# Patient Record
Sex: FEMALE | Race: WHITE | NOT HISPANIC OR LATINO | Employment: FULL TIME | URBAN - METROPOLITAN AREA
[De-identification: names, ages, dates, MRNs, and addresses within clinical notes are randomized per-mention and may not be internally consistent; named-entity substitution may affect disease eponyms.]

---

## 2017-05-25 DIAGNOSIS — J45.20 MILD INTERMITTENT ASTHMA WITHOUT COMPLICATION: ICD-10-CM

## 2017-05-26 RX ORDER — ALBUTEROL SULFATE 90 UG/1
AEROSOL, METERED RESPIRATORY (INHALATION)
Qty: 8.5 G | Refills: 0 | Status: SHIPPED | OUTPATIENT
Start: 2017-05-26 | End: 2017-07-28 | Stop reason: SDUPTHER

## 2017-06-05 ENCOUNTER — TELEPHONE (OUTPATIENT)
Dept: FAMILY MEDICINE | Facility: CLINIC | Age: 57
End: 2017-06-05

## 2017-06-05 DIAGNOSIS — Z00.00 ANNUAL PHYSICAL EXAM: Primary | ICD-10-CM

## 2017-06-05 DIAGNOSIS — E55.9 VITAMIN D DEFICIENCY: ICD-10-CM

## 2017-06-05 NOTE — TELEPHONE ENCOUNTER
----- Message from Soledad Summers sent at 6/5/2017  2:45 PM CDT -----  Contact: Pt [phone]  Doctor appointment and lab have been scheduled.  Please link lab orders to the lab appointment.  Date of doctor appointment:  07-28-17  Physical or EP:annual   Date of lab appointment:  07-28-17  Comments:

## 2017-07-28 ENCOUNTER — OFFICE VISIT (OUTPATIENT)
Dept: FAMILY MEDICINE | Facility: CLINIC | Age: 57
End: 2017-07-28
Payer: COMMERCIAL

## 2017-07-28 ENCOUNTER — LAB VISIT (OUTPATIENT)
Dept: LAB | Facility: HOSPITAL | Age: 57
End: 2017-07-28
Attending: INTERNAL MEDICINE
Payer: COMMERCIAL

## 2017-07-28 VITALS
BODY MASS INDEX: 30.93 KG/M2 | HEIGHT: 67 IN | HEART RATE: 64 BPM | SYSTOLIC BLOOD PRESSURE: 109 MMHG | WEIGHT: 197.06 LBS | DIASTOLIC BLOOD PRESSURE: 67 MMHG | TEMPERATURE: 98 F

## 2017-07-28 DIAGNOSIS — Z23 NEED FOR PNEUMOCOCCAL VACCINATION: ICD-10-CM

## 2017-07-28 DIAGNOSIS — N95.1 MENOPAUSAL SYNDROME (HOT FLASHES): ICD-10-CM

## 2017-07-28 DIAGNOSIS — Z00.00 ANNUAL PHYSICAL EXAM: ICD-10-CM

## 2017-07-28 DIAGNOSIS — Z00.00 ANNUAL PHYSICAL EXAM: Primary | ICD-10-CM

## 2017-07-28 DIAGNOSIS — E78.5 HYPERLIPIDEMIA, UNSPECIFIED HYPERLIPIDEMIA TYPE: ICD-10-CM

## 2017-07-28 DIAGNOSIS — J30.89 ENVIRONMENTAL AND SEASONAL ALLERGIES: ICD-10-CM

## 2017-07-28 DIAGNOSIS — E66.9 OBESITY (BMI 30-39.9): ICD-10-CM

## 2017-07-28 DIAGNOSIS — M54.30 SCIATICA, UNSPECIFIED LATERALITY: ICD-10-CM

## 2017-07-28 DIAGNOSIS — E55.9 VITAMIN D DEFICIENCY: ICD-10-CM

## 2017-07-28 DIAGNOSIS — F32.A DEPRESSION, UNSPECIFIED DEPRESSION TYPE: ICD-10-CM

## 2017-07-28 DIAGNOSIS — J45.20 MILD INTERMITTENT ASTHMA WITHOUT COMPLICATION: ICD-10-CM

## 2017-07-28 LAB
25(OH)D3+25(OH)D2 SERPL-MCNC: 32 NG/ML
ALBUMIN SERPL BCP-MCNC: 3.6 G/DL
ALP SERPL-CCNC: 80 U/L
ALT SERPL W/O P-5'-P-CCNC: 20 U/L
ANION GAP SERPL CALC-SCNC: 13 MMOL/L
AST SERPL-CCNC: 20 U/L
BASOPHILS # BLD AUTO: 0.04 K/UL
BASOPHILS NFR BLD: 0.6 %
BILIRUB SERPL-MCNC: 0.5 MG/DL
BUN SERPL-MCNC: 21 MG/DL
CALCIUM SERPL-MCNC: 9.5 MG/DL
CHLORIDE SERPL-SCNC: 103 MMOL/L
CHOLEST/HDLC SERPL: 5.3 {RATIO}
CO2 SERPL-SCNC: 24 MMOL/L
CREAT SERPL-MCNC: 0.8 MG/DL
DIFFERENTIAL METHOD: NORMAL
EOSINOPHIL # BLD AUTO: 0.3 K/UL
EOSINOPHIL NFR BLD: 4.1 %
ERYTHROCYTE [DISTWIDTH] IN BLOOD BY AUTOMATED COUNT: 13.2 %
EST. GFR  (AFRICAN AMERICAN): >60 ML/MIN/1.73 M^2
EST. GFR  (NON AFRICAN AMERICAN): >60 ML/MIN/1.73 M^2
GLUCOSE SERPL-MCNC: 92 MG/DL
HCT VFR BLD AUTO: 38.7 %
HDL/CHOLESTEROL RATIO: 18.8 %
HDLC SERPL-MCNC: 255 MG/DL
HDLC SERPL-MCNC: 48 MG/DL
HGB BLD-MCNC: 12.9 G/DL
LDLC SERPL CALC-MCNC: 173 MG/DL
LYMPHOCYTES # BLD AUTO: 2.2 K/UL
LYMPHOCYTES NFR BLD: 30.3 %
MCH RBC QN AUTO: 29.2 PG
MCHC RBC AUTO-ENTMCNC: 33.3 G/DL
MCV RBC AUTO: 88 FL
MONOCYTES # BLD AUTO: 0.4 K/UL
MONOCYTES NFR BLD: 5.5 %
NEUTROPHILS # BLD AUTO: 4.2 K/UL
NEUTROPHILS NFR BLD: 59.2 %
NONHDLC SERPL-MCNC: 207 MG/DL
PLATELET # BLD AUTO: 338 K/UL
PMV BLD AUTO: 11.3 FL
POTASSIUM SERPL-SCNC: 4.1 MMOL/L
PROT SERPL-MCNC: 7.3 G/DL
RBC # BLD AUTO: 4.42 M/UL
SODIUM SERPL-SCNC: 140 MMOL/L
TRIGL SERPL-MCNC: 170 MG/DL
WBC # BLD AUTO: 7.13 K/UL

## 2017-07-28 PROCEDURE — 85025 COMPLETE CBC W/AUTO DIFF WBC: CPT

## 2017-07-28 PROCEDURE — 36415 COLL VENOUS BLD VENIPUNCTURE: CPT | Mod: PO

## 2017-07-28 PROCEDURE — 99999 PR PBB SHADOW E&M-EST. PATIENT-LVL III: CPT | Mod: PBBFAC,,, | Performed by: INTERNAL MEDICINE

## 2017-07-28 PROCEDURE — 90732 PPSV23 VACC 2 YRS+ SUBQ/IM: CPT | Mod: S$GLB,,, | Performed by: INTERNAL MEDICINE

## 2017-07-28 PROCEDURE — 80061 LIPID PANEL: CPT

## 2017-07-28 PROCEDURE — 80053 COMPREHEN METABOLIC PANEL: CPT

## 2017-07-28 PROCEDURE — 82306 VITAMIN D 25 HYDROXY: CPT

## 2017-07-28 PROCEDURE — 90471 IMMUNIZATION ADMIN: CPT | Mod: S$GLB,,, | Performed by: INTERNAL MEDICINE

## 2017-07-28 PROCEDURE — 99396 PREV VISIT EST AGE 40-64: CPT | Mod: 25,S$GLB,, | Performed by: INTERNAL MEDICINE

## 2017-07-28 RX ORDER — BUPROPION HYDROCHLORIDE 300 MG/1
300 TABLET ORAL EVERY MORNING
Qty: 90 TABLET | Refills: 3 | Status: SHIPPED | OUTPATIENT
Start: 2017-07-28 | End: 2018-08-19 | Stop reason: SDUPTHER

## 2017-07-28 RX ORDER — ALBUTEROL SULFATE 90 UG/1
1-2 AEROSOL, METERED RESPIRATORY (INHALATION) EVERY 6 HOURS PRN
Qty: 18 G | Refills: 3 | Status: SHIPPED | OUTPATIENT
Start: 2017-07-28 | End: 2018-11-20 | Stop reason: SDUPTHER

## 2017-07-28 NOTE — PROGRESS NOTES
Subjective:        Patient ID: Amita Lewis is a 56 y.o. female.    Chief Complaint: Annual Exam    HPI   Amita Lewis presents for annual exam.  Feels well, no new complaints today.    1. Allergies: pt reports her allergies really flared up due to poor air quality and things she breathes in while at work.  She has found a good regimen that works for her to control these sx: OTC Allegra, nasal saline and OTC Flonase daily.  She is also using an ointment for her eyes prescribed by her eye doctor that helps with the irritation, redness.    2. Weight: Pt was able to lose weight with regular walking, myfitness pal when she was using it regularly.  She has gained some weight back due to not being adherent but plans to resume.  The weight loss helped with heartburn, mood and she felt a lot better overall.    Review of Systems   Constitutional: Negative for unexpected weight change.   HENT: Negative for hearing loss, sore throat and trouble swallowing.    Eyes: Negative for visual disturbance (glasses).   Respiratory: Negative for chest tightness, shortness of breath and wheezing.    Cardiovascular: Negative for chest pain and leg swelling.   Gastrointestinal: Negative for abdominal pain, blood in stool, constipation and diarrhea.   Genitourinary: Negative for difficulty urinating, hematuria, vaginal bleeding and vaginal discharge.   Musculoskeletal: Positive for back pain (chronic, managed with Ibuprofen qhs).   Skin: Negative for rash.   Allergic/Immunologic: Positive for environmental allergies.   Psychiatric/Behavioral: Negative for dysphoric mood. The patient is not nervous/anxious.            Objective:        Vitals:    07/28/17 0828   BP: 109/67   Pulse: 64   Temp: 98.1 °F (36.7 °C)     Physical Exam   Constitutional: She is oriented to person, place, and time. She appears well-developed and well-nourished. No distress.   HENT:   Head: Normocephalic and atraumatic.   Right Ear: External ear  normal.   Left Ear: External ear normal.   Nose: Nose normal.   Mouth/Throat: Oropharynx is clear and moist. No oropharyngeal exudate.   - bilateral ear canals clear, tympanic membranes visualized - normal color and light reflex   Eyes: Conjunctivae and EOM are normal.   Neck: Normal range of motion. Neck supple. No thyromegaly present.   Cardiovascular: Normal rate, regular rhythm and normal heart sounds.    No murmur heard.  Pulmonary/Chest: Effort normal and breath sounds normal. No respiratory distress. She has no wheezes. She has no rales.   Abdominal: Soft. Bowel sounds are normal. She exhibits no distension. There is no tenderness. There is no guarding.   Musculoskeletal: She exhibits no edema.   Lymphadenopathy:     She has no cervical adenopathy.   Neurological: She is alert and oriented to person, place, and time.   Skin: Skin is warm and dry. No rash noted. No erythema.   Psychiatric: She has a normal mood and affect. Her behavior is normal. Judgment and thought content normal.   Vitals reviewed.          Assessment:         1. Annual physical exam    2. Need for pneumococcal vaccination    3. Mild intermittent asthma without complication    4. Depression, unspecified depression type    5. Sciatica, unspecified laterality    6. Menopausal syndrome (hot flashes)    7. Vitamin D deficiency    8. Hyperlipidemia, unspecified hyperlipidemia type    9. Environmental and seasonal allergies    10. Obesity (BMI 30-39.9)              Plan:         Amita was seen today for annual exam.    Diagnoses and all orders for this visit:    Annual physical exam  - fasting labs drawn this AM, results still pending  - Pneumovax today given asthma  - pt received zoster 2014  - gets flu shot every October    Need for pneumococcal vaccination  -     (In Office Administered) Pneumococcal Polysaccharide Vaccine (23 Valent) (SQ/IM)    Mild intermittent asthma without complication: No acute issues; Albuterol PRN; Pneumovax today.  -      (In Office Administered) Pneumococcal Polysaccharide Vaccine (23 Valent) (SQ/IM)  -     PROAIR HFA 90 mcg/actuation inhaler; Inhale 1-2 puffs into the lungs every 6 (six) hours as needed for Wheezing or Shortness of Breath (chest tightness). Rescue    Depression, unspecified depression type: Stable with regular exercise, Wellbutrin 300mg qAM  -     buPROPion (WELLBUTRIN XL) 300 MG 24 hr tablet; Take 1 tablet (300 mg total) by mouth every morning.    Sciatica, unspecified laterality: Stable; Ibuprofen qhs    Menopausal syndrome (hot flashes): No acute issues; Estradiol vaginal cream; follow up with gyn    Vitamin D deficiency: Vit D level pending    Hyperlipidemia, unspecified hyperlipidemia type: Could not tolerate statin.  Pt incorporated more fruits/vegetables into diet, started taking flax seed instead of fish oil.  Fasting lipid pending.    Environmental and seasonal allergies: Currently well controlled w Allegra, Flonase and nasal saline.  Advised pt to let me know if these does not continue to control her sx, could add Montelukast.    Obesity (BMI 30-39.9): Pt planning to resume [employer] pal to lose weight, continue with regular exercise.        Follow up pending lab results.

## 2017-07-28 NOTE — PROGRESS NOTES
Two patient identifiers verified.  Allergies reviewed.   Pneumovax 23 IM administered to Left deltoid per MD order.  Patient tolerated injection well; no redness, bleeding, or bruising noted to injection site.  Patient instructed to remain in clinic setting for 15 minutes.  Verbalizes understanding.

## 2017-07-31 DIAGNOSIS — M54.30 SCIATICA, UNSPECIFIED LATERALITY: ICD-10-CM

## 2017-07-31 RX ORDER — IBUPROFEN 800 MG/1
800 TABLET ORAL EVERY 8 HOURS PRN
Qty: 270 TABLET | Refills: 3 | Status: SHIPPED | OUTPATIENT
Start: 2017-07-31 | End: 2018-11-20 | Stop reason: SDUPTHER

## 2017-07-31 NOTE — TELEPHONE ENCOUNTER
----- Message from Yoon Guillory sent at 7/31/2017  2:55 PM CDT -----  Contact: Self/ [phone]   Type: Rx    Name of medication(s):  ibuprofen (ADVIL,MOTRIN) 800 MG tablet    Is this a refill? New rx? Refill     Who prescribed medication? Dr. Zepeda     Pharmacy Name, Phone, & Location: [facility] on file     Comments: pt is calling to have a refill on the medication above. Please call and advise     Thank you

## 2017-08-25 ENCOUNTER — TELEPHONE (OUTPATIENT)
Dept: FAMILY MEDICINE | Facility: CLINIC | Age: 57
End: 2017-08-25

## 2017-08-25 NOTE — TELEPHONE ENCOUNTER
Patient returning my call in regards to the Fit stool sample kit. She will call for nurse appointment when she returns from vacation.

## 2017-08-25 NOTE — TELEPHONE ENCOUNTER
----- Message from Patricia Zepeda MD sent at 8/25/2017 10:45 AM CDT -----  Regarding: FW: needs FIT      ----- Message -----  From: Patricia Zepeda MD  Sent: 7/28/2017   8:58 AM  To: Patricia Zepeda MD  Subject: needs FIT

## 2017-09-11 ENCOUNTER — PATIENT MESSAGE (OUTPATIENT)
Dept: FAMILY MEDICINE | Facility: CLINIC | Age: 57
End: 2017-09-11

## 2017-09-11 DIAGNOSIS — M54.30 SCIATICA, UNSPECIFIED LATERALITY: Primary | ICD-10-CM

## 2017-09-12 ENCOUNTER — PATIENT MESSAGE (OUTPATIENT)
Dept: FAMILY MEDICINE | Facility: CLINIC | Age: 57
End: 2017-09-12

## 2017-09-12 DIAGNOSIS — M54.30 SCIATICA, UNSPECIFIED LATERALITY: Primary | ICD-10-CM

## 2017-09-13 RX ORDER — GABAPENTIN 300 MG/1
300 CAPSULE ORAL 3 TIMES DAILY
Qty: 90 CAPSULE | Refills: 1 | Status: SHIPPED | OUTPATIENT
Start: 2017-09-13 | End: 2017-09-19

## 2017-09-15 NOTE — PROGRESS NOTES
Subjective:      Patient ID: Amita Lewis is a 56 y.o. female.    Chief Complaint: Low-back Pain (left)    Ms Lewis is a 55 yo female here for evaluation of left low back pain.  She has had back and left leg pain for the past 2 weeks.  She had back and leg pain in 2007 and she did PT and JILLIAN, and then had a surgery L4-5 microdiscectomy 2007, post surgery she was back in the hospital with lumbar drains, and she had another surgery.  Post second surgery the pain was better.  She has been able to manage her pain and do lot of stretches and ibuprofen at night.  She was able to walk.  The pain is now in the left buttock and the left calf.  She has some numbness in the foot.  She has more pain in the back of the left calf.  The pain is also in the buttock.  The leg pain is not constant.  The pain is gone with sitting and worse with standing and walking.  She was told she has DDD.  She is concerned disc has collapsed.  MRI from 2015 shows DDD at L4-5 and L3-4.  She has been going to chiropractor to manage.  She has not done PT recently.  She does work on tightening the core.  She was given gabapentin but has not started it.  The ibuprofen 800mg po BID.  Pain in the back is constant.  Pain is 1/10 now, worst 8/10 with walking, best 0/10 resting    Past Medical History:  No date: Abnormal Pap smear  No date: Asthma  3/17/2016: Depression  3/17/2016: Environmental and seasonal allergies  3/17/2016: Heartburn  03/22/2016: HLD (hyperlipidemia)      Comment: 2017 ASCVD 5.2%; can't tolerate statins  3/17/2016: Menopausal syndrome (hot flashes)  3/17/2016: Mild intermittent asthma without complication  3/17/2016: Obesity (BMI 30-39.9)  3/17/2016: Sciatica    Past Surgical History:  No date: CERVICAL BIOPSY  W/ LOOP ELECTRODE EXCISION  No date: microdiscetomy      Comment: L4-L5  2001: OVARIAN CYST REMOVAL    Review of patient's family history indicates:    Cancer                         Father                       Comment: lung; non smoker, worked in railroad and               gas station    Breast cancer                  Mother                    Heart failure                  Mother                    Diabetes Mellitus              Mother                      Comment: ?secondary to chronic steroids    Ovarian cancer                 Neg Hx                    Hypertension                   Neg Hx                      Social History    Marital status: Single              Spouse name:                       Years of education:                 Number of children:               Social History Main Topics    Smoking status: Current Some Day Smoker                                                      Packs/day: 0.00      Years: 0.00           Types: Cigarettes    Smokeless tobacco: Never Used                        Alcohol use: Yes           0.0 oz/week    Drug use: No              Sexual activity: Yes               Partners with: Male       Birth control/protection: None    Social History Narrative    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    Works for [employer]    Walks a lot at work and for exercise    Going to [place] Aug 2017    Zoster 11/4/14        Current Outpatient Prescriptions:  ASPIRIN (ASPIR-81 ORAL), Take 1 tablet by mouth once daily., Disp: , Rfl:   buPROPion (WELLBUTRIN XL) 300 MG 24 hr tablet, Take 1 tablet (300 mg total) by mouth every morning., Disp: 90 tablet, Rfl: 3  estradiol (ESTRACE) 0.01 % (0.1 mg/gram) vaginal cream, Use 1/2 gram twice weekly, Disp: 42.5 g, Rfl: 10  fexofenadine (ALLEGRA) 180 MG tablet, Take 1 tablet (180 mg total) by mouth once daily., Disp: 90 tablet, Rfl: 3  FLAXSEED ORAL, Take by mouth., Disp: , Rfl:   fluticasone (FLONASE) 50 mcg/actuation nasal spray, 1 spray by Each Nare route 2 (two) times daily as needed for Rhinitis or Allergies., Disp: 1 Bottle, Rfl: 6  gabapentin (NEURONTIN) 300 MG capsule, Take 1 capsule (300 mg total) by mouth 3 (three) times daily., Disp: 90 capsule,  Rfl: 1  ibuprofen (ADVIL,MOTRIN) 800 MG tablet, Take 1 tablet (800 mg total) by mouth every 8 (eight) hours as needed for Pain., Disp: 270 tablet, Rfl: 3  LACTOBAC NO.41/BIFIDOBACT NO.7 (PROBIOTIC-10 ORAL), Take by mouth., Disp: , Rfl:   multivitamin (ONE DAILY MULTIVITAMIN) per tablet, Take 1 tablet by mouth every evening. , Disp: , Rfl:   PROAIR HFA 90 mcg/actuation inhaler, Inhale 1-2 puffs into the lungs every 6 (six) hours as needed for Wheezing or Shortness of Breath (chest tightness). Rescue, Disp: 18 g, Rfl: 3    No current facility-administered medications for this visit.       Review of patient's allergies indicates:  No Known Allergies          Review of Systems   Constitution: Negative for weight gain and weight loss.   Cardiovascular: Negative for chest pain.   Respiratory: Negative for shortness of breath.    Musculoskeletal: Positive for back pain (left leg). Negative for joint pain and joint swelling.   Gastrointestinal: Negative for abdominal pain and bowel incontinence.   Genitourinary: Negative for bladder incontinence.   Neurological: Negative for numbness.         Objective:        General: Amita is well-developed, well-nourished, appears stated age, in no acute distress, alert and oriented to time, place and person.     General    Vitals reviewed.  Constitutional: She is oriented to person, place, and time. She appears well-developed and well-nourished.   HENT:   Head: Normocephalic and atraumatic.   Pulmonary/Chest: Effort normal.   Neurological: She is alert and oriented to person, place, and time.   Psychiatric: She has a normal mood and affect. Her behavior is normal. Judgment and thought content normal.     General Musculoskeletal Exam   Gait: normal     Right Ankle/Foot Exam     Tests   Heel Walk: able to perform  Tiptoe Walk: able to perform    Left Ankle/Foot Exam     Tests   Heel Walk: able to perform  Tiptoe Walk: able to perform  Back (L-Spine & T-Spine) / Neck (C-Spine) Exam      Tenderness Left paramedian tenderness of the Sacrum.     Back (L-Spine & T-Spine) Range of Motion   Extension: 20   Flexion: 90   Lateral Bend Right: 20   Lateral Bend Left: 20   Rotation Right: 40   Rotation Left: 40     Spinal Sensation   Right Side Sensation  C-Spine Level: normal   L-Spine Level: normal  S-Spine Level: normal  Left Side Sensation  C-Spine Level: normal  L-Spine Level: normal  S-Spine Level: normal    Back (L-Spine & T-Spine) Tests   Right Side Tests  Straight leg raise:      Sitting SLR: > 70 degrees      Left Side Tests  Straight leg raise:     Sitting SLR: > 70 degrees          Other She has no scoliosis .  Spinal Kyphosis:  Absent      Muscle Strength   Right Upper Extremity   Biceps: 5/5/5   Deltoid:  5/5  Triceps:  5/5  Wrist Extension: 5/5/5   Finger Flexors:  5/5  Left Upper Extremity  Biceps: 5/5/5   Deltoid:  5/5  Triceps:  5/5  Wrist Extension: 5/5/5   Finger Flexors:  5/5  Right Lower Extremity   Hip Flexion: 5/5   Quadriceps:  5/5   Anterior tibial:  5/5/5  EHL:  5/5  Left Lower Extremity   Hip Flexion: 5/5   Quadriceps:  5/5   Anterior tibial:  5/5/5   EHL:  5/5    Reflexes     Left Side  Biceps:  2+  Triceps:  2+  Brachioradialis:  2+  Quadriceps:  2+  Achilles:  2+  Left Moctezuma's Sign:  Absent  Babinski Sign:  absent    Right Side   Biceps:  2+  Triceps:  2+  Brachioradialis:  2+  Quadriceps:  2+  Achilles:  2+  Right Moctezuma's Sign:  absent  Babinski Sign:  absent    Vascular Exam     Right Pulses        Carotid:                  2+    Left Pulses        Carotid:                  2+              Assessment:       1. Chronic left-sided low back pain with left-sided sciatica    2. DDD (degenerative disc disease), lumbar           Plan:       Orders Placed This Encounter    MRI Lumbar Spine W WO Contrast    X-Ray Lumbar Complete With Flex And Ext    Ambulatory Referral to Physical/Occupational Therapy     More than 50% of the total time of 45 minutes was spent in counseling  on diagnosis and treatment options.  We discussed back pain and the nature of back pain.  We discussed that it can improve and that it is not one thing that causes the pain but an accumulation of multiple things that we do.  We discussed that We discussed posture sitting and the importance of trying to sit better.  We discussed the benefits of therapy and exercise and continuing to move.  We discussed healthy back program, her work schedule is hard.  We discussed the possibility of JILLIAN as an option, she is reluctant to try another JILLIAN because they did not work and they hit her nerve.  I reviewed her old MRI CD from 2015, and does appear to be space from previous surgery.   all of her Cds were given back.  1. PT for back strengthening, core strengthening, stretching and myofascial release at [facility]  2.  Ibuprofen 800 TID  3.  We discussed a steroid taper but not interested  4.  She will try the gabapentin, we discussed trying at night first  5.  X-ray lumbar  6.  MRI with and without contrast  7.  RTC after MRI    Follow-up: No Follow-up on file. If there are any questions prior to this, the patient was instructed to contact the office.

## 2017-09-18 ENCOUNTER — HOSPITAL ENCOUNTER (OUTPATIENT)
Dept: RADIOLOGY | Facility: OTHER | Age: 57
Discharge: HOME | End: 2017-09-18
Attending: PHYSICAL MEDICINE & REHABILITATION
Payer: COMMERCIAL

## 2017-09-18 ENCOUNTER — OFFICE VISIT (OUTPATIENT)
Dept: SPINE | Facility: CLINIC | Age: 57
End: 2017-09-18
Attending: PHYSICAL MEDICINE & REHABILITATION
Payer: COMMERCIAL

## 2017-09-18 VITALS
WEIGHT: 199 LBS | HEIGHT: 67 IN | DIASTOLIC BLOOD PRESSURE: 96 MMHG | HEART RATE: 76 BPM | SYSTOLIC BLOOD PRESSURE: 165 MMHG | BODY MASS INDEX: 31.23 KG/M2

## 2017-09-18 DIAGNOSIS — G89.29 CHRONIC LEFT-SIDED LOW BACK PAIN WITH LEFT-SIDED SCIATICA: ICD-10-CM

## 2017-09-18 DIAGNOSIS — M54.42 CHRONIC LEFT-SIDED LOW BACK PAIN WITH LEFT-SIDED SCIATICA: ICD-10-CM

## 2017-09-18 DIAGNOSIS — M54.42 CHRONIC LEFT-SIDED LOW BACK PAIN WITH LEFT-SIDED SCIATICA: Primary | ICD-10-CM

## 2017-09-18 DIAGNOSIS — M51.36 DDD (DEGENERATIVE DISC DISEASE), LUMBAR: ICD-10-CM

## 2017-09-18 DIAGNOSIS — G89.29 CHRONIC LEFT-SIDED LOW BACK PAIN WITH LEFT-SIDED SCIATICA: Primary | ICD-10-CM

## 2017-09-18 PROCEDURE — 3008F BODY MASS INDEX DOCD: CPT | Mod: S$GLB,,, | Performed by: PHYSICAL MEDICINE & REHABILITATION

## 2017-09-18 PROCEDURE — 72114 X-RAY EXAM L-S SPINE BENDING: CPT | Mod: TC

## 2017-09-18 PROCEDURE — 72114 X-RAY EXAM L-S SPINE BENDING: CPT | Mod: 26,,, | Performed by: RADIOLOGY

## 2017-09-18 PROCEDURE — 99204 OFFICE O/P NEW MOD 45 MIN: CPT | Mod: S$GLB,,, | Performed by: PHYSICAL MEDICINE & REHABILITATION

## 2017-09-18 PROCEDURE — 99999 PR PBB SHADOW E&M-EST. PATIENT-LVL III: CPT | Mod: PBBFAC,,, | Performed by: PHYSICAL MEDICINE & REHABILITATION

## 2017-09-18 NOTE — LETTER
September 18, 2017      Patricia Zepeda MD  [address] [facility] [address]           [facility]  [address], [address]  [facility] [address]  Phone: [phone]  Fax: [phone]          Patient: Amita Lewis   MR Number: [id number]   YOB: 1960   Date of Visit: 9/18/2017       Dear Dr. Patricia Zepeda:    Thank you for referring Amita Lewis to me for evaluation. Attached you will find relevant portions of my assessment and plan of care.    If you have questions, please do not hesitate to call me. I look forward to following Amita Lewis along with you.    Sincerely,    Imelda Orozco MD    Enclosure  CC:  No Recipients    If you would like to receive this communication electronically, please contact [email or web address] or [phone] to request more information on [email or web address] Link access.    For providers and/or their staff who would like to refer a patient to [facility], please contact us through our one-stop-shop provider referral line, [facility], at [phone].    If you feel you have received this communication in error or would no longer like to receive these types of communications, please e-mail [email or web address]

## 2017-09-19 ENCOUNTER — PATIENT MESSAGE (OUTPATIENT)
Dept: FAMILY MEDICINE | Facility: CLINIC | Age: 57
End: 2017-09-19

## 2017-09-21 ENCOUNTER — PATIENT MESSAGE (OUTPATIENT)
Dept: FAMILY MEDICINE | Facility: CLINIC | Age: 57
End: 2017-09-21

## 2017-09-21 DIAGNOSIS — B37.2 CANDIDAL SKIN INFECTION: Primary | ICD-10-CM

## 2017-09-22 PROBLEM — B37.2 CANDIDAL SKIN INFECTION: Status: ACTIVE | Noted: 2017-09-22

## 2017-09-22 RX ORDER — NYSTATIN 100000 [USP'U]/G
POWDER TOPICAL
Qty: 30 G | Refills: 3 | Status: SHIPPED | OUTPATIENT
Start: 2017-09-22 | End: 2018-11-20 | Stop reason: SDUPTHER

## 2017-09-25 ENCOUNTER — HOSPITAL ENCOUNTER (OUTPATIENT)
Dept: RADIOLOGY | Facility: OTHER | Age: 57
Discharge: HOME | End: 2017-09-25
Attending: PHYSICAL MEDICINE & REHABILITATION
Payer: COMMERCIAL

## 2017-09-25 DIAGNOSIS — M51.36 DDD (DEGENERATIVE DISC DISEASE), LUMBAR: ICD-10-CM

## 2017-09-25 DIAGNOSIS — G89.29 CHRONIC LEFT-SIDED LOW BACK PAIN WITH LEFT-SIDED SCIATICA: ICD-10-CM

## 2017-09-25 DIAGNOSIS — M54.42 CHRONIC LEFT-SIDED LOW BACK PAIN WITH LEFT-SIDED SCIATICA: ICD-10-CM

## 2017-09-25 PROCEDURE — A9585 GADOBUTROL INJECTION: HCPCS | Performed by: PHYSICAL MEDICINE & REHABILITATION

## 2017-09-25 PROCEDURE — 25500020 PHARM REV CODE 255: Performed by: PHYSICAL MEDICINE & REHABILITATION

## 2017-09-25 PROCEDURE — 72158 MRI LUMBAR SPINE W/O & W/DYE: CPT | Mod: 26,,, | Performed by: RADIOLOGY

## 2017-09-25 PROCEDURE — 72158 MRI LUMBAR SPINE W/O & W/DYE: CPT | Mod: TC

## 2017-09-25 RX ORDER — GADOBUTROL 604.72 MG/ML
9 INJECTION INTRAVENOUS
Status: COMPLETED | OUTPATIENT
Start: 2017-09-25 | End: 2017-09-25

## 2017-09-25 RX ADMIN — GADOBUTROL 9 ML: 604.72 INJECTION INTRAVENOUS at 09:09

## 2017-10-06 DIAGNOSIS — Z12.11 COLON CANCER SCREENING: ICD-10-CM

## 2017-10-09 ENCOUNTER — OFFICE VISIT (OUTPATIENT)
Dept: SPINE | Facility: CLINIC | Age: 57
End: 2017-10-09
Attending: PHYSICAL MEDICINE & REHABILITATION
Payer: COMMERCIAL

## 2017-10-09 VITALS
BODY MASS INDEX: 31.98 KG/M2 | HEART RATE: 70 BPM | DIASTOLIC BLOOD PRESSURE: 57 MMHG | HEIGHT: 66 IN | SYSTOLIC BLOOD PRESSURE: 120 MMHG | WEIGHT: 199 LBS

## 2017-10-09 DIAGNOSIS — M51.36 DDD (DEGENERATIVE DISC DISEASE), LUMBAR: ICD-10-CM

## 2017-10-09 DIAGNOSIS — M47.26 OSTEOARTHRITIS OF SPINE WITH RADICULOPATHY, LUMBAR REGION: ICD-10-CM

## 2017-10-09 DIAGNOSIS — M54.42 CHRONIC LEFT-SIDED LOW BACK PAIN WITH LEFT-SIDED SCIATICA: Primary | ICD-10-CM

## 2017-10-09 DIAGNOSIS — G89.29 CHRONIC LEFT-SIDED LOW BACK PAIN WITH LEFT-SIDED SCIATICA: Primary | ICD-10-CM

## 2017-10-09 PROCEDURE — 99999 PR PBB SHADOW E&M-EST. PATIENT-LVL III: CPT | Mod: PBBFAC,,, | Performed by: PHYSICAL MEDICINE & REHABILITATION

## 2017-10-09 PROCEDURE — 99214 OFFICE O/P EST MOD 30 MIN: CPT | Mod: S$GLB,,, | Performed by: PHYSICAL MEDICINE & REHABILITATION

## 2017-10-09 NOTE — PROGRESS NOTES
Subjective:      Patient ID: Amita Lewis is a 56 y.o. female.    Chief Complaint: Follow-up    Ms Lewis is a 57 yo female here for follow up of low back pain and left leg pain.  She has had back pain since 2007,but flare 2 weeks before her last visit on 9/18/2017 and did an MRI and sent her to PT.  She had back and leg pain in 2007 and she did PT and JILLIAN, and then had a surgery L4-5 microdiscectomy 2007, post surgery she was back in the hospital with lumbar drains, and she had another surgery.  Post second surgery the pain was better.  She has been going to PT twice a week, she has had 6 visits, she does not feel like it is helping.  She is still having back and left leg pain.  She does not feel like it is better.  The leg pain is with lifting, stairs, and bending, and walking.  She is having some pain on the side of the but.  The pain goes down to the calf and the numbness of the whole front of the foot.  She feels like she has to  her foot higher.  She feels like she is limping so the knee is hurting.  Pain is 1/10 now, worst 8/10 walking, best 0/10 sitting and standing still.  She has had more lower back pain the past 2 weeks    MRI lumbar 9/25/2017  FINDINGS: The lumbar spine vertebral body heights and alignment are well-maintained.  There is no evidence of osseous fracture.    There is degenerative disc space narrowing most prominent at L3-L4 and L4-L5.  The disc spaces are otherwise well maintained.    The spinal cord terminates at the L1 level and its visualized portions are unremarkable.    The pre-and post vertebral soft tissues are unremarkable.    T12-L1: No evidence of disc bulge, facet arthropathy, neuroforaminal stenosis, or central canal stenosis.    L1-L2: There is degenerative facet arthropathy with ligamentous thickening.  No evidence of a disc bulge, central canal stenosis, or neuroforaminal stenosis.    L2-L3: There is degenerative facet hypertrophy with ligamentous thickening.   No evidence of a disc bulge, central canal stenosis or neuroforaminal stenosis.    L3-L4: There is a diffuse posterior disc bulge and bilateral facet hypertrophy with ligamentous thickening.  There is mild right-sided neuroforaminal stenosis.  The left neural foramen appears patent.  There is minimal central canal stenosis.    L4-L5: There is a diffuse posterior disc bulge and bilateral facet hypertrophy with ligamentous thickening.  There is mild bilateral neuroforaminal stenosis.  No central canal stenosis.    L5-S1: There is bilateral facet hypertrophy and ligamentous thickening.  No evidence of a disc bulge, central canal stenosis, or neuroforaminal stenosis.  Impression          #1. Degenerative change of the lumbar spine most significant at L3-L4 and L4-L5 as detailed above.  On the prior MRI there appears to have been a left-sided paracentral disc herniation at L4-L5 that is not seen on today's exam.        Past Medical History:  No date: Abnormal Pap smear  No date: Asthma  3/17/2016: Depression  3/17/2016: Environmental and seasonal allergies  3/17/2016: Heartburn  03/22/2016: HLD (hyperlipidemia)      Comment: 2017 ASCVD 5.2%; can't tolerate statins  3/17/2016: Menopausal syndrome (hot flashes)  3/17/2016: Mild intermittent asthma without complication  3/17/2016: Obesity (BMI 30-39.9)  3/17/2016: Sciatica    Past Surgical History:  No date: CERVICAL BIOPSY  W/ LOOP ELECTRODE EXCISION  No date: microdiscetomy      Comment: L4-L5  2001: OVARIAN CYST REMOVAL    Review of patient's family history indicates:    Cancer                         Father                      Comment: lung; non smoker, worked in railroad and               gas station    Breast cancer                  Mother                    Heart failure                  Mother                    Diabetes Mellitus              Mother                      Comment: ?secondary to chronic steroids    Ovarian cancer                 Neg Hx                     Hypertension                   Neg Hx                      Social History    Marital status: Single              Spouse name:                       Years of education:                 Number of children:               Social History Main Topics    Smoking status: Current Some Day Smoker                                                      Packs/day: 0.00      Years: 0.00           Types: Cigarettes    Smokeless tobacco: Never Used                        Alcohol use: Yes           0.0 oz/week    Drug use: No              Sexual activity: Yes               Partners with: Male       Birth control/protection: None    Social History Narrative    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    Works for [employer]    Walks a lot at work and for exercise    Going to [place] Aug 2017    [place] 11/4/14        Current Outpatient Prescriptions:  ASPIRIN (ASPIR-81 ORAL), Take 1 tablet by mouth once daily., Disp: , Rfl:   buPROPion (WELLBUTRIN XL) 300 MG 24 hr tablet, Take 1 tablet (300 mg total) by mouth every morning., Disp: 90 tablet, Rfl: 3  estradiol (ESTRACE) 0.01 % (0.1 mg/gram) vaginal cream, Use 1/2 gram twice weekly, Disp: 42.5 g, Rfl: 10  fexofenadine (ALLEGRA) 180 MG tablet, Take 1 tablet (180 mg total) by mouth once daily., Disp: 90 tablet, Rfl: 3  FLAXSEED ORAL, Take by mouth., Disp: , Rfl:   fluticasone (FLONASE) 50 mcg/actuation nasal spray, 1 spray by Each Nare route 2 (two) times daily as needed for Rhinitis or Allergies., Disp: 1 Bottle, Rfl: 6  ibuprofen (ADVIL,MOTRIN) 800 MG tablet, Take 1 tablet (800 mg total) by mouth every 8 (eight) hours as needed for Pain., Disp: 270 tablet, Rfl: 3  LACTOBAC NO.41/BIFIDOBACT NO.7 (PROBIOTIC-10 ORAL), Take by mouth., Disp: , Rfl:   multivitamin (ONE DAILY MULTIVITAMIN) per tablet, Take 1 tablet by mouth every evening. , Disp: , Rfl:   nystatin (MYCOSTATIN) powder, Apply to affected areas of skin twice daily as needed for skin infection.  Re treat as needed., Disp: 30  g, Rfl: 3  PROAIR HFA 90 mcg/actuation inhaler, Inhale 1-2 puffs into the lungs every 6 (six) hours as needed for Wheezing or Shortness of Breath (chest tightness). Rescue, Disp: 18 g, Rfl: 3    No current facility-administered medications for this visit.       Review of patient's allergies indicates:  No Known Allergies          Review of Systems   Constitution: Negative for weight gain and weight loss.   Cardiovascular: Negative for chest pain.   Respiratory: Negative for shortness of breath.    Musculoskeletal: Positive for back pain (left leg). Negative for joint pain and joint swelling.   Gastrointestinal: Negative for abdominal pain and bowel incontinence.   Genitourinary: Negative for bladder incontinence.   Neurological: Negative for numbness.         Objective:        General: Amita is well-developed, well-nourished, appears stated age, in no acute distress, alert and oriented to time, place and person.     General    Vitals reviewed.  Constitutional: She is oriented to person, place, and time. She appears well-developed and well-nourished.   HENT:   Head: Normocephalic and atraumatic.   Pulmonary/Chest: Effort normal.   Neurological: She is alert and oriented to person, place, and time.   Psychiatric: She has a normal mood and affect. Her behavior is normal. Judgment and thought content normal.     General Musculoskeletal Exam   Gait: normal     Right Ankle/Foot Exam     Tests   Heel Walk: able to perform  Tiptoe Walk: able to perform    Left Ankle/Foot Exam     Tests   Heel Walk: able to perform  Tiptoe Walk: able to perform  Back (L-Spine & T-Spine) / Neck (C-Spine) Exam     Tenderness Left paramedian tenderness of the Sacrum.     Back (L-Spine & T-Spine) Range of Motion   Extension: 20   Flexion: 90   Lateral Bend Right: 20   Lateral Bend Left: 20   Rotation Right: 40   Rotation Left: 40     Spinal Sensation   Right Side Sensation  C-Spine Level: normal   L-Spine Level: normal  S-Spine Level:  normal  Left Side Sensation  C-Spine Level: normal  L-Spine Level: normal  S-Spine Level: normal    Back (L-Spine & T-Spine) Tests   Right Side Tests  Straight leg raise:      Sitting SLR: > 70 degrees      Left Side Tests  Straight leg raise:     Sitting SLR: > 70 degrees          Other She has no scoliosis .  Spinal Kyphosis:  Absent      Muscle Strength   Right Upper Extremity   Biceps: 5/5/5   Deltoid:  5/5  Triceps:  5/5  Wrist Extension: 5/5/5   Finger Flexors:  5/5  Left Upper Extremity  Biceps: 5/5/5   Deltoid:  5/5  Triceps:  5/5  Wrist Extension: 5/5/5   Finger Flexors:  5/5  Right Lower Extremity   Hip Flexion: 5/5   Quadriceps:  5/5   Anterior tibial:  5/5/5  EHL:  5/5  Left Lower Extremity   Hip Flexion: 5/5   Quadriceps:  5/5   Anterior tibial:  5/5/5   EHL:  5/5    Reflexes     Left Side  Biceps:  2+  Triceps:  2+  Brachioradialis:  2+  Quadriceps:  2+  Achilles:  2+  Left Moctezuma's Sign:  Absent  Babinski Sign:  absent    Right Side   Biceps:  2+  Triceps:  2+  Brachioradialis:  2+  Quadriceps:  2+  Achilles:  2+  Right Moctezuma's Sign:  absent  Babinski Sign:  absent    Vascular Exam     Right Pulses        Carotid:                  2+    Left Pulses        Carotid:                  2+              Assessment:       1. Chronic left-sided low back pain with left-sided sciatica    2. DDD (degenerative disc disease), lumbar    3. Osteoarthritis of spine with radiculopathy, lumbar region           Plan:          1. Continue PT for back strengthening, core strengthening, stretching and myofascial release at [place].  She wants to give therapy more time prior to considering an injection  2.  Continue Ibuprofen 800 TID  3.  MRI of the lumbar spine was reviewed with her we compared it to her old MRI and the DDD at L3-4 and L4-5.  We discussed arthritis in the back.  We discussed narrowing where the nerves go, and we could try an JILLIAN Left L4 and L5.  She wants to wait for now  4. Gabapentin is an option, but  she has not been intersted5.  X-ray lumbar  6  RTC 6 weeks    Follow-up: Return in about 6 weeks (around 11/20/2017). If there are any questions prior to this, the patient was instructed to contact the office.

## 2017-10-27 ENCOUNTER — TELEPHONE (OUTPATIENT)
Dept: OBSTETRICS AND GYNECOLOGY | Facility: CLINIC | Age: 57
End: 2017-10-27

## 2017-10-27 DIAGNOSIS — Z12.31 ENCOUNTER FOR SCREENING MAMMOGRAM FOR BREAST CANCER: Primary | ICD-10-CM

## 2017-10-27 NOTE — TELEPHONE ENCOUNTER
----- Message from Trang Pedro sent at 10/27/2017  4:12 PM CDT -----  Contact: pt  _x  1st Request  _  2nd Request  _  3rd Request      Who:pt    Why: requesting [facility] orders     What Number to Call Back: [phone]    When to Expect a call back: (Before the end of the day)   -- if call after 3:00 call back will be tomorrow.

## 2017-11-20 NOTE — PROGRESS NOTES
Subjective:      Patient ID: Amita Lewis is a 57 y.o. female.    Chief Complaint: Follow-up (6 weeks)    Ms Lewis is a 56 yo female here for follow up of her back and leg pain.  She has had back pain since 2007,but flare in early september.  She had back and leg pain in 2007 and she did PT and JILLIAN, and then had a surgery L4-5 microdiscectomy 2007, post surgery she was back in the hospital with lumbar drains, and she had another surgery.  Post second surgery the pain was better.  She was last seen by me on 10/9/2017 was going to PT, but did not feel like it was helping.  We disucssed a left L4 and L5 TF but she was not interested.  Today, she is doing well.  She has good days and bad days.  She feels like PT is helping.  She feels like left leg getting stronger.  She is making improvements.  She still has the pain with walking, but not every time she walks.  She has been thinking about the injection.  She is willing to try the injection.  She has 1/10 now, worst 8/10 with walking.  Best 0/10 sitting.  The pain is not always gone with sitting, but she feels like her back will hurt.  She is going to get a new chair at work.  She is still taking the ibuprofen twice a day.  She has long days at work.  She works in the court.  The pain is not bad at night.  She is sleeping ok.      MRI lumbar 9/25/2017  FINDINGS: The lumbar spine vertebral body heights and alignment are well-maintained.  There is no evidence of osseous fracture.     There is degenerative disc space narrowing most prominent at L3-L4 and L4-L5.  The disc spaces are otherwise well maintained.     The spinal cord terminates at the L1 level and its visualized portions are unremarkable.     The pre-and post vertebral soft tissues are unremarkable.     T12-L1: No evidence of disc bulge, facet arthropathy, neuroforaminal stenosis, or central canal stenosis.     L1-L2: There is degenerative facet arthropathy with ligamentous thickening.  No evidence of  a disc bulge, central canal stenosis, or neuroforaminal stenosis.     L2-L3: There is degenerative facet hypertrophy with ligamentous thickening.  No evidence of a disc bulge, central canal stenosis or neuroforaminal stenosis.     L3-L4: There is a diffuse posterior disc bulge and bilateral facet hypertrophy with ligamentous thickening.  There is mild right-sided neuroforaminal stenosis.  The left neural foramen appears patent.  There is minimal central canal stenosis.     L4-L5: There is a diffuse posterior disc bulge and bilateral facet hypertrophy with ligamentous thickening.  There is mild bilateral neuroforaminal stenosis.  No central canal stenosis.     L5-S1: There is bilateral facet hypertrophy and ligamentous thickening.  No evidence of a disc bulge, central canal stenosis, or neuroforaminal stenosis.  Impression            #1. Degenerative change of the lumbar spine most significant at L3-L4 and L4-L5 as detailed above.  On the prior MRI there appears to have been a left-sided paracentral disc herniation at L4-L5 that is not seen on today's exam.    Past Medical History:  No date: Abnormal Pap smear  No date: Asthma  3/17/2016: Depression  3/17/2016: Environmental and seasonal allergies  3/17/2016: Heartburn  03/22/2016: HLD (hyperlipidemia)      Comment: 2017 ASCVD 5.2%; can't tolerate statins  3/17/2016: Menopausal syndrome (hot flashes)  3/17/2016: Mild intermittent asthma without complication  3/17/2016: Obesity (BMI 30-39.9)  3/17/2016: Sciatica    Past Surgical History:  No date: CERVICAL BIOPSY  W/ LOOP ELECTRODE EXCISION  No date: microdiscetomy      Comment: L4-L5  2001: OVARIAN CYST REMOVAL    Review of patient's family history indicates:    Cancer                         Father                      Comment: lung; non smoker, worked in [place] and               gas station    Breast cancer                  Mother                    Heart failure                  Mother                     Diabetes Mellitus              Mother                      Comment: ?secondary to chronic steroids    Ovarian cancer                 Neg Hx                    Hypertension                   Neg Hx                      Social History    Marital status: Single              Spouse name:                       Years of education:                 Number of children:               Social History Main Topics    Smoking status: Current Some Day Smoker                                                      Packs/day: 0.00      Years: 0.00           Types: Cigarettes    Smokeless tobacco: Never Used                        Alcohol use: Yes           0.0 oz/week    Drug use: No              Sexual activity: Yes               Partners with: Male       Birth control/protection: None    Social History Narrative    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    Works for [employer]    Walks a lot at work and for exercise    Going to [place] Aug 2017    [place] 11/4/14        Current Outpatient Prescriptions:  ASPIRIN (ASPIR-81 ORAL), Take 1 tablet by mouth once daily., Disp: , Rfl:   buPROPion (WELLBUTRIN XL) 300 MG 24 hr tablet, Take 1 tablet (300 mg total) by mouth every morning., Disp: 90 tablet, Rfl: 3  estradiol (ESTRACE) 0.01 % (0.1 mg/gram) vaginal cream, Use 1/2 gram twice weekly, Disp: 42.5 g, Rfl: 10  fexofenadine (ALLEGRA) 180 MG tablet, Take 1 tablet (180 mg total) by mouth once daily., Disp: 90 tablet, Rfl: 3  FLAXSEED ORAL, Take by mouth., Disp: , Rfl:   fluticasone (FLONASE) 50 mcg/actuation nasal spray, 1 spray by Each Nare route 2 (two) times daily as needed for Rhinitis or Allergies., Disp: 1 Bottle, Rfl: 6  ibuprofen (ADVIL,MOTRIN) 800 MG tablet, Take 1 tablet (800 mg total) by mouth every 8 (eight) hours as needed for Pain., Disp: 270 tablet, Rfl: 3  LACTOBAC NO.41/BIFIDOBACT NO.7 (PROBIOTIC-10 ORAL), Take by mouth., Disp: , Rfl:   multivitamin (ONE DAILY MULTIVITAMIN) per tablet, Take 1 tablet by mouth every  evening. , Disp: , Rfl:   nystatin (MYCOSTATIN) powder, Apply to affected areas of skin twice daily as needed for skin infection.  Re treat as needed., Disp: 30 g, Rfl: 3  PROAIR HFA 90 mcg/actuation inhaler, Inhale 1-2 puffs into the lungs every 6 (six) hours as needed for Wheezing or Shortness of Breath (chest tightness). Rescue, Disp: 18 g, Rfl: 3    No current facility-administered medications for this visit.       Review of patient's allergies indicates:  No Known Allergies          Review of Systems   Constitution: Negative for weight gain and weight loss.   Cardiovascular: Negative for chest pain.   Respiratory: Negative for shortness of breath.    Musculoskeletal: Positive for back pain (left leg). Negative for joint pain and joint swelling.   Gastrointestinal: Negative for abdominal pain and bowel incontinence.   Genitourinary: Negative for bladder incontinence.   Neurological: Negative for numbness.         Objective:        General: Amita is well-developed, well-nourished, appears stated age, in no acute distress, alert and oriented to time, place and person.     General    Vitals reviewed.  Constitutional: She is oriented to person, place, and time. She appears well-developed and well-nourished.   HENT:   Head: Normocephalic and atraumatic.   Pulmonary/Chest: Effort normal.   Neurological: She is alert and oriented to person, place, and time.   Psychiatric: She has a normal mood and affect. Her behavior is normal. Judgment and thought content normal.     General Musculoskeletal Exam   Gait: normal     Right Ankle/Foot Exam     Tests   Heel Walk: able to perform  Tiptoe Walk: able to perform    Left Ankle/Foot Exam     Tests   Heel Walk: able to perform  Tiptoe Walk: able to perform  Back (L-Spine & T-Spine) / Neck (C-Spine) Exam     Tenderness Right paramedian tenderness of the Sacrum. Left paramedian tenderness of the Sacrum.     Back (L-Spine & T-Spine) Range of Motion   Extension: 20   Flexion: 90    Lateral Bend Right: 20   Lateral Bend Left: 20   Rotation Right: 40   Rotation Left: 40     Spinal Sensation   Right Side Sensation  C-Spine Level: normal   L-Spine Level: normal  S-Spine Level: normal  Left Side Sensation  C-Spine Level: normal  L-Spine Level: normal  S-Spine Level: normal    Back (L-Spine & T-Spine) Tests   Right Side Tests  Straight leg raise:      Sitting SLR: > 70 degrees      Left Side Tests  Straight leg raise:     Sitting SLR: > 70 degrees          Other She has no scoliosis .  Spinal Kyphosis:  Absent      Muscle Strength   Right Lower Extremity   Hip Flexion: 5/5   Quadriceps:  5/5   Anterior tibial:  5/5/5  EHL:  5/5  Left Lower Extremity   Hip Flexion: 5/5   Quadriceps:  5/5   Anterior tibial:  5/5/5   EHL:  5/5    Reflexes     Left Side  Biceps:  2+  Triceps:  2+  Brachioradialis:  2+  Quadriceps:  2+  Achilles:  2+  Babinski Sign:  absent    Right Side   Biceps:  2+  Triceps:  2+  Brachioradialis:  2+  Quadriceps:  2+  Achilles:  2+  Babinski Sign:  absent    Vascular Exam     Right Pulses        Carotid:                  2+    Left Pulses        Carotid:                  2+              Assessment:       1. Chronic left-sided low back pain with left-sided sciatica    2. DDD (degenerative disc disease), lumbar    3. Osteoarthritis of spine with radiculopathy, lumbar region           Plan:       Orders Placed This Encounter    Procedure Order to [facility]     1. Continue PT for back strengthening, core strengthening, stretching and myofascial release at [place]. She feels like it is helping  2.  Continue Ibuprofen 800 TID  3.  JILLIAN Left L4 and L5 to be done with pain management  4.   She is getting a new chair at work that is more ergonomic.  I said I would write a letter after seeing what chair she is sitting in  5.  RTC 6 weeks    Follow-up: Return in about 6 weeks (around 1/2/2018). If there are any questions prior to this, the patient was instructed to contact the  office.

## 2017-11-21 ENCOUNTER — OFFICE VISIT (OUTPATIENT)
Dept: SPINE | Facility: CLINIC | Age: 57
End: 2017-11-21
Attending: PHYSICAL MEDICINE & REHABILITATION
Payer: COMMERCIAL

## 2017-11-21 VITALS
HEIGHT: 66 IN | WEIGHT: 199 LBS | SYSTOLIC BLOOD PRESSURE: 115 MMHG | HEART RATE: 68 BPM | DIASTOLIC BLOOD PRESSURE: 56 MMHG | BODY MASS INDEX: 31.98 KG/M2

## 2017-11-21 DIAGNOSIS — M51.36 DDD (DEGENERATIVE DISC DISEASE), LUMBAR: ICD-10-CM

## 2017-11-21 DIAGNOSIS — M54.42 CHRONIC LEFT-SIDED LOW BACK PAIN WITH LEFT-SIDED SCIATICA: Primary | ICD-10-CM

## 2017-11-21 DIAGNOSIS — G89.29 CHRONIC LEFT-SIDED LOW BACK PAIN WITH LEFT-SIDED SCIATICA: Primary | ICD-10-CM

## 2017-11-21 DIAGNOSIS — M47.26 OSTEOARTHRITIS OF SPINE WITH RADICULOPATHY, LUMBAR REGION: ICD-10-CM

## 2017-11-21 PROCEDURE — 99214 OFFICE O/P EST MOD 30 MIN: CPT | Mod: S$GLB,,, | Performed by: PHYSICAL MEDICINE & REHABILITATION

## 2017-11-21 PROCEDURE — 99999 PR PBB SHADOW E&M-EST. PATIENT-LVL III: CPT | Mod: PBBFAC,,, | Performed by: PHYSICAL MEDICINE & REHABILITATION

## 2017-12-07 ENCOUNTER — PATIENT MESSAGE (OUTPATIENT)
Dept: PAIN MEDICINE | Facility: CLINIC | Age: 57
End: 2017-12-07

## 2017-12-07 ENCOUNTER — TELEPHONE (OUTPATIENT)
Dept: PAIN MEDICINE | Facility: CLINIC | Age: 57
End: 2017-12-07

## 2017-12-07 NOTE — TELEPHONE ENCOUNTER
----- Message from Roas Humphreys sent at 12/7/2017 11:22 AM CST -----  Contact: pt  x_  1st Request  _  2nd Request  _  3rd Request    Who: DANIELLE OJEDA [[id number]]    Why: Patient would like to speak with staff in regards to rescheduling her injection that is scheduled for 12/15.    What Number to Call Back:[phone] or [phone]     When to Expect a call back: (Within 24 hours)    Please return the call at earliest convenience. Thanks!

## 2017-12-07 NOTE — TELEPHONE ENCOUNTER
Contacted and spoke to pt regarding rescheduling procedure. Pt stated she can reschedule with any provider she just needs the injection scheduled for 12/27/17. Pt was scheduled to have injection with Dr. Rowley.    Pt verbalized understanding

## 2017-12-13 ENCOUNTER — OFFICE VISIT (OUTPATIENT)
Dept: OBSTETRICS AND GYNECOLOGY | Facility: CLINIC | Age: 57
End: 2017-12-13
Attending: OBSTETRICS & GYNECOLOGY
Payer: COMMERCIAL

## 2017-12-13 ENCOUNTER — HOSPITAL ENCOUNTER (OUTPATIENT)
Dept: RADIOLOGY | Facility: OTHER | Age: 57
Discharge: HOME | End: 2017-12-13
Attending: OBSTETRICS & GYNECOLOGY
Payer: COMMERCIAL

## 2017-12-13 VITALS
BODY MASS INDEX: 31.35 KG/M2 | HEIGHT: 67 IN | WEIGHT: 199.75 LBS | DIASTOLIC BLOOD PRESSURE: 80 MMHG | SYSTOLIC BLOOD PRESSURE: 114 MMHG

## 2017-12-13 DIAGNOSIS — N76.0 VAGINITIS AND VULVOVAGINITIS: ICD-10-CM

## 2017-12-13 DIAGNOSIS — Z12.31 ENCOUNTER FOR SCREENING MAMMOGRAM FOR BREAST CANCER: ICD-10-CM

## 2017-12-13 DIAGNOSIS — Z01.419 WELL WOMAN EXAM: Primary | ICD-10-CM

## 2017-12-13 PROCEDURE — 77067 SCR MAMMO BI INCL CAD: CPT | Mod: TC

## 2017-12-13 PROCEDURE — 88175 CYTOPATH C/V AUTO FLUID REDO: CPT

## 2017-12-13 PROCEDURE — 77063 BREAST TOMOSYNTHESIS BI: CPT | Mod: 26,,, | Performed by: RADIOLOGY

## 2017-12-13 PROCEDURE — 99999 PR PBB SHADOW E&M-EST. PATIENT-LVL III: CPT | Mod: PBBFAC,,, | Performed by: OBSTETRICS & GYNECOLOGY

## 2017-12-13 PROCEDURE — 99396 PREV VISIT EST AGE 40-64: CPT | Mod: S$GLB,,, | Performed by: OBSTETRICS & GYNECOLOGY

## 2017-12-13 PROCEDURE — 77067 SCR MAMMO BI INCL CAD: CPT | Mod: 26,,, | Performed by: RADIOLOGY

## 2017-12-13 RX ORDER — METRONIDAZOLE 500 MG/1
500 TABLET ORAL 2 TIMES DAILY
Qty: 14 TABLET | Refills: 0 | Status: SHIPPED | OUTPATIENT
Start: 2017-12-13 | End: 2017-12-18

## 2017-12-13 RX ORDER — BORIC ACID
600 GRANULES (GRAM) MISCELLANEOUS NIGHTLY
Qty: 100 G | Refills: 0 | Status: SHIPPED | OUTPATIENT
Start: 2017-12-13 | End: 2017-12-27

## 2017-12-13 NOTE — PROGRESS NOTES
CC: Well woman exam    Amita Lewis is a 57 y.o. female  presents for a well woman exam.  LMP: No LMP recorded. Patient is not currently having periods (Reason: Premenopausal)..  Has had recurrent bv for about 1 year.  Daily probiotic has helped.  Reviewed hygiene, etc with patient.    Hx LEEP  & .  No abnormal pap after that.  Pap 10/15 negative;  Pap & HPV 12/16 negative.  Patient prefers yearly testing.    Past Medical History:   Diagnosis Date    Abnormal Pap smear     Asthma     Depression 3/17/2016    Environmental and seasonal allergies 3/17/2016    Heartburn 3/17/2016    HLD (hyperlipidemia) 2016 ASCVD 5.2%; can't tolerate statins    Menopausal syndrome (hot flashes) 3/17/2016    Mild intermittent asthma without complication 3/17/2016    Obesity (BMI 30-39.9) 3/17/2016    Sciatica 3/17/2016     Past Surgical History:   Procedure Laterality Date    CERVICAL BIOPSY  W/ LOOP ELECTRODE EXCISION      microdiscetomy      L4-L5    OVARIAN CYST REMOVAL       Social History     Social History    Marital status: Single     Spouse name: N/A    Number of children: N/A    Years of education: N/A     Social History Main Topics    Smoking status: Current Some Day Smoker     Types: Cigarettes    Smokeless tobacco: Never Used    Alcohol use 0.6 oz/week     1 Glasses of wine per week    Drug use: No    Sexual activity: Yes     Partners: Male     Birth control/ protection: None     Other Topics Concern    None     Social History Narrative    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    Works for [employer] at [employer]    Walks a lot at work and for exercise    Going to [place] Aug 2017    Zoster 15     Family History   Problem Relation Age of Onset    Cancer Father      lung; non smoker, worked in [employer] and gas station    Breast cancer Mother     Heart failure Mother     Diabetes Mellitus Mother      ?secondary to chronic steroids    Ovarian cancer Neg Hx      "Hypertension Neg Hx      OB History      Para Term  AB Living    0              SAB TAB Ectopic Multiple Live Births                       /80   Ht 5' 7" (1.702 m)   Wt 90.6 kg (199 lb 11.8 oz)   BMI 31.28 kg/m²       ROS:  GENERAL: Denies weight gain or weight loss. Feeling well overall.   SKIN: Denies rash or lesions.   HEAD: Denies head injury or headache.   NODES: Denies enlarged lymph nodes.   CHEST: Denies chest pain or shortness of breath.   CARDIOVASCULAR: Denies palpitations or left sided chest pain.   ABDOMEN: No abdominal pain, constipation, diarrhea, nausea, vomiting or rectal bleeding.   URINARY: No frequency, dysuria, hematuria, or burning on urination.  REPRODUCTIVE: See HPI.   BREASTS: The patient performs breast self-examination and denies pain, lumps, or nipple discharge.   HEMATOLOGIC: No easy bruisability or excessive bleeding.   MUSCULOSKELETAL: Denies joint pain or swelling.   NEUROLOGIC: Denies syncope or weakness.   PSYCHIATRIC: Denies depression, anxiety or mood swings.    PHYSICAL EXAM:    APPEARANCE: Well nourished, well developed, in no acute distress.  AFFECT: WNL, alert and oriented x 3  SKIN: No acne or hirsutism  NECK: Neck symmetric without masses or thyromegaly  NODES: No inguinal, cervical, axillary, or femoral lymph node enlargement  CHEST: Good respiratory effect  ABDOMEN: Soft.  No tenderness or masses.  No hepatosplenomegaly.  No hernias.  BREASTS: Symmetrical, no skin changes or visible lesions.  No palpable masses, nipple discharge bilaterally.  PELVIC: Normal external genitalia without lesions.  Normal hair distribution.  Adequate perineal body, normal urethral meatus.  Vagina moist and well rugated without lesions or discharge.  Cervix pink, without lesions, discharge or tenderness.  No significant cystocele or rectocele.  Bimanual exam shows uterus to be normal size, regular, mobile and nontender.  Adnexa without masses or tenderness.    EXTREMITIES: " No edema.    ASSESSMENT & PLAN      ICD-10-CM ICD-9-CM    1. Well woman exam Z01.419 V72.31 Liquid-based pap smear, screening   2. Vaginitis and vulvovaginitis N76.0 616.10 boric acid, bulk, Gran         Patient was counseled today on A.C.S. Pap guidelines and recommendations for yearly pelvic exams, mammograms and monthly self breast exams; to see her PCP for other health maintenance.

## 2017-12-27 ENCOUNTER — HOSPITAL ENCOUNTER (OUTPATIENT)
Facility: OTHER | Age: 57
Discharge: HOME | End: 2017-12-27
Attending: ANESTHESIOLOGY | Admitting: ANESTHESIOLOGY
Payer: COMMERCIAL

## 2017-12-27 VITALS
SYSTOLIC BLOOD PRESSURE: 140 MMHG | BODY MASS INDEX: 30.61 KG/M2 | OXYGEN SATURATION: 99 % | HEIGHT: 67 IN | TEMPERATURE: 99 F | WEIGHT: 195 LBS | RESPIRATION RATE: 16 BRPM | HEART RATE: 63 BPM | DIASTOLIC BLOOD PRESSURE: 67 MMHG

## 2017-12-27 DIAGNOSIS — M54.16 LUMBAR RADICULOPATHY: Primary | ICD-10-CM

## 2017-12-27 PROCEDURE — 64484 NJX AA&/STRD TFRM EPI L/S EA: CPT | Performed by: ANESTHESIOLOGY

## 2017-12-27 PROCEDURE — 63600175 PHARM REV CODE 636 W HCPCS: Performed by: ANESTHESIOLOGY

## 2017-12-27 PROCEDURE — 99152 MOD SED SAME PHYS/QHP 5/>YRS: CPT | Mod: ,,, | Performed by: ANESTHESIOLOGY

## 2017-12-27 PROCEDURE — 64483 NJX AA&/STRD TFRM EPI L/S 1: CPT | Mod: LT,,, | Performed by: ANESTHESIOLOGY

## 2017-12-27 PROCEDURE — 64484 NJX AA&/STRD TFRM EPI L/S EA: CPT | Mod: LT,,, | Performed by: ANESTHESIOLOGY

## 2017-12-27 PROCEDURE — 64483 NJX AA&/STRD TFRM EPI L/S 1: CPT | Performed by: ANESTHESIOLOGY

## 2017-12-27 PROCEDURE — 25500020 PHARM REV CODE 255: Performed by: ANESTHESIOLOGY

## 2017-12-27 PROCEDURE — 25000003 PHARM REV CODE 250: Performed by: ANESTHESIOLOGY

## 2017-12-27 RX ORDER — SODIUM CHLORIDE 9 MG/ML
INJECTION, SOLUTION INTRAVENOUS CONTINUOUS
Status: DISCONTINUED | OUTPATIENT
Start: 2017-12-27 | End: 2017-12-27 | Stop reason: HOSPADM

## 2017-12-27 RX ORDER — MIDAZOLAM HYDROCHLORIDE 1 MG/ML
INJECTION INTRAMUSCULAR; INTRAVENOUS
Status: DISCONTINUED | OUTPATIENT
Start: 2017-12-27 | End: 2017-12-27 | Stop reason: HOSPADM

## 2017-12-27 RX ORDER — BUPIVACAINE HYDROCHLORIDE 2.5 MG/ML
INJECTION, SOLUTION EPIDURAL; INFILTRATION; INTRACAUDAL
Status: DISCONTINUED | OUTPATIENT
Start: 2017-12-27 | End: 2017-12-27 | Stop reason: HOSPADM

## 2017-12-27 RX ORDER — METHYLPREDNISOLONE ACETATE 40 MG/ML
INJECTION, SUSPENSION INTRA-ARTICULAR; INTRALESIONAL; INTRAMUSCULAR; SOFT TISSUE
Status: DISCONTINUED | OUTPATIENT
Start: 2017-12-27 | End: 2017-12-27 | Stop reason: HOSPADM

## 2017-12-27 RX ORDER — LIDOCAINE HYDROCHLORIDE 10 MG/ML
INJECTION INFILTRATION; PERINEURAL
Status: DISCONTINUED | OUTPATIENT
Start: 2017-12-27 | End: 2017-12-27 | Stop reason: HOSPADM

## 2017-12-27 RX ADMIN — SODIUM CHLORIDE: 900 INJECTION, SOLUTION INTRAVENOUS at 08:12

## 2017-12-27 NOTE — H&P
"HPI  Here for L4/5 TFESI to address radicular symptoms.  Lengthy discussion was had with the patient regarding choice of level and reasoning behind it vs L3/4 level.  Patient was in agreement.  No recent Abx use or anticoagulation.    PMHx, PSHx, Allergies, Medications reviewed in epic    ROS negative except pain complaints in HPI    OBJECTIVE:    BP (!) 183/77 (BP Location: Right arm, Patient Position: Lying)   Pulse 71   Temp 98.7 °F (37.1 °C) (Oral)   Resp 18   Ht 5' 7" (1.702 m)   Wt 88.5 kg (195 lb)   LMP 10/20/2013   SpO2 99%   BMI 30.54 kg/m²     PHYSICAL EXAMINATION:    GENERAL: Well appearing, in no acute distress, alert and oriented x3.  PSYCH:  Mood and affect appropriate.  SKIN: Skin color, texture, turgor normal, no rashes or lesions.  CV: RRR with palpation of the radial artery.  PULM: No evidence of respiratory difficulty, symmetric chest rise. Clear to auscultation.  NEURO: Cranial nerves grossly intact.    Plan:    Proceed with procedure as planned    Nba Shelton  12/27/2017    "

## 2017-12-27 NOTE — OP NOTE
INFORMED CONSENT: The procedure, risks, benefits and options were discussed with patient. There are no contraindications to the procedure. The patient expressed understanding and agreed to proceed. The personnel performing the procedure was discussed.    12/27/2017    Surgeon: Lynette Rowley MD    Assistants: None    PROCEDURE:    1)  Left  L4 TRANSFORAMINAL EPIDURAL STEROID INJECTION    2)  Left  L5 TRANSFORAMINAL EPIDURAL STEROID INJECTION    Pre Procedure diagnosis:    Left  L4 and L5  DDD (degenerative disc disease), lumbar [M51.36]  Lumbar radiculopathy    Post-Procedure diagnosis:   same    Complications: None    Specimens: None      DESCRIPTION OF PROCEDURE: The patient was brought to the procedure room. IV access was obtained prior to the procedure. The patient was positioned prone on the fluoroscopy table. Continuous hemodynamic monitoring was initiated including blood pressure, EKG, and pulse oximetry. . The skin was prepped with chlorhexidine and draped in a sterile fashion. Skin anesthesia was achieved using a total of 10mL of lidocaine, 5mL over each respective injection site.     The  L4 and L5  transforaminal spaces were identified with fluoroscopy in the  AP, oblique, and lateral views.  A 22 gauge spinal quinke needle was then advanced into the area of the trans foraminal spaces bilaterally with confirmation of proper needle position using AP, oblique, and lateral fluoroscopic views. Once the needle tip was in the area of the transforaminal space, and there was no blood, CSF or paraesthesias,  1.5 mL of Omnipaque 300mg/ml was injected on each side for a total of 3mL.  Fluoroscopic imaging in the AP and lateral views revealed a clear outline of the spinal nerve with proximal spread of agent through the neural foramen into the epidural space. A total combination of 1 mL of Bupivicaine 0.25% and 40 mg depo medrol was injected on each side for a total of 4mL of injected medications with displacement of  the contrast dye confirming that the medication went into the area of the transforaminal spaces bilaterally. A sterile dressing was applied.   Patient tolerated the procedure well.    Conscious sedation provided by M.D    The patient was monitored with continuous pulse oximetry, EKG, and intermittent blood pressure monitors.  The patient was hemodynamically stable throughout the entire process was responsive to voice, and breathing spontaneously.  Supplemental O2 was provided at 2L/min via nasal cannula.  Patient was comfortable for the duration of the procedure. (See nurse documentation and case log for sedation time)    There was a total of 3 mg IV Midazolam was titrated for the procedure    Patient was taken back to the recovery room for further observation.     The patient was discharged to home in stable condition

## 2017-12-27 NOTE — DISCHARGE INSTRUCTIONS
Home Care Instructions Pain Management:    1. DIET:   You may resume your normal diet today.   2. BATHING:   You may shower with luke warm water.  3. DRESSING:   You may remove your bandage today.   4. ACTIVITY LEVEL:   You may resume your normal activities 24 hrs after your procedure.  5. MEDICATIONS:   You may resume your normal medications today.   6. SPECIAL INSTRUCTIONS:   No heat to the injection site for 24 hrs including, bath or shower, heating pad, moist heat, or hot tubs.    Use ice pack to injection site for any pain or discomfort.  Apply ice packs for 20 minute intervals as needed.   If you have received any sedatives by mouth today you may not drive for 12 hours.    If you have received any sedation through your IV, you may not drive for 24 hrs.     PLEASE CALL YOUR DOCTOR IF:  1. Redness or swelling around the injection site.  2. Fever of 101 degrees  3. Drainage (pus) from the injection site.  4. For any continuous bleeding (some dried blood over the incision is normal.)    FOR EMERGENCIES:   If any unusual problems or difficulties occur during clinic hours, call [phone] or [phone].     Thank you for allowing us to care for you today. You may receive a survey about the care we provided. Your feedback is valuable and helps us provide excellent care throughout the community.

## 2017-12-27 NOTE — DISCHARGE SUMMARY
Discharge Note  Short Stay      SUMMARY     Admit Date: 12/27/2017    Attending Physician: Lynette Rowley    Discharge Diagnosis: DDD (degenerative disc disease), lumbar [M51.36]    Discharge Physician: Lynette Rowley      Discharge Date: 12/27/2017 9:59 AM     PROCEDURE:    1)  Left  L4 TRANSFORAMINAL EPIDURAL STEROID INJECTION    2)  Left  L5 TRANSFORAMINAL EPIDURAL STEROID INJECTION    Pre Procedure diagnosis:    Left  L4 and L5  DDD (degenerative disc disease), lumbar [M51.36]  Lumbar radiculopathy    Disposition: Home or self care    Patient Instructions:   Current Discharge Medication List      CONTINUE these medications which have NOT CHANGED    Details   ASPIRIN (ASPIR-81 ORAL) Take 1 tablet by mouth once daily.      boric acid, bulk, Gran Place 600 mg vaginally every evening.  Qty: 100 g, Refills: 0    Associated Diagnoses: Vaginitis and vulvovaginitis      buPROPion (WELLBUTRIN XL) 300 MG 24 hr tablet Take 1 tablet (300 mg total) by mouth every morning.  Qty: 90 tablet, Refills: 3    Associated Diagnoses: Depression, unspecified depression type      estradiol (ESTRACE) 0.01 % (0.1 mg/gram) vaginal cream Use 1/2 gram twice weekly  Qty: 42.5 g, Refills: 10    Associated Diagnoses: Vaginal atrophy      fexofenadine (ALLEGRA) 180 MG tablet Take 1 tablet (180 mg total) by mouth once daily.  Qty: 90 tablet, Refills: 3    Associated Diagnoses: Environmental and seasonal allergies      FLAXSEED ORAL Take by mouth.      fluticasone (FLONASE) 50 mcg/actuation nasal spray 1 spray by Each Nare route 2 (two) times daily as needed for Rhinitis or Allergies.  Qty: 1 Bottle, Refills: 6    Associated Diagnoses: Environmental and seasonal allergies      ibuprofen (ADVIL,MOTRIN) 800 MG tablet Take 1 tablet (800 mg total) by mouth every 8 (eight) hours as needed for Pain.  Qty: 270 tablet, Refills: 3    Associated Diagnoses: Sciatica, unspecified laterality      LACTOBAC NO.41/BIFIDOBACT NO.7 (PROBIOTIC-10 ORAL) Take by  mouth.      multivitamin (ONE DAILY MULTIVITAMIN) per tablet Take 1 tablet by mouth every evening.       nystatin (MYCOSTATIN) powder Apply to affected areas of skin twice daily as needed for skin infection.  Re treat as needed.  Qty: 30 g, Refills: 3    Associated Diagnoses: Candidal skin infection      PROAIR HFA 90 mcg/actuation inhaler Inhale 1-2 puffs into the lungs every 6 (six) hours as needed for Wheezing or Shortness of Breath (chest tightness). Rescue  Qty: 18 g, Refills: 3    Associated Diagnoses: Mild intermittent asthma without complication             Resume home diet and activity

## 2018-01-06 ENCOUNTER — PATIENT MESSAGE (OUTPATIENT)
Dept: FAMILY MEDICINE | Facility: CLINIC | Age: 58
End: 2018-01-06

## 2018-01-09 ENCOUNTER — HOSPITAL ENCOUNTER (OUTPATIENT)
Dept: RADIOLOGY | Facility: HOSPITAL | Age: 58
Discharge: HOME | End: 2018-01-09
Attending: INTERNAL MEDICINE
Payer: COMMERCIAL

## 2018-01-09 ENCOUNTER — OFFICE VISIT (OUTPATIENT)
Dept: FAMILY MEDICINE | Facility: CLINIC | Age: 58
End: 2018-01-09
Payer: COMMERCIAL

## 2018-01-09 ENCOUNTER — PATIENT MESSAGE (OUTPATIENT)
Dept: FAMILY MEDICINE | Facility: CLINIC | Age: 58
End: 2018-01-09

## 2018-01-09 VITALS
HEIGHT: 67 IN | WEIGHT: 196 LBS | TEMPERATURE: 98 F | SYSTOLIC BLOOD PRESSURE: 114 MMHG | DIASTOLIC BLOOD PRESSURE: 68 MMHG | BODY MASS INDEX: 30.76 KG/M2

## 2018-01-09 DIAGNOSIS — F32.A DEPRESSION, UNSPECIFIED DEPRESSION TYPE: ICD-10-CM

## 2018-01-09 DIAGNOSIS — J20.9 SUBACUTE BRONCHITIS: ICD-10-CM

## 2018-01-09 DIAGNOSIS — J20.9 SUBACUTE BRONCHITIS: Primary | ICD-10-CM

## 2018-01-09 PROCEDURE — 71046 X-RAY EXAM CHEST 2 VIEWS: CPT | Mod: TC,FY,PO

## 2018-01-09 PROCEDURE — 99214 OFFICE O/P EST MOD 30 MIN: CPT | Mod: S$GLB,,, | Performed by: INTERNAL MEDICINE

## 2018-01-09 PROCEDURE — 71046 X-RAY EXAM CHEST 2 VIEWS: CPT | Mod: 26,,, | Performed by: RADIOLOGY

## 2018-01-09 PROCEDURE — 99999 PR PBB SHADOW E&M-EST. PATIENT-LVL III: CPT | Mod: PBBFAC,,, | Performed by: INTERNAL MEDICINE

## 2018-01-09 RX ORDER — AMOXICILLIN AND CLAVULANATE POTASSIUM 875; 125 MG/1; MG/1
1 TABLET, FILM COATED ORAL 2 TIMES DAILY
Qty: 10 TABLET | Refills: 0 | Status: SHIPPED | OUTPATIENT
Start: 2018-01-09 | End: 2018-01-14

## 2018-01-09 RX ORDER — BUPROPION HYDROCHLORIDE 150 MG/1
150 TABLET ORAL DAILY
Qty: 90 TABLET | Refills: 3 | Status: SHIPPED | OUTPATIENT
Start: 2018-01-09 | End: 2018-11-20 | Stop reason: SDUPTHER

## 2018-01-09 NOTE — PROGRESS NOTES
Subjective:        Patient ID: Amita Lewis is a 57 y.o. female.    Chief Complaint: Cough (green mucus) and Sore Throat    HPI   Amita Lewis presents with the followin. URI: Pt c/o rhinorrhea, chest congestion, cough productive of brown and green mucous, scratchy throat first starting 1.5 weeks ago.  Pt was treating her sx at home with Sudafed, nasal spray, green tea with lemon, lots of water.  She has not required her Albuterol inhaler more than usual.  Her sx seemed to be improving but before pt returned to baseline, her sx returned and got much worse 3 days ago.  Pt has missed work since 1/3/18 due to this.  She did not get a flu shot this season.    2. Depression: Pt reports her depression and anger has been gradually getting worse.  She reports decreased enjoyment of activities at home.  Pt attributes a lot of this to her job.  She works very long, unpredictable hours.  She would like to go to therapy but due to her work schedule, cannot reliably schedule/keep appointments.  She is taking Wellbutrin 300mg qd and tolerating well.  She has started smoking again to deal with the increased stress.  No EtOH or recreational drugs.  Denies SI/HI.    Review of Systems   Constitutional: Positive for fever. Negative for chills.   HENT: Positive for postnasal drip, rhinorrhea and sore throat. Negative for ear pain.    Respiratory: Positive for cough. Negative for shortness of breath and wheezing.    Cardiovascular: Negative for chest pain.   Musculoskeletal: Negative for myalgias.   Skin: Negative for rash.   Allergic/Immunologic: Negative for environmental allergies.   Neurological: Positive for headaches.           Objective:        Vitals:    18 1406   BP: 114/68   Temp: 98.1 °F (36.7 °C)     Physical Exam   Constitutional: She is oriented to person, place, and time. She appears well-developed and well-nourished. No distress.   HENT:   Head: Normocephalic and atraumatic.   Right Ear:  External ear normal.   Left Ear: External ear normal.   Nose: Nose normal.   Mouth/Throat: Oropharynx is clear and moist. No oropharyngeal exudate.   - bilateral ear canals clear, tympanic membranes visualized - normal color and light reflex   Eyes: Conjunctivae and EOM are normal.   Neck: Normal range of motion. Neck supple.   Cardiovascular: Normal rate, regular rhythm and normal heart sounds.    Pulmonary/Chest: Effort normal. No respiratory distress. She has wheezes (right middle lobe). She has no rales.   - decreased breath sounds in right middle lobe   Lymphadenopathy:     She has no cervical adenopathy.   Neurological: She is alert and oriented to person, place, and time.   Skin: Skin is warm and dry.   Psychiatric: She has a normal mood and affect. Her behavior is normal. Judgment and thought content normal.   Vitals reviewed.          Assessment:         1. Subacute bronchitis    2. Depression, unspecified depression type              Plan:         Amita was seen today for cough and sore throat.    Diagnoses and all orders for this visit:    Subacute bronchitis: CXR to evaluate for PNA.  abx pending CXR results.  - Continue with current symptomatic treatment  - out of window for treatment with Tamiflu so no testing today  -     X-Ray Chest PA And Lateral; Future    Depression, unspecified depression type: Discussed trying to find a therapist that works evenings or weekends so pt can keep appt.  Pt is considering finding a different job.  Increase Wellbutrin from 300 to 450mg XL total per day.  -     buPROPion (WELLBUTRIN XL) 150 MG TB24 tablet; Take 1 tablet (150 mg total) by mouth once daily.      Follow up if worse or not improving.

## 2018-01-31 NOTE — PROGRESS NOTES
Subjective:      Patient ID: Amita Lewis is a 57 y.o. female.    Chief Complaint: Back Pain    Ms Lewis is a 58 yo female here for follow up of her abck and leg pain that has been present since 2007 but worsened in September 2017.  She had back and leg pain in 2007 and she did PT and JILLIAN, and then had a surgery L4-5 microdiscectomy 2007, post surgery she was back in the hospital with lumbar drains, and she had another surgery.  Post second surgery the pain was better.  She was last seen by me on 11/21/2017 and I encouraged her to continue PT.  We also discussed a left L4 and L5 Tf JILLIAN which was done on 12//27/2017.  She does feel like the injection helped some.  She still has good days and bad days.  She feels like the PT is helping.  She is stronger.  Yesterday, she walked a half a block and the pain in the leg started.  However today she did not have a problem.  She sees how many blocks she can go before she stops.  She does not have a problem standing, it is the walking and the sitting.  The pain is the worst in the left calf.  She has had some right glute pain.  That pain comes and goes.  She has had needling and some pain from that. Pain is 0/10 now, worst 5/10 with walking in the left calf, best 0/10 standing, sitting, and lying down    MRI lumbar 9/25/2017  FINDINGS: The lumbar spine vertebral body heights and alignment are well-maintained.  There is no evidence of osseous fracture.     There is degenerative disc space narrowing most prominent at L3-L4 and L4-L5.  The disc spaces are otherwise well maintained.     The spinal cord terminates at the L1 level and its visualized portions are unremarkable.     The pre-and post vertebral soft tissues are unremarkable.     T12-L1: No evidence of disc bulge, facet arthropathy, neuroforaminal stenosis, or central canal stenosis.     L1-L2: There is degenerative facet arthropathy with ligamentous thickening.  No evidence of a disc bulge, central canal  stenosis, or neuroforaminal stenosis.     L2-L3: There is degenerative facet hypertrophy with ligamentous thickening.  No evidence of a disc bulge, central canal stenosis or neuroforaminal stenosis.     L3-L4: There is a diffuse posterior disc bulge and bilateral facet hypertrophy with ligamentous thickening.  There is mild right-sided neuroforaminal stenosis.  The left neural foramen appears patent.  There is minimal central canal stenosis.     L4-L5: There is a diffuse posterior disc bulge and bilateral facet hypertrophy with ligamentous thickening.  There is mild bilateral neuroforaminal stenosis.  No central canal stenosis.     L5-S1: There is bilateral facet hypertrophy and ligamentous thickening.  No evidence of a disc bulge, central canal stenosis, or neuroforaminal stenosis.  Impression            #1. Degenerative change of the lumbar spine most significant at L3-L4 and L4-L5 as detailed above.  On the prior MRI there appears to have been a left-sided paracentral disc herniation at L4-L5 that is not seen on today's exam.    Past Medical History:  No date: Abnormal Pap smear  No date: Asthma  3/17/2016: Depression  3/17/2016: Environmental and seasonal allergies  3/17/2016: Heartburn  03/22/2016: HLD (hyperlipidemia)      Comment: 2017 ASCVD 5.2%; can't tolerate statins  3/17/2016: Menopausal syndrome (hot flashes)  3/17/2016: Mild intermittent asthma without complication  3/17/2016: Obesity (BMI 30-39.9)  3/17/2016: Sciatica    Past Surgical History:  No date: CERVICAL BIOPSY  W/ LOOP ELECTRODE EXCISION  No date: microdiscetomy      Comment: L4-L5  2001: OVARIAN CYST REMOVAL    Review of patient's family history indicates:    Cancer                         Father                      Comment: lung; non smoker, worked in [place] and               gas station    Breast cancer                  Mother                    Heart failure                  Mother                    Diabetes Mellitus               Mother                      Comment: ?secondary to chronic steroids    Ovarian cancer                 Neg Hx                    Hypertension                   Neg Hx                      Social History    Marital status:              Spouse name:                       Years of education:                 Number of children:               Social History Main Topics    Smoking status: Current Some Day Smoker                                                      Packs/day: 0.00      Years: 0.00           Types: Cigarettes    Smokeless tobacco: Never Used                        Alcohol use: Yes           0.6 oz/week       Glasses of wine: 1 per week    Drug use: No              Sexual activity: Yes               Partners with: Male       Birth control/protection: None    Social History Narrative    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    Works for [employer]    Walks a lot at work and for exercise    Going to [place] Aug 2017    Zoster 11/4/14        Current Outpatient Prescriptions:  ASPIRIN (ASPIR-81 ORAL), Take 1 tablet by mouth once daily., Disp: , Rfl:   boric acid (BORIC ACID) vaginal suppository, Place 650 mg vaginally., Disp: , Rfl:   buPROPion (WELLBUTRIN XL) 150 MG TB24 tablet, Take 1 tablet (150 mg total) by mouth once daily., Disp: 90 tablet, Rfl: 3  buPROPion (WELLBUTRIN XL) 300 MG 24 hr tablet, Take 1 tablet (300 mg total) by mouth every morning., Disp: 90 tablet, Rfl: 3  estradiol (ESTRACE) 0.01 % (0.1 mg/gram) vaginal cream, Use 1/2 gram twice weekly, Disp: 42.5 g, Rfl: 10  fexofenadine (ALLEGRA) 180 MG tablet, Take 1 tablet (180 mg total) by mouth once daily., Disp: 90 tablet, Rfl: 3  FLAXSEED ORAL, Take by mouth., Disp: , Rfl:   fluticasone (FLONASE) 50 mcg/actuation nasal spray, 1 spray by Each Nare route 2 (two) times daily as needed for Rhinitis or Allergies., Disp: 1 Bottle, Rfl: 6  ibuprofen (ADVIL,MOTRIN) 800 MG tablet, Take 1 tablet (800 mg total) by mouth every 8 (eight) hours as  needed for Pain., Disp: 270 tablet, Rfl: 3  LACTOBAC NO.41/BIFIDOBACT NO.7 (PROBIOTIC-10 ORAL), Take by mouth., Disp: , Rfl:   multivitamin (ONE DAILY MULTIVITAMIN) per tablet, Take 1 tablet by mouth every evening. , Disp: , Rfl:   nystatin (MYCOSTATIN) powder, Apply to affected areas of skin twice daily as needed for skin infection.  Re treat as needed., Disp: 30 g, Rfl: 3  PROAIR HFA 90 mcg/actuation inhaler, Inhale 1-2 puffs into the lungs every 6 (six) hours as needed for Wheezing or Shortness of Breath (chest tightness). Rescue, Disp: 18 g, Rfl: 3    No current facility-administered medications for this visit.       Review of patient's allergies indicates:  No Known Allergies             Review of Systems   Constitution: Negative for weight gain and weight loss.   Cardiovascular: Negative for chest pain.   Respiratory: Negative for shortness of breath.    Musculoskeletal: Positive for back pain (left leg). Negative for joint pain and joint swelling.   Gastrointestinal: Negative for abdominal pain and bowel incontinence.   Genitourinary: Negative for bladder incontinence.   Neurological: Negative for numbness.         Objective:        General: Amita is well-developed, well-nourished, appears stated age, in no acute distress, alert and oriented to time, place and person.     General    Vitals reviewed.  Constitutional: She is oriented to person, place, and time. She appears well-developed and well-nourished.   HENT:   Head: Normocephalic and atraumatic.   Pulmonary/Chest: Effort normal.   Neurological: She is alert and oriented to person, place, and time.   Psychiatric: She has a normal mood and affect. Her behavior is normal. Judgment and thought content normal.     General Musculoskeletal Exam   Gait: normal     Back (L-Spine & T-Spine) / Neck (C-Spine) Exam     Tenderness Right paramedian tenderness of the Sacrum. Left paramedian tenderness of the Sacrum.     Back (L-Spine & T-Spine) Range of Motion    Extension: 20   Flexion: 90   Lateral Bend Right: 20   Lateral Bend Left: 20   Rotation Right: 40   Rotation Left: 40     Spinal Sensation   Right Side Sensation  C-Spine Level: normal   L-Spine Level: normal  S-Spine Level: normal  Left Side Sensation  C-Spine Level: normal  L-Spine Level: normal  S-Spine Level: normal    Back (L-Spine & T-Spine) Tests   Right Side Tests  Straight leg raise:      Sitting SLR: > 70 degrees      Left Side Tests  Straight leg raise:     Sitting SLR: > 70 degrees          Other She has no scoliosis .  Spinal Kyphosis:  Absent      Muscle Strength   Right Lower Extremity   Hip Flexion: 5/5   Quadriceps:  5/5   Anterior tibial:  5/5/5  EHL:  5/5  Left Lower Extremity   Hip Flexion: 5/5   Quadriceps:  5/5   Anterior tibial:  5/5/5   EHL:  5/5    Reflexes     Left Side  Quadriceps:  2+    Right Side   Quadriceps:  2+    Vascular Exam     Right Pulses        Carotid:                  2+    Left Pulses        Carotid:                  2+              Assessment:       1. Chronic left-sided low back pain with left-sided sciatica    2. DDD (degenerative disc disease), lumbar    3. Osteoarthritis of spine with radiculopathy, lumbar region    4. Claudication           Plan:       Orders Placed This Encounter    Ambulatory consult to Pain Clinic     1. Continue PT for back strengthening, core strengthening, stretching and myofascial release at [facility]. She feels like it is helping  2.  Continue Ibuprofen as needed  3.  JILLIAN Left L4 and L5 was done on 12/27 she is not sure it helped much  4.   We discussed the calf pain with walking that gets better when stop and stand still.  We discussed investigating vascular causes.  She wants to discuss with her PCP.    5.  Pain consult to see Dr. Rowley.  She does not want to follow up with me at this time.  She realizes that she needs to manage the pain at this point.  She is not ready for DCS, but we did discuss there are other injection  options    Follow-up: No Follow-up on file. If there are any questions prior to this, the patient was instructed to contact the office.

## 2018-02-01 ENCOUNTER — OFFICE VISIT (OUTPATIENT)
Dept: SPINE | Facility: CLINIC | Age: 58
End: 2018-02-01
Attending: PHYSICAL MEDICINE & REHABILITATION
Payer: COMMERCIAL

## 2018-02-01 VITALS
SYSTOLIC BLOOD PRESSURE: 151 MMHG | BODY MASS INDEX: 31.39 KG/M2 | HEIGHT: 67 IN | HEART RATE: 79 BPM | WEIGHT: 200 LBS | DIASTOLIC BLOOD PRESSURE: 69 MMHG

## 2018-02-01 DIAGNOSIS — M51.36 DDD (DEGENERATIVE DISC DISEASE), LUMBAR: ICD-10-CM

## 2018-02-01 DIAGNOSIS — M47.26 OSTEOARTHRITIS OF SPINE WITH RADICULOPATHY, LUMBAR REGION: ICD-10-CM

## 2018-02-01 DIAGNOSIS — I73.9 CLAUDICATION: ICD-10-CM

## 2018-02-01 DIAGNOSIS — M54.42 CHRONIC LEFT-SIDED LOW BACK PAIN WITH LEFT-SIDED SCIATICA: Primary | ICD-10-CM

## 2018-02-01 DIAGNOSIS — G89.29 CHRONIC LEFT-SIDED LOW BACK PAIN WITH LEFT-SIDED SCIATICA: Primary | ICD-10-CM

## 2018-02-01 PROCEDURE — 3008F BODY MASS INDEX DOCD: CPT | Mod: S$GLB,,, | Performed by: PHYSICAL MEDICINE & REHABILITATION

## 2018-02-01 PROCEDURE — 99213 OFFICE O/P EST LOW 20 MIN: CPT | Mod: S$GLB,,, | Performed by: PHYSICAL MEDICINE & REHABILITATION

## 2018-02-01 PROCEDURE — 99999 PR PBB SHADOW E&M-EST. PATIENT-LVL III: CPT | Mod: PBBFAC,,, | Performed by: PHYSICAL MEDICINE & REHABILITATION

## 2018-02-08 ENCOUNTER — OFFICE VISIT (OUTPATIENT)
Dept: PAIN MEDICINE | Facility: CLINIC | Age: 58
End: 2018-02-08
Attending: PHYSICAL MEDICINE & REHABILITATION
Payer: COMMERCIAL

## 2018-02-08 VITALS
BODY MASS INDEX: 30.79 KG/M2 | WEIGHT: 196.19 LBS | DIASTOLIC BLOOD PRESSURE: 64 MMHG | TEMPERATURE: 99 F | HEART RATE: 78 BPM | RESPIRATION RATE: 18 BRPM | SYSTOLIC BLOOD PRESSURE: 137 MMHG | HEIGHT: 67 IN

## 2018-02-08 DIAGNOSIS — M54.16 LUMBAR RADICULOPATHY: ICD-10-CM

## 2018-02-08 DIAGNOSIS — R29.818 NEUROGENIC CLAUDICATION: Primary | ICD-10-CM

## 2018-02-08 PROCEDURE — 99999 PR PBB SHADOW E&M-EST. PATIENT-LVL III: CPT | Mod: PBBFAC,,, | Performed by: ANESTHESIOLOGY

## 2018-02-08 PROCEDURE — 99244 OFF/OP CNSLTJ NEW/EST MOD 40: CPT | Mod: S$GLB,,, | Performed by: ANESTHESIOLOGY

## 2018-02-08 NOTE — PROGRESS NOTES
Chronic Pain - New Consult    Referring Physician: Imelda Orozco, *    Chief Complaint:   Chief Complaint   Patient presents with    Back Pain        SUBJECTIVE: Disclaimer: This note has been generated using voice-recognition software. There may be typographical errors that have been missed during proof-reading    Initial encounter:    Amita Lewis presents to the clinic for the evaluation of back pain. The pain started one year ago  and symptoms have been worsening.    Brief history:    Pain Description:    The pain is located in the back area and radiates to the leg in the L3-4 distribution.  She describes symptoms of neurogenic versus vascular claudication.  Her pain significant worsens after walking for several blocks and gradually improves when sitting down.  She describes her pain symptoms as a radiating pain into her calf and a cramping sensation in her calf.    At BEST  0/10     At WORST  7/10 on the WORST day.      On average pain is rated as 7/10.     Today the pain is rated as 1/10    The pain is described as burning, dull and tight band      Symptoms interfere with daily activity and sleeping.     Exacerbating factors: Walking.      Mitigating factors medications.     Patient denies .  Patient denies any suicidal or homicidal ideations    Pain Medications:  Current:  Ibuprofen - with relief    Tried in Past:  NSAIDs -Never  TCA -Never  SNRI -Never  Anti-convulsants -lyrica and gabapentin in the past with significant sedating side effects limiting their use  Muscle Relaxants -Never  Opioids-Never    Physical Therapy/Home Exercise: yes       report:  Reviewed and consistent with medication use as prescribed.    Pain Procedures:   12/27/2017 LUMBAR LEFT L4 AND L5 TRANSFORAMINAL JILLIAN     Chiropractor -never  Acupuncture - never  TENS unit -never  Spinal decompression -Lumbar microdiskectomy in 2008 with complication of dural tear requiring repair.  Joint replacement  -never    Imagin2017 MRI L SPINE   Impression          #1. Degenerative change of the lumbar spine most significant at L3-L4 and L4-L5 as detailed above.  On the prior MRI there appears to have been a left-sided paracentral disc herniation at L4-L5 that is not seen on today's exam           2017 XRAY L SPINE  Impression        Mild degenerative disc disease of the inferior lumbar spine without instability.       Past Medical History:   Diagnosis Date    Abnormal Pap smear     Asthma     Depression 3/17/2016    Environmental and seasonal allergies 3/17/2016    Heartburn 3/17/2016    HLD (hyperlipidemia) 2016 ASCVD 5.2%; can't tolerate statins    Menopausal syndrome (hot flashes) 3/17/2016    Mild intermittent asthma without complication 3/17/2016    Obesity (BMI 30-39.9) 3/17/2016    Sciatica 3/17/2016     Past Surgical History:   Procedure Laterality Date    CERVICAL BIOPSY  W/ LOOP ELECTRODE EXCISION      microdiscetomy      L4-L5    OVARIAN CYST REMOVAL       Social History     Social History    Marital status:      Spouse name: N/A    Number of children: N/A    Years of education: N/A     Occupational History    Not on file.     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    Works for [employer] at [employer]    Walks a lot at work and for exercise    Going to [place] Aug 2017    Zoster 15     Family History   Problem Relation Age of Onset    Cancer Father      lung; non smoker, worked in [employer] and gas station    Breast cancer Mother     Heart failure Mother     Diabetes Mellitus Mother      ?secondary to  chronic steroids    Ovarian cancer Neg Hx     Hypertension Neg Hx        Review of patient's allergies indicates:  No Known Allergies    Current Outpatient Prescriptions   Medication Sig    ASPIRIN (ASPIR-81 ORAL) Take 1 tablet by mouth once daily.    boric acid (BORIC ACID) vaginal suppository Place 650 mg vaginally.    buPROPion (WELLBUTRIN XL) 150 MG TB24 tablet Take 1 tablet (150 mg total) by mouth once daily.    buPROPion (WELLBUTRIN XL) 300 MG 24 hr tablet Take 1 tablet (300 mg total) by mouth every morning.    estradiol (ESTRACE) 0.01 % (0.1 mg/gram) vaginal cream Use 1/2 gram twice weekly    fexofenadine (ALLEGRA) 180 MG tablet Take 1 tablet (180 mg total) by mouth once daily.    FLAXSEED ORAL Take by mouth.    fluticasone (FLONASE) 50 mcg/actuation nasal spray 1 spray by Each Nare route 2 (two) times daily as needed for Rhinitis or Allergies.    ibuprofen (ADVIL,MOTRIN) 800 MG tablet Take 1 tablet (800 mg total) by mouth every 8 (eight) hours as needed for Pain.    LACTOBAC NO.41/BIFIDOBACT NO.7 (PROBIOTIC-10 ORAL) Take by mouth.    multivitamin (ONE DAILY MULTIVITAMIN) per tablet Take 1 tablet by mouth every evening.     nystatin (MYCOSTATIN) powder Apply to affected areas of skin twice daily as needed for skin infection.  Re treat as needed.    PROAIR HFA 90 mcg/actuation inhaler Inhale 1-2 puffs into the lungs every 6 (six) hours as needed for Wheezing or Shortness of Breath (chest tightness). Rescue     No current facility-administered medications for this visit.        REVIEW OF SYSTEMS:    GENERAL:  No weight loss, malaise or fevers.  HEENT:   No recent changes in vision or hearing  NECK:  Negative for lumps, no difficulty with swallowing.  RESPIRATORY:  Negative for cough, wheezing or shortness of breath, patient denies any recent URI.  CARDIOVASCULAR:  Negative for chest pain, leg swelling or palpitations.  GI:  Negative for abdominal discomfort, blood in stools or black stools or  "change in bowel habits.  MUSCULOSKELETAL:  See HPI.  SKIN:  Negative for lesions, rash, and itching.  PSYCH:  No mood disorder or recent psychosocial stressors.  Patients sleep is not disturbed secondary to pain.  HEMATOLOGY/LYMPHOLOGY:  Negative for prolonged bleeding, bruising easily or swollen nodes.  Patient is not currently taking any anti-coagulants  ENDO: No history of diabetes or thyroid dysfunction  NEURO:   No history of headaches, syncope, paralysis, seizures or tremors.  All other reviewed and negative other than HPI.    OBJECTIVE:    /64   Pulse 78   Temp 98.7 °F (37.1 °C)   Resp 18   Ht 5' 7" (1.702 m)   Wt 89 kg (196 lb 3.4 oz)   LMP 10/20/2013   BMI 30.73 kg/m²     PHYSICAL EXAMINATION:    GENERAL: Well appearing, in no acute distress, alert and oriented x3.  PSYCH:  Mood and affect appropriate.  SKIN: Skin color, texture, turgor normal, no rashes or lesions.  HEAD/FACE:  Normocephalic, atraumatic. Cranial nerves grossly intact.  CV: RRR with palpation of the radial artery.  PULM: No evidence of respiratory difficulty, symmetric chest rise.  BACK: Straight leg raising in the sitting and supine positions is negative to radicular pain. There is pain with palpation over the facet joints of the lumbar spine bilaterally. There is decreased range of motion with extension to 15 degrees, and facet loading maneuvers cause reproducible pain.    EXTREMITIES: Peripheral joint ROM is full and pain free without obvious instability or laxity in all four extremities. No deformities, edema, or skin discoloration. Good capillary refill.  MUSCULOSKELETAL: Hip, and knee provocative maneuvers are negative.  There is  pain with palpation over the sacroiliac joints bilaterally.  There is no pain to palpation over the greater trochanteric bursa bilaterally.  FABERs test is positive.  FADIRs test is negative.   Bilateral lower extremity strength is normal and symmetric.  No atrophy or tone abnormalities are " noted.  NEURO: Bilateral lower extremity coordination and muscle stretch reflexes are physiologic and symmetric.  Plantar response are downgoing. No clonus.  No loss of sensation is noted.  GAIT: Antalgic, ambulates without assistance     Lab Results   Component Value Date    WBC 7.13 07/28/2017    HGB 12.9 07/28/2017    HCT 38.7 07/28/2017    MCV 88 07/28/2017     07/28/2017     BMP  Lab Results   Component Value Date     07/28/2017    K 4.1 07/28/2017     07/28/2017    CO2 24 07/28/2017    BUN 21 (H) 07/28/2017    CREATININE 0.8 07/28/2017    CALCIUM 9.5 07/28/2017    ANIONGAP 13 07/28/2017    ESTGFRAFRICA >60.0 07/28/2017    EGFRNONAA >60.0 07/28/2017         ASSESSMENT: 57 y.o. year old female with pain, consistent with     Encounter Diagnoses   Name Primary?    Neurogenic claudication Yes    Lumbar radiculopathy        PLAN:   I will schedule the patient for left-sided L3 and L4 transforaminal epidural steroid injection by one time to see if this helps her more significantly than the L4 and L5 transforaminal epidural steroid injection.  She is continuing to describe symptoms of neurogenic versus vascular claudication although on physical exam today she does not have any evidence of decreased blood flow in her lower extremity.    Follow-up 2 weeks after injection with APC, depending on response to injection the patient may be scheduled for an ankle brachial index to evaluate lower extremity blood flow versus trial of anticonvulsant with slow escalation to rule out sedating side effects.    Placed emphasis on the importance of continued physical therapy and exercises for her lumbar spine.    The above plan and management options were discussed at length with patient. Patient is in agreement with the above and verbalized understanding. It will be communicated with the referring physician via electronic record, fax, or mail.    Lynette Rowley  02/08/2018

## 2018-03-14 ENCOUNTER — HOSPITAL ENCOUNTER (OUTPATIENT)
Facility: OTHER | Age: 58
Discharge: HOME | End: 2018-03-14
Attending: ANESTHESIOLOGY | Admitting: ANESTHESIOLOGY
Payer: COMMERCIAL

## 2018-03-14 ENCOUNTER — SURGERY (OUTPATIENT)
Age: 58
End: 2018-03-14

## 2018-03-14 VITALS
TEMPERATURE: 98 F | HEART RATE: 85 BPM | DIASTOLIC BLOOD PRESSURE: 73 MMHG | OXYGEN SATURATION: 99 % | RESPIRATION RATE: 18 BRPM | SYSTOLIC BLOOD PRESSURE: 160 MMHG

## 2018-03-14 DIAGNOSIS — M54.16 LUMBAR RADICULOPATHY: Primary | ICD-10-CM

## 2018-03-14 PROCEDURE — 99152 MOD SED SAME PHYS/QHP 5/>YRS: CPT | Mod: ,,, | Performed by: ANESTHESIOLOGY

## 2018-03-14 PROCEDURE — 25500020 PHARM REV CODE 255: Performed by: ANESTHESIOLOGY

## 2018-03-14 PROCEDURE — 64484 NJX AA&/STRD TFRM EPI L/S EA: CPT | Mod: LT,,, | Performed by: ANESTHESIOLOGY

## 2018-03-14 PROCEDURE — 64483 NJX AA&/STRD TFRM EPI L/S 1: CPT | Mod: LT,,, | Performed by: ANESTHESIOLOGY

## 2018-03-14 PROCEDURE — 25000003 PHARM REV CODE 250: Performed by: ANESTHESIOLOGY

## 2018-03-14 PROCEDURE — 63600175 PHARM REV CODE 636 W HCPCS: Performed by: ANESTHESIOLOGY

## 2018-03-14 PROCEDURE — 64484 NJX AA&/STRD TFRM EPI L/S EA: CPT | Performed by: ANESTHESIOLOGY

## 2018-03-14 PROCEDURE — 64483 NJX AA&/STRD TFRM EPI L/S 1: CPT | Performed by: ANESTHESIOLOGY

## 2018-03-14 RX ORDER — SODIUM CHLORIDE 9 MG/ML
INJECTION, SOLUTION INTRAVENOUS CONTINUOUS
Status: DISCONTINUED | OUTPATIENT
Start: 2018-03-14 | End: 2018-03-14 | Stop reason: HOSPADM

## 2018-03-14 RX ORDER — MIDAZOLAM HYDROCHLORIDE 1 MG/ML
INJECTION INTRAMUSCULAR; INTRAVENOUS
Status: DISCONTINUED | OUTPATIENT
Start: 2018-03-14 | End: 2018-03-14 | Stop reason: HOSPADM

## 2018-03-14 RX ORDER — BUPIVACAINE HYDROCHLORIDE 2.5 MG/ML
INJECTION, SOLUTION EPIDURAL; INFILTRATION; INTRACAUDAL
Status: DISCONTINUED | OUTPATIENT
Start: 2018-03-14 | End: 2018-03-14 | Stop reason: HOSPADM

## 2018-03-14 RX ORDER — LIDOCAINE HYDROCHLORIDE 10 MG/ML
INJECTION INFILTRATION; PERINEURAL
Status: DISCONTINUED | OUTPATIENT
Start: 2018-03-14 | End: 2018-03-14 | Stop reason: HOSPADM

## 2018-03-14 RX ORDER — METHYLPREDNISOLONE ACETATE 80 MG/ML
INJECTION, SUSPENSION INTRA-ARTICULAR; INTRALESIONAL; INTRAMUSCULAR; SOFT TISSUE
Status: DISCONTINUED | OUTPATIENT
Start: 2018-03-14 | End: 2018-03-14 | Stop reason: HOSPADM

## 2018-03-14 RX ADMIN — MIDAZOLAM HYDROCHLORIDE 2 MG: 1 INJECTION, SOLUTION INTRAMUSCULAR; INTRAVENOUS at 03:03

## 2018-03-14 RX ADMIN — BUPIVACAINE HYDROCHLORIDE 10 ML: 2.5 INJECTION, SOLUTION EPIDURAL; INFILTRATION; INTRACAUDAL; PERINEURAL at 03:03

## 2018-03-14 RX ADMIN — IOHEXOL 5 ML: 300 INJECTION, SOLUTION INTRAVENOUS at 03:03

## 2018-03-14 RX ADMIN — METHYLPREDNISOLONE ACETATE 80 MG: 80 INJECTION, SUSPENSION INTRA-ARTICULAR; INTRALESIONAL; INTRAMUSCULAR; SOFT TISSUE at 03:03

## 2018-03-14 RX ADMIN — LIDOCAINE HYDROCHLORIDE 10 ML: 10 INJECTION, SOLUTION INFILTRATION; PERINEURAL at 03:03

## 2018-03-14 RX ADMIN — SODIUM CHLORIDE: 900 INJECTION, SOLUTION INTRAVENOUS at 03:03

## 2018-03-14 NOTE — DISCHARGE SUMMARY
Discharge Note  Short Stay      SUMMARY     Admit Date: 3/14/2018    Attending Physician: Lynette Rowley    Discharge Diagnosis: Lumbar radiculopathy [M54.16]    Discharge Physician: Lynette Rowley      Discharge Date: 3/14/2018 3:47 PM     PROCEDURE:    1)  Left  L4 TRANSFORAMINAL EPIDURAL STEROID INJECTION    2)  Left  L3 TRANSFORAMINAL EPIDURAL STEROID INJECTION    Pre Procedure diagnosis:    Left  L3 and L4  Lumbar radiculopathy [M54.16]    Disposition: Home or self care    Patient Instructions:   Current Discharge Medication List      CONTINUE these medications which have NOT CHANGED    Details   ASPIRIN (ASPIR-81 ORAL) Take 1 tablet by mouth once daily.      boric acid (BORIC ACID) vaginal suppository Place 650 mg vaginally.      !! buPROPion (WELLBUTRIN XL) 150 MG TB24 tablet Take 1 tablet (150 mg total) by mouth once daily.  Qty: 90 tablet, Refills: 3    Associated Diagnoses: Depression, unspecified depression type      !! buPROPion (WELLBUTRIN XL) 300 MG 24 hr tablet Take 1 tablet (300 mg total) by mouth every morning.  Qty: 90 tablet, Refills: 3    Associated Diagnoses: Depression, unspecified depression type      estradiol (ESTRACE) 0.01 % (0.1 mg/gram) vaginal cream Use 1/2 gram twice weekly  Qty: 42.5 g, Refills: 10    Associated Diagnoses: Vaginal atrophy      fexofenadine (ALLEGRA) 180 MG tablet Take 1 tablet (180 mg total) by mouth once daily.  Qty: 90 tablet, Refills: 3    Associated Diagnoses: Environmental and seasonal allergies      FLAXSEED ORAL Take by mouth.      fluticasone (FLONASE) 50 mcg/actuation nasal spray 1 spray by Each Nare route 2 (two) times daily as needed for Rhinitis or Allergies.  Qty: 1 Bottle, Refills: 6    Associated Diagnoses: Environmental and seasonal allergies      ibuprofen (ADVIL,MOTRIN) 800 MG tablet Take 1 tablet (800 mg total) by mouth every 8 (eight) hours as needed for Pain.  Qty: 270 tablet, Refills: 3    Associated Diagnoses: Sciatica, unspecified laterality       LACTOBAC NO.41/BIFIDOBACT NO.7 (PROBIOTIC-10 ORAL) Take by mouth.      multivitamin (ONE DAILY MULTIVITAMIN) per tablet Take 1 tablet by mouth every evening.       nystatin (MYCOSTATIN) powder Apply to affected areas of skin twice daily as needed for skin infection.  Re treat as needed.  Qty: 30 g, Refills: 3    Associated Diagnoses: Candidal skin infection      PROAIR HFA 90 mcg/actuation inhaler Inhale 1-2 puffs into the lungs every 6 (six) hours as needed for Wheezing or Shortness of Breath (chest tightness). Rescue  Qty: 18 g, Refills: 3    Associated Diagnoses: Mild intermittent asthma without complication       !! - Potential duplicate medications found. Please discuss with provider.          Resume home diet and activity

## 2018-03-14 NOTE — OP NOTE
INFORMED CONSENT: The procedure, risks, benefits and options were discussed with patient. There are no contraindications to the procedure. The patient expressed understanding and agreed to proceed. The personnel performing the procedure was discussed.    03/14/2018    Surgeon: Lynette Rowley MD    Assistants:  William Montiel MD PGY-4  I was present and supervising all critical portions of the procedure      PROCEDURE:    1)  Left  L4 TRANSFORAMINAL EPIDURAL STEROID INJECTION    2)  Left  L3 TRANSFORAMINAL EPIDURAL STEROID INJECTION    Pre Procedure diagnosis:    Left  L3 and L4  Lumbar radiculopathy [M54.16]    Post-Procedure diagnosis:   same    Complications: None    Specimens: None      DESCRIPTION OF PROCEDURE: The patient was brought to the procedure room. IV access was obtained prior to the procedure. The patient was positioned prone on the fluoroscopy table. Continuous hemodynamic monitoring was initiated including blood pressure, EKG, and pulse oximetry. . The skin was prepped with chlorhexidine and draped in a sterile fashion. Skin anesthesia was achieved using a total of 10mL of lidocaine, 5mL over each respective injection site.     The  L3 and L4  transforaminal spaces were identified with fluoroscopy in the  AP, oblique, and lateral views.  A 22 gauge spinal quinke needle was then advanced into the area of the trans foraminal spaces bilaterally with confirmation of proper needle position using AP, oblique, and lateral fluoroscopic views. Once the needle tip was in the area of the transforaminal space, and there was no blood, CSF or paraesthesias,  1.5 mL of Omnipaque 300mg/ml was injected on each side for a total of 3mL.  Fluoroscopic imaging in the AP and lateral views revealed a clear outline of the spinal nerve with proximal spread of agent through the neural foramen into the epidural space. A total combination of 1 mL of Bupivicaine 0.25% and 40 mg depo medrol was injected on each side for a  total of 4mL of injected medications with displacement of the contrast dye confirming that the medication went into the area of the transforaminal spaces bilaterally. A sterile dressing was applied.   Patient tolerated the procedure well.    Conscious sedation provided by M.D    The patient was monitored with continuous pulse oximetry, EKG, and intermittent blood pressure monitors.  The patient was hemodynamically stable throughout the entire process was responsive to voice, and breathing spontaneously.  Supplemental O2 was provided at 2L/min via nasal cannula.  Patient was comfortable for the duration of the procedure. (See nurse documentation and case log for sedation time)    There was a total of 4 mg IV Midazolam was titrated for the procedure    Patient was taken back to the recovery room for further observation.     The patient was discharged to home in stable condition

## 2018-03-14 NOTE — H&P
HPI  The pt is a 56 yo F who presents for left TF JILLIAN at L3 and L4.     PMHx, PSHx, Allergies, Medications reviewed in epic    ROS negative except pain complaints in HPI    OBJECTIVE:    BP (!) 148/69 (BP Location: Right arm, Patient Position: Lying)   Pulse 77   Temp 97.9 °F (36.6 °C) (Oral)   Resp 18   LMP 10/20/2013   SpO2 100%     PHYSICAL EXAMINATION:    GENERAL: Well appearing, in no acute distress, alert and oriented x3.  PSYCH:  Mood and affect appropriate.  SKIN: Skin color, texture, turgor normal, no rashes or lesions.  CV: RRR with palpation of the radial artery.  PULM: No evidence of respiratory difficulty, symmetric chest rise. Clear to auscultation.  NEURO: Cranial nerves grossly intact.    Plan:    Proceed with procedure as planned    William Montiel  03/14/2018

## 2018-03-14 NOTE — DISCHARGE INSTRUCTIONS
Home Care Instructions Pain Management:    1. DIET:   You may resume your normal diet today.   2. BATHING:   You may shower with luke warm water.  3. DRESSING:   You may remove your bandage today.   4. ACTIVITY LEVEL:   You may resume your normal activities 24 hrs after your procedure.  5. MEDICATIONS:   You may resume your normal medications today.   6. SPECIAL INSTRUCTIONS:   No heat to the injection site for 24 hrs including, bath or shower, heating pad, moist heat, or hot tubs.    Use ice pack to injection site for any pain or discomfort.  Apply ice packs for 20 minute intervals as needed.   If you have received any sedatives by mouth today you may not drive for 12 hours.    If you have received any sedation through your IV, you may not drive for 24 hrs.     PLEASE CALL YOUR DOCTOR IF:  1. Redness or swelling around the injection site.  2. Fever of 101 degrees  3. Drainage (pus) from the injection site.  4. For any continuous bleeding (some dried blood over the incision is normal.)    FOR EMERGENCIES:   If any unusual problems or difficulties occur during clinic hours, call [phone] or [phone].   Adult Procedural Sedation Instructions    Recovery After Procedural Sedation (Adult)  You have been given medicine by vein to make you sleep during your surgery. This may have included both a pain medicine and sleeping medicine. Most of the effects have worn off. But you may still have some drowsiness for the next 6 to 8 hours.  Home care  Follow these guidelines when you get home:  · For the next 8 hours, you should be watched by a responsible adult. This person should make sure your condition is not getting worse.  · Don't drink any alcohol for the next 24 hours.  · Don't drive, operate dangerous machinery, or make important business or personal decisions during the next 24 hours.  Note: Your healthcare provider may tell you not to take any medicine by mouth for pain or sleep in the next 4 hours. These medicines may  react with the medicines you were given in the hospital. This could cause a much stronger response than usual.  Follow-up care  Follow up with your healthcare provider if you are not alert and back to your usual level of activity within 12 hours.  When to seek medical advice  Call your healthcare provider right away if any of these occur:  · Drowsiness gets worse  · Weakness or dizziness gets worse  · Repeated vomiting  · You can't be awakened   Date Last Reviewed: 10/18/2016  © 6967-1854 [employer]. [address] [place] [address]. All rights reserved. This information is not intended as a substitute for professional medical care. Always follow your healthcare professional's instructions.      Thank you for allowing us to care for you today. You may receive a survey about the care we provided. Your feedback is valuable and helps us provide excellent care throughout the community.

## 2018-04-20 ENCOUNTER — TELEPHONE (OUTPATIENT)
Dept: PAIN MEDICINE | Facility: CLINIC | Age: 58
End: 2018-04-20

## 2018-04-20 ENCOUNTER — TELEPHONE (OUTPATIENT)
Dept: SPINE | Facility: CLINIC | Age: 58
End: 2018-04-20

## 2018-04-20 NOTE — TELEPHONE ENCOUNTER
"WILLARD Antoine LPN informed me that patient states she has been in pain since Wednesday of this week and needs to be seen. It was explained that We have no available appointments today and recommended to go to the ER if pain was unbearable. Pt. Asked to speak to supervisor. Called and spoke with patient. Informed her that we do not have a provider in the office at the moment and that our NP is fully booked. I do see that Dr. Orozco is her treating physician and that she should call his off for an appointment or go to the ER if needed. Patient states she is not going to "tie up someone's ER and will call her other provider".   "

## 2018-04-20 NOTE — TELEPHONE ENCOUNTER
She can go to urgent care, and continue moving and stretching.  Can schedule follow up with Dr. Rowley next week to consider a repeat injection

## 2018-04-20 NOTE — TELEPHONE ENCOUNTER
Spoke with patient regarding a same day appointment request, patient stated that on this past Wednesday she bent over and she has been in excruciating pain ever since and she is having difficulty standing, walking and moving, advised patient that Dr Rowley is out of the office until 05/23/2018 and that the nurse practitioner Patience is 100% booked as of now, advised patient to follow up with the ER or Urgent Care Dept, patient began yelling stating hat she was not going to take up space in an ER and that she needed to be seen and have a mri done now,  Advised patient that her request would be escalated to the Pain

## 2018-04-20 NOTE — TELEPHONE ENCOUNTER
Called spoke with patient she stated she bend to  water on Wed.and was unable to stand straight up afterwards it hurts to walk,sit and stand moving hurts, she have been doing ice and heat with 800 ibuprofen but it is not helping her legs are weak, pain in right hip  8/10 slow movement.please advise  ----- Message from Jostin Russell sent at 4/20/2018 12:29 PM CDT -----  Contact: Amita Lewis            Name of Who is Calling: Amita Lewis      What is the request in detail: Patient is having extreme back pain and patient needs advise on what to do      Can the clinic reply by [facility]: No      What Number to Call Back if not [facility]: [phone]

## 2018-04-23 ENCOUNTER — OFFICE VISIT (OUTPATIENT)
Dept: PAIN MEDICINE | Facility: CLINIC | Age: 58
End: 2018-04-23
Attending: ANESTHESIOLOGY
Payer: COMMERCIAL

## 2018-04-23 VITALS
BODY MASS INDEX: 31.59 KG/M2 | WEIGHT: 201.25 LBS | HEART RATE: 88 BPM | DIASTOLIC BLOOD PRESSURE: 68 MMHG | SYSTOLIC BLOOD PRESSURE: 138 MMHG | TEMPERATURE: 98 F | HEIGHT: 67 IN

## 2018-04-23 DIAGNOSIS — M53.3 SACROILIAC JOINT PAIN: ICD-10-CM

## 2018-04-23 DIAGNOSIS — M79.10 MYALGIA: ICD-10-CM

## 2018-04-23 PROCEDURE — 99214 OFFICE O/P EST MOD 30 MIN: CPT | Mod: S$GLB,,, | Performed by: ANESTHESIOLOGY

## 2018-04-23 PROCEDURE — 99999 PR PBB SHADOW E&M-EST. PATIENT-LVL IV: CPT | Mod: PBBFAC,,, | Performed by: ANESTHESIOLOGY

## 2018-04-23 RX ORDER — TRAMADOL HYDROCHLORIDE 50 MG/1
50 TABLET ORAL EVERY 6 HOURS PRN
Qty: 10 TABLET | Refills: 0 | Status: SHIPPED | OUTPATIENT
Start: 2018-04-23 | End: 2018-05-03

## 2018-04-23 RX ORDER — METHYLPREDNISOLONE 4 MG/1
TABLET ORAL
Qty: 1 PACKAGE | Refills: 0 | Status: SHIPPED | OUTPATIENT
Start: 2018-04-23 | End: 2018-05-14

## 2018-04-23 NOTE — PROGRESS NOTES
Chronic Pain -Follow Up    Referring Physician: No ref. provider found    Chief Complaint:   Chief Complaint   Patient presents with    Low-back Pain        SUBJECTIVE: Disclaimer: This note has been generated using voice-recognition software. There may be typographical errors that have been missed during proof-reading  Interval History 4/23/2018  One week ago, sudden worsened back pain when bending forwards with radiation into bilateral thighs and weakness.  Gradually improving, taking ibuprofen, but has been restricting activities secondary to persisting pain in the lower back.  Initial encounter:    Amita Lewis presents to the clinic for the evaluation of back pain. The pain started one year ago  and symptoms have been worsening.    Brief history:    Pain Description:    The pain is located in the back area and radiates to the leg in the L3-4 distribution.  She describes symptoms of neurogenic versus vascular claudication.  Her pain significant worsens after walking for several blocks and gradually improves when sitting down.  She describes her pain symptoms as a radiating pain into her calf and a cramping sensation in her calf.    At BEST  0/10     At WORST  7/10 on the WORST day.      On average pain is rated as 7/10.     Today the pain is rated as 1/10    The pain is described as burning, dull and tight band      Symptoms interfere with daily activity and sleeping.     Exacerbating factors: Walking.      Mitigating factors medications.     Patient denies .  Patient denies any suicidal or homicidal ideations    Pain Medications:  Current:  Ibuprofen - with relief    Tried in Past:  NSAIDs -Never  TCA -Never  SNRI -Never  Anti-convulsants -lyrica and gabapentin in the past with significant sedating side effects limiting their use  Muscle Relaxants -Never  Opioids-Never    Physical Therapy/Home Exercise: yes       report:  Reviewed and consistent with medication use as prescribed.    Pain  Procedures:   2017 LUMBAR LEFT L4 AND L5 TRANSFORAMINAL JILLIAN   3/14/2018 - left L3 and L4 TFESI    Chiropractor -never  Acupuncture - never  TENS unit -never  Spinal decompression -Lumbar microdiskectomy in  with complication of dural tear requiring repair.  Joint replacement -never    Imagin2017 MRI L SPINE   Impression          #1. Degenerative change of the lumbar spine most significant at L3-L4 and L4-L5 as detailed above.  On the prior MRI there appears to have been a left-sided paracentral disc herniation at L4-L5 that is not seen on today's exam           2017 XRAY L SPINE  Impression        Mild degenerative disc disease of the inferior lumbar spine without instability.       Past Medical History:   Diagnosis Date    Abnormal Pap smear     Asthma     Depression 3/17/2016    Environmental and seasonal allergies 3/17/2016    Heartburn 3/17/2016    HLD (hyperlipidemia) 2016 ASCVD 5.2%; can't tolerate statins    Menopausal syndrome (hot flashes) 3/17/2016    Mild intermittent asthma without complication 3/17/2016    Obesity (BMI 30-39.9) 3/17/2016    Sciatica 3/17/2016     Past Surgical History:   Procedure Laterality Date    CERVICAL BIOPSY  W/ LOOP ELECTRODE EXCISION      microdiscetomy      L4-L5    OVARIAN CYST REMOVAL       Social History     Social History    Marital status:      Spouse name: N/A    Number of children: N/A    Years of education: N/A     Occupational History    Not on file.     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    Works for  at [employer]    Walks a lot at work and  for exercise    Going to [place] Aug 2017    Zoster 11/4/14     Family History   Problem Relation Age of Onset    Cancer Father      lung; non smoker, worked in [employer] and [employer] station    Breast cancer Mother     Heart failure Mother     Diabetes Mellitus Mother      ?secondary to chronic steroids    Ovarian cancer Neg Hx     Hypertension Neg Hx        Review of patient's allergies indicates:  No Known Allergies    Current Outpatient Prescriptions   Medication Sig    ASPIRIN (ASPIR-81 ORAL) Take 1 tablet by mouth once daily.    boric acid (BORIC ACID) vaginal suppository Place 650 mg vaginally.    buPROPion (WELLBUTRIN XL) 150 MG TB24 tablet Take 1 tablet (150 mg total) by mouth once daily.    buPROPion (WELLBUTRIN XL) 300 MG 24 hr tablet Take 1 tablet (300 mg total) by mouth every morning.    estradiol (ESTRACE) 0.01 % (0.1 mg/gram) vaginal cream Use 1/2 gram twice weekly    fexofenadine (ALLEGRA) 180 MG tablet Take 1 tablet (180 mg total) by mouth once daily.    FLAXSEED ORAL Take by mouth.    fluticasone (FLONASE) 50 mcg/actuation nasal spray 1 spray by Each Nare route 2 (two) times daily as needed for Rhinitis or Allergies.    ibuprofen (ADVIL,MOTRIN) 800 MG tablet Take 1 tablet (800 mg total) by mouth every 8 (eight) hours as needed for Pain.    LACTOBAC NO.41/BIFIDOBACT NO.7 (PROBIOTIC-10 ORAL) Take by mouth.    multivitamin (ONE DAILY MULTIVITAMIN) per tablet Take 1 tablet by mouth every evening.     nystatin (MYCOSTATIN) powder Apply to affected areas of skin twice daily as needed for skin infection.  Re treat as needed.    PROAIR HFA 90 mcg/actuation inhaler Inhale 1-2 puffs into the lungs every 6 (six) hours as needed for Wheezing or Shortness of Breath (chest tightness). Rescue     No current facility-administered medications for this visit.        REVIEW OF SYSTEMS:    GENERAL:  No weight loss, malaise or fevers.  HEENT:   No recent changes in vision or hearing  NECK:  Negative for  "lumps, no difficulty with swallowing.  RESPIRATORY:  Negative for cough, wheezing or shortness of breath, patient denies any recent URI.  CARDIOVASCULAR:  Negative for chest pain, leg swelling or palpitations.  GI:  Negative for abdominal discomfort, blood in stools or black stools or change in bowel habits.  MUSCULOSKELETAL:  See HPI.  SKIN:  Negative for lesions, rash, and itching.  PSYCH:  No mood disorder or recent psychosocial stressors.  Patients sleep is disturbed secondary to pain.  HEMATOLOGY/LYMPHOLOGY:  Negative for prolonged bleeding, bruising easily or swollen nodes.  Patient is not currently taking any anti-coagulants  ENDO: No history of diabetes or thyroid dysfunction  NEURO:   No history of headaches, syncope, paralysis, seizures or tremors.  All other reviewed and negative other than HPI.    OBJECTIVE:    /68   Pulse 88   Temp 98.1 °F (36.7 °C)   Ht 5' 7" (1.702 m)   Wt 91.3 kg (201 lb 4.5 oz)   LMP 10/20/2013   BMI 31.52 kg/m²     PHYSICAL EXAMINATION:    GENERAL: Well appearing, in no acute distress, alert and oriented x3.  PSYCH:  Mood and affect appropriate.  SKIN: Resolving ecchymosis over the skin at the level of L5-S1  HEAD/FACE:  Normocephalic, atraumatic. Cranial nerves grossly intact.  CV: RRR with palpation of the radial artery.  PULM: No evidence of respiratory difficulty, symmetric chest rise.  BACK: Straight leg raising in the sitting and supine positions is negative to radicular pain. There is pain with palpation over the facet joints of the lumbar spine bilaterally. There is decreased range of motion with extension to 15 degrees, and facet loading maneuvers cause reproducible pain.    EXTREMITIES: Peripheral joint ROM is full and pain free without obvious instability or laxity in all four extremities. No deformities, edema, or skin discoloration. Good capillary refill.  MUSCULOSKELETAL: Hip, and knee provocative maneuvers are negative.  There is  pain with palpation over " the sacroiliac joints bilaterally.  There is no pain to palpation over the greater trochanteric bursa bilaterally.  FABERs test is positive.  FADIRs test is negative.   Bilateral lower extremity strength is normal and symmetric.  No atrophy or tone abnormalities are noted.  NEURO: Bilateral lower extremity coordination and muscle stretch reflexes are physiologic and symmetric.  Plantar response are downgoing. No clonus.  No loss of sensation is noted.  GAIT: Antalgic, ambulates without assistance     Lab Results   Component Value Date    WBC 7.13 07/28/2017    HGB 12.9 07/28/2017    HCT 38.7 07/28/2017    MCV 88 07/28/2017     07/28/2017     BMP  Lab Results   Component Value Date     07/28/2017    K 4.1 07/28/2017     07/28/2017    CO2 24 07/28/2017    BUN 21 (H) 07/28/2017    CREATININE 0.8 07/28/2017    CALCIUM 9.5 07/28/2017    ANIONGAP 13 07/28/2017    ESTGFRAFRICA >60.0 07/28/2017    EGFRNONAA >60.0 07/28/2017         ASSESSMENT: 57 y.o. year old female with pain, consistent with     Encounter Diagnoses   Name Primary?    Myalgia     Sacroiliac joint pain        PLAN:   Medrol Dosepak    Tramadol 50 mg every 6 hours by mouth when necessary quantity #10, refill 0    Continue physical therapy after an additional week of rest and light activity.    Ice over lower back to greater than 20 minutes at a time and not directly to the skin    Light stretches at home to patient's tolerance    Follow-up with [facility]    The  above plan and management options were discussed at length with patient. Patient is in agreement with the above and verbalized understanding. It will be communicated with the referring physician via electronic record, fax, or mail.    Lynette Rowley  04/23/2018

## 2018-05-11 ENCOUNTER — OFFICE VISIT (OUTPATIENT)
Dept: FAMILY MEDICINE | Facility: CLINIC | Age: 58
End: 2018-05-11
Payer: COMMERCIAL

## 2018-05-11 VITALS
SYSTOLIC BLOOD PRESSURE: 120 MMHG | RESPIRATION RATE: 20 BRPM | BODY MASS INDEX: 32.03 KG/M2 | WEIGHT: 199.31 LBS | DIASTOLIC BLOOD PRESSURE: 56 MMHG | HEIGHT: 66 IN | TEMPERATURE: 98 F

## 2018-05-11 DIAGNOSIS — G43.001 MIGRAINE WITHOUT AURA AND WITH STATUS MIGRAINOSUS, NOT INTRACTABLE: Primary | ICD-10-CM

## 2018-05-11 PROCEDURE — 99999 PR PBB SHADOW E&M-EST. PATIENT-LVL III: CPT | Mod: PBBFAC,,, | Performed by: INTERNAL MEDICINE

## 2018-05-11 PROCEDURE — 99214 OFFICE O/P EST MOD 30 MIN: CPT | Mod: S$GLB,,, | Performed by: INTERNAL MEDICINE

## 2018-05-11 PROCEDURE — 3008F BODY MASS INDEX DOCD: CPT | Mod: CPTII,S$GLB,, | Performed by: INTERNAL MEDICINE

## 2018-05-11 RX ORDER — BUTALBITAL, ACETAMINOPHEN AND CAFFEINE 50; 325; 40 MG/1; MG/1; MG/1
1 TABLET ORAL EVERY 4 HOURS PRN
Qty: 30 TABLET | Refills: 1 | Status: SHIPPED | OUTPATIENT
Start: 2018-05-11 | End: 2018-05-24

## 2018-05-11 NOTE — PROGRESS NOTES
"Subjective:        Patient ID: Amita Lewis is a 57 y.o. female.    Chief Complaint: Headache (left side); Neck Pain (left side); and Jaw Pain (left side)    HPI   Amita Lewis presents with c/o left sided HA x 2 weeks.  CAGE feels like a constant pressure or throbbing.  She reports some left jaw pain and neck "tightness".  The HA waxes and wanes but never goes away completely.  There is no associated fever, vision or hearing changes, sinus congestion or pain, toothache or other dental issues, grinding of the jaw, facial weakness, numbness/tingling, dysphagia, dysphasia, changes in gait/balance/coordination.    Pt has allergies that have been well controlled lately with Allegra.  Mood has been better since increasing Wellbutrin dose.  She has had some increased stress due to work and renovating a new house.  She goes to PT 2x/week and walks for exercise.  Sleeping well.  No excessive sun exposure.  No significant changes in diet, caffeine (less than before), EtOH (occasional), smoking (decreasing gradually).  Menopausal vasomotor sx are a little worse but still tolerable and manageable with lifestyle modifications (fan, cooling sheets/pillow).  No recent changes in medications or OTC supplements (has been taking flaxseed, probiotic and MVI for a long time).    Pt has hx of migraine HAs in the 90s but that started in the back of the head and radiated up "like someone was clawing her head".    Review of Systems  as per HPI      Objective:        Vitals:    05/11/18 1329   BP: (!) 120/56   Resp: 20   Temp: 98.3 °F (36.8 °C)     Physical Exam   Constitutional: She is oriented to person, place, and time. She appears well-developed and well-nourished. No distress.   HENT:   Head: Normocephalic and atraumatic.   Right Ear: External ear normal.   Left Ear: External ear normal.   Nose: Nose normal.   Mouth/Throat: Oropharynx is clear and moist. No oropharyngeal exudate.   - bilateral ear canals clear, " tympanic membranes visualized - normal color and light reflex  - good dentition, normal gingiva   Eyes: Conjunctivae and EOM are normal. Pupils are equal, round, and reactive to light.   Neck: Normal range of motion. Neck supple.   - mild tenderness of L neck  - normal an symmetrical muscle tone   Neurological: She is alert and oriented to person, place, and time. No cranial nerve deficit or sensory deficit. She exhibits normal muscle tone. Coordination normal.   - cerebellar testing normal   Skin: Skin is warm and dry.   Psychiatric: She has a normal mood and affect. Her behavior is normal. Judgment and thought content normal.   Vitals reviewed.          Assessment:         1. Migraine without aura and with status migrainosus, not intractable              Plan:         Amita was seen today for headache, neck pain and jaw pain.    Diagnoses and all orders for this visit:    Migraine without aura and with status migrainosus, not intractable: Normal neuro exam today.  Given sudden onset of new, constant HA in pt >45, recommend MRI to r/o intracranial lesion.  This may be a new pattern to migraine HAs.  Potential triggers include stress, MSK pain, postmenopausal vasomotor sx.  - Pt will continue PT and dry needling for low back.  They are starting to work on upper back, upper extremities and neck.  - Discussed treatments for vasomotor sx.  No HRT due to mother having breast CA.  Currently sx are still tolerable so we will defer starting any other new medication for this now.  - Will try fioricet for HA pain.  Consider triptan if that doesn't help.  -     butalbital-acetaminophen-caffeine -40 mg (FIORICET, ESGIC) -40 mg per tablet; Take 1 tablet by mouth every 4 (four) hours as needed for Headaches.  -     MRI Brain Without Contrast; Future          Follow-up for pending MRI results.

## 2018-05-15 ENCOUNTER — OFFICE VISIT (OUTPATIENT)
Dept: SPINE | Facility: CLINIC | Age: 58
End: 2018-05-15
Attending: ANESTHESIOLOGY
Payer: COMMERCIAL

## 2018-05-15 VITALS
RESPIRATION RATE: 18 BRPM | HEART RATE: 92 BPM | DIASTOLIC BLOOD PRESSURE: 65 MMHG | BODY MASS INDEX: 31.89 KG/M2 | HEIGHT: 66 IN | TEMPERATURE: 98 F | WEIGHT: 198.44 LBS | SYSTOLIC BLOOD PRESSURE: 131 MMHG

## 2018-05-15 DIAGNOSIS — M53.3 SACROILIAC JOINT PAIN: Primary | ICD-10-CM

## 2018-05-15 DIAGNOSIS — M79.10 MYALGIA: ICD-10-CM

## 2018-05-15 DIAGNOSIS — M54.30 SCIATICA, UNSPECIFIED LATERALITY: ICD-10-CM

## 2018-05-15 PROCEDURE — 99213 OFFICE O/P EST LOW 20 MIN: CPT | Mod: S$GLB,,, | Performed by: ANESTHESIOLOGY

## 2018-05-15 PROCEDURE — 99999 PR PBB SHADOW E&M-EST. PATIENT-LVL III: CPT | Mod: PBBFAC,,, | Performed by: ANESTHESIOLOGY

## 2018-05-15 PROCEDURE — 3008F BODY MASS INDEX DOCD: CPT | Mod: CPTII,S$GLB,, | Performed by: ANESTHESIOLOGY

## 2018-05-15 NOTE — PROGRESS NOTES
Chronic Pain -Follow Up    Referring Physician: No ref. provider found    Chief Complaint:   Chief Complaint   Patient presents with    Follow-up        SUBJECTIVE: Disclaimer: This note has been generated using voice-recognition software. There may be typographical errors that have been missed during proof-reading  Interval history 5/15/2018:  Since previous encounter the patient continues to have significant lower back pain over the area of the sacral iliac joints.  She states it is worse when she first starts to get going and gradually improves as she walks.  She feels as if she is able to walk longer distances after previous epidural steroid injections but overall reports that 10% improvement in her pain.  The previous Medrol Dosepak did not help significant only tramadol was also not helping with her pain.  She has been doing physical therapy and feels as if she is making slow strides but there still certain activities that she cannot do without exacerbating her pain.  No other health changes since previous encounter.  Interval History 4/23/2018  One week ago, sudden worsened back pain when bending forwards with radiation into bilateral thighs and weakness.  Gradually improving, taking ibuprofen, but has been restricting activities secondary to persisting pain in the lower back.  Initial encounter:    Amita Lewis presents to the clinic for the evaluation of back pain. The pain started one year ago  and symptoms have been worsening.    Brief history:    Pain Description:    The pain is located in the back area and radiates to the leg in the L3-4 distribution.  She describes symptoms of neurogenic versus vascular claudication.  Her pain significant worsens after walking for several blocks and gradually improves when sitting down.  She describes her pain symptoms as a radiating pain into her calf and a cramping sensation in her calf.    At BEST  0/10     At WORST  7/10 on the WORST day.      On  average pain is rated as 7/10.     Today the pain is rated as 1/10    The pain is described as burning, dull and tight band      Symptoms interfere with daily activity and sleeping.     Exacerbating factors: Walking.      Mitigating factors medications.     Patient denies .  Patient denies any suicidal or homicidal ideations    Pain Medications:  Current:  Ibuprofen - with relief  Medrol dose el    Tried in Past:  NSAIDs -Never  TCA -Never  SNRI -Never  Anti-convulsants -lyrica and gabapentin in the past with significant sedating side effects limiting their use  Muscle Relaxants -Never  Opioids-tramadol without improvement    Physical Therapy/Home Exercise: yes       report:  Reviewed and consistent with medication use as prescribed.    Pain Procedures:   2017 LUMBAR LEFT L4 AND L5 TRANSFORAMINAL JILLIAN   3/14/2018 - left L3 and L4 TFESI    Chiropractor -never  Acupuncture - never  TENS unit -never  Spinal decompression -Lumbar microdiskectomy in  with complication of dural tear requiring repair.  Joint replacement -never    Imagin2017 MRI L SPINE   Impression          #1. Degenerative change of the lumbar spine most significant at L3-L4 and L4-L5 as detailed above.  On the prior MRI there appears to have been a left-sided paracentral disc herniation at L4-L5 that is not seen on today's exam           2017 XRAY L SPINE  Impression        Mild degenerative disc disease of the inferior lumbar spine without instability.       Past Medical History:   Diagnosis Date    Abnormal Pap smear     Asthma     Depression 3/17/2016    Environmental and seasonal allergies 3/17/2016    Heartburn 3/17/2016    HLD (hyperlipidemia) 2016    2017 ASCVD 5.2%; can't tolerate statins    Menopausal syndrome (hot flashes) 3/17/2016    Mild intermittent asthma without complication 3/17/2016    Obesity (BMI 30-39.9) 3/17/2016    Sciatica 3/17/2016     Past Surgical History:   Procedure Laterality Date     CERVICAL BIOPSY  W/ LOOP ELECTRODE EXCISION      microdiscetomy      L4-L5    OVARIAN CYST REMOVAL  2001     Social History     Social History    Marital status:      Spouse name: N/A    Number of children: N/A    Years of education: N/A     Occupational History    Not on file.     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    boric acid (BORIC ACID) vaginal suppository Place 650 mg vaginally.    buPROPion (WELLBUTRIN XL) 150 MG TB24 tablet Take 1 tablet (150 mg total) by mouth once daily.    buPROPion (WELLBUTRIN XL) 300 MG 24 hr tablet Take 1 tablet (300 mg total) by mouth every morning.    butalbital-acetaminophen-caffeine -40 mg (FIORICET, ESGIC) -40 mg per tablet Take 1 tablet by mouth every 4 (four) hours as needed for Headaches.    estradiol (ESTRACE) 0.01 % (0.1 mg/gram) vaginal cream Use 1/2 gram twice weekly    fexofenadine (ALLEGRA) 180 MG tablet Take 1 tablet (180 mg total) by mouth once daily.    FLAXSEED ORAL Take by mouth.     "fluticasone (FLONASE) 50 mcg/actuation nasal spray 1 spray by Each Nare route 2 (two) times daily as needed for Rhinitis or Allergies.    ibuprofen (ADVIL,MOTRIN) 800 MG tablet Take 1 tablet (800 mg total) by mouth every 8 (eight) hours as needed for Pain.    LACTOBAC NO.41/BIFIDOBACT NO.7 (PROBIOTIC-10 ORAL) Take by mouth.    multivitamin (ONE DAILY MULTIVITAMIN) per tablet Take 1 tablet by mouth every evening.     nystatin (MYCOSTATIN) powder Apply to affected areas of skin twice daily as needed for skin infection.  Re treat as needed.    PROAIR HFA 90 mcg/actuation inhaler Inhale 1-2 puffs into the lungs every 6 (six) hours as needed for Wheezing or Shortness of Breath (chest tightness). Rescue     No current facility-administered medications for this visit.        REVIEW OF SYSTEMS:    GENERAL:  No weight loss, malaise or fevers.  RESPIRATORY:  Negative for cough, wheezing or shortness of breath, patient denies any recent URI.  CARDIOVASCULAR:  Negative for chest pain, leg swelling or palpitations.  GI:  Negative for abdominal discomfort, blood in stools or black stools or change in bowel habits.  MUSCULOSKELETAL:  See HPI.  SKIN:  Negative for lesions, rash, and itching.  PSYCH:  No mood disorder or recent psychosocial stressors.  Patients sleep is disturbed secondary to pain.  HEMATOLOGY/LYMPHOLOGY:  Negative for prolonged bleeding, bruising easily or swollen nodes.  Patient is not currently taking any anti-coagulants  ENDO: No history of diabetes or thyroid dysfunction  NEURO:   No history of headaches, syncope, paralysis, seizures or tremors.  All other reviewed and negative other than HPI.    OBJECTIVE:    /65   Pulse 92   Temp 97.8 °F (36.6 °C) (Oral)   Resp 18   Ht 5' 6" (1.676 m)   Wt 90 kg (198 lb 6.6 oz)   LMP 10/20/2013   BMI 32.02 kg/m²     PHYSICAL EXAMINATION:    GENERAL: Well appearing, in no acute distress, alert and oriented x3.  PSYCH:  Mood and affect appropriate.  SKIN: " Resolving ecchymosis over the skin at the level of L5-S1  HEAD/FACE:  Normocephalic, atraumatic. Cranial nerves grossly intact.  CV: RRR with palpation of the radial artery.  PULM: No evidence of respiratory difficulty, symmetric chest rise.  BACK: Straight leg raising in the sitting and supine positions is negative to radicular pain. There is pain with palpation over the facet joints of the lumbar spine bilaterally. There is decreased range of motion with extension to 15 degrees, and facet loading maneuvers cause reproducible pain.    EXTREMITIES: Peripheral joint ROM is full and pain free without obvious instability or laxity in all four extremities. No deformities, edema, or skin discoloration. Good capillary refill.  MUSCULOSKELETAL: Hip, and knee provocative maneuvers are negative.  There is  pain with palpation over the sacroiliac joints bilaterally.  There is no pain to palpation over the greater trochanteric bursa bilaterally.  FABERs test is positive.  FADIRs test is negative.   Bilateral lower extremity strength is normal and symmetric.  No atrophy or tone abnormalities are noted.  NEURO: Bilateral lower extremity coordination and muscle stretch reflexes are physiologic and symmetric.  Plantar response are downgoing. No clonus.  No loss of sensation is noted.  GAIT: Antalgic, ambulates without assistance     Lab Results   Component Value Date    WBC 7.13 07/28/2017    HGB 12.9 07/28/2017    HCT 38.7 07/28/2017    MCV 88 07/28/2017     07/28/2017     BMP  Lab Results   Component Value Date     07/28/2017    K 4.1 07/28/2017     07/28/2017    CO2 24 07/28/2017    BUN 21 (H) 07/28/2017    CREATININE 0.8 07/28/2017    CALCIUM 9.5 07/28/2017    ANIONGAP 13 07/28/2017    ESTGFRAFRICA >60.0 07/28/2017    EGFRNONAA >60.0 07/28/2017         ASSESSMENT: 57 y.o. year old female with pain, consistent with     Encounter Diagnoses   Name Primary?    Sacroiliac joint pain Yes    Sciatica, unspecified  laterality     Myalgia        PLAN:   I will schedule patient for bilateral sacral iliac joint injections    Continue home exercises    In the future we can consider long-term therapy with a Yo 2 inhibitor such as meloxicam.    The  above plan and management options were discussed at length with patient. Patient is in agreement with the above and verbalized understanding. It will be communicated with the referring physician via electronic record, fax, or mail.    Lynette Rowley  05/15/2018

## 2018-05-23 ENCOUNTER — HOSPITAL ENCOUNTER (OUTPATIENT)
Dept: RADIOLOGY | Facility: OTHER | Age: 58
Discharge: HOME | End: 2018-05-23
Attending: INTERNAL MEDICINE
Payer: COMMERCIAL

## 2018-05-23 DIAGNOSIS — G43.001 MIGRAINE WITHOUT AURA AND WITH STATUS MIGRAINOSUS, NOT INTRACTABLE: ICD-10-CM

## 2018-05-23 PROCEDURE — 70551 MRI BRAIN STEM W/O DYE: CPT | Mod: TC

## 2018-05-23 PROCEDURE — 70551 MRI BRAIN STEM W/O DYE: CPT | Mod: 26,,, | Performed by: RADIOLOGY

## 2018-05-24 ENCOUNTER — PATIENT MESSAGE (OUTPATIENT)
Dept: FAMILY MEDICINE | Facility: CLINIC | Age: 58
End: 2018-05-24

## 2018-05-24 DIAGNOSIS — G43.809 OTHER MIGRAINE WITHOUT STATUS MIGRAINOSUS, NOT INTRACTABLE: Primary | ICD-10-CM

## 2018-05-24 RX ORDER — SUMATRIPTAN 50 MG/1
TABLET, FILM COATED ORAL
Qty: 30 TABLET | Refills: 1 | Status: SHIPPED | OUTPATIENT
Start: 2018-05-24 | End: 2022-08-16

## 2018-05-31 ENCOUNTER — HOSPITAL ENCOUNTER (OUTPATIENT)
Facility: OTHER | Age: 58
Discharge: HOME | End: 2018-05-31
Attending: ANESTHESIOLOGY | Admitting: ANESTHESIOLOGY
Payer: COMMERCIAL

## 2018-05-31 ENCOUNTER — SURGERY (OUTPATIENT)
Age: 58
End: 2018-05-31

## 2018-05-31 VITALS
BODY MASS INDEX: 31.39 KG/M2 | WEIGHT: 200 LBS | RESPIRATION RATE: 18 BRPM | OXYGEN SATURATION: 100 % | HEART RATE: 68 BPM | TEMPERATURE: 98 F | SYSTOLIC BLOOD PRESSURE: 166 MMHG | HEIGHT: 67 IN | DIASTOLIC BLOOD PRESSURE: 79 MMHG

## 2018-05-31 DIAGNOSIS — M53.3 SACROILIAC JOINT PAIN: Primary | ICD-10-CM

## 2018-05-31 PROCEDURE — 25000003 PHARM REV CODE 250: Performed by: ANESTHESIOLOGY

## 2018-05-31 PROCEDURE — 63600175 PHARM REV CODE 636 W HCPCS: Performed by: ANESTHESIOLOGY

## 2018-05-31 PROCEDURE — 27096 INJECT SACROILIAC JOINT: CPT | Mod: 50 | Performed by: ANESTHESIOLOGY

## 2018-05-31 PROCEDURE — 25500020 PHARM REV CODE 255: Performed by: ANESTHESIOLOGY

## 2018-05-31 PROCEDURE — 27096 INJECT SACROILIAC JOINT: CPT | Mod: 50,,, | Performed by: ANESTHESIOLOGY

## 2018-05-31 RX ORDER — METHYLPREDNISOLONE ACETATE 40 MG/ML
INJECTION, SUSPENSION INTRA-ARTICULAR; INTRALESIONAL; INTRAMUSCULAR; SOFT TISSUE
Status: DISCONTINUED | OUTPATIENT
Start: 2018-05-31 | End: 2018-05-31 | Stop reason: HOSPADM

## 2018-05-31 RX ORDER — LIDOCAINE HYDROCHLORIDE 10 MG/ML
INJECTION INFILTRATION; PERINEURAL
Status: DISCONTINUED | OUTPATIENT
Start: 2018-05-31 | End: 2018-05-31 | Stop reason: HOSPADM

## 2018-05-31 RX ORDER — BUPIVACAINE HYDROCHLORIDE 2.5 MG/ML
INJECTION, SOLUTION EPIDURAL; INFILTRATION; INTRACAUDAL
Status: DISCONTINUED | OUTPATIENT
Start: 2018-05-31 | End: 2018-05-31 | Stop reason: HOSPADM

## 2018-05-31 RX ADMIN — LIDOCAINE HYDROCHLORIDE 10 ML: 10 INJECTION, SOLUTION INFILTRATION; PERINEURAL at 09:05

## 2018-05-31 RX ADMIN — METHYLPREDNISOLONE ACETATE 80 MG: 40 INJECTION, SUSPENSION INTRA-ARTICULAR; INTRALESIONAL; INTRAMUSCULAR; SOFT TISSUE at 09:05

## 2018-05-31 RX ADMIN — BUPIVACAINE HYDROCHLORIDE 10 ML: 2.5 INJECTION, SOLUTION EPIDURAL; INFILTRATION; INTRACAUDAL; PERINEURAL at 09:05

## 2018-05-31 RX ADMIN — IOHEXOL 5 ML: 300 INJECTION, SOLUTION INTRAVENOUS at 09:05

## 2018-05-31 NOTE — DISCHARGE SUMMARY
Discharge Note  Short Stay      SUMMARY     Admit Date: 5/31/2018    Attending Physician: Lynette Rowley      Discharge Physician: Lynette Rowley      Discharge Date: 5/31/2018 10:10 AM    Procedure(s) (LRB):  INJECTION-JOINT (Bilateral)    Final Diagnosis: Sacroiliitis [M46.1]    Disposition: Home or self care    Patient Instructions:   Current Discharge Medication List      CONTINUE these medications which have NOT CHANGED    Details   ASPIRIN (ASPIR-81 ORAL) Take 1 tablet by mouth once daily.      boric acid (BORIC ACID) vaginal suppository Place 650 mg vaginally.      !! buPROPion (WELLBUTRIN XL) 150 MG TB24 tablet Take 1 tablet (150 mg total) by mouth once daily.  Qty: 90 tablet, Refills: 3    Associated Diagnoses: Depression, unspecified depression type      !! buPROPion (WELLBUTRIN XL) 300 MG 24 hr tablet Take 1 tablet (300 mg total) by mouth every morning.  Qty: 90 tablet, Refills: 3    Associated Diagnoses: Depression, unspecified depression type      estradiol (ESTRACE) 0.01 % (0.1 mg/gram) vaginal cream Use 1/2 gram twice weekly  Qty: 42.5 g, Refills: 10    Associated Diagnoses: Vaginal atrophy      fexofenadine (ALLEGRA) 180 MG tablet Take 1 tablet (180 mg total) by mouth once daily.  Qty: 90 tablet, Refills: 3    Associated Diagnoses: Environmental and seasonal allergies      FLAXSEED ORAL Take by mouth.      fluticasone (FLONASE) 50 mcg/actuation nasal spray 1 spray by Each Nare route 2 (two) times daily as needed for Rhinitis or Allergies.  Qty: 1 Bottle, Refills: 6    Associated Diagnoses: Environmental and seasonal allergies      ibuprofen (ADVIL,MOTRIN) 800 MG tablet Take 1 tablet (800 mg total) by mouth every 8 (eight) hours as needed for Pain.  Qty: 270 tablet, Refills: 3    Associated Diagnoses: Sciatica, unspecified laterality      LACTOBAC NO.41/BIFIDOBACT NO.7 (PROBIOTIC-10 ORAL) Take by mouth.      multivitamin (ONE DAILY MULTIVITAMIN) per tablet Take 1 tablet by mouth every evening.        nystatin (MYCOSTATIN) powder Apply to affected areas of skin twice daily as needed for skin infection.  Re treat as needed.  Qty: 30 g, Refills: 3    Associated Diagnoses: Candidal skin infection      PROAIR HFA 90 mcg/actuation inhaler Inhale 1-2 puffs into the lungs every 6 (six) hours as needed for Wheezing or Shortness of Breath (chest tightness). Rescue  Qty: 18 g, Refills: 3    Associated Diagnoses: Mild intermittent asthma without complication      sumatriptan (IMITREX) 50 MG tablet Take 1 tab by mouth as needed for migraine headache.  Repeat dose every 2 hours as needed.  Max 200mg in 24 hours.  Qty: 30 tablet, Refills: 1    Associated Diagnoses: Other migraine without status migrainosus, not intractable       !! - Potential duplicate medications found. Please discuss with provider.              Discharge Diagnosis: Sacroiliitis [M46.1]  Condition on Discharge: Stable with no complications to procedure   Diet on Discharge: Same as before.  Activity: as per instruction sheet.  Discharge to: Home with a responsible adult.  Follow up: 2-4 weeks

## 2018-05-31 NOTE — DISCHARGE INSTRUCTIONS

## 2018-06-13 NOTE — PROGRESS NOTES
Chronic Pain -Follow Up    Referring Physician: No ref. provider found    Chief Complaint:   Chief Complaint   Patient presents with    Low-back Pain     Follow up after procedure with Dr. Rowley        SUBJECTIVE: Disclaimer: This note has been generated using voice-recognition software. There may be typographical errors that have been missed during proof-reading    Interval History 6/14/2018:  The patient returns for follow up.  She is s/p bilateral SI joint injections on 5/31/18 with 100% pain relief.  She has not had any back or buttock pain since the procedure.  She still has some cramping to her calf which she feels is unrelated.  She takes Ibuprofen 800 mg at night with benefit.  She has had benefit with PT exercises and continues to be active.  Her pain today is 0/10.    Interval history 5/15/2018:  Since previous encounter the patient continues to have significant lower back pain over the area of the sacral iliac joints.  She states it is worse when she first starts to get going and gradually improves as she walks.  She feels as if she is able to walk longer distances after previous epidural steroid injections but overall reports that 10% improvement in her pain.  The previous Medrol Dosepak did not help significant only tramadol was also not helping with her pain.  She has been doing physical therapy and feels as if she is making slow strides but there still certain activities that she cannot do without exacerbating her pain.  No other health changes since previous encounter.    Interval History 4/23/2018  One week ago, sudden worsened back pain when bending forwards with radiation into bilateral thighs and weakness.  Gradually improving, taking ibuprofen, but has been restricting activities secondary to persisting pain in the lower back.    Initial encounter:    Amita Lewis presents to the clinic for the evaluation of back pain. The pain started one year ago  and symptoms have been  worsening.    Brief history:    Pain Description:    The pain is located in the back area and radiates to the leg in the L3-4 distribution.  She describes symptoms of neurogenic versus vascular claudication.  Her pain significant worsens after walking for several blocks and gradually improves when sitting down.  She describes her pain symptoms as a radiating pain into her calf and a cramping sensation in her calf.    At BEST  0/10     At WORST  7/10 on the WORST day.      On average pain is rated as 7/10.     Today the pain is rated as 1/10    The pain is described as burning, dull and tight band      Symptoms interfere with daily activity and sleeping.     Exacerbating factors: Walking.      Mitigating factors medications.     Patient denies .  Patient denies any suicidal or homicidal ideations    Pain Medications:  Current:  Ibuprofen - with relief    Tried in Past:  NSAIDs -Never  TCA -Never  SNRI -Never  Anti-convulsants -lyrica and gabapentin in the past with significant sedating side effects limiting their use  Muscle Relaxants -Never  Opioids-Never    Physical Therapy/Home Exercise: yes       report:  Reviewed and consistent with medication use as prescribed.    Pain Procedures:   2017 LUMBAR LEFT L4 AND L5 TRANSFORAMINAL JILLIAN   3/14/2018 - left L3 and L4 TF JILLIAN  18 Bilateral SI joint injections- 100% relief    Chiropractor -never  Acupuncture - never  TENS unit -never  Spinal decompression -Lumbar microdiskectomy in  with complication of dural tear requiring repair.  Joint replacement -never    Imagin2017 MRI L SPINE   Impression          #1. Degenerative change of the lumbar spine most significant at L3-L4 and L4-L5 as detailed above.  On the prior MRI there appears to have been a left-sided paracentral disc herniation at L4-L5 that is not seen on today's exam           2017 XRAY L SPINE  Impression        Mild degenerative disc disease of the inferior lumbar spine without  instability.       Past Medical History:   Diagnosis Date    Abnormal Pap smear     Asthma     Depression 3/17/2016    Environmental and seasonal allergies 3/17/2016    Heartburn 3/17/2016    HLD (hyperlipidemia) 03/22/2016    2017 ASCVD 5.2%; can't tolerate statins    Menopausal syndrome (hot flashes) 3/17/2016    Mild intermittent asthma without complication 3/17/2016    Obesity (BMI 30-39.9) 3/17/2016    Sciatica 3/17/2016     Past Surgical History:   Procedure Laterality Date    CERVICAL BIOPSY  W/ LOOP ELECTRODE EXCISION      INJECTION OF JOINT Bilateral 5/31/2018    Procedure: INJECTION-JOINT;  Surgeon: Lynette Rowley MD;  [facility];  Service: Pain Management;  Laterality: Bilateral;  B/L SI Joint Injs  02450, [id number]    microdiscetomy      L4-L5    OVARIAN CYST REMOVAL  2001     Social History     Social History    Marital status:      Spouse name: N/A    Number of children: N/A    Years of education: N/A     Occupational History    Not on file.     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    Works for  at [employer]    Walks a lot at work and for exercise    Going to [place] Aug 2017    Zoster 11/4/14     Family History   Problem Relation Age of Onset    Cancer Father         lung; non smoker, worked in [employer] and gas station    Breast cancer Mother     Heart failure Mother     Diabetes Mellitus Mother         ?secondary to chronic steroids    Ovarian cancer Neg Hx     Hypertension Neg Hx        Review of patient's allergies indicates:  No Known Allergies    Current Outpatient Prescriptions   Medication Sig     ASPIRIN (ASPIR-81 ORAL) Take 1 tablet by mouth once daily.    boric acid (BORIC ACID) vaginal suppository Place 650 mg vaginally.    buPROPion (WELLBUTRIN XL) 150 MG TB24 tablet Take 1 tablet (150 mg total) by mouth once daily.    buPROPion (WELLBUTRIN XL) 300 MG 24 hr tablet Take 1 tablet (300 mg total) by mouth every morning.    estradiol (ESTRACE) 0.01 % (0.1 mg/gram) vaginal cream Use 1/2 gram twice weekly    fexofenadine (ALLEGRA) 180 MG tablet Take 1 tablet (180 mg total) by mouth once daily.    FLAXSEED ORAL Take by mouth.    fluticasone (FLONASE) 50 mcg/actuation nasal spray 1 spray by Each Nare route 2 (two) times daily as needed for Rhinitis or Allergies.    ibuprofen (ADVIL,MOTRIN) 800 MG tablet Take 1 tablet (800 mg total) by mouth every 8 (eight) hours as needed for Pain.    LACTOBAC NO.41/BIFIDOBACT NO.7 (PROBIOTIC-10 ORAL) Take by mouth.    multivitamin (ONE DAILY MULTIVITAMIN) per tablet Take 1 tablet by mouth every evening.     nystatin (MYCOSTATIN) powder Apply to affected areas of skin twice daily as needed for skin infection.  Re treat as needed.    PROAIR HFA 90 mcg/actuation inhaler Inhale 1-2 puffs into the lungs every 6 (six) hours as needed for Wheezing or Shortness of Breath (chest tightness). Rescue    sumatriptan (IMITREX) 50 MG tablet Take 1 tab by mouth as needed for migraine headache.  Repeat dose every 2 hours as needed.  Max 200mg in 24 hours.     No current facility-administered medications for this visit.        REVIEW OF SYSTEMS:    GENERAL:  No weight loss, malaise or fevers.  HEENT:   No recent changes in vision or hearing  NECK:  Negative for lumps, no difficulty with swallowing.  RESPIRATORY:  Negative for cough, wheezing or shortness of breath, patient denies any recent URI.  CARDIOVASCULAR:  Negative for chest pain, leg swelling or palpitations.  GI:  Negative for abdominal discomfort, blood in stools or black stools or change in bowel habits.  MUSCULOSKELETAL:   "See HPI.  SKIN:  Negative for lesions, rash, and itching.  PSYCH:  No mood disorder or recent psychosocial stressors.  Patient's sleep is disturbed secondary to pain.  HEMATOLOGY/LYMPHOLOGY:  Negative for prolonged bleeding, bruising easily or swollen nodes.  Patient is not currently taking any anti-coagulants  ENDO: No history of diabetes or thyroid dysfunction  NEURO: Occasional headaches.  All other reviewed and negative other than HPI.    OBJECTIVE:    /87   Pulse 75   Temp 97.9 °F (36.6 °C) (Oral)   Resp 18   Ht 5' 7" (1.702 m)   Wt 89 kg (196 lb 3.4 oz)   LMP 10/20/2013   SpO2 100%   BMI 30.73 kg/m²     PHYSICAL EXAMINATION:    GENERAL: Well appearing, in no acute distress, alert and oriented x3.  PSYCH:  Mood and affect appropriate.  SKIN: No rashes or lesions.  HEAD/FACE:  Normocephalic, atraumatic. Cranial nerves grossly intact.  CV: RRR with palpation of the radial artery.  PULM: No evidence of respiratory difficulty, symmetric chest rise.  BACK: Straight leg raising in the sitting and supine positions is negative to radicular pain.  There is pain with palpation over the facet joints of the lumbar spine bilaterally. Full ROM without pain.  Negative facet loading bilaterally.  EXTREMITIES: Peripheral joint ROM is full and pain free without obvious instability or laxity in all four extremities. No deformities, edema, or skin discoloration. Good capillary refill.  MUSCULOSKELETAL: Hip, and knee provocative maneuvers are negative.  There is no pain with palpation over the sacroiliac joints bilaterally.  There is no pain to palpation over the greater trochanteric bursa bilaterally.  FABERs test is negative.  FADIRs test is negative.   Bilateral lower extremity strength is normal and symmetric.  No atrophy or tone abnormalities are noted.  NEURO: Bilateral lower extremity coordination and muscle stretch reflexes are physiologic and symmetric.  Plantar response are downgoing. No clonus.  No loss of " sensation is noted.  GAIT: Normal.    Lab Results   Component Value Date    WBC 7.13 07/28/2017    HGB 12.9 07/28/2017    HCT 38.7 07/28/2017    MCV 88 07/28/2017     07/28/2017     BMP  Lab Results   Component Value Date     07/28/2017    K 4.1 07/28/2017     07/28/2017    CO2 24 07/28/2017    BUN 21 (H) 07/28/2017    CREATININE 0.8 07/28/2017    CALCIUM 9.5 07/28/2017    ANIONGAP 13 07/28/2017    ESTGFRAFRICA >60.0 07/28/2017    EGFRNONAA >60.0 07/28/2017         ASSESSMENT: 57 y.o. year old female with back and leg pain, consistent with the following diagnoses:    Encounter Diagnoses   Name Primary?    Sacroiliac joint pain Yes    Myalgia     Lumbar radiculopathy     Neurogenic claudication        PLAN:     - Previous imaging was reviewed and discussed with the patient today.    - She is s/p SI joint injections with significant benefit.  Will monitor progress.    - Continue with PT exercises and activity.    - RTC in 2-3 months.  She would like to have an appointment prior to her vacation for check up.      The  above plan and management options were discussed at length with patient. Patient is in agreement with the above and verbalized understanding.     Patience Vinson  06/13/2018

## 2018-06-14 ENCOUNTER — OFFICE VISIT (OUTPATIENT)
Dept: PAIN MEDICINE | Facility: CLINIC | Age: 58
End: 2018-06-14
Payer: COMMERCIAL

## 2018-06-14 VITALS
HEART RATE: 75 BPM | WEIGHT: 196.19 LBS | BODY MASS INDEX: 30.79 KG/M2 | SYSTOLIC BLOOD PRESSURE: 137 MMHG | RESPIRATION RATE: 18 BRPM | OXYGEN SATURATION: 100 % | TEMPERATURE: 98 F | HEIGHT: 67 IN | DIASTOLIC BLOOD PRESSURE: 87 MMHG

## 2018-06-14 DIAGNOSIS — R29.818 NEUROGENIC CLAUDICATION: ICD-10-CM

## 2018-06-14 DIAGNOSIS — M79.10 MYALGIA: ICD-10-CM

## 2018-06-14 DIAGNOSIS — M53.3 SACROILIAC JOINT PAIN: Primary | ICD-10-CM

## 2018-06-14 DIAGNOSIS — M54.16 LUMBAR RADICULOPATHY: ICD-10-CM

## 2018-06-14 PROCEDURE — 99213 OFFICE O/P EST LOW 20 MIN: CPT | Mod: S$GLB,,, | Performed by: NURSE PRACTITIONER

## 2018-06-14 PROCEDURE — 99999 PR PBB SHADOW E&M-EST. PATIENT-LVL III: CPT | Mod: PBBFAC,,, | Performed by: NURSE PRACTITIONER

## 2018-06-14 PROCEDURE — 3008F BODY MASS INDEX DOCD: CPT | Mod: CPTII,S$GLB,, | Performed by: NURSE PRACTITIONER

## 2018-08-13 ENCOUNTER — PATIENT MESSAGE (OUTPATIENT)
Dept: SPINE | Facility: CLINIC | Age: 58
End: 2018-08-13

## 2018-08-19 DIAGNOSIS — F32.A DEPRESSION, UNSPECIFIED DEPRESSION TYPE: ICD-10-CM

## 2018-08-20 RX ORDER — BUPROPION HYDROCHLORIDE 300 MG/1
TABLET ORAL
Qty: 90 TABLET | Refills: 0 | Status: SHIPPED | OUTPATIENT
Start: 2018-08-20 | End: 2018-11-20 | Stop reason: SDUPTHER

## 2018-08-28 ENCOUNTER — TELEPHONE (OUTPATIENT)
Dept: FAMILY MEDICINE | Facility: CLINIC | Age: 58
End: 2018-08-28

## 2018-08-28 DIAGNOSIS — Z00.00 ROUTINE GENERAL MEDICAL EXAMINATION AT A HEALTH CARE FACILITY: Primary | ICD-10-CM

## 2018-08-28 NOTE — TELEPHONE ENCOUNTER
----- Message from Rohini Mart sent at 8/28/2018 12:11 PM CDT -----  Doctor appointment and lab have been scheduled.  Please link lab orders to the lab appointment.  Date of doctor appointment:  11/20/2018  Physical or EP:  Physical   Date of lab appointment:  11/12/2018  Comments:

## 2018-09-05 ENCOUNTER — OFFICE VISIT (OUTPATIENT)
Dept: SPINE | Facility: CLINIC | Age: 58
End: 2018-09-05
Attending: ANESTHESIOLOGY
Payer: COMMERCIAL

## 2018-09-05 VITALS
DIASTOLIC BLOOD PRESSURE: 70 MMHG | TEMPERATURE: 99 F | BODY MASS INDEX: 31.39 KG/M2 | SYSTOLIC BLOOD PRESSURE: 156 MMHG | HEIGHT: 67 IN | WEIGHT: 200 LBS | HEART RATE: 65 BPM

## 2018-09-05 DIAGNOSIS — M53.3 SACROILIAC JOINT PAIN: Primary | ICD-10-CM

## 2018-09-05 DIAGNOSIS — M46.1 BILATERAL SACROILIITIS: Primary | ICD-10-CM

## 2018-09-05 PROCEDURE — 99213 OFFICE O/P EST LOW 20 MIN: CPT | Mod: S$GLB,,, | Performed by: ANESTHESIOLOGY

## 2018-09-05 PROCEDURE — 99999 PR PBB SHADOW E&M-EST. PATIENT-LVL IV: CPT | Mod: PBBFAC,,, | Performed by: ANESTHESIOLOGY

## 2018-09-05 PROCEDURE — 3008F BODY MASS INDEX DOCD: CPT | Mod: CPTII,S$GLB,, | Performed by: ANESTHESIOLOGY

## 2018-09-05 NOTE — PROGRESS NOTES
Chronic Pain -Follow Up    Referring Physician: No ref. provider found    Chief Complaint:   Chief Complaint   Patient presents with    Follow-up        SUBJECTIVE: Disclaimer: This note has been generated using voice-recognition software. There may be typographical errors that have been missed during proof-reading    Interval History: 9/5/18:  She states that 3 weeks ago her pain returned after hearing a pop and twisting while getting into her car. She states that pain has returned in the same character and distribution as prior to the bilateral SIJ on 5/31 but may be more severe now with pain going down both legs instead just one. She is hoping to repeat injections. She denies any other health changes in the interim. She takes a baby ASA. She takes ibuprofen nightly for pain.     Interval History 6/14/2018:  The patient returns for follow up.  She is s/p bilateral SI joint injections on 5/31/18 with 100% pain relief.  She has not had any back or buttock pain since the procedure.  She still has some cramping to her calf which she feels is unrelated.  She takes Ibuprofen 800 mg at night with benefit.  She has had benefit with PT exercises and continues to be active.  Her pain today is 0/10.    Interval history 5/15/2018:  Since previous encounter the patient continues to have significant lower back pain over the area of the sacral iliac joints.  She states it is worse when she first starts to get going and gradually improves as she walks.  She feels as if she is able to walk longer distances after previous epidural steroid injections but overall reports that 10% improvement in her pain.  The previous Medrol Dosepak did not help significant only tramadol was also not helping with her pain.  She has been doing physical therapy and feels as if she is making slow strides but there still certain activities that she cannot do without exacerbating her pain.  No other health changes since previous encounter.  Interval  History 4/23/2018  One week ago, sudden worsened back pain when bending forwards with radiation into bilateral thighs and weakness.  Gradually improving, taking ibuprofen, but has been restricting activities secondary to persisting pain in the lower back.  Initial encounter:    Amita Lewis presents to the clinic for the evaluation of back pain. The pain started one year ago  and symptoms have been worsening.    Brief history:    Pain Description:    The pain is located in the back area and radiates down the posterior legs to the calves. She describes symptoms of neurogenic versus vascular claudication.  Her pain significant worsens after walking for several blocks and gradually improves when sitting down.  She describes her pain symptoms as a radiating pain into her calf and a cramping sensation in her calf.    At BEST  0/10     At WORST  7/10 on the WORST day.      On average pain is rated as 7/10.     Today the pain is rated as 1/10    The pain is described as burning, dull and tight band      Symptoms interfere with daily activity and sleeping.     Exacerbating factors: Walking.      Mitigating factors medications.     Patient denies .  Patient denies any suicidal or homicidal ideations    Pain Medications:  Current:  Ibuprofen - with relief  Medrol dose el    Tried in Past:  NSAIDs - Ibuprofen  TCA -Never  SNRI -Never  Anti-convulsants -lyrica and gabapentin in the past with significant sedating side effects limiting their use  Muscle Relaxants -Never  Opioids-tramadol without improvement    Physical Therapy/Home Exercise: yes       report:  Reviewed and consistent with medication use as prescribed.    Pain Procedures:   5/31/18: Bilateral Sacroiliac Joint Injections: 95% relief for 3 months  3/14/2018 - left L3 and L4 TFESI  12/27/2017 LUMBAR LEFT L4 AND L5 TRANSFORAMINAL JILLIAN     Chiropractor -never  Acupuncture - never  TENS unit -never  Spinal decompression -Lumbar microdiskectomy in 2008 with  complication of dural tear requiring repair.  Joint replacement -never    Imagin2017 MRI L SPINE   Impression          #1. Degenerative change of the lumbar spine most significant at L3-L4 and L4-L5 as detailed above.  On the prior MRI there appears to have been a left-sided paracentral disc herniation at L4-L5 that is not seen on today's exam           2017 XRAY L SPINE  Impression        Mild degenerative disc disease of the inferior lumbar spine without instability.       Past Medical History:   Diagnosis Date    Abnormal Pap smear     Asthma     Depression 3/17/2016    Environmental and seasonal allergies 3/17/2016    Heartburn 3/17/2016    HLD (hyperlipidemia) 2016 ASCVD 5.2%; can't tolerate statins    Menopausal syndrome (hot flashes) 3/17/2016    Mild intermittent asthma without complication 3/17/2016    Obesity (BMI 30-39.9) 3/17/2016    Sciatica 3/17/2016     Past Surgical History:   Procedure Laterality Date    CERVICAL BIOPSY  W/ LOOP ELECTRODE EXCISION      microdiscetomy      L4-L5    OVARIAN CYST REMOVAL       Social History     Socioeconomic History    Marital status:      Spouse name: Not on file    Number of children: Not on file    Years of education: Not on file    Highest education level: Not on file   Social Needs    Financial resource strain: Not on file    Food insecurity - worry: Not on file    Food insecurity - inability: Not on file    Transportation needs - medical: Not on file    Transportation needs - non-medical: Not on file   Occupational History    Not on file   Tobacco Use    Smoking status: Current Some Day Smoker     Types: Cigarettes    Smokeless tobacco: Never Used   Substance and Sexual Activity    Alcohol use: Yes     Alcohol/week: 0.6 oz     Types: 1 Glasses of wine per week    Drug use: No    Sexual activity: Yes     Partners: Male     Birth control/protection: None   Other Topics Concern    Not on file    Social History Narrative    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    Works for [employer] at [employer]    Walks a lot at work and for exercise    Going to [place] Aug 2017    [place] 11/4/14     Family History   Problem Relation Age of Onset    Cancer Father         lung; non smoker, worked in [employer] and gas station    Breast cancer Mother     Heart failure Mother     Diabetes Mellitus Mother         ?secondary to chronic steroids    Ovarian cancer Neg Hx     Hypertension Neg Hx        Review of patient's allergies indicates:  No Known Allergies    Current Outpatient Medications   Medication Sig    ASPIRIN (ASPIR-81 ORAL) Take 1 tablet by mouth once daily.    boric acid (BORIC ACID) vaginal suppository Place 650 mg vaginally.    buPROPion (WELLBUTRIN XL) 150 MG TB24 tablet Take 1 tablet (150 mg total) by mouth once daily.    buPROPion (WELLBUTRIN XL) 300 MG 24 hr tablet TAKE 1 TABLET BY MOUTH EVERY MORNING    estradiol (ESTRACE) 0.01 % (0.1 mg/gram) vaginal cream Use 1/2 gram twice weekly    fexofenadine (ALLEGRA) 180 MG tablet Take 1 tablet (180 mg total) by mouth once daily.    FLAXSEED ORAL Take by mouth.    fluticasone (FLONASE) 50 mcg/actuation nasal spray 1 spray by Each Nare route 2 (two) times daily as needed for Rhinitis or Allergies.    ibuprofen (ADVIL,MOTRIN) 800 MG tablet Take 1 tablet (800 mg total) by mouth every 8 (eight) hours as needed for Pain.    LACTOBAC NO.41/BIFIDOBACT NO.7 (PROBIOTIC-10 ORAL) Take by mouth.    multivitamin (ONE DAILY MULTIVITAMIN) per tablet Take 1 tablet by mouth every evening.     nystatin (MYCOSTATIN) powder Apply to affected areas of skin twice daily as needed for skin infection.  Re treat as needed.    sumatriptan (IMITREX) 50 MG tablet Take 1 tab by mouth as needed for migraine headache.  Repeat dose every 2 hours as needed.  Max 200mg in 24 hours.    PROAIR HFA 90 mcg/actuation inhaler Inhale 1-2 puffs into the lungs every 6 (six) hours as needed for  "Wheezing or Shortness of Breath (chest tightness). Rescue     No current facility-administered medications for this visit.        REVIEW OF SYSTEMS:    GENERAL:  No weight loss, malaise or fevers.  RESPIRATORY:  Negative for cough, wheezing or shortness of breath, patient denies any recent URI.  CARDIOVASCULAR:  Takes daily baby ASA. Negative for chest pain, leg swelling or palpitations.  GI:  Negative for bowel changes  MUSCULOSKELETAL:  See HPI.  SKIN:  Negative for lesions, rash, and itching.  PSYCH:  (+) Depression.  Patients sleep is disturbed secondary to pain.  HEMATOLOGY/LYMPHOLOGY:  Negative for prolonged bleeding, bruising easily or swollen nodes.    ENDO: No history of diabetes or thyroid dysfunction  NEURO:   No history of headaches, syncope, paralysis, seizures or tremors.  All other reviewed and negative other than HPI.    OBJECTIVE:    BP (!) 156/70   Pulse 65   Temp 98.9 °F (37.2 °C)   Ht 5' 7" (1.702 m)   Wt 90.7 kg (200 lb)   LMP 10/20/2013   BMI 31.32 kg/m²     PHYSICAL EXAMINATION:    GENERAL: Well appearing, in no acute distress, alert.  PSYCH:  Mood and affect appropriate.  SKIN: Surgical scar in midline of spine well healed  HEAD/FACE:  Normocephalic, atraumatic. Cranial nerves grossly intact.  CV: Non-diaphoretic, pulses intact in BUE.   PULM: No evidence of respiratory difficulty, symmetric chest rise.  BACK: Straight leg raising in the sitting position is negative to radicular pain. There is pain with palpation over the facet joints of the lumbar spine bilaterally. There is decreased range of motion with extension to 15 degrees, and facet loading maneuvers cause reproducible pain.    EXTREMITIES: No deformities, edema, or skin discoloration.   MUSCULOSKELETAL: There is  pain with palpation over the sacroiliac joints bilaterally.  There is no pain to palpation over the greater trochanteric bursa bilaterally.  No atrophy or tone abnormalities are noted.  NEURO: Bilateral lower extremity " strength is normal and symmetric.  Bilateral patella reflexes are +3 and symmetric. No clonus.  No loss of sensation is noted.  GAIT: Antalgic, ambulates without assistance     Lab Results   Component Value Date    WBC 7.13 07/28/2017    HGB 12.9 07/28/2017    HCT 38.7 07/28/2017    MCV 88 07/28/2017     07/28/2017     BMP  Lab Results   Component Value Date     07/28/2017    K 4.1 07/28/2017     07/28/2017    CO2 24 07/28/2017    BUN 21 (H) 07/28/2017    CREATININE 0.8 07/28/2017    CALCIUM 9.5 07/28/2017    ANIONGAP 13 07/28/2017    ESTGFRAFRICA >60.0 07/28/2017    EGFRNONAA >60.0 07/28/2017         ASSESSMENT: 57 y.o. year old female with pain, consistent with     Encounter Diagnosis   Name Primary?    Sacroiliac joint pain Yes       PLAN:   I will schedule patient for bilateral sacral iliac joint injections. If not getting adequate relief of leg pain, consider L4/5 or L5/S1 ILESI to cover bilateral radicular symptoms in future.     Continue home exercises    Discussed risks and benefits of NSAIDs. Takes daily ibuprofen. Rec mixing in tylenol 500-1000mg nightly and discussed not exceeding recommended daily dose and recommended limiting to 3G daily if needed. Patient on daily low dose ASA, so recommended switching to naproxen. Will want to avoid akins-2 selective NSAIDs with the baby ASA.     RTC 2 weeks after procedure.     The  above plan and management options were discussed at length with patient. Patient is in agreement with the above and verbalized understanding. It will be communicated with the referring physician via electronic record, fax, or mail.    Janel Velazquez  09/05/2018     I reviewed and edited the  fellow's note, I conducted my own interview and physical examination and agree with the findings.      Lynette Rowley 09/05/2018

## 2018-09-18 ENCOUNTER — HOSPITAL ENCOUNTER (OUTPATIENT)
Facility: OTHER | Age: 58
Discharge: HOME | End: 2018-09-18
Attending: ANESTHESIOLOGY | Admitting: ANESTHESIOLOGY
Payer: COMMERCIAL

## 2018-09-18 VITALS
RESPIRATION RATE: 18 BRPM | HEART RATE: 77 BPM | SYSTOLIC BLOOD PRESSURE: 142 MMHG | DIASTOLIC BLOOD PRESSURE: 67 MMHG | OXYGEN SATURATION: 100 % | WEIGHT: 200 LBS | TEMPERATURE: 99 F | BODY MASS INDEX: 31.39 KG/M2 | HEIGHT: 67 IN

## 2018-09-18 DIAGNOSIS — M53.3 SACROILIAC JOINT PAIN: Primary | ICD-10-CM

## 2018-09-18 PROCEDURE — 25000003 PHARM REV CODE 250: Performed by: ANESTHESIOLOGY

## 2018-09-18 PROCEDURE — 25500020 PHARM REV CODE 255: Performed by: ANESTHESIOLOGY

## 2018-09-18 PROCEDURE — 63600175 PHARM REV CODE 636 W HCPCS: Performed by: ANESTHESIOLOGY

## 2018-09-18 PROCEDURE — 27096 INJECT SACROILIAC JOINT: CPT | Mod: 50 | Performed by: ANESTHESIOLOGY

## 2018-09-18 PROCEDURE — 27096 INJECT SACROILIAC JOINT: CPT | Mod: 50,,, | Performed by: ANESTHESIOLOGY

## 2018-09-18 RX ORDER — BUPIVACAINE HYDROCHLORIDE 2.5 MG/ML
INJECTION, SOLUTION EPIDURAL; INFILTRATION; INTRACAUDAL
Status: DISCONTINUED | OUTPATIENT
Start: 2018-09-18 | End: 2018-09-18 | Stop reason: HOSPADM

## 2018-09-18 RX ORDER — LIDOCAINE HYDROCHLORIDE 10 MG/ML
INJECTION, SOLUTION EPIDURAL; INFILTRATION; INTRACAUDAL; PERINEURAL
Status: DISCONTINUED | OUTPATIENT
Start: 2018-09-18 | End: 2018-09-18 | Stop reason: HOSPADM

## 2018-09-18 RX ORDER — METHYLPREDNISOLONE ACETATE 40 MG/ML
INJECTION, SUSPENSION INTRA-ARTICULAR; INTRALESIONAL; INTRAMUSCULAR; SOFT TISSUE
Status: DISCONTINUED | OUTPATIENT
Start: 2018-09-18 | End: 2018-09-18 | Stop reason: HOSPADM

## 2018-09-18 NOTE — OP NOTE
Sacroiliac Joint Injection under Fluoroscopy    Date of procedure 09/18/2018    Time-out taken to identify patient and procedure side prior to starting the procedure.                                                                 PROCEDURE:  Bilateral sacroiliac joint injection under fluoroscopy.    1) Right  sacroiliac joint injection under fluoroscopy.    2) Left  sacroiliac joint injection under fluoroscopy.    REASON FOR PROCEDURE: Bilateral sacroiliitis [M46.1]    PHYSICIAN: Lynette Rowley MD  ASSISTANTS: >None    MEDICATIONS INJECTED:  Depo-Medrol 40mg and 4 mL Bupivacaine 0.25% into each joint    LOCAL ANESTHETIC USED: Xylocaine 1% 5mL    SEDATION MEDICATIONS: None    ESTIMATED BLOOD LOSS:  None.    COMPLICATIONS:  None.    TECHNIQUE:   Laying in the prone position, the patient was prepped and draped in the usual sterile fashion using ChloraPrep and fenestrated drape.  The area was determined under fluoroscopy.  Local Xylocaine was injected by raising a wheel and going down to the periosteum using a 27-gauge hypodermic needle.  The 3.5 inch 22-gauge spinal needle was introduce into bilateral sacroiliac joints.  Negative pressure applied to confirm no intravascular placement.  Omnipaque was injected to confirm placement and to confirm that there was no vascular runoff.  The medication was then injected slowly.  The patient tolerated the procedure well.    The patient was monitored for approximately 30 minutes after the procedure.  Patient was given post procedure and discharge instructions to follow at home.  We will see the patient back in two weeks or the patient may call to inform of status. The patient was discharged in a stable condition

## 2018-09-18 NOTE — DISCHARGE SUMMARY
Discharge Note  Short Stay      SUMMARY     Admit Date: 9/18/2018    Attending Physician: Lynette Rowley      Discharge Physician: Lynette Rowley      Discharge Date: 9/18/2018 3:13 PM    Procedure(s) (LRB):  INJECTION, JOINT  Bilateral SI joint (Bilateral)    Final Diagnosis: Bilateral sacroiliitis [M46.1]    Disposition: Home or self care    Patient Instructions:   Current Discharge Medication List      CONTINUE these medications which have NOT CHANGED    Details   ASPIRIN (ASPIR-81 ORAL) Take 1 tablet by mouth once daily.      boric acid (BORIC ACID) vaginal suppository Place 650 mg vaginally.      !! buPROPion (WELLBUTRIN XL) 150 MG TB24 tablet Take 1 tablet (150 mg total) by mouth once daily.  Qty: 90 tablet, Refills: 3    Associated Diagnoses: Depression, unspecified depression type      !! buPROPion (WELLBUTRIN XL) 300 MG 24 hr tablet TAKE 1 TABLET BY MOUTH EVERY MORNING  Qty: 90 tablet, Refills: 0    Associated Diagnoses: Depression, unspecified depression type      estradiol (ESTRACE) 0.01 % (0.1 mg/gram) vaginal cream Use 1/2 gram twice weekly  Qty: 42.5 g, Refills: 10    Associated Diagnoses: Vaginal atrophy      fexofenadine (ALLEGRA) 180 MG tablet Take 1 tablet (180 mg total) by mouth once daily.  Qty: 90 tablet, Refills: 3    Associated Diagnoses: Environmental and seasonal allergies      FLAXSEED ORAL Take by mouth.      fluticasone (FLONASE) 50 mcg/actuation nasal spray 1 spray by Each Nare route 2 (two) times daily as needed for Rhinitis or Allergies.  Qty: 1 Bottle, Refills: 6    Associated Diagnoses: Environmental and seasonal allergies      ibuprofen (ADVIL,MOTRIN) 800 MG tablet Take 1 tablet (800 mg total) by mouth every 8 (eight) hours as needed for Pain.  Qty: 270 tablet, Refills: 3    Associated Diagnoses: Sciatica, unspecified laterality      LACTOBAC NO.41/BIFIDOBACT NO.7 (PROBIOTIC-10 ORAL) Take by mouth.      multivitamin (ONE DAILY MULTIVITAMIN) per tablet Take 1 tablet by mouth  every evening.       nystatin (MYCOSTATIN) powder Apply to affected areas of skin twice daily as needed for skin infection.  Re treat as needed.  Qty: 30 g, Refills: 3    Associated Diagnoses: Candidal skin infection      PROAIR HFA 90 mcg/actuation inhaler Inhale 1-2 puffs into the lungs every 6 (six) hours as needed for Wheezing or Shortness of Breath (chest tightness). Rescue  Qty: 18 g, Refills: 3    Associated Diagnoses: Mild intermittent asthma without complication      sumatriptan (IMITREX) 50 MG tablet Take 1 tab by mouth as needed for migraine headache.  Repeat dose every 2 hours as needed.  Max 200mg in 24 hours.  Qty: 30 tablet, Refills: 1    Associated Diagnoses: Other migraine without status migrainosus, not intractable       !! - Potential duplicate medications found. Please discuss with provider.              Discharge Diagnosis: Bilateral sacroiliitis [M46.1]  Condition on Discharge: Stable with no complications to procedure   Diet on Discharge: Same as before.  Activity: as per instruction sheet.  Discharge to: Home with a responsible adult.  Follow up: 2-4 weeks

## 2018-09-18 NOTE — DISCHARGE INSTRUCTIONS
Thank you for allowing us to care for you today. You may receive a survey about the care we provided. Your feedback is valuable and helps us provide excellent care throughout the community.     Home Care Instructions for Pain Management:    1. DIET:   You may resume your normal diet today.   2. BATHING:   You may shower with luke warm water. No tub baths or anything that will soak injection sites under water for the next 24 hours.  3. DRESSING:   You may remove your bandage today.   4. ACTIVITY LEVEL:   You may resume your normal activities 24 hrs after your procedure. Nothing strenuous today.  5. MEDICATIONS:   You may resume your normal medications today. To restart blood thinners, ask your doctor.  6. DRIVING    If you have received any sedatives by mouth today, you may not drive for 12 hours.    If you have received any sedation through your IV, you may not drive for 24 hrs.   7. SPECIAL INSTRUCTIONS:   No heat to the injection site for 24 hrs including, hot bath or shower, heating pad, moist heat, or hot tubs.    Use ice pack to injection site for any pain or discomfort.  Apply ice packs for 20 minute intervals as needed.    IF you have diabetes, be sure to monitor your blood sugar more closely. IF your injection contained steroids your blood sugar levels may become higher than normal.    If you are still having pain upon discharge:  Your pain may improve over the next 48 hours. The anesthetic (numbing medication) works immediately to 48 hours. IF your injection contained a steroid (anti-inflammatory medication), it takes approximately 3 days to start feeling relief and 7-10 days to see your greatest results from the medication. It is possible you may need subsequent injections. This would be discussed at your follow up appointment with pain management or your referring doctor.      PLEASE CALL YOUR DOCTOR IF:  1. Redness or swelling around the injection site.  2. Fever of 101 degrees or more  3. Drainage  (pus) from the injection site.  4. For any continuous bleeding (some dried blood over the incision is normal.)    FOR EMERGENCIES:   If any unusual problems or difficulties occur during clinic hours, call [phone] or [phone].

## 2018-10-05 ENCOUNTER — OFFICE VISIT (OUTPATIENT)
Dept: PAIN MEDICINE | Facility: CLINIC | Age: 58
End: 2018-10-05
Payer: COMMERCIAL

## 2018-10-05 VITALS
BODY MASS INDEX: 30.86 KG/M2 | HEART RATE: 68 BPM | HEIGHT: 67 IN | DIASTOLIC BLOOD PRESSURE: 87 MMHG | SYSTOLIC BLOOD PRESSURE: 158 MMHG | TEMPERATURE: 98 F | WEIGHT: 196.63 LBS

## 2018-10-05 DIAGNOSIS — M51.36 DDD (DEGENERATIVE DISC DISEASE), LUMBAR: ICD-10-CM

## 2018-10-05 DIAGNOSIS — M53.3 SACROILIAC JOINT PAIN: Primary | ICD-10-CM

## 2018-10-05 PROCEDURE — 3008F BODY MASS INDEX DOCD: CPT | Mod: CPTII,S$GLB,, | Performed by: NURSE PRACTITIONER

## 2018-10-05 PROCEDURE — 99999 PR PBB SHADOW E&M-EST. PATIENT-LVL III: CPT | Mod: PBBFAC,,, | Performed by: NURSE PRACTITIONER

## 2018-10-05 PROCEDURE — 99213 OFFICE O/P EST LOW 20 MIN: CPT | Mod: S$GLB,,, | Performed by: NURSE PRACTITIONER

## 2018-10-05 NOTE — PROGRESS NOTES
Chronic Pain -Follow Up    Referring Physician: No ref. provider found    Chief Complaint:   Chief Complaint   Patient presents with    Low-back Pain        SUBJECTIVE: Disclaimer: This note has been generated using voice-recognition software. There may be typographical errors that have been missed during proof-reading    Interval History 10/5/2018:  The patient returns to clinic today for follow up. She is s/p bilateral SI joint injection on 9/18/2018. She reports 95% relief of her low back and buttock pain. She denies any radiating leg pain today. She continues to participate in physical therapy with benefit. She denies any other health changes. Her pain today is 1/10.    Interval History: 9/5/18:  She states that 3 weeks ago her pain returned after hearing a pop and twisting while getting into her car. She states that pain has returned in the same character and distribution as prior to the bilateral SIJ on 5/31 but may be more severe now with pain going down both legs instead just one. She is hoping to repeat injections. She denies any other health changes in the interim. She takes a baby ASA. She takes ibuprofen nightly for pain.     Interval History 6/14/2018:  The patient returns for follow up.  She is s/p bilateral SI joint injections on 5/31/18 with 100% pain relief.  She has not had any back or buttock pain since the procedure.  She still has some cramping to her calf which she feels is unrelated.  She takes Ibuprofen 800 mg at night with benefit.  She has had benefit with PT exercises and continues to be active.  Her pain today is 0/10.    Interval history 5/15/2018:  Since previous encounter the patient continues to have significant lower back pain over the area of the sacral iliac joints.  She states it is worse when she first starts to get going and gradually improves as she walks.  She feels as if she is able to walk longer distances after previous epidural steroid injections but overall reports  that 10% improvement in her pain.  The previous Medrol Dosepak did not help significant only tramadol was also not helping with her pain.  She has been doing physical therapy and feels as if she is making slow strides but there still certain activities that she cannot do without exacerbating her pain.  No other health changes since previous encounter.    Interval History 4/23/2018  One week ago, sudden worsened back pain when bending forwards with radiation into bilateral thighs and weakness.  Gradually improving, taking ibuprofen, but has been restricting activities secondary to persisting pain in the lower back.    Initial encounter:    Amita Lewis presents to the clinic for the evaluation of back pain. The pain started one year ago  and symptoms have been worsening.    Brief history:    Pain Description:    The pain is located in the back area and radiates to the leg in the L3-4 distribution.  She describes symptoms of neurogenic versus vascular claudication.  Her pain significant worsens after walking for several blocks and gradually improves when sitting down.  She describes her pain symptoms as a radiating pain into her calf and a cramping sensation in her calf.    At BEST  0/10     At WORST  7/10 on the WORST day.      On average pain is rated as 7/10.     Today the pain is rated as 1/10    The pain is described as burning, dull and tight band      Symptoms interfere with daily activity and sleeping.     Exacerbating factors: Walking.      Mitigating factors medications.     Patient denies .  Patient denies any suicidal or homicidal ideations    Pain Medications:  Current:  Ibuprofen - with relief    Tried in Past:  NSAIDs -Never  TCA -Never  SNRI -Never  Anti-convulsants -lyrica and gabapentin in the past with significant sedating side effects limiting their use  Muscle Relaxants -Never  Opioids-Never    Physical Therapy/Home Exercise: yes       report:  Reviewed and consistent with medication  use as prescribed.    Pain Procedures:   2017 LUMBAR LEFT L4 AND L5 TRANSFORAMINAL JILLIAN   3/14/2018 - left L3 and L4 TF JILLIAN  18 Bilateral SI joint injections- 100% relief  2018- Bilateral SI joint injections    Chiropractor -never  Acupuncture - never  TENS unit -never  Spinal decompression -Lumbar microdiskectomy in 2008 with complication of dural tear requiring repair.  Joint replacement -never    Imagin2017 MRI L SPINE   Impression          #1. Degenerative change of the lumbar spine most significant at L3-L4 and L4-L5 as detailed above.  On the prior MRI there appears to have been a left-sided paracentral disc herniation at L4-L5 that is not seen on today's exam           2017 XRAY L SPINE  Impression        Mild degenerative disc disease of the inferior lumbar spine without instability.       Past Medical History:   Diagnosis Date    Abnormal Pap smear     Asthma     Depression 3/17/2016    Environmental and seasonal allergies 3/17/2016    Heartburn 3/17/2016    HLD (hyperlipidemia) 2016 ASCVD 5.2%; can't tolerate statins    Menopausal syndrome (hot flashes) 3/17/2016    Mild intermittent asthma without complication 3/17/2016    Obesity (BMI 30-39.9) 3/17/2016    Sciatica 3/17/2016     Past Surgical History:   Procedure Laterality Date    CERVICAL BIOPSY  W/ LOOP ELECTRODE EXCISION      INJECTION OF JOINT Bilateral 2018    Procedure: INJECTION-JOINT;  Surgeon: Lynette Rowley MD;  Location: [facility];  Service: Pain Management;  Laterality: Bilateral;  B/L SI Joint Injs  45996, [id number]    INJECTION OF JOINT Bilateral 2018    Procedure: INJECTION, JOINT  Bilateral SI joint;  Surgeon: Lynette Rowley MD;  Location: [facility];  Service: Pain Management;  Laterality: Bilateral;    INJECTION, JOINT  Bilateral SI joint Bilateral 2018    Performed by [facility] MD at [facility]    INJECTION-JOINT Bilateral 2018     [facility]    INJECTION-STEROID-EPIDURAL-TRANSFORAMINAL Left 3/14/2018    [facility]    INJECTION-STEROID-EPIDURAL-TRANSFORAMINAL Left 12/27/2017    [facility]    microdiscetomy      L4-L5    OVARIAN CYST REMOVAL  2001     Social History     Socioeconomic History    Marital status:      Spouse name: Not on file    Number of children: Not on file    Years of education: Not on file    Highest education level: Not on file   Social Needs    Financial resource strain: Not on file    Food insecurity - worry: Not on file    Food insecurity - inability: Not on file    Transportation needs - medical: Not on file    Transportation needs - non-medical: Not on file   Occupational History    Not on file   Tobacco Use    Smoking status: Current Some Day Smoker     Types: Cigarettes    Smokeless tobacco: Never Used   Substance and Sexual Activity    Alcohol use: Yes     Alcohol/week: 0.6 oz     Types: 1 Glasses of wine per week    Drug use: No    Sexual activity: Yes     Partners: Male     Birth control/protection: None   Other Topics Concern    Not on file   Social History Narrative    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    Works for [email or web address]    Walks a lot at work and for exercise    Going to [place] Aug 2017    [place] 11/4/14     Family History   Problem Relation Age of Onset    Cancer Father         lung; non smoker, worked in [employer] and gas station    Breast cancer Mother     Heart failure Mother     Diabetes Mellitus Mother         ?secondary to chronic steroids    Ovarian cancer Neg Hx     Hypertension Neg Hx        Review of patient's allergies indicates:  No Known Allergies    Current Outpatient Medications   Medication Sig    ASPIRIN (ASPIR-81 ORAL) Take 1 tablet by mouth once daily.    boric acid (BORIC ACID) vaginal suppository Place 650 mg vaginally.     buPROPion (WELLBUTRIN XL) 150 MG TB24 tablet Take 1 tablet (150 mg total) by mouth once daily.    buPROPion (WELLBUTRIN XL) 300 MG 24 hr tablet TAKE 1 TABLET BY MOUTH EVERY MORNING    estradiol (ESTRACE) 0.01 % (0.1 mg/gram) vaginal cream Use 1/2 gram twice weekly    fexofenadine (ALLEGRA) 180 MG tablet Take 1 tablet (180 mg total) by mouth once daily.    FLAXSEED ORAL Take by mouth.    fluticasone (FLONASE) 50 mcg/actuation nasal spray 1 spray by Each Nare route 2 (two) times daily as needed for Rhinitis or Allergies.    ibuprofen (ADVIL,MOTRIN) 800 MG tablet Take 1 tablet (800 mg total) by mouth every 8 (eight) hours as needed for Pain.    LACTOBAC NO.41/BIFIDOBACT NO.7 (PROBIOTIC-10 ORAL) Take by mouth.    multivitamin (ONE DAILY MULTIVITAMIN) per tablet Take 1 tablet by mouth every evening.     nystatin (MYCOSTATIN) powder Apply to affected areas of skin twice daily as needed for skin infection.  Re treat as needed.    PROAIR HFA 90 mcg/actuation inhaler Inhale 1-2 puffs into the lungs every 6 (six) hours as needed for Wheezing or Shortness of Breath (chest tightness). Rescue    sumatriptan (IMITREX) 50 MG tablet Take 1 tab by mouth as needed for migraine headache.  Repeat dose every 2 hours as needed.  Max 200mg in 24 hours.     No current facility-administered medications for this visit.        REVIEW OF SYSTEMS:    GENERAL:  No weight loss, malaise or fevers.  HEENT:   No recent changes in vision or hearing  NECK:  Negative for lumps, no difficulty with swallowing.  RESPIRATORY:  Negative for cough, wheezing or shortness of breath, patient denies any recent URI.  CARDIOVASCULAR:  Negative for chest pain, leg swelling or palpitations.  GI:  Negative for abdominal discomfort, blood in stools or black stools or change in bowel habits.  MUSCULOSKELETAL:  See HPI.  SKIN:  Negative for lesions, rash, and itching.  PSYCH:  No mood disorder or recent psychosocial stressors.  Patient's sleep is  "disturbed secondary to pain.  HEMATOLOGY/LYMPHOLOGY:  Negative for prolonged bleeding, bruising easily or swollen nodes.  Patient is not currently taking any anti-coagulants  ENDO: No history of diabetes or thyroid dysfunction  NEURO: Occasional headaches.  All other reviewed and negative other than HPI.    OBJECTIVE:    BP (!) 158/87   Pulse 68   Temp 97.8 °F (36.6 °C)   Ht 5' 7" (1.702 m)   Wt 89.2 kg (196 lb 10.4 oz)   LMP 10/20/2013   BMI 30.80 kg/m²     PHYSICAL EXAMINATION:    GENERAL: Well appearing, in no acute distress, alert and oriented x3.  PSYCH:  Mood and affect appropriate.  SKIN: No rashes or lesions.  HEAD/FACE:  Normocephalic, atraumatic. Cranial nerves grossly intact.  CV: RRR with palpation of the radial artery.  PULM: No evidence of respiratory difficulty, symmetric chest rise.  BACK: Straight leg raising in the sitting and supine positions is negative to radicular pain.  There is mild pain with palpation over the facet joints of the lumbar spine bilaterally. Full ROM without pain.  Negative facet loading bilaterally.  EXTREMITIES: Peripheral joint ROM is full and pain free without obvious instability or laxity in all four extremities. No deformities, edema, or skin discoloration. Good capillary refill.  MUSCULOSKELETAL: Hip, and knee provocative maneuvers are negative.  There is mild pain with palpation over the sacroiliac joints bilaterally.  There is no pain to palpation over the greater trochanteric bursa bilaterally.  FABERs test is negative.  FADIRs test is negative.   Bilateral lower extremity strength is normal and symmetric.  No atrophy or tone abnormalities are noted.  NEURO: Bilateral lower extremity coordination and muscle stretch reflexes are physiologic and symmetric.  Plantar response are downgoing. No clonus.  No loss of sensation is noted.  GAIT: Normal.    Lab Results   Component Value Date    WBC 7.13 07/28/2017    HGB 12.9 07/28/2017    HCT 38.7 07/28/2017    MCV 88 " 07/28/2017     07/28/2017     BMP  Lab Results   Component Value Date     07/28/2017    K 4.1 07/28/2017     07/28/2017    CO2 24 07/28/2017    BUN 21 (H) 07/28/2017    CREATININE 0.8 07/28/2017    CALCIUM 9.5 07/28/2017    ANIONGAP 13 07/28/2017    ESTGFRAFRICA >60.0 07/28/2017    EGFRNONAA >60.0 07/28/2017         ASSESSMENT: 57 y.o. year old female with back and leg pain, consistent with the following diagnoses:    Encounter Diagnoses   Name Primary?    Sacroiliac joint pain Yes    DDD (degenerative disc disease), lumbar        PLAN:     - Previous imaging was reviewed and discussed with the patient today.    - She is s/p SI joint injections with significant benefit. We can repeat this as needed.     - Continue with PT exercises.    - RTC PRN.     - Dr. Rowley was consulted on the patient and agrees with this plan.    The  above plan and management options were discussed at length with patient. Patient is in agreement with the above and verbalized understanding.     Carolann Hi, MAIN  10/05/2018

## 2018-10-29 ENCOUNTER — TELEPHONE (OUTPATIENT)
Dept: OBSTETRICS AND GYNECOLOGY | Facility: CLINIC | Age: 58
End: 2018-10-29

## 2018-10-29 DIAGNOSIS — Z12.31 ENCOUNTER FOR SCREENING MAMMOGRAM FOR BREAST CANCER: Primary | ICD-10-CM

## 2018-10-29 NOTE — TELEPHONE ENCOUNTER
----- Message from Twyla Conteh sent at 10/29/2018 10:12 AM CDT -----  Contact: self  Pt is requesting mmg orders. Pt can be reached at [phone].

## 2018-11-12 ENCOUNTER — LAB VISIT (OUTPATIENT)
Dept: LAB | Facility: HOSPITAL | Age: 58
End: 2018-11-12
Attending: INTERNAL MEDICINE
Payer: COMMERCIAL

## 2018-11-12 DIAGNOSIS — Z00.00 ROUTINE GENERAL MEDICAL EXAMINATION AT A HEALTH CARE FACILITY: ICD-10-CM

## 2018-11-12 LAB
ALBUMIN SERPL BCP-MCNC: 3.7 G/DL
ALP SERPL-CCNC: 97 U/L
ALT SERPL W/O P-5'-P-CCNC: 19 U/L
ANION GAP SERPL CALC-SCNC: 10 MMOL/L
AST SERPL-CCNC: 22 U/L
BASOPHILS # BLD AUTO: 0.07 K/UL
BASOPHILS NFR BLD: 0.9 %
BILIRUB SERPL-MCNC: 0.4 MG/DL
BUN SERPL-MCNC: 17 MG/DL
CALCIUM SERPL-MCNC: 9.8 MG/DL
CHLORIDE SERPL-SCNC: 105 MMOL/L
CHOLEST SERPL-MCNC: 264 MG/DL
CHOLEST/HDLC SERPL: 6.1 {RATIO}
CO2 SERPL-SCNC: 26 MMOL/L
CREAT SERPL-MCNC: 0.8 MG/DL
DIFFERENTIAL METHOD: ABNORMAL
EOSINOPHIL # BLD AUTO: 0.3 K/UL
EOSINOPHIL NFR BLD: 3.7 %
ERYTHROCYTE [DISTWIDTH] IN BLOOD BY AUTOMATED COUNT: 13.2 %
EST. GFR  (AFRICAN AMERICAN): >60 ML/MIN/1.73 M^2
EST. GFR  (NON AFRICAN AMERICAN): >60 ML/MIN/1.73 M^2
ESTIMATED AVG GLUCOSE: 105 MG/DL
GLUCOSE SERPL-MCNC: 91 MG/DL
HBA1C MFR BLD HPLC: 5.3 %
HCT VFR BLD AUTO: 40.8 %
HDLC SERPL-MCNC: 43 MG/DL
HDLC SERPL: 16.3 %
HGB BLD-MCNC: 13 G/DL
IMM GRANULOCYTES # BLD AUTO: 0.02 K/UL
IMM GRANULOCYTES NFR BLD AUTO: 0.3 %
LDLC SERPL CALC-MCNC: 190.4 MG/DL
LYMPHOCYTES # BLD AUTO: 2 K/UL
LYMPHOCYTES NFR BLD: 26.1 %
MCH RBC QN AUTO: 28.4 PG
MCHC RBC AUTO-ENTMCNC: 31.9 G/DL
MCV RBC AUTO: 89 FL
MONOCYTES # BLD AUTO: 0.5 K/UL
MONOCYTES NFR BLD: 6.8 %
NEUTROPHILS # BLD AUTO: 4.8 K/UL
NEUTROPHILS NFR BLD: 62.2 %
NONHDLC SERPL-MCNC: 221 MG/DL
NRBC BLD-RTO: 0 /100 WBC
PLATELET # BLD AUTO: 328 K/UL
PMV BLD AUTO: 11.8 FL
POTASSIUM SERPL-SCNC: 4.3 MMOL/L
PROT SERPL-MCNC: 7.3 G/DL
RBC # BLD AUTO: 4.57 M/UL
SODIUM SERPL-SCNC: 141 MMOL/L
TRIGL SERPL-MCNC: 153 MG/DL
WBC # BLD AUTO: 7.66 K/UL

## 2018-11-12 PROCEDURE — 80061 LIPID PANEL: CPT

## 2018-11-12 PROCEDURE — 83036 HEMOGLOBIN GLYCOSYLATED A1C: CPT

## 2018-11-12 PROCEDURE — 36415 COLL VENOUS BLD VENIPUNCTURE: CPT | Mod: PO

## 2018-11-12 PROCEDURE — 85025 COMPLETE CBC W/AUTO DIFF WBC: CPT

## 2018-11-12 PROCEDURE — 80053 COMPREHEN METABOLIC PANEL: CPT

## 2018-11-13 ENCOUNTER — OFFICE VISIT (OUTPATIENT)
Dept: URGENT CARE | Facility: CLINIC | Age: 58
End: 2018-11-13
Payer: COMMERCIAL

## 2018-11-13 VITALS
TEMPERATURE: 98 F | DIASTOLIC BLOOD PRESSURE: 65 MMHG | WEIGHT: 196 LBS | HEIGHT: 67 IN | OXYGEN SATURATION: 98 % | SYSTOLIC BLOOD PRESSURE: 135 MMHG | HEART RATE: 75 BPM | RESPIRATION RATE: 18 BRPM | BODY MASS INDEX: 30.76 KG/M2

## 2018-11-13 DIAGNOSIS — J02.9 SORE THROAT: Primary | ICD-10-CM

## 2018-11-13 DIAGNOSIS — J03.90 TONSILLITIS: ICD-10-CM

## 2018-11-13 LAB
CTP QC/QA: YES
S PYO RRNA THROAT QL PROBE: NEGATIVE

## 2018-11-13 PROCEDURE — 3008F BODY MASS INDEX DOCD: CPT | Mod: CPTII,S$GLB,, | Performed by: NURSE PRACTITIONER

## 2018-11-13 PROCEDURE — 99214 OFFICE O/P EST MOD 30 MIN: CPT | Mod: S$GLB,,, | Performed by: NURSE PRACTITIONER

## 2018-11-13 PROCEDURE — 87880 STREP A ASSAY W/OPTIC: CPT | Mod: QW,S$GLB,, | Performed by: NURSE PRACTITIONER

## 2018-11-13 RX ORDER — AMOXICILLIN 500 MG/1
500 CAPSULE ORAL EVERY 12 HOURS
Qty: 20 CAPSULE | Refills: 0 | Status: SHIPPED | OUTPATIENT
Start: 2018-11-13 | End: 2018-11-18

## 2018-11-13 NOTE — PROGRESS NOTES
"Subjective:       Patient ID: Amita Lewis is a 57 y.o. female.    Vitals:  height is 5' 7" (1.702 m) and weight is 88.9 kg (196 lb). Her temperature is 98.3 °F (36.8 °C). Her blood pressure is 135/65 and her pulse is 75. Her respiration is 18 and oxygen saturation is 98%.     Chief Complaint: Sore Throat    Sore throat and right ear pain for 4 days   Strep with co worker about 3 weeks ago       Sore Throat    This is a new problem. The current episode started in the past 7 days. The problem has been gradually worsening. There has been no fever. Associated symptoms include ear pain. Pertinent negatives include no abdominal pain, congestion, coughing, headaches, hoarse voice or shortness of breath. She has tried NSAIDs and oral narcotic analgesics for the symptoms. The treatment provided no relief.     Review of Systems   Constitution: Negative for chills, fever and malaise/fatigue.   HENT: Positive for ear pain and sore throat. Negative for congestion and hoarse voice.    Eyes: Negative for discharge and redness.   Cardiovascular: Negative for chest pain, dyspnea on exertion and leg swelling.   Respiratory: Negative for cough, shortness of breath, sputum production and wheezing.    Musculoskeletal: Negative for myalgias.   Gastrointestinal: Negative for abdominal pain and nausea.   Neurological: Negative for headaches.       Objective:      Physical Exam   Constitutional: She is oriented to person, place, and time. She appears well-developed.   HENT:   Head: Normocephalic.   Right Ear: Hearing and external ear normal. No swelling or tenderness. Tympanic membrane is not erythematous. A middle ear effusion is present.   Left Ear: Hearing and external ear normal. No swelling or tenderness. Tympanic membrane is not erythematous. A middle ear effusion is present.   Nose: Nose normal.   Mouth/Throat: Uvula is midline and mucous membranes are normal. Posterior oropharyngeal erythema present. Tonsils are 2+ on the " right. Tonsils are 2+ on the left. Tonsillar exudate.   Eyes: Pupils are equal, round, and reactive to light.   Pulmonary/Chest: Effort normal.   Neurological: She is alert and oriented to person, place, and time.   Skin: Skin is warm and dry.   Psychiatric: She has a normal mood and affect. Her behavior is normal. Judgment and thought content normal.   Vitals reviewed.      Assessment:       1. Sore throat    2. Tonsillitis        Plan:         Sore throat  -     POCT rapid strep A    Tonsillitis    Other orders  -     amoxicillin (AMOXIL) 500 MG capsule; Take 1 capsule (500 mg total) by mouth every 12 (twelve) hours.  Dispense: 20 capsule; Refill: 0  -     (Magic mouthwash) 1:1:1 Benadryl 12.5mg/5ml liq, aluminum & magnesium hydroxide-simehticone (Maalox), lidocaine viscous 2%; 10 cc swish and spit every 4 hours as needed for mouth sores or sore throat  Dispense: 90 mL; Refill: 0    recommended throat culture prior to abx but the patient wants to start abx now \  Results for orders placed or performed in visit on 11/13/18   POCT rapid strep A   Result Value Ref Range    Rapid Strep A Screen Negative Negative     Acceptable Yes        Patient Instructions

## 2018-11-18 ENCOUNTER — HOSPITAL ENCOUNTER (INPATIENT)
Facility: OTHER | Age: 58
LOS: 2 days | Discharge: HOME | DRG: 603 | End: 2018-11-20
Attending: EMERGENCY MEDICINE | Admitting: INTERNAL MEDICINE
Payer: COMMERCIAL
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
## 2018-11-18 DIAGNOSIS — L03.114 CELLULITIS OF LEFT HAND: Primary | ICD-10-CM

## 2018-11-18 LAB
ANION GAP SERPL CALC-SCNC: 11 MMOL/L
BASOPHILS # BLD AUTO: 0.03 K/UL
BASOPHILS NFR BLD: 0.4 %
BUN SERPL-MCNC: 14 MG/DL
CALCIUM SERPL-MCNC: 9.7 MG/DL
CHLORIDE SERPL-SCNC: 105 MMOL/L
CO2 SERPL-SCNC: 25 MMOL/L
CREAT SERPL-MCNC: 0.8 MG/DL
DIFFERENTIAL METHOD: NORMAL
EOSINOPHIL # BLD AUTO: 0.3 K/UL
EOSINOPHIL NFR BLD: 3.8 %
ERYTHROCYTE [DISTWIDTH] IN BLOOD BY AUTOMATED COUNT: 13.4 %
EST. GFR  (AFRICAN AMERICAN): >60 ML/MIN/1.73 M^2
EST. GFR  (NON AFRICAN AMERICAN): >60 ML/MIN/1.73 M^2
GLUCOSE SERPL-MCNC: 108 MG/DL
HCT VFR BLD AUTO: 39 %
HGB BLD-MCNC: 12.6 G/DL
LYMPHOCYTES # BLD AUTO: 1.9 K/UL
LYMPHOCYTES NFR BLD: 22.2 %
MCH RBC QN AUTO: 28.8 PG
MCHC RBC AUTO-ENTMCNC: 32.3 G/DL
MCV RBC AUTO: 89 FL
MONOCYTES # BLD AUTO: 0.6 K/UL
MONOCYTES NFR BLD: 6.6 %
NEUTROPHILS # BLD AUTO: 5.7 K/UL
NEUTROPHILS NFR BLD: 66.9 %
PLATELET # BLD AUTO: 348 K/UL
PMV BLD AUTO: 10.6 FL
POTASSIUM SERPL-SCNC: 3.7 MMOL/L
RBC # BLD AUTO: 4.37 M/UL
SODIUM SERPL-SCNC: 141 MMOL/L
WBC # BLD AUTO: 8.5 K/UL

## 2018-11-18 PROCEDURE — 99285 EMERGENCY DEPT VISIT HI MDM: CPT | Mod: 25,27

## 2018-11-18 PROCEDURE — S0077 INJECTION, CLINDAMYCIN PHOSP: HCPCS | Performed by: EMERGENCY MEDICINE

## 2018-11-18 PROCEDURE — 87040 BLOOD CULTURE FOR BACTERIA: CPT

## 2018-11-18 PROCEDURE — 25000003 PHARM REV CODE 250: Performed by: EMERGENCY MEDICINE

## 2018-11-18 PROCEDURE — 12000002 HC ACUTE/MED SURGE SEMI-PRIVATE ROOM

## 2018-11-18 PROCEDURE — 96365 THER/PROPH/DIAG IV INF INIT: CPT

## 2018-11-18 PROCEDURE — 63600175 PHARM REV CODE 636 W HCPCS: Performed by: PHYSICIAN ASSISTANT

## 2018-11-18 PROCEDURE — 85025 COMPLETE CBC W/AUTO DIFF WBC: CPT

## 2018-11-18 PROCEDURE — 96375 TX/PRO/DX INJ NEW DRUG ADDON: CPT

## 2018-11-18 PROCEDURE — 80048 BASIC METABOLIC PNL TOTAL CA: CPT

## 2018-11-18 RX ORDER — CLINDAMYCIN PHOSPHATE 900 MG/50ML
900 INJECTION, SOLUTION INTRAVENOUS
Status: COMPLETED | OUTPATIENT
Start: 2018-11-18 | End: 2018-11-19

## 2018-11-18 RX ORDER — CLINDAMYCIN PHOSPHATE 150 MG/ML
900 INJECTION, SOLUTION INTRAVENOUS
Status: DISCONTINUED | OUTPATIENT
Start: 2018-11-18 | End: 2018-11-18 | Stop reason: SDUPTHER

## 2018-11-18 RX ORDER — KETOROLAC TROMETHAMINE 30 MG/ML
10 INJECTION, SOLUTION INTRAMUSCULAR; INTRAVENOUS
Status: COMPLETED | OUTPATIENT
Start: 2018-11-18 | End: 2018-11-18

## 2018-11-18 RX ADMIN — KETOROLAC TROMETHAMINE 10 MG: 30 INJECTION, SOLUTION INTRAMUSCULAR at 10:11

## 2018-11-18 RX ADMIN — CLINDAMYCIN IN 5 PERCENT DEXTROSE 900 MG: 18 INJECTION, SOLUTION INTRAVENOUS at 11:11

## 2018-11-19 PROBLEM — L03.114 CELLULITIS OF LEFT HAND: Status: ACTIVE | Noted: 2018-11-19

## 2018-11-19 LAB
ANION GAP SERPL CALC-SCNC: 10 MMOL/L
BASOPHILS # BLD AUTO: 0.05 K/UL
BASOPHILS NFR BLD: 0.7 %
BUN SERPL-MCNC: 17 MG/DL
CALCIUM SERPL-MCNC: 9.4 MG/DL
CHLORIDE SERPL-SCNC: 105 MMOL/L
CO2 SERPL-SCNC: 27 MMOL/L
CREAT SERPL-MCNC: 0.8 MG/DL
DIFFERENTIAL METHOD: NORMAL
EOSINOPHIL # BLD AUTO: 0.4 K/UL
EOSINOPHIL NFR BLD: 4.9 %
ERYTHROCYTE [DISTWIDTH] IN BLOOD BY AUTOMATED COUNT: 13.6 %
EST. GFR  (AFRICAN AMERICAN): >60 ML/MIN/1.73 M^2
EST. GFR  (NON AFRICAN AMERICAN): >60 ML/MIN/1.73 M^2
GLUCOSE SERPL-MCNC: 100 MG/DL
HCT VFR BLD AUTO: 37.7 %
HGB BLD-MCNC: 12.1 G/DL
LYMPHOCYTES # BLD AUTO: 2.1 K/UL
LYMPHOCYTES NFR BLD: 29.4 %
MCH RBC QN AUTO: 28.9 PG
MCHC RBC AUTO-ENTMCNC: 32.1 G/DL
MCV RBC AUTO: 90 FL
MONOCYTES # BLD AUTO: 0.6 K/UL
MONOCYTES NFR BLD: 7.8 %
NEUTROPHILS # BLD AUTO: 4.1 K/UL
NEUTROPHILS NFR BLD: 57.1 %
PLATELET # BLD AUTO: 308 K/UL
PMV BLD AUTO: 10.7 FL
POTASSIUM SERPL-SCNC: 4.4 MMOL/L
RBC # BLD AUTO: 4.19 M/UL
SODIUM SERPL-SCNC: 142 MMOL/L
WBC # BLD AUTO: 7.14 K/UL

## 2018-11-19 PROCEDURE — 99253 IP/OBS CNSLTJ NEW/EST LOW 45: CPT | Mod: 57,,, | Performed by: PLASTIC SURGERY

## 2018-11-19 PROCEDURE — 25000003 PHARM REV CODE 250: Performed by: PHYSICIAN ASSISTANT

## 2018-11-19 PROCEDURE — 63600175 PHARM REV CODE 636 W HCPCS: Performed by: PHYSICIAN ASSISTANT

## 2018-11-19 PROCEDURE — 94761 N-INVAS EAR/PLS OXIMETRY MLT: CPT

## 2018-11-19 PROCEDURE — 99223 1ST HOSP IP/OBS HIGH 75: CPT | Mod: ,,, | Performed by: PHYSICIAN ASSISTANT

## 2018-11-19 PROCEDURE — 85025 COMPLETE CBC W/AUTO DIFF WBC: CPT

## 2018-11-19 PROCEDURE — 11000001 HC ACUTE MED/SURG PRIVATE ROOM

## 2018-11-19 PROCEDURE — 25000003 PHARM REV CODE 250: Performed by: INTERNAL MEDICINE

## 2018-11-19 PROCEDURE — 80048 BASIC METABOLIC PNL TOTAL CA: CPT

## 2018-11-19 PROCEDURE — 25000003 PHARM REV CODE 250: Performed by: PLASTIC SURGERY

## 2018-11-19 PROCEDURE — 36415 COLL VENOUS BLD VENIPUNCTURE: CPT

## 2018-11-19 RX ORDER — BACITRACIN 500 [USP'U]/G
OINTMENT TOPICAL 3 TIMES DAILY
Status: DISCONTINUED | OUTPATIENT
Start: 2018-11-19 | End: 2018-11-20 | Stop reason: HOSPADM

## 2018-11-19 RX ORDER — BUPROPION HYDROCHLORIDE 150 MG/1
450 TABLET ORAL NIGHTLY
Status: DISCONTINUED | OUTPATIENT
Start: 2018-11-19 | End: 2018-11-20 | Stop reason: HOSPADM

## 2018-11-19 RX ORDER — ASPIRIN 81 MG/1
81 TABLET ORAL DAILY
Status: DISCONTINUED | OUTPATIENT
Start: 2018-11-19 | End: 2018-11-20 | Stop reason: HOSPADM

## 2018-11-19 RX ORDER — BUPROPION HYDROCHLORIDE 150 MG/1
150 TABLET ORAL DAILY
Status: DISCONTINUED | OUTPATIENT
Start: 2018-11-19 | End: 2018-11-19

## 2018-11-19 RX ORDER — ACETAMINOPHEN 325 MG/1
650 TABLET ORAL EVERY 4 HOURS PRN
Status: DISCONTINUED | OUTPATIENT
Start: 2018-11-19 | End: 2018-11-20 | Stop reason: HOSPADM

## 2018-11-19 RX ORDER — LIDOCAINE HYDROCHLORIDE 10 MG/ML
10 INJECTION INFILTRATION; PERINEURAL
Status: COMPLETED | OUTPATIENT
Start: 2018-11-19 | End: 2018-11-19

## 2018-11-19 RX ORDER — HYDROCODONE BITARTRATE AND ACETAMINOPHEN 5; 325 MG/1; MG/1
1 TABLET ORAL EVERY 4 HOURS PRN
Status: DISCONTINUED | OUTPATIENT
Start: 2018-11-19 | End: 2018-11-20 | Stop reason: HOSPADM

## 2018-11-19 RX ORDER — ONDANSETRON 8 MG/1
8 TABLET, ORALLY DISINTEGRATING ORAL EVERY 8 HOURS PRN
Status: DISCONTINUED | OUTPATIENT
Start: 2018-11-19 | End: 2018-11-20 | Stop reason: HOSPADM

## 2018-11-19 RX ORDER — ENOXAPARIN SODIUM 100 MG/ML
40 INJECTION SUBCUTANEOUS EVERY 24 HOURS
Status: DISCONTINUED | OUTPATIENT
Start: 2018-11-19 | End: 2018-11-20 | Stop reason: HOSPADM

## 2018-11-19 RX ORDER — SODIUM CHLORIDE 0.9 % (FLUSH) 0.9 %
5 SYRINGE (ML) INJECTION
Status: DISCONTINUED | OUTPATIENT
Start: 2018-11-19 | End: 2018-11-20 | Stop reason: HOSPADM

## 2018-11-19 RX ORDER — ACETAMINOPHEN 650 MG/1
650 SUPPOSITORY RECTAL EVERY 8 HOURS PRN
Status: DISCONTINUED | OUTPATIENT
Start: 2018-11-19 | End: 2018-11-20 | Stop reason: HOSPADM

## 2018-11-19 RX ADMIN — AMPICILLIN SODIUM AND SULBACTAM SODIUM 3 G: 2; 1 INJECTION, POWDER, FOR SOLUTION INTRAMUSCULAR; INTRAVENOUS at 03:11

## 2018-11-19 RX ADMIN — ASPIRIN 81 MG: 81 TABLET, COATED ORAL at 08:11

## 2018-11-19 RX ADMIN — AMPICILLIN SODIUM AND SULBACTAM SODIUM 3 G: 2; 1 INJECTION, POWDER, FOR SOLUTION INTRAMUSCULAR; INTRAVENOUS at 10:11

## 2018-11-19 RX ADMIN — LIDOCAINE HYDROCHLORIDE 10 ML: 10 INJECTION, SOLUTION INFILTRATION; PERINEURAL at 09:11

## 2018-11-19 RX ADMIN — AMPICILLIN SODIUM AND SULBACTAM SODIUM 3 G: 2; 1 INJECTION, POWDER, FOR SOLUTION INTRAMUSCULAR; INTRAVENOUS at 08:11

## 2018-11-19 RX ADMIN — BACITRACIN ZINC: 500 OINTMENT TOPICAL at 03:11

## 2018-11-19 RX ADMIN — ACETAMINOPHEN 650 MG: 325 TABLET ORAL at 01:11

## 2018-11-19 RX ADMIN — BACITRACIN ZINC: 500 OINTMENT TOPICAL at 10:11

## 2018-11-19 RX ADMIN — BUPROPION HYDROCHLORIDE 450 MG: 150 TABLET, FILM COATED, EXTENDED RELEASE ORAL at 10:11

## 2018-11-19 RX ADMIN — AMPICILLIN SODIUM AND SULBACTAM SODIUM 3 G: 2; 1 INJECTION, POWDER, FOR SOLUTION INTRAMUSCULAR; INTRAVENOUS at 04:11

## 2018-11-19 NOTE — PLAN OF CARE
Problem: Patient Care Overview  Goal: Plan of Care Review  Outcome: Ongoing (interventions implemented as appropriate)  Plan of care reviewed with patient. VSS on RA. No pain reported during shift. IV ABX administered per orders. Pt ambulates to RR independently. Purposeful rounding performed. Bed lowered and locked, call light and personal items with reach. No needs at this time. Will continue to monitor.l

## 2018-11-19 NOTE — ED TRIAGE NOTES
"Pt arrives to ED with c/o hand pain from cat bite at 5 am.Pt states "the cat bite has gotten worse and it keeps swelling and more redness. I have already taken another dose of the augmentin but its still swelling." Pt sitting in ED chair, respirations even, unlabored, eyes open spontaneously, NAD noted, answering questions appropriately, AAOX4.   "

## 2018-11-19 NOTE — H&P
"[facility] Medicine  History & Physical    Patient Name: Amita Lewis  MRN: [id number]  Admission Date: 11/18/2018  Attending Physician: ANA Hinton MD   Primary Care Provider: Patricia Cope MD         Patient information was obtained from patient, past medical records and ER records.     Subjective:     Principal Problem:Cat bite    Chief Complaint:   Chief Complaint   Patient presents with    Hand Pain     pt c/o of finger pain/swelling x2 hours. pt was seen here earlier today for animal bite. pt states " I took my antibiotics today but about 2 hours ago i noticed my finger started to swell and the pain got really bad"         HPI: Ms. Amita Lewis is a 58 y.o. female, with PMH of asthma, depression, who presented to [facility] ED on 11/18/18 2/2 infection from cat bite which occurred at 5 am on 11/18/18, and re-presented after she had worsened symptoms after starting Augmentin and symptoms had not improved. The cat that bit her was her own, and was vaccinated. The bite occurred on her legt (non-dominant) had between and on the index finger and thumb. Associated symptoms include swelling, and decreased ROM of the affected fingers.  She denies fever, chills, sore throat, SOB, CP, abdominal pain, N/V/D, any urinary symptoms, back pain, headaches, and weakness. Tetanus is up to date. The patient was admitted to inpatient.     Past Medical History:   Diagnosis Date    Abnormal Pap smear     Asthma     Depression 3/17/2016    Environmental and seasonal allergies 3/17/2016    Heartburn 3/17/2016    HLD (hyperlipidemia) 03/22/2016    2017 ASCVD 5.2%; can't tolerate statins    Menopausal syndrome (hot flashes) 3/17/2016    Mild intermittent asthma without complication 3/17/2016    Obesity (BMI 30-39.9) 3/17/2016    Sciatica 3/17/2016       Past Surgical History:   Procedure Laterality Date    CERVICAL BIOPSY  W/ LOOP ELECTRODE EXCISION      " INJECTION OF JOINT Bilateral 5/31/2018    Procedure: INJECTION-JOINT;  Surgeon: Lynette Rowley MD;  Location: [facility];  Service: Pain Management;  Laterality: Bilateral;  B/L SI Joint Injs  77096, [id number]    INJECTION OF JOINT Bilateral 9/18/2018    Procedure: INJECTION, JOINT  Bilateral SI joint;  Surgeon: Lynette Rowley MD;  Location: [facility];  Service: Pain Management;  Laterality: Bilateral;    INJECTION, JOINT  Bilateral SI joint Bilateral 9/18/2018    Performed by [facility]ette [facility]ley MD at [facility]    INJECTION-JOINT Bilateral 5/31/2018    Performed by [facility] at [facility]    INJECTION-STEROID-EPIDURAL-TRANSFORAMINAL Left 3/14/2018    Performed by [facility] at [facility]    INJECTION-STEROID-EPIDURAL-TRANSFORAMINAL Left 12/27/2017    Performed by [facility] at [facility]    microdiscetomy      L4-L5    OVARIAN CYST REMOVAL  2001       Review of patient's allergies indicates:  No Known Allergies    Current Facility-Administered Medications on File Prior to Encounter   Medication    [COMPLETED] acetaminophen tablet 1,000 mg    [COMPLETED] amoxicillin-clavulanate 875-125mg per tablet 1 tablet    [COMPLETED] neomycin-bacitracnZn-polymyxnB packet     Current Outpatient Medications on File Prior to Encounter   Medication Sig    amoxicillin-clavulanate 875-125mg (AUGMENTIN) 875-125 mg per tablet Take 1 tablet by mouth 2 (two) times daily.    buPROPion (WELLBUTRIN XL) 150 MG TB24 tablet Take 1 tablet (150 mg total) by mouth once daily.    buPROPion (WELLBUTRIN XL) 300 MG 24 hr tablet TAKE 1 TABLET BY MOUTH EVERY MORNING    fexofenadine (ALLEGRA) 180 MG tablet Take 1 tablet (180 mg total) by mouth once daily.    (Magic mouthwash) 1:1:1 Benadryl 12.5mg/5ml liq, aluminum & magnesium hydroxide-simehticone (Maalox), lidocaine viscous 2% 10 cc swish and spit every 4 hours as needed for mouth sores or sore throat    ASPIRIN (ASPIR-81 ORAL)  Take 1 tablet by mouth once daily.    boric acid (BORIC ACID) vaginal suppository Place 650 mg vaginally.    estradiol (ESTRACE) 0.01 % (0.1 mg/gram) vaginal cream Use 1/2 gram twice weekly    FLAXSEED ORAL Take by mouth.    fluticasone (FLONASE) 50 mcg/actuation nasal spray 1 spray by Each Nare route 2 (two) times daily as needed for Rhinitis or Allergies.    ibuprofen (ADVIL,MOTRIN) 800 MG tablet Take 1 tablet (800 mg total) by mouth every 8 (eight) hours as needed for Pain.    LACTOBAC NO.41/BIFIDOBACT NO.7 (PROBIOTIC-10 ORAL) Take by mouth.    multivitamin (ONE DAILY MULTIVITAMIN) per tablet Take 1 tablet by mouth every evening.     nystatin (MYCOSTATIN) powder Apply to affected areas of skin twice daily as needed for skin infection.  Re treat as needed.    PROAIR HFA 90 mcg/actuation inhaler Inhale 1-2 puffs into the lungs every 6 (six) hours as needed for Wheezing or Shortness of Breath (chest tightness). Rescue    sumatriptan (IMITREX) 50 MG tablet Take 1 tab by mouth as needed for migraine headache.  Repeat dose every 2 hours as needed.  Max 200mg in 24 hours.     Family History     Problem Relation (Age of Onset)    Breast cancer Mother    Cancer Father    Diabetes Mellitus Mother    Heart failure Mother        Tobacco Use    Smoking status: Current Some Day Smoker     Types: Cigarettes    Smokeless tobacco: Never Used   Substance and Sexual Activity    Alcohol use: Yes     Alcohol/week: 0.6 oz     Types: 1 Glasses of wine per week    Drug use: No    Sexual activity: Yes     Partners: Male     Birth control/protection: None     Review of Systems   Constitutional: Negative for chills, diaphoresis and fever.   Respiratory: Negative for cough, shortness of breath and wheezing.    Cardiovascular: Negative for chest pain.   Gastrointestinal: Negative for abdominal pain, diarrhea, nausea and vomiting.   Musculoskeletal: Positive for joint swelling and myalgias. Negative for arthralgias, back pain,  gait problem, neck pain and neck stiffness.   Skin: Positive for color change and wound. Negative for pallor and rash.   Neurological: Negative for weakness and numbness.   Psychiatric/Behavioral: Negative for confusion and decreased concentration.     Objective:     Vital Signs (Most Recent):  Temp: 97.9 °F (36.6 °C) (11/19/18 0344)  Pulse: 80 (11/19/18 0403)  Resp: 18 (11/19/18 0403)  BP: 135/74 (11/19/18 0344)  SpO2: 99 % (11/19/18 0403) Vital Signs (24h Range):  Temp:  [97.9 °F (36.6 °C)-98.3 °F (36.8 °C)] 97.9 °F (36.6 °C)  Pulse:  [69-86] 80  Resp:  [16-98] 18  SpO2:  [95 %-100 %] 99 %  BP: (135-176)/(64-84) 135/74     Weight: 91.7 kg (202 lb 2.6 oz)  Body mass index is 31.66 kg/m².    Physical Exam   Constitutional: She is oriented to person, place, and time. She appears well-developed and well-nourished. No distress.   HENT:   Head: Normocephalic and atraumatic.   Eyes: Conjunctivae and EOM are normal. Pupils are equal, round, and reactive to light. No scleral icterus.   Neck: Normal range of motion. Neck supple. No tracheal deviation present.   Cardiovascular: Normal rate, regular rhythm, normal heart sounds and intact distal pulses. Exam reveals no gallop and no friction rub.   No murmur heard.  Pulmonary/Chest: Effort normal and breath sounds normal. No stridor. No respiratory distress. She has no wheezes. She has no rales. She exhibits no tenderness.   Abdominal: Soft. Bowel sounds are normal. She exhibits no distension and no mass. There is no tenderness. There is no rebound and no guarding.   Musculoskeletal: Normal range of motion. She exhibits no deformity.   Neurological: She is alert and oriented to person, place, and time. No cranial nerve deficit. She exhibits normal muscle tone.   Skin: Skin is warm and dry. Capillary refill takes less than 2 seconds. No rash noted. She is not diaphoretic. There is erythema. No pallor.   Multiple cat bite marks of the left first and second digits. There is  "redness and swelling of the finger pad of the left 2nd digit. There is mildly decreased ROM of the left 2nd digit.    Psychiatric: She has a normal mood and affect. Her behavior is normal. Judgment and thought content normal.   Nursing note and vitals reviewed.        CRANIAL NERVES     CN III, IV, VI   Pupils are equal, round, and reactive to light.  Extraocular motions are normal.        Significant Labs:   BMP:   Recent Labs   Lab 11/18/18 2302         K 3.7      CO2 25   BUN 14   CREATININE 0.8   CALCIUM 9.7     CBC:   Recent Labs   Lab 11/18/18 2302 11/19/18  0525   WBC 8.50 7.14   HGB 12.6 12.1   HCT 39.0 37.7    308     CMP:   Recent Labs   Lab 11/18/18 2302      K 3.7      CO2 25      BUN 14   CREATININE 0.8   CALCIUM 9.7   ANIONGAP 11   EGFRNONAA >60     All pertinent labs within the past 24 hours have been reviewed.    Significant Imaging: I have reviewed all pertinent imaging results/findings within the past 24 hours.   Imaging Results    None          Assessment/Plan:     * Cat bite    - Ms. Amita Lewis is admitted to inpatient status   - Unasyn was started per uptodate rec's for animal bite parenteral treatment   - Hand Surgery consulted        Cellulitis of left hand    - as above in "cat bite"       HLD (hyperlipidemia)    - continue home meds        Mild intermittent asthma without complication    - no apparent exacerbation at present   - continue home meds        Depression    - continue home meds          VTE Risk Mitigation (From admission, onward)        Ordered     enoxaparin injection 40 mg  Daily      11/19/18 0138     IP VTE HIGH RISK PATIENT  Once      11/19/18 0138     Place sequential compression device  Until discontinued      11/19/18 0040             Imelda Bolanos PA-C  Department of Hospital Medicine   [facility]  "

## 2018-11-19 NOTE — ED NOTES
Pt sitting in ED chair, respirations even, unlabored, call bell within reach, family member at bedside, eyes open spontaneously, NAD noted. Pt updated on plan of care, will continue to monitor

## 2018-11-19 NOTE — HPI
Ms. Amita Lewis is a 58 y.o. female, with PMH of asthma, depression, who presented to [facility] ED on 11/18/18 2/2 infection from cat bite which occurred at 5 am on 11/18/18, and re-presented after she had worsened symptoms after starting Augmentin and symptoms had not improved. The cat that bit her was her own, and was vaccinated. The bite occurred on her legt (non-dominant) had between and on the index finger and thumb. Associated symptoms include swelling, and decreased ROM of the affected fingers.  She denies fever, chills, sore throat, SOB, CP, abdominal pain, N/V/D, any urinary symptoms, back pain, headaches, and weakness. Tetanus is up to date. The patient was admitted to inpatient.

## 2018-11-19 NOTE — CONSULTS
"Chief Complaint: Hand Pain (pt c/o of finger pain/swelling x2 hours. pt was seen here earlier today for animal bite. pt states " I took my antibiotics today but about 2 hours ago i noticed my finger started to swell and the pain got really bad" )      HPI:    Amita Lewis is a right hand dominant 58 y.o. female  Admitted to hospital for cat bites to left hand.  Pt states that she was attempting to help her cat when she was bitten on the left hand several times.  She was seen in the ED and started on Augmentin and discharged home.  After several hours she had worsening pain and swelling with redness, she was admitted and started on IV antibiotics.  She states that her ROM has improved greatly after starting antibiotics but her left index finger remains painful and tight.     Past Medical History:   Diagnosis Date    Abnormal Pap smear     Asthma     Depression 3/17/2016    Environmental and seasonal allergies 3/17/2016    Heartburn 3/17/2016    HLD (hyperlipidemia) 03/22/2016 2017 ASCVD 5.2%; can't tolerate statins    Menopausal syndrome (hot flashes) 3/17/2016    Mild intermittent asthma without complication 3/17/2016    Obesity (BMI 30-39.9) 3/17/2016    Sciatica 3/17/2016       Past Surgical History:   Procedure Laterality Date    CERVICAL BIOPSY  W/ LOOP ELECTRODE EXCISION      INJECTION OF JOINT Bilateral 5/31/2018    Procedure: INJECTION-JOINT;  Surgeon: Lynette Rowley MD;  Location: [facility];  Service: Pain Management;  Laterality: Bilateral;  B/L SI Joint Injs  15967, 62089    INJECTION OF JOINT Bilateral 9/18/2018    Procedure: INJECTION, JOINT  Bilateral SI joint;  Surgeon: Lynette Rowley MD;  Location: [facility];  Service: Pain Management;  Laterality: Bilateral;    INJECTION, JOINT  Bilateral SI joint Bilateral 9/18/2018    [facility] by [facility] at [facility]    INJECTION-JOINT Bilateral 5/31/2018    [facility] " PAIN MGT    INJECTION-STEROID-EPIDURAL-TRANSFORAMINAL Left 3/14/2018    [facility]IN [facility]    INJECTION-STEROID-EPIDURAL-TRANSFORAMINAL Left 12/27/2017    [facility] at [facility] PAIN [facility]GT    microdiscetomy      L4-L5    OVARIAN CYST REMOVAL  2001        Family History   Problem Relation Age of Onset    Cancer Father         lung; non smoker, worked in [employer] and gas station    Breast cancer Mother     Heart failure Mother     Diabetes Mellitus Mother         ?secondary to chronic steroids    Ovarian cancer Neg Hx     Hypertension Neg Hx        Social History     Socioeconomic History    Marital status:      Spouse name: None    Number of children: None    Years of education: None    Highest education level: None   Social Needs    Financial resource strain: None    Food insecurity - worry: None    Food insecurity - inability: None    Transportation needs - medical: None    Transportation needs - non-medical: None   Occupational History    None   Tobacco Use    Smoking status: Current Some Day Smoker     Types: Cigarettes    Smokeless tobacco: Never Used   Substance and Sexual Activity    Alcohol use: Yes     Alcohol/week: 0.6 oz     Types: 1 Glasses of wine per week    Drug use: No    Sexual activity: Yes     Partners: Male     Birth control/protection: None   Other Topics Concern    None   Social History Narrative    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    Works for [employer]    Walks a lot at work and for exercise    Going to [place] Aug 2017    [place] 11/4/14       Review of patient's allergies indicates:  No Known Allergies      Current Facility-Administered Medications:     acetaminophen suppository 650 mg, 650 mg, Rectal, Q8H PRN, Ej Perez PA-C    acetaminophen tablet 650 mg, 650 mg, Oral, Q4H PRN, Imelda Bolanos PA-C    ampicillin-sulbactam 3 g in sodium chloride 0.9 % 100 mL IVPB (ready to mix  "system), 3 g, Intravenous, Q6H, Imelda Bolanos PA-C, Last Rate: 100 mL/hr at 11/19/18 0831, 3 g at 11/19/18 0831    aspirin EC tablet 81 mg, 81 mg, Oral, Daily, Imelda Bolanos PA-C, 81 mg at 11/19/18 0824    buPROPion TB24 tablet 450 mg, 450 mg, Oral, QHS, DMilvia Hinton MD    enoxaparin injection 40 mg, 40 mg, Subcutaneous, Daily, Imelda Bolanos PA-C    HYDROcodone-acetaminophen 5-325 mg per tablet 1 tablet, 1 tablet, Oral, Q4H PRN, Ej Perez PA-C    ondansetron disintegrating tablet 8 mg, 8 mg, Oral, Q8H PRN, Imelda Bolanos PA-C    sodium chloride 0.9% flush 5 mL, 5 mL, Intravenous, PRN, Imelda Bolanos PA-C        Review of Systems:  Constitutional: no fever or chills  ENT: no nasal congestion or sore throat  Respiratory: no cough or shortness of breath  Cardiovascular: no chest pain or palpitations  Gastrointestinal: no nausea or vomiting, PUD, GERD, NSAID intolerance  Genitourinary: no hematuria or dysuria  Integument/Breast: no rash or pruritis  Hematologic/Lymphatic: no easy bruising or lymphadenopathy  Musculoskeletal: see HPI  Neurological: no seizures or tremors  Behavioral/Psych: no auditory or visual hallucinations      Objective:      PHYSICAL EXAM:  Vitals:    11/19/18 0027 11/19/18 0344 11/19/18 0403 11/19/18 0741   BP: (!) 150/66 135/74  (!) 164/70   Pulse: 69 82 80 65   Resp: 16 18 18 18   Temp: 98 °F (36.7 °C) 97.9 °F (36.6 °C)  97.8 °F (36.6 °C)   TempSrc: Oral Oral  Oral   SpO2: 99% 99% 99% 97%   Weight: 91.7 kg (202 lb 2.6 oz)      Height: 5' 7" (1.702 m)        General Appearance: WDWN, NAD  Neuro/Psych: Mood & affect appropriate  Lungs: Respirations equal and unlabored.   CV: No apparent CV dysfunction, distal pulses intact, RRR  Skin: Intact throughout  Extremities:   Left Hand Exam     Tenderness   Left hand tenderness location: + tenderness, swelling and redness in left index finger volar pulp.     Range of Motion   Hand   MP Index: normal   PIP " Index: normal   DIP Index: normal     Other   Erythema: present  Sensation: normal  Pulse: present    Comments:  Able to make a composite fist, multiple bite wounds to dorsal hand and thumb, small abscess with purulent fluid in index pulp. + TTP                    Assessment:     Left index finger abscess after cat bite          Plan/Discussion:   Pt would benefit from I&D of left index finger abscess  Continue IV antibiotics  Needs xray of left hand - ordered.

## 2018-11-19 NOTE — ASSESSMENT & PLAN NOTE
- Ms. Amita Lewis is admitted to inpatient status   - Unasyn was started per uptodate rec's for animal bite parenteral treatment   - Hand Surgery consulted

## 2018-11-19 NOTE — SUBJECTIVE & OBJECTIVE
Past Medical History:   Diagnosis Date    Abnormal Pap smear     Asthma     Depression 3/17/2016    Environmental and seasonal allergies 3/17/2016    Heartburn 3/17/2016    HLD (hyperlipidemia) 03/22/2016    2017 ASCVD 5.2%; can't tolerate statins    Menopausal syndrome (hot flashes) 3/17/2016    Mild intermittent asthma without complication 3/17/2016    Obesity (BMI 30-39.9) 3/17/2016    Sciatica 3/17/2016       Past Surgical History:   Procedure Laterality Date    CERVICAL BIOPSY  W/ LOOP ELECTRODE EXCISION      INJECTION OF JOINT Bilateral 5/31/2018    Procedure: INJECTION-JOINT;  Surgeon: Lynette Rowley MD;  Location: [facility];  Service: Pain Management;  Laterality: Bilateral;  B/L SI Joint Injs  52037, [id number]    INJECTION OF JOINT Bilateral 9/18/2018    Procedure: INJECTION, JOINT  Bilateral SI joint;  Surgeon: Lynette Rowley MD;  Location: [facility];  Service: Pain Management;  Laterality: Bilateral;    INJECTION, JOINT  Bilateral SI joint Bilateral 9/18/2018    [facility]    INJECTION-JOINT Bilateral 5/31/2018    [facility]    INJECTION-STEROID-EPIDURAL-TRANSFORAMINAL Left 3/14/2018    [facility]    INJECTION-STEROID-EPIDURAL-TRANSFORAMINAL Left 12/27/2017    Performed by [facility] at [facility]    microdiscetomy      L4-L5    OVARIAN CYST REMOVAL  2001       Review of patient's allergies indicates:  No Known Allergies    Current Facility-Administered Medications on File Prior to Encounter   Medication    [COMPLETED] acetaminophen tablet 1,000 mg    [COMPLETED] amoxicillin-clavulanate 875-125mg per tablet 1 tablet    [COMPLETED] neomycin-bacitracnZn-polymyxnB packet     Current Outpatient Medications on File Prior to Encounter   Medication Sig    amoxicillin-clavulanate 875-125mg (AUGMENTIN) 875-125 mg per tablet Take 1 tablet by  mouth 2 (two) times daily.    buPROPion (WELLBUTRIN XL) 150 MG TB24 tablet Take 1 tablet (150 mg total) by mouth once daily.    buPROPion (WELLBUTRIN XL) 300 MG 24 hr tablet TAKE 1 TABLET BY MOUTH EVERY MORNING    fexofenadine (ALLEGRA) 180 MG tablet Take 1 tablet (180 mg total) by mouth once daily.    (Magic mouthwash) 1:1:1 Benadryl 12.5mg/5ml liq, aluminum & magnesium hydroxide-simehticone (Maalox), lidocaine viscous 2% 10 cc swish and spit every 4 hours as needed for mouth sores or sore throat    ASPIRIN (ASPIR-81 ORAL) Take 1 tablet by mouth once daily.    boric acid (BORIC ACID) vaginal suppository Place 650 mg vaginally.    estradiol (ESTRACE) 0.01 % (0.1 mg/gram) vaginal cream Use 1/2 gram twice weekly    FLAXSEED ORAL Take by mouth.    fluticasone (FLONASE) 50 mcg/actuation nasal spray 1 spray by Each Nare route 2 (two) times daily as needed for Rhinitis or Allergies.    ibuprofen (ADVIL,MOTRIN) 800 MG tablet Take 1 tablet (800 mg total) by mouth every 8 (eight) hours as needed for Pain.    LACTOBAC NO.41/BIFIDOBACT NO.7 (PROBIOTIC-10 ORAL) Take by mouth.    multivitamin (ONE DAILY MULTIVITAMIN) per tablet Take 1 tablet by mouth every evening.     nystatin (MYCOSTATIN) powder Apply to affected areas of skin twice daily as needed for skin infection.  Re treat as needed.    PROAIR HFA 90 mcg/actuation inhaler Inhale 1-2 puffs into the lungs every 6 (six) hours as needed for Wheezing or Shortness of Breath (chest tightness). Rescue    sumatriptan (IMITREX) 50 MG tablet Take 1 tab by mouth as needed for migraine headache.  Repeat dose every 2 hours as needed.  Max 200mg in 24 hours.     Family History     Problem Relation (Age of Onset)    Breast cancer Mother    Cancer Father    Diabetes Mellitus Mother    Heart failure Mother        Tobacco Use    Smoking status: Current Some Day Smoker     Types: Cigarettes    Smokeless tobacco: Never Used   Substance and Sexual Activity    Alcohol use: Yes      Alcohol/week: 0.6 oz     Types: 1 Glasses of wine per week    Drug use: No    Sexual activity: Yes     Partners: Male     Birth control/protection: None     Review of Systems   Constitutional: Negative for chills, diaphoresis and fever.   Respiratory: Negative for cough, shortness of breath and wheezing.    Cardiovascular: Negative for chest pain.   Gastrointestinal: Negative for abdominal pain, diarrhea, nausea and vomiting.   Musculoskeletal: Positive for joint swelling and myalgias. Negative for arthralgias, back pain, gait problem, neck pain and neck stiffness.   Skin: Positive for color change and wound. Negative for pallor and rash.   Neurological: Negative for weakness and numbness.   Psychiatric/Behavioral: Negative for confusion and decreased concentration.     Objective:     Vital Signs (Most Recent):  Temp: 97.9 °F (36.6 °C) (11/19/18 0344)  Pulse: 80 (11/19/18 0403)  Resp: 18 (11/19/18 0403)  BP: 135/74 (11/19/18 0344)  SpO2: 99 % (11/19/18 0403) Vital Signs (24h Range):  Temp:  [97.9 °F (36.6 °C)-98.3 °F (36.8 °C)] 97.9 °F (36.6 °C)  Pulse:  [69-86] 80  Resp:  [16-98] 18  SpO2:  [95 %-100 %] 99 %  BP: (135-176)/(64-84) 135/74     Weight: 91.7 kg (202 lb 2.6 oz)  Body mass index is 31.66 kg/m².    Physical Exam   Constitutional: She is oriented to person, place, and time. She appears well-developed and well-nourished. No distress.   HENT:   Head: Normocephalic and atraumatic.   Eyes: Conjunctivae and EOM are normal. Pupils are equal, round, and reactive to light. No scleral icterus.   Neck: Normal range of motion. Neck supple. No tracheal deviation present.   Cardiovascular: Normal rate, regular rhythm, normal heart sounds and intact distal pulses. Exam reveals no gallop and no friction rub.   No murmur heard.  Pulmonary/Chest: Effort normal and breath sounds normal. No stridor. No respiratory distress. She has no wheezes. She has no rales. She exhibits no tenderness.   Abdominal: Soft. Bowel  sounds are normal. She exhibits no distension and no mass. There is no tenderness. There is no rebound and no guarding.   Musculoskeletal: Normal range of motion. She exhibits no deformity.   Neurological: She is alert and oriented to person, place, and time. No cranial nerve deficit. She exhibits normal muscle tone.   Skin: Skin is warm and dry. Capillary refill takes less than 2 seconds. No rash noted. She is not diaphoretic. There is erythema. No pallor.   Multiple cat bite marks of the left first and second digits. There is redness and swelling of the finger pad of the left 2nd digit. There is mildly decreased ROM of the left 2nd digit.    Psychiatric: She has a normal mood and affect. Her behavior is normal. Judgment and thought content normal.   Nursing note and vitals reviewed.        CRANIAL NERVES     CN III, IV, VI   Pupils are equal, round, and reactive to light.  Extraocular motions are normal.        Significant Labs:   BMP:   Recent Labs   Lab 11/18/18 2302         K 3.7      CO2 25   BUN 14   CREATININE 0.8   CALCIUM 9.7     CBC:   Recent Labs   Lab 11/18/18 2302 11/19/18  0525   WBC 8.50 7.14   HGB 12.6 12.1   HCT 39.0 37.7    308     CMP:   Recent Labs   Lab 11/18/18 2302      K 3.7      CO2 25      BUN 14   CREATININE 0.8   CALCIUM 9.7   ANIONGAP 11   EGFRNONAA >60     All pertinent labs within the past 24 hours have been reviewed.    Significant Imaging: I have reviewed all pertinent imaging results/findings within the past 24 hours.   Imaging Results    None

## 2018-11-19 NOTE — PLAN OF CARE
SW met with pt at bedside to complete discharge assessment, verified PCP and uses [facility] on [address] and [address]. Pt's significant other, Ivone will provide transportation home.  No needs identified at this time.     11/19/18 1329   Discharge Assessment   Assessment Type Discharge Planning Assessment   Confirmed/corrected address and phone number on facesheet? Yes   Assessment information obtained from? Patient   Communicated expected length of stay with patient/caregiver no   Prior to hospitilization cognitive status: Alert/Oriented   Prior to hospitalization functional status: Independent   Current cognitive status: Alert/Oriented   Current Functional Status: Independent   Lives With significant other   Able to Return to Prior Arrangements yes   Is patient able to care for self after discharge? Yes   Readmission Within The Last 30 Days no previous admission in last 30 days   Patient currently being followed by outpatient case management? No   Patient currently receives any other outside agency services? No   Equipment Currently Used at Home none   Do you have any problems affording any of your prescribed medications? No   Is the patient taking medications as prescribed? yes   Does the patient have transportation home? Yes   Transportation Available family or friend will provide   Discharge Plan A Home   Patient/Family In Agreement With Plan yes

## 2018-11-19 NOTE — ED PROVIDER NOTES
"Encounter Date: 11/18/2018       History     Chief Complaint   Patient presents with    Hand Pain     pt c/o of finger pain/swelling x2 hours. pt was seen here earlier today for animal bite. pt states " I took my antibiotics today but about 2 hours ago i noticed my finger started to swell and the pain got really bad"        Patient is a 58-year-old female with asthma who presents to the ED for a worsen the wound.  Patient states she was seen here in the ED at 7:00 a.m. this morning for a cat bite.  Patient reports she was bit by her cat at 5:00 a.m. this morning.  States she was bit to her left thumb and left index finger.  Her tetanus is up-to-date.  She was sent home with Augmentin.  There was no need for primary closure of her wound.  Patient reports she has taken 2 doses of Augmentin since this morning.  She reports the swelling, redness, pain is worsening.  She states she has been elevating her arm.  She denies fever.  She states it is more difficult to flex her left thumb.      The history is provided by the patient.     Review of patient's allergies indicates:  No Known Allergies  Past Medical History:   Diagnosis Date    Abnormal Pap smear     Asthma     Depression 3/17/2016    Environmental and seasonal allergies 3/17/2016    Heartburn 3/17/2016    HLD (hyperlipidemia) 03/22/2016    2017 ASCVD 5.2%; can't tolerate statins    Menopausal syndrome (hot flashes) 3/17/2016    Mild intermittent asthma without complication 3/17/2016    Obesity (BMI 30-39.9) 3/17/2016    Sciatica 3/17/2016     Past Surgical History:   Procedure Laterality Date    CERVICAL BIOPSY  W/ LOOP ELECTRODE EXCISION      INJECTION OF JOINT Bilateral 5/31/2018    Procedure: INJECTION-JOINT;  Surgeon: Lynette Rowley MD;  Location: [facility];  Service: Pain Management;  Laterality: Bilateral;  B/L SI Joint Injs  [id number], [id number]    INJECTION OF JOINT Bilateral 9/18/2018    Procedure: INJECTION, JOINT  Bilateral SI joint;  " Surgeon: Lynette Rowley MD;  Location: [facility];  Service: Pain Management;  Laterality: Bilateral;    INJECTION, JOINT  Bilateral SI joint Bilateral 9/18/2018    [facility]    INJECTION-JOINT Bilateral 5/31/2018    [facility] by [facility] at [facility]    INJECTION-STEROID-EPIDURAL-TRANSFORAMINAL Left 3/14/2018    [facility]    INJECTION-STEROID-EPIDURAL-TRANSFORAMINAL Left 12/27/2017    [facility]    microdiscetomy      L4-L5    OVARIAN CYST REMOVAL  2001     Family History   Problem Relation Age of Onset    Cancer Father         lung; non smoker, worked in [employer] and [employer] station    Breast cancer Mother     Heart failure Mother     Diabetes Mellitus Mother         ?secondary to chronic steroids    Ovarian cancer Neg Hx     Hypertension Neg Hx      Social History     Tobacco Use    Smoking status: Current Some Day Smoker     Types: Cigarettes    Smokeless tobacco: Never Used   Substance Use Topics    Alcohol use: Yes     Alcohol/week: 0.6 oz     Types: 1 Glasses of wine per week    Drug use: No     Review of Systems   Constitutional: Negative for chills and fever.   HENT: Negative for congestion and sore throat.    Eyes: Negative for pain.   Respiratory: Negative for shortness of breath.    Cardiovascular: Negative for chest pain.   Gastrointestinal: Negative for abdominal pain, diarrhea, nausea and vomiting.   Genitourinary: Negative for dysuria.   Musculoskeletal: Negative for arthralgias.        Left hand pain, erythema and warmth   Skin: Positive for wound.   Neurological: Negative for headaches.       Physical Exam     Initial Vitals [11/18/18 2155]   BP Pulse Resp Temp SpO2   (!) 176/81 86 18 98 °F (36.7 °C) 99 %      MAP       --         Physical Exam    Constitutional: Vital signs are normal. She is cooperative.  Non-toxic appearance.   HENT:    Head: Normocephalic and atraumatic.   Eyes: EOM are normal. Pupils are equal, round, and reactive to light.   Neck: Normal range of motion. Neck supple.   Cardiovascular: Normal rate, regular rhythm and intact distal pulses.   Pulmonary/Chest: Breath sounds normal. She has no wheezes. She has no rhonchi. She has no rales.   Abdominal: Soft. Bowel sounds are normal. There is no tenderness.   Musculoskeletal:   Tenderness along palmar and ventral aspect of left thumb.  Able to fully flex and fully extend left wrist and left thumb.  Passive range of motion to left thumb and left index finger.  Limited active range of motion secondary to pain. TTP to the palmar aspect of 2nd digit finger pad.    Neurological: She is alert and oriented to person, place, and time. GCS eye subscore is 4. GCS verbal subscore is 5. GCS motor subscore is 6.   Skin: Skin is warm and dry. Capillary refill takes less than 2 seconds. No rash noted.   Left hand:  Erythema to the distal palmar aspect of the 2nd digit.  No induration or feel on.  There is well demarcated erythema to the ventral aspect of the right thumb with associated streaking extending to the wrist.   Psychiatric: She has a normal mood and affect. Her behavior is normal.         ED Course   Procedures  Labs Reviewed   CULTURE, BLOOD   CULTURE, BLOOD   CBC W/ AUTO DIFFERENTIAL   BASIC METABOLIC PANEL          Imaging Results    None          Medical Decision Making:   Initial Assessment:   Urgent evaluation of a 58 y.o. female presenting to the emergency department complaining of cat bite to left hand approximately 15 hr ago. Patient is afebrile, nontoxic appearing and hemodynamically stable.  Taken 2 doses of Augmentin.  Patient reports redness, pain, and erythema or worsening.  No signs of flexor tenosynovitis.  There is developing cellulitis.  No felon or drainable abscess.  Patient is given the option to go home a different antibiotic and closely follow up with PCP or a be  admitted for IV antibiotics.  She is choosing IV antibiotics.  Will obtain labs and administer IV clindamycin.  ED Management:  CBC and BMP are unremarkable. Blood cultures pending.  Case discussed with Imelda willis, who will admit patient to inpatient floor.    Patient is stable for the floor at this time.  Her pain is controlled.  Vital signs are normal.  Other:   I have discussed this case with another health care provider.  This note was created using M*Modal Dictation. There may be typographical errors secondary to dictation.                       Clinical Impression:     1. Cellulitis of left hand    2. Cat bite, initial encounter                               Ej Perez PA-C  11/19/18 0019

## 2018-11-20 ENCOUNTER — OFFICE VISIT (OUTPATIENT)
Dept: FAMILY MEDICINE | Facility: CLINIC | Age: 58
End: 2018-11-20
Payer: COMMERCIAL

## 2018-11-20 ENCOUNTER — PATIENT MESSAGE (OUTPATIENT)
Dept: FAMILY MEDICINE | Facility: CLINIC | Age: 58
End: 2018-11-20

## 2018-11-20 VITALS
DIASTOLIC BLOOD PRESSURE: 65 MMHG | RESPIRATION RATE: 18 BRPM | SYSTOLIC BLOOD PRESSURE: 157 MMHG | OXYGEN SATURATION: 96 % | TEMPERATURE: 98 F | HEART RATE: 63 BPM | BODY MASS INDEX: 31.73 KG/M2 | HEIGHT: 67 IN | WEIGHT: 202.19 LBS

## 2018-11-20 VITALS
WEIGHT: 197.31 LBS | HEIGHT: 67 IN | BODY MASS INDEX: 30.97 KG/M2 | SYSTOLIC BLOOD PRESSURE: 108 MMHG | TEMPERATURE: 98 F | HEART RATE: 83 BPM | DIASTOLIC BLOOD PRESSURE: 76 MMHG

## 2018-11-20 DIAGNOSIS — B37.2 CANDIDAL SKIN INFECTION: ICD-10-CM

## 2018-11-20 DIAGNOSIS — M53.3 SACROILIAC JOINT PAIN: ICD-10-CM

## 2018-11-20 DIAGNOSIS — Z00.00 ANNUAL PHYSICAL EXAM: Primary | ICD-10-CM

## 2018-11-20 DIAGNOSIS — J35.8 TONSIL STONE: ICD-10-CM
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
## 2018-11-20 DIAGNOSIS — L03.114 CELLULITIS OF LEFT HAND: ICD-10-CM

## 2018-11-20 DIAGNOSIS — Z12.11 SCREENING FOR COLORECTAL CANCER: ICD-10-CM

## 2018-11-20 DIAGNOSIS — F32.A DEPRESSION, UNSPECIFIED DEPRESSION TYPE: ICD-10-CM

## 2018-11-20 DIAGNOSIS — E78.5 HYPERLIPIDEMIA, UNSPECIFIED HYPERLIPIDEMIA TYPE: ICD-10-CM

## 2018-11-20 DIAGNOSIS — J30.89 ENVIRONMENTAL AND SEASONAL ALLERGIES: ICD-10-CM

## 2018-11-20 DIAGNOSIS — Z12.12 SCREENING FOR COLORECTAL CANCER: ICD-10-CM

## 2018-11-20 DIAGNOSIS — M54.30 SCIATICA, UNSPECIFIED LATERALITY: ICD-10-CM

## 2018-11-20 DIAGNOSIS — R12 HEARTBURN: ICD-10-CM

## 2018-11-20 DIAGNOSIS — J45.20 MILD INTERMITTENT ASTHMA WITHOUT COMPLICATION: ICD-10-CM

## 2018-11-20 LAB
ANION GAP SERPL CALC-SCNC: 10 MMOL/L
BASOPHILS # BLD AUTO: 0.03 K/UL
BASOPHILS NFR BLD: 0.5 %
BUN SERPL-MCNC: 14 MG/DL
CALCIUM SERPL-MCNC: 9.3 MG/DL
CHLORIDE SERPL-SCNC: 104 MMOL/L
CO2 SERPL-SCNC: 28 MMOL/L
CREAT SERPL-MCNC: 0.8 MG/DL
DIFFERENTIAL METHOD: ABNORMAL
EOSINOPHIL # BLD AUTO: 0.3 K/UL
EOSINOPHIL NFR BLD: 4.7 %
ERYTHROCYTE [DISTWIDTH] IN BLOOD BY AUTOMATED COUNT: 13.5 %
EST. GFR  (AFRICAN AMERICAN): >60 ML/MIN/1.73 M^2
EST. GFR  (NON AFRICAN AMERICAN): >60 ML/MIN/1.73 M^2
GLUCOSE SERPL-MCNC: 91 MG/DL
HCT VFR BLD AUTO: 39.2 %
HGB BLD-MCNC: 12.4 G/DL
LYMPHOCYTES # BLD AUTO: 2 K/UL
LYMPHOCYTES NFR BLD: 30.2 %
MCH RBC QN AUTO: 28.4 PG
MCHC RBC AUTO-ENTMCNC: 31.6 G/DL
MCV RBC AUTO: 90 FL
MONOCYTES # BLD AUTO: 0.5 K/UL
MONOCYTES NFR BLD: 7.4 %
NEUTROPHILS # BLD AUTO: 3.7 K/UL
NEUTROPHILS NFR BLD: 57 %
PLATELET # BLD AUTO: 314 K/UL
PMV BLD AUTO: 10.9 FL
POTASSIUM SERPL-SCNC: 4.1 MMOL/L
RBC # BLD AUTO: 4.37 M/UL
SODIUM SERPL-SCNC: 142 MMOL/L
WBC # BLD AUTO: 6.53 K/UL

## 2018-11-20 PROCEDURE — 63600175 PHARM REV CODE 636 W HCPCS: Performed by: PHYSICIAN ASSISTANT

## 2018-11-20 PROCEDURE — 99999 PR PBB SHADOW E&M-EST. PATIENT-LVL III: CPT | Mod: PBBFAC,,, | Performed by: INTERNAL MEDICINE

## 2018-11-20 PROCEDURE — 85025 COMPLETE CBC W/AUTO DIFF WBC: CPT

## 2018-11-20 PROCEDURE — 99238 HOSP IP/OBS DSCHRG MGMT 30/<: CPT | Mod: ,,, | Performed by: HOSPITALIST

## 2018-11-20 PROCEDURE — 25000003 PHARM REV CODE 250: Performed by: PHYSICIAN ASSISTANT

## 2018-11-20 PROCEDURE — 80048 BASIC METABOLIC PNL TOTAL CA: CPT

## 2018-11-20 PROCEDURE — 99396 PREV VISIT EST AGE 40-64: CPT | Mod: S$GLB,,, | Performed by: INTERNAL MEDICINE

## 2018-11-20 PROCEDURE — 36415 COLL VENOUS BLD VENIPUNCTURE: CPT

## 2018-11-20 RX ORDER — NYSTATIN 100000 [USP'U]/G
POWDER TOPICAL
Qty: 30 G | Refills: 3 | Status: SHIPPED | OUTPATIENT
Start: 2018-11-20 | End: 2020-03-19 | Stop reason: SDUPTHER

## 2018-11-20 RX ORDER — ALBUTEROL SULFATE 90 UG/1
1-2 AEROSOL, METERED RESPIRATORY (INHALATION) EVERY 6 HOURS PRN
Qty: 18 G | Refills: 3 | Status: SHIPPED | OUTPATIENT
Start: 2018-11-20 | End: 2019-12-17 | Stop reason: SDUPTHER

## 2018-11-20 RX ORDER — EZETIMIBE 10 MG/1
10 TABLET ORAL DAILY
Qty: 30 TABLET | Refills: 1 | Status: SHIPPED | OUTPATIENT
Start: 2018-11-20 | End: 2018-12-05 | Stop reason: SINTOL

## 2018-11-20 RX ORDER — BUPROPION HYDROCHLORIDE 300 MG/1
300 TABLET ORAL EVERY MORNING
Qty: 90 TABLET | Refills: 3 | Status: SHIPPED | OUTPATIENT
Start: 2018-11-20 | End: 2019-12-17

## 2018-11-20 RX ORDER — IBUPROFEN 800 MG/1
800 TABLET ORAL EVERY 8 HOURS PRN
Qty: 90 TABLET | Refills: 11 | Status: SHIPPED | OUTPATIENT
Start: 2018-11-20 | End: 2019-11-25 | Stop reason: SDUPTHER

## 2018-11-20 RX ORDER — BUPROPION HYDROCHLORIDE 150 MG/1
150 TABLET ORAL DAILY
Qty: 90 TABLET | Refills: 3 | Status: SHIPPED | OUTPATIENT
Start: 2018-11-20 | End: 2019-11-20

## 2018-11-20 RX ADMIN — AMPICILLIN SODIUM AND SULBACTAM SODIUM 3 G: 2; 1 INJECTION, POWDER, FOR SOLUTION INTRAMUSCULAR; INTRAVENOUS at 03:11

## 2018-11-20 RX ADMIN — AMPICILLIN SODIUM AND SULBACTAM SODIUM 3 G: 2; 1 INJECTION, POWDER, FOR SOLUTION INTRAMUSCULAR; INTRAVENOUS at 09:11

## 2018-11-20 RX ADMIN — BACITRACIN ZINC: 500 OINTMENT TOPICAL at 09:11

## 2018-11-20 RX ADMIN — ASPIRIN 81 MG: 81 TABLET, COATED ORAL at 09:11

## 2018-11-20 NOTE — DISCHARGE INSTRUCTIONS
Cat Bite    A cat bite can cause a wound deep enough to break the skin. In such cases, the wound is cleaned and then sometimes closed. If the wound is closed it is usually not closed completely. This is so that fluid can drain if the wound becomes infected. Often the wound is left open to heal. In addition to wound care, a tetanus shot may be given, if needed.  Home care  · Wash your hands well with soap and warm water before and after caring for the wound. This helps lower the risk of infection.  · Care for the wound as directed. If a dressing was applied to the wound, be sure to change it as directed.  · If the wound bleeds, place a clean, soft cloth on the wound. Then firmly apply pressure until the bleeding stops. This may take up to 5 minutes. Do not release the pressure and look at the wound during this time.  · Always get medical attention for cat bites on the hand. They are highly likely to become infected.  · Most wounds heal within 10 days. But an infection can occur even with proper treatment. So be sure to check the wound daily for signs of infection (see below).  · Antibiotics may be prescribed. These help prevent or treat infection. If youre given antibiotics, take them as directed. Also be sure to complete the medicines.  Rabies prevention  Rabies is a virus that can be carried in certain animals. These can include domestic animals such as cats and dogs. Pets fully vaccinated against rabies (2 shots) are at very low risk of infection. But because human rabies is almost always fatal, any biting pet should be confined for 10 days as an extra precaution. In general, if there is a risk for rabies, the following steps may need to be taken:  · If someones pet cat has bitten you, it should be kept in a secure area for the next 10 days to watch for signs of illness. (If the pet owner wont allow this, contact your local animal control center.) If the cat becomes ill or dies during that time, contact your  local animal control center at once so the animal may be tested for rabies. If the cat stays healthy for the next 10 days, there is no danger of rabies in the animal or you.  · If a stray cat bit you, contact your local animal control center. They can give information on capture, quarantine, and animal rabies testing.  · If you cant find the animal that bit you in the next 2 days, and if rabies exists in your area, you may need to receive the rabies vaccine series. Call your healthcare provider right away. Or return to the emergency department promptly.  · All animal bites should be reported to the local animal control center. If you were not given a form to fill out, you can report this yourself.  Follow-up care  Follow up with your healthcare provider, or as directed.  When to seek medical advice  Call your healthcare provider right away if any of these occur:  · Signs of infection:  ¨ Spreading redness or warmth from the wound  ¨ Increased pain or swelling  ¨ Fever of 100.4ºF (38ºC) or higher, or as directed by your healthcare provider  ¨ Colored fluid or pus draining from the wound  ¨ Enlarged lymph nodes above the area that was bitten, such as lymph nodes in the armpit if you were bitten on the hand or arm. This may be a sign of cat-scratch disease (cat-scratch fever).  · Signs of rabies infection:  ¨ Headache  ¨ Confusion  ¨ Strange behavior  ¨ Increased salivating or drooling  ¨ Seizure  · Decreased ability to move any body part near the bite area  · Bleeding that can't be stopped after 5 minutes of firm pressure  Date Last Reviewed: 3/23/2015  © 1305-8080 [employer]. [address] [place] [address], [place] [address]. All rights reserved. This information is not intended as a substitute for professional medical care. Always follow your healthcare professional's instructions.      Discharge Instructions for Cellulitis  You have been diagnosed with cellulitis. This is an infection in the deepest layer  of the skin. In some cases, the infection also affects the muscle. Cellulitis is caused by bacteria. The bacteria can enter the body through broken skin. This can happen with a cut, scratch, animal bite, or an insect bite that has been scratched. You may have been treated in the hospital with antibiotics and fluids. You will likely be given a prescription for antibiotics to take at home. This sheet will help you take care of yourself at home.  Home care  When you are home:  · Take the prescribed antibiotic medicine you are given as directed until it is gone. Take it even if you feel better. It treats the infection and stops it from returning. Not taking all the medicine can make future infections hard to treat.  · Keep the infected area clean.  · When possible, raise the infected area above the level of your heart. This helps keep swelling down.  · Talk with your healthcare provider if you are in pain. Ask what kind of over-the-counter medicine you can take for pain.  · Apply clean bandages as advised.  · Take your temperature once a day for a week.  · Wash your hands often to prevent spreading the infection.  In the future, wash your hands before and after you touch cuts, scratches, or bandages. This will help prevent infection.   When to call your healthcare provider  Call your healthcare provider immediately if you have any of the following:  · Difficulty or pain when moving the joints above or below the infected area  · Discharge or pus draining from the area  · Fever of 100.4°F (38°C) or higher, or as directed by your healthcare provider  · Pain that gets worse in or around the infected   · Redness that gets worse in or around the infected area, particularly if the area of redness expands to a wider area  · Shaking chills  · Swelling of the infected area  · Vomiting   Date Last Reviewed: 8/1/2016  © 2488-3805 [employer]. [address] [place] [address], [place] [address]. All rights reserved. This  information is not intended as a substitute for professional medical care. Always follow your healthcare professional's instructions.

## 2018-11-20 NOTE — PLAN OF CARE
Copy of discharge summary provided to patient & instructed her to bring to any follow up appts - patient has an appt scheduled this pm with her PCP - significant other will provide transportation home - patient denied any DC needs      11/20/18 0931   Final Note   Assessment Type Final Discharge Note   Anticipated Discharge Disposition Home   What phone number can be called within the next 1-3 days to see how you are doing after discharge? [phone]   Discharge plans and expectations educations in teach back method with documentation complete? Yes   Right Care Referral Info   Post Acute Recommendation No Care

## 2018-11-20 NOTE — NURSING
Pt resting in bed. NAD, VSS on RA. AOx4. Pt complains of intermittent pain. Controlled with PRN tylenol. Pt ambulatory independently to the bathroom. Pt tolerating IV abx and dressing changes. POC reviewed with pt. Bed low and locked. Personal items and call ligth within reach. Will continue to monitor.

## 2018-11-20 NOTE — HOSPITAL COURSE
The patient had rapid improvement in erythema and edema to the left finger and hand.  The patient was given Unasyn and responded well.  She is instructed to keep the wound clean, dry, and covered.  Blood cultures obtained remained negative during her hospitalization.  The patient is instructed to resume Augmentin and will follow up with her PCP today after discharge.

## 2018-11-20 NOTE — NURSING
Plan of care reviewed with patient, medications explained. Pt currently resting comfortably in bed and appears to be sleeping in between care. VSS, bed in lowest, locked position with call light in reach. Purposeful rounding done.  No new needs identified at this time, will continue to monitor.

## 2018-11-20 NOTE — DISCHARGE SUMMARY
[facility] Hospital Medicine  Discharge Summary      Patient Name: Amita Lewis  MRN: [id number]  Admission Date: 11/18/2018  Hospital Length of Stay: 2 days  Discharge Date and Time:  11/20/2018 9:23 AM  Attending Physician: Afia Abdul MD   Discharging Provider: Afia Abdul MD  Primary Care Provider: Patricia Cope MD      HPI:   Ms. Amita Lewis is a 58 y.o. female, with PMH of asthma, depression, who presented to [facility] ED on 11/18/18 2/2 infection from cat bite which occurred at 5 am on 11/18/18, and re-presented after she had worsened symptoms after starting Augmentin and symptoms had not improved. The cat that bit her was her own, and was vaccinated. The bite occurred on her legt (non-dominant) had between and on the index finger and thumb. Associated symptoms include swelling, and decreased ROM of the affected fingers.  She denies fever, chills, sore throat, SOB, CP, abdominal pain, N/V/D, any urinary symptoms, back pain, headaches, and weakness. Tetanus is up to date. The patient was admitted to inpatient.     * No surgery found *      Hospital Course:   The patient had rapid improvement in erythema and edema to the left finger and hand.  The patient was given Unasyn and responded well.  She is instructed to keep the wound clean, dry, and covered.  Blood cultures obtained remained negative during her hospitalization.  The patient is instructed to resume Augmentin and will follow up with her PCP today after discharge.       Consults:   Consults (From admission, onward)        Status Ordering Provider     Inpatient consult to Plastic Surgery  Once     Provider:  Demetrius Henry Jr., MD    Completed PRINCE MATSON          No new Assessment & Plan notes have been filed under this hospital service since the last note was generated.  Service: Hospital Medicine    Final Active Diagnoses:    Diagnosis Date Noted POA    PRINCIPAL PROBLEM:   Cellulitis of left hand [L03.114] 11/19/2018 Yes    Cat bite [W55.01XA] 11/18/2018 Yes    HLD (hyperlipidemia) [E78.5] 03/22/2016 Yes    Depression [F32.9] 03/17/2016 Yes    Mild intermittent asthma without complication [J45.20] 03/17/2016 Yes      Problems Resolved During this Admission:       Discharged Condition: good    Disposition: Home or Self Care    Follow Up:  Follow-up Information     Patricia Cope MD Today.    Specialty:  Internal Medicine  Contact information:  [address] [place] [facility] [address] [phone]                 Patient Instructions:      Diet Adult Regular     Activity as tolerated       Significant Diagnostic Studies: Labs:   CMP   Recent Labs   Lab 11/18/18 2302 11/19/18  0525 11/20/18  0432    142 142   K 3.7 4.4 4.1    105 104   CO2 25 27 28    100 91   BUN 14 17 14   CREATININE 0.8 0.8 0.8   CALCIUM 9.7 9.4 9.3   ANIONGAP 11 10 10   ESTGFRAFRICA >60 >60 >60   EGFRNONAA >60 >60 >60    and CBC   Recent Labs   Lab 11/18/18  2302 11/19/18  0525 11/20/18  0432   WBC 8.50 7.14 6.53   HGB 12.6 12.1 12.4   HCT 39.0 37.7 39.2    308 314     Microbiology:   Blood Culture   Lab Results   Component Value Date    LABBLOO No Growth to date 11/18/2018    LABBLOO No Growth to date 11/18/2018       Pending Diagnostic Studies:     None         Medications:  Reconciled Home Medications:      Medication List      CONTINUE taking these medications    (MAGIC MOUTHWASH) 1:1:1 BENADRYL 12.5MG/5ML LIQ, ALUMINUM & MAGNESIUM  10 cc swish and spit every 4 hours as needed for mouth sores or sore throat     amoxicillin-clavulanate 875-125mg 875-125 mg per tablet  Commonly known as:  AUGMENTIN  Take 1 tablet by mouth 2 (two) times daily.     ASPIR-81 ORAL  Take 1 tablet by mouth once daily.     boric acid vaginal suppository  Commonly known as:  boric acid  Place 650 mg vaginally.     * buPROPion 150 MG TB24 tablet  Commonly known as:  WELLBUTRIN XL  Take 1 tablet (150  mg total) by mouth once daily.     * buPROPion 300 MG 24 hr tablet  Commonly known as:  WELLBUTRIN XL  TAKE 1 TABLET BY MOUTH EVERY MORNING     estradiol 0.01 % (0.1 mg/gram) vaginal cream  Commonly known as:  ESTRACE  Use 1/2 gram twice weekly     fexofenadine 180 MG tablet  Commonly known as:  ALLEGRA  Take 1 tablet (180 mg total) by mouth once daily.     FLAXSEED ORAL  Take by mouth.     fluticasone 50 mcg/actuation nasal spray  Commonly known as:  FLONASE  1 spray by Each Nare route 2 (two) times daily as needed for Rhinitis or Allergies.     ibuprofen 800 MG tablet  Commonly known as:  ADVIL,MOTRIN  Take 1 tablet (800 mg total) by mouth every 8 (eight) hours as needed for Pain.     nystatin powder  Commonly known as:  MYCOSTATIN  Apply to affected areas of skin twice daily as needed for skin infection.  Re treat as needed.     ONE DAILY MULTIVITAMIN per tablet  Generic drug:  multivitamin  Take 1 tablet by mouth every evening.     PROAIR HFA 90 mcg/actuation inhaler  Generic drug:  albuterol  Inhale 1-2 puffs into the lungs every 6 (six) hours as needed for Wheezing or Shortness of Breath (chest tightness). Rescue     PROBIOTIC-10 ORAL  Take by mouth.     sumatriptan 50 MG tablet  Commonly known as:  IMITREX  Take 1 tab by mouth as needed for migraine headache.  Repeat dose every 2 hours as needed.  Max 200mg in 24 hours.         * This list has 2 medication(s) that are the same as other medications prescribed for you. Read the directions carefully, and ask your doctor or other care provider to review them with you.            STOP taking these medications    amoxicillin 500 MG capsule  Commonly known as:  AMOXIL            Indwelling Lines/Drains at time of discharge:   Lines/Drains/Airways          None          Time spent on the discharge of patient: 20 minutes  Patient was seen and examined on the date of discharge and determined to be suitable for discharge.         Afia Abdul MD  Department of  [facility] Medicine  [facility]

## 2018-11-20 NOTE — PROGRESS NOTES
Subjective:        Patient ID: Amita Lewis is a 58 y.o. female.    Chief Complaint: Annual Exam    HPI   Amita Lewis presents for annual exam.    Pt was discharged from the hospital this AM for cellulitis of the L hand 2/2 cat bite.  Pt had several dose of IV abx and the redness and swelling of most of her hand has improved.  There is still redness, swelling and tenderness around the wound on the distal index finger.  This wound was packed.  She is applying abx ointment, keeping it covered and discharged with PO Augmentin.    Review of Systems   Constitutional: Positive for activity change (exercising at PT twice/week, has helped mood, bp). Negative for fever and unexpected weight change.   Eyes: Negative for visual disturbance (glasses UTD).   Respiratory: Negative for chest tightness and shortness of breath.    Cardiovascular: Negative for chest pain.   Gastrointestinal: Negative for abdominal pain, blood in stool, constipation and diarrhea.   Genitourinary: Negative for hematuria.   Musculoskeletal: Positive for back pain (SI joint injections have helped more than previous low back injections).   Skin: Negative for rash.   Neurological: Negative for headaches (migraine HAs stable).   Psychiatric/Behavioral: Negative for dysphoric mood (depression much better).           Objective:        Vitals:    11/20/18 1256   BP: 108/76   Pulse: 83   Temp: 98 °F (36.7 °C)     Physical Exam   Constitutional: She is oriented to person, place, and time. She appears well-developed and well-nourished. No distress.   HENT:   Head: Normocephalic and atraumatic.   Right Ear: External ear normal.   Left Ear: External ear normal.   Nose: Nose normal.   - bilateral ear canals clear, tympanic membranes visualized - normal color and light reflex  - +tonsil stones b/l; tonsils normal color and size, no exudate   Eyes: Conjunctivae and EOM are normal.   Neck: Neck supple. No thyromegaly present.   Cardiovascular: Normal  rate, regular rhythm and normal heart sounds.   No murmur heard.  Pulmonary/Chest: Effort normal and breath sounds normal. No respiratory distress.   Abdominal: Soft. She exhibits no distension. There is no tenderness. There is no guarding.   Musculoskeletal: She exhibits no edema (no edema of LEs).   Lymphadenopathy:     She has no cervical adenopathy.   Neurological: She is alert and oriented to person, place, and time.   Skin:   L hand: healing puncture wounds on L hand and thumb 2/2 cat bite, no erythema or induration; largest puncture wound on palmar surface of distal 2nd finger, wound packed; erythema, mild edema and tenderness to the PIP joint line; no drainage expressed   Psychiatric: She has a normal mood and affect. Her behavior is normal. Judgment and thought content normal.   Vitals reviewed.      Most recent lab results reviewed with patient.    The 10-year ASCVD risk score (Mic WHITE Jr., et al., 2013) is: 6.3%    Values used to calculate the score:      Age: 58 years      Sex: Female      Is Non- : No      Diabetic: No      Tobacco smoker: Yes      Systolic Blood Pressure: 108 mmHg      Is BP treated: No      HDL Cholesterol: 43 mg/dL      Total Cholesterol: 264 mg/dL      Assessment:         1. Annual physical exam    2. Screening for colorectal cancer    3. Depression, unspecified depression type    4. Candidal skin infection    5. Mild intermittent asthma without complication    6. Sciatica, unspecified laterality    7. Hyperlipidemia, unspecified hyperlipidemia type    8. Cat bite, subsequent encounter    9. Cellulitis of left hand    10. Heartburn    11. Sacroiliac joint pain    12. Environmental and seasonal allergies    13. Tonsil stone              Plan:         Amita was seen today for annual exam.    Diagnoses and all orders for this visit:    Annual physical exam  - FIT given  - Recommended Shingrix vaccine.  Check coverage with insurance first, including where pt  should obtain vaccination.  - pt has mammogram scheduled    Screening for colorectal cancer  -     Fecal Immunochemical Test (iFOBT); Future    Depression, unspecified depression type: Doing well with Wellbutrin 450mg qd and regular exercise.  After she finishes with PT, she is joining the gym upstairs from PT and will work with a .  -     buPROPion (WELLBUTRIN XL) 300 MG 24 hr tablet; Take 1 tablet (300 mg total) by mouth every morning.  -     buPROPion (WELLBUTRIN XL) 150 MG TB24 tablet; Take 1 tablet (150 mg total) by mouth once daily.    Candidal skin infection: Nystatin PRN  -     nystatin (MYCOSTATIN) powder; Apply to affected areas of skin twice daily as needed for skin infection.  Re treat as needed.    Mild intermittent asthma without complication: no acute issues; Albuterol PRN  -     PROAIR HFA 90 mcg/actuation inhaler; Inhale 1-2 puffs into the lungs every 6 (six) hours as needed for Wheezing or Shortness of Breath (chest tightness). Rescue    Sciatica, unspecified laterality  Sacroiliac joint pain: PT, Ibuprofen PRN.  -     ibuprofen (ADVIL,MOTRIN) 800 MG tablet; Take 1 tablet (800 mg total) by mouth every 8 (eight) hours as needed for Pain. Take with food.    Hyperlipidemia, unspecified hyperlipidemia type: Pt unable to tolerate statins.  ASCVD risk low but LDL >190.  Will try Zetia 10mg qd.  -     ezetimibe (ZETIA) 10 mg tablet; Take 1 tablet (10 mg total) by mouth once daily.    Cat bite, subsequent encounter  Cellulitis of left hand: Continue current regimen with Augmentin.  Pt advised to draw a line around the current area of redness and swelling.  If sx spread, go back to ER for IV abx.    Heartburn: no acute issues; Tums PRN    Environmental and seasonal allergies: no acute issues    Tonsil stone: Improved; pt gargling with warm salt water and has magic mouthwash for pain        Pt will let me know how she tolerates Zetia.  If tolerated, will recheck lipids in 4 months.    Follow-up in  about 1 year (around 11/20/2019) for annual exam or sooner as needed.

## 2018-11-20 NOTE — NURSING
Pt dc to home. IV dc, pt tolerated. Pt education and followup information provided. Pt verbalized understanding. Pt waiting in room for ride. Pt stated she would not need a wheel chair. Will continue to monitor.

## 2018-11-20 NOTE — NURSING
Pt IV infiltrated. Pt did not receive 1530 dose of ampicillin dt infiltration. Charge nurse notified. Pharmacy will be contacted. Pt NAD and tolerating.

## 2018-11-22 ENCOUNTER — HOSPITAL ENCOUNTER (EMERGENCY)
Facility: OTHER | Age: 58
Discharge: HOME | End: 2018-11-22
Attending: EMERGENCY MEDICINE
Payer: COMMERCIAL

## 2018-11-22 VITALS
HEIGHT: 67 IN | HEART RATE: 80 BPM | BODY MASS INDEX: 30.92 KG/M2 | SYSTOLIC BLOOD PRESSURE: 165 MMHG | OXYGEN SATURATION: 100 % | WEIGHT: 197 LBS | DIASTOLIC BLOOD PRESSURE: 72 MMHG | RESPIRATION RATE: 18 BRPM | TEMPERATURE: 98 F

## 2018-11-22 DIAGNOSIS — L03.012 CELLULITIS OF FINGER OF LEFT HAND: Primary | ICD-10-CM

## 2018-11-22 PROCEDURE — 99282 EMERGENCY DEPT VISIT SF MDM: CPT

## 2018-11-23 ENCOUNTER — PATIENT MESSAGE (OUTPATIENT)
Dept: FAMILY MEDICINE | Facility: CLINIC | Age: 58
End: 2018-11-23

## 2018-11-23 NOTE — ED PROVIDER NOTES
"Encounter Date: 11/22/2018    SCRIBE #1 NOTE: I, Ivan Almonte, am scribing for, and in the presence of, Dr. Carpenter.       History     Chief Complaint   Patient presents with    Cellulitis     pt reports cellulitis of left pointer finger. pt was just recently d/c from hospital for same problem , pt states " finger has not gotten better" pt denies fever, chills, n/v, sob, cp     Time seen by provider: 10:57 PM    This is a 58 y.o. female who presents with complaint of cellulitis on left index finger that began approximately four days ago. She was discharged on 11/20/18 ED and was prescribed antibiotics for cellulitis on the same finger. She reports that she has been cleaning and wrapping her finger. The patient reports that she compliant with her antibiotics since her discharge. The patient reports that she has noticed that approximately six hours ago, she noticed that her finger looked worse than it did this morning. She reports that while cooking she made sure that her finger was clean. She denies fever, chest pain, shortness of breath, nausea, and dysuria.      The history is provided by the patient.     Review of patient's allergies indicates:  No Known Allergies  Past Medical History:   Diagnosis Date    Abnormal Pap smear     Asthma     Depression 3/17/2016    Environmental and seasonal allergies 3/17/2016    Heartburn 3/17/2016    HLD (hyperlipidemia) 03/22/2016    ASCVD 2018 6.3%; can't tolerate statins    Menopausal syndrome (hot flashes) 3/17/2016    Mild intermittent asthma without complication 3/17/2016    Obesity (BMI 30-39.9) 3/17/2016    Sciatica 3/17/2016     Past Surgical History:   Procedure Laterality Date    CERVICAL BIOPSY  W/ LOOP ELECTRODE EXCISION      INJECTION OF JOINT Bilateral 5/31/2018    Procedure: INJECTION-JOINT;  Surgeon: Lynette Rowley MD;  Location: [facility];  Service: Pain Management;  Laterality: Bilateral;  B/L SI Joint Injs  [id number], [id number]    INJECTION OF " JOINT Bilateral 9/18/2018    Procedure: INJECTION, JOINT  Bilateral SI joint;  Surgeon: Lynette Rowley MD;  Location: [facility];  Service: Pain Management;  Laterality: Bilateral;    INJECTION, JOINT  Bilateral SI joint Bilateral 9/18/2018    Performed by Lynette [facility]ley MD at [facility]    INJECTION-JOINT Bilateral 5/31/2018    Performed by Lynette Rowley MD at [facility]    INJECTION-STEROID-EPIDURAL-TRANSFORAMINAL Left 3/14/2018    Performed by [facility]ley MD at [facility]    INJECTION-STEROID-EPIDURAL-TRANSFORAMINAL Left 12/27/2017    Performed by [facility] at [facility]    microdiscetomy      L4-L5    OVARIAN CYST REMOVAL  2001     Family History   Problem Relation Age of Onset    Cancer Father         lung; non smoker, worked in [employer] and gas station    Breast cancer Mother     Heart failure Mother     Diabetes Mellitus Mother         ?secondary to chronic steroids    Ovarian cancer Neg Hx     Hypertension Neg Hx      Social History     Tobacco Use    Smoking status: Current Some Day Smoker     Types: Cigarettes    Smokeless tobacco: Never Used   Substance Use Topics    Alcohol use: Yes     Alcohol/week: 0.6 oz     Types: 1 Glasses of wine per week    Drug use: No     Review of Systems    Physical Exam     Initial Vitals [11/22/18 2241]   BP Pulse Resp Temp SpO2   (!) 165/72 80 18 98.2 °F (36.8 °C) 100 %      MAP       --         Physical Exam    Nursing note and vitals reviewed.  Constitutional: She appears well-developed and well-nourished. She is not diaphoretic. No distress.   HENT:   Head: Normocephalic and atraumatic.   Eyes: Conjunctivae and EOM are normal. Pupils are equal, round, and reactive to light.   Pulmonary/Chest: Breath sounds normal.   Musculoskeletal: Normal range of motion. She exhibits edema. She exhibits no tenderness.   Minimally decreased ROM at the DIP join of left index finger secondary to edema.    Neurological: She is  alert and oriented to person, place, and time.   Skin: Skin is warm and dry. No rash and no abscess noted. There is erythema. No pallor.   Minimal erythema to the bolivar aspect of distal left index finger with no palpable fluid collection or induration. No drainage present/   Psychiatric: She has a normal mood and affect. Her behavior is normal. Judgment and thought content normal.         ED Course   Procedures  Labs Reviewed - No data to display       Imaging Results    None             Additional MDM:   Comments: 58-year-old female presents complaining of worsening redness to the tip of her left index finger at the site where she had a cat scratch that developed cellulitis.  On exam she has minimal erythema of the area on the palmar aspect only.  The range of motion of the DI P is slightly limited secondary to edema but no associated pain.  She has no discrete fluid collection to be drained.  There is no active drainage. She has no lymphangitis and no systemic symptoms. I tried to reassure the patient and instructed her to closely monitor the finger and return for any worsening.  The patient was initially not comfortable with this plan, therefore, she was given the option of a dose of IV antibiotics again with return to the emergency department for re-evaluation approximately 12 hr.  Patient states she is a difficult stick and does not want IV antibiotics.  Ultimately she opted for observation at home and return if anything worsens..          Scribe Attestation:   Scribe #1: I performed the above scribed service and the documentation accurately describes the services I performed. I attest to the accuracy of the note.    Attending Attestation:           Physician Attestation for Scribe:  Physician Attestation Statement for Scribe #1: I, Dr. Carpenter, reviewed documentation, as scribed by Ivan Almonte in my presence, and it is both accurate and complete.                    Clinical Impression:     1. Cellulitis of  finger of left hand                                   Jocy Carpenter MD  11/22/18 [id number]

## 2018-11-23 NOTE — ED TRIAGE NOTES
"Pt presents to the ed with c/o cellulitis of left pointer finger. Pt was just recently d/c from hospital for same problem. Pt states " I have been taking the antibiotics, soaking and bandaging my finger but it has not gotten better." Pt denies fever, chills, n/v, sob, cp. Pt is aao, RR even and unlabored. NAD noted.   "

## 2018-11-24 LAB
BACTERIA BLD CULT: NORMAL
BACTERIA BLD CULT: NORMAL

## 2018-12-05 ENCOUNTER — PATIENT MESSAGE (OUTPATIENT)
Dept: FAMILY MEDICINE | Facility: CLINIC | Age: 58
End: 2018-12-05

## 2018-12-17 ENCOUNTER — OFFICE VISIT (OUTPATIENT)
Dept: OBSTETRICS AND GYNECOLOGY | Facility: CLINIC | Age: 58
End: 2018-12-17
Attending: OBSTETRICS & GYNECOLOGY
Payer: COMMERCIAL

## 2018-12-17 ENCOUNTER — HOSPITAL ENCOUNTER (OUTPATIENT)
Dept: RADIOLOGY | Facility: OTHER | Age: 58
Discharge: HOME | End: 2018-12-17
Attending: OBSTETRICS & GYNECOLOGY
Payer: COMMERCIAL

## 2018-12-17 VITALS
SYSTOLIC BLOOD PRESSURE: 112 MMHG | WEIGHT: 196.44 LBS | DIASTOLIC BLOOD PRESSURE: 64 MMHG | BODY MASS INDEX: 30.83 KG/M2 | HEIGHT: 67 IN

## 2018-12-17 DIAGNOSIS — Z12.31 ENCOUNTER FOR SCREENING MAMMOGRAM FOR BREAST CANCER: ICD-10-CM

## 2018-12-17 DIAGNOSIS — Z01.419 WELL WOMAN EXAM: Primary | ICD-10-CM

## 2018-12-17 DIAGNOSIS — N76.0 VAGINITIS AND VULVOVAGINITIS: ICD-10-CM

## 2018-12-17 LAB
CANDIDA RRNA VAG QL PROBE: NEGATIVE
G VAGINALIS RRNA GENITAL QL PROBE: NEGATIVE
T VAGINALIS RRNA GENITAL QL PROBE: NEGATIVE

## 2018-12-17 PROCEDURE — 77063 BREAST TOMOSYNTHESIS BI: CPT | Mod: TC

## 2018-12-17 PROCEDURE — 77067 SCR MAMMO BI INCL CAD: CPT | Mod: 26,,, | Performed by: RADIOLOGY

## 2018-12-17 PROCEDURE — 88175 CYTOPATH C/V AUTO FLUID REDO: CPT

## 2018-12-17 PROCEDURE — 87660 TRICHOMONAS VAGIN DIR PROBE: CPT

## 2018-12-17 PROCEDURE — 77067 SCR MAMMO BI INCL CAD: CPT | Mod: TC

## 2018-12-17 PROCEDURE — 99999 PR PBB SHADOW E&M-EST. PATIENT-LVL III: CPT | Mod: PBBFAC,,, | Performed by: OBSTETRICS & GYNECOLOGY

## 2018-12-17 PROCEDURE — 99396 PREV VISIT EST AGE 40-64: CPT | Mod: S$GLB,,, | Performed by: OBSTETRICS & GYNECOLOGY

## 2018-12-17 PROCEDURE — 77063 BREAST TOMOSYNTHESIS BI: CPT | Mod: 26,,, | Performed by: RADIOLOGY

## 2018-12-17 RX ORDER — ALBUTEROL SULFATE 90 UG/1
AEROSOL, METERED RESPIRATORY (INHALATION)
COMMUNITY
End: 2019-12-17 | Stop reason: SDUPTHER

## 2018-12-17 NOTE — PROGRESS NOTES
CC: Well woman exam    Amita Lewis is a 58 y.o. female  presents for well woman exam.  LMP: Patient's last menstrual period was 10/20/2013..  No issues, problems, or complaints.  Did recently finish augmentin for cellulitis - wants to check for yeast.     &  LEEP.  No abnormal pap after that.    10/15 Pap negative;     Pap & HPV negative.    1217 pap normal  Patient prefers yearly testing.    Past Medical History:   Diagnosis Date    Abnormal Pap smear     Asthma     Depression 3/17/2016    Environmental and seasonal allergies 3/17/2016    Heartburn 3/17/2016    HLD (hyperlipidemia) 2016    ASCVD  6.3%; can't tolerate statins    Menopausal syndrome (hot flashes) 3/17/2016    Mild intermittent asthma without complication 3/17/2016    Obesity (BMI 30-39.9) 3/17/2016    Sciatica 3/17/2016     Past Surgical History:   Procedure Laterality Date    CERVICAL BIOPSY  W/ LOOP ELECTRODE EXCISION      INJECTION OF JOINT Bilateral 2018    Procedure: INJECTION-JOINT;  Surgeon: Lynette Rowley MD;  Location: [facility];  Service: Pain Management;  Laterality: Bilateral;  B/L SI Joint Injs  [id number], [id number]    INJECTION OF JOINT Bilateral 2018    Procedure: INJECTION, JOINT  Bilateral SI joint;  Surgeon: Lynette Rowley MD;  Location: [facility];  Service: Pain Management;  Laterality: Bilateral;    INJECTION, JOINT  Bilateral SI joint Bilateral 2018    [facility]    INJECTION-JOINT Bilateral 2018    [facility]    INJECTION-STEROID-EPIDURAL-TRANSFORAMINAL Left 3/14/2018    [facility]    INJECTION-STEROID-EPIDURAL-TRANSFORAMINAL Left 2017    [facility] by [facility] at [facility]    microdiscetomy      L4-L5    OVARIAN CYST REMOVAL       Social History     Socioeconomic History    Marital status:  "     Spouse name: Not on file    Number of children: Not on file    Years of education: Not on file    Highest education level: Not on file   Social Needs    Financial resource strain: Not on file    Food insecurity - worry: Not on file    Food insecurity - inability: Not on file    Transportation needs - medical: Not on file    Transportation needs - non-medical: Not on file   Occupational History    Not on file   Tobacco Use    Smoking status: Current Some Day Smoker     Types: Cigarettes    Smokeless tobacco: Never Used   Substance and Sexual Activity    Alcohol use: Yes     Alcohol/week: 0.6 oz     Types: 1 Glasses of wine per week    Drug use: No    Sexual activity: Yes     Partners: Male     Birth control/protection: None   Other Topics Concern    Not on file   Social History Narrative    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    Works for [employer] at [employer]    Walks a lot at work and for exercise     Family History   Problem Relation Age of Onset    Cancer Father         lung; non smoker, worked in [employer] and gas station    Breast cancer Mother     Heart failure Mother     Diabetes Mellitus Mother         ?secondary to chronic steroids    Ovarian cancer Neg Hx     Hypertension Neg Hx      OB History      Para Term  AB Living    0   0          SAB TAB Ectopic Multiple Live Births                       /64   Ht 5' 7" (1.702 m)   Wt 89.1 kg (196 lb 6.9 oz)   LMP 10/20/2013   BMI 30.77 kg/m²       ROS:  GENERAL: Denies weight gain or weight loss. Feeling well overall.   SKIN: Denies rash or lesions.   HEAD: Denies head injury or headache.   NODES: Denies enlarged lymph nodes.   CHEST: Denies chest pain or shortness of breath.   CARDIOVASCULAR: Denies palpitations or left sided chest pain.   ABDOMEN: No abdominal pain, constipation, diarrhea, nausea, vomiting or rectal bleeding.   URINARY: No frequency, dysuria, hematuria, or burning on " urination.  REPRODUCTIVE: See HPI.   BREASTS: The patient performs breast self-examination and denies pain, lumps, or nipple discharge.   HEMATOLOGIC: No easy bruisability or excessive bleeding.   MUSCULOSKELETAL: Denies joint pain or swelling.   NEUROLOGIC: Denies syncope or weakness.   PSYCHIATRIC: Denies depression, anxiety or mood swings.    PHYSICAL EXAM:  APPEARANCE: Well nourished, well developed, in no acute distress.  AFFECT: WNL, alert and oriented x 3  SKIN: No acne or hirsutism  NECK: Neck symmetric without masses or thyromegaly  NODES: No inguinal, cervical, axillary, or femoral lymph node enlargement  CHEST: Good respiratory effect  ABDOMEN: Soft.  No tenderness or masses.  No hepatosplenomegaly.  No hernias.  BREASTS: Symmetrical, no skin changes or visible lesions.  No palpable masses, nipple discharge bilaterally.  PELVIC: Normal external genitalia without lesions.  Normal hair distribution.  Adequate perineal body, normal urethral meatus.  Vagina moist and well rugated without lesions or discharge.  Cervix pink, without lesions, discharge or tenderness.  No significant cystocele or rectocele.  Bimanual exam shows uterus to be normal size, regular, mobile and nontender.  Adnexa without masses or tenderness.    EXTREMITIES: No edema.    ASSESSMENT    ICD-10-CM ICD-9-CM    1. Well woman exam Z01.419 V72.31 Liquid-based pap smear, screening   2. Vaginitis and vulvovaginitis N76.0 616.10 Vaginosis Screen by DNA Probe         PLAN:  Well woman exam  -     Liquid-based pap smear, screening    Vaginitis and vulvovaginitis  -     Vaginosis Screen by DNA Probe    Patient was counseled today on A.C.S. Pap guidelines and recommendations for yearly pelvic exams, mammograms and monthly self breast exams; to see her PCP for other health maintenance.

## 2018-12-18 ENCOUNTER — PATIENT MESSAGE (OUTPATIENT)
Dept: OBSTETRICS AND GYNECOLOGY | Facility: CLINIC | Age: 58
End: 2018-12-18

## 2018-12-18 DIAGNOSIS — Z91.89 INCREASED RISK OF BREAST CANCER: Primary | ICD-10-CM

## 2018-12-20 ENCOUNTER — TELEPHONE (OUTPATIENT)
Dept: INTERNAL MEDICINE | Facility: CLINIC | Age: 58
End: 2018-12-20

## 2019-03-06 ENCOUNTER — OFFICE VISIT (OUTPATIENT)
Dept: SPINE | Facility: CLINIC | Age: 59
End: 2019-03-06
Attending: ANESTHESIOLOGY
Payer: COMMERCIAL

## 2019-03-06 VITALS
TEMPERATURE: 98 F | SYSTOLIC BLOOD PRESSURE: 121 MMHG | DIASTOLIC BLOOD PRESSURE: 69 MMHG | HEART RATE: 74 BPM | BODY MASS INDEX: 31.04 KG/M2 | HEIGHT: 67 IN | WEIGHT: 197.75 LBS

## 2019-03-06 DIAGNOSIS — M79.673 MECHANICAL FOOT PAIN, UNSPECIFIED LATERALITY: ICD-10-CM

## 2019-03-06 DIAGNOSIS — M51.36 DDD (DEGENERATIVE DISC DISEASE), LUMBAR: ICD-10-CM

## 2019-03-06 DIAGNOSIS — M54.16 LUMBAR RADICULOPATHY: Primary | ICD-10-CM

## 2019-03-06 PROBLEM — M51.369 DDD (DEGENERATIVE DISC DISEASE), LUMBAR: Status: ACTIVE | Noted: 2019-03-06

## 2019-03-06 PROCEDURE — 3008F PR BODY MASS INDEX (BMI) DOCUMENTED: ICD-10-PCS | Mod: CPTII,S$GLB,, | Performed by: ANESTHESIOLOGY

## 2019-03-06 PROCEDURE — 99214 PR OFFICE/OUTPT VISIT, EST, LEVL IV, 30-39 MIN: ICD-10-PCS | Mod: S$GLB,,, | Performed by: ANESTHESIOLOGY

## 2019-03-06 PROCEDURE — 3008F BODY MASS INDEX DOCD: CPT | Mod: CPTII,S$GLB,, | Performed by: ANESTHESIOLOGY

## 2019-03-06 PROCEDURE — 99999 PR PBB SHADOW E&M-EST. PATIENT-LVL IV: CPT | Mod: PBBFAC,,, | Performed by: ANESTHESIOLOGY

## 2019-03-06 PROCEDURE — 99999 PR PBB SHADOW E&M-EST. PATIENT-LVL IV: ICD-10-PCS | Mod: PBBFAC,,, | Performed by: ANESTHESIOLOGY

## 2019-03-06 PROCEDURE — 99214 OFFICE O/P EST MOD 30 MIN: CPT | Mod: S$GLB,,, | Performed by: ANESTHESIOLOGY

## 2019-03-06 NOTE — PROGRESS NOTES
"  Chronic Pain -Follow Up    Referring Physician: No ref. provider found    Chief Complaint:   Chief Complaint   Patient presents with    Leg Pain     Left Side    Foot Pain     Left Side    Knee Pain     Left Side        SUBJECTIVE: Disclaimer: This note has been generated using voice-recognition software. There may be typographical errors that have been missed during proof-reading    Interval History 3/6/2019:  She returns to clinic today for follow up. Reports she had 95% pain relief from bilateral SI joint injections on 9/18/2018 for approximately 4-5 months. She reports new left leg pain that started approximately 1 month ago out of the blue without any inciting event. Pain seems to start at times in her buttocks and radiates down her entire left leg. The pain is described as "crushing" and associated with numbness/tingling in the entire left leg that is worse in the hamstrings and dorsum of her foot. She also has cramping in her left hamstring at times. Pain is worse with using the stairs and after walking about 1 to 1.5 blocks. Pain better at rest, when leaning forward, and gets limited relief from chiropractor. No cardiovascular history or issues per patient. She is taking Ibuprofen 800mg qhs that provides some relief. Pain is 2/10 at best and 9/10 at worst. Today her pain is 5/10. + generalized weakness/limping due to the pain. No loss of bowel/bladder control or saddle anesthesia. No pain in her RLE.     Interval History 10/5/2018:  The patient returns to clinic today for follow up. She is s/p bilateral SI joint injection on 9/18/2018. She reports 95% relief of her low back and buttock pain. She denies any radiating leg pain today. She continues to participate in physical therapy with benefit. She denies any other health changes. Her pain today is 1/10.    Interval History: 9/5/18:  She states that 3 weeks ago her pain returned after hearing a pop and twisting while getting into her car. She states that " pain has returned in the same character and distribution as prior to the bilateral SIJ on 5/31 but may be more severe now with pain going down both legs instead just one. She is hoping to repeat injections. She denies any other health changes in the interim. She takes a baby ASA. She takes ibuprofen nightly for pain.     Interval History 6/14/2018:  The patient returns for follow up.  She is s/p bilateral SI joint injections on 5/31/18 with 100% pain relief.  She has not had any back or buttock pain since the procedure.  She still has some cramping to her calf which she feels is unrelated.  She takes Ibuprofen 800 mg at night with benefit.  She has had benefit with PT exercises and continues to be active.  Her pain today is 0/10.    Interval history 5/15/2018:  Since previous encounter the patient continues to have significant lower back pain over the area of the sacral iliac joints.  She states it is worse when she first starts to get going and gradually improves as she walks.  She feels as if she is able to walk longer distances after previous epidural steroid injections but overall reports that 10% improvement in her pain.  The previous Medrol Dosepak did not help significant only tramadol was also not helping with her pain.  She has been doing physical therapy and feels as if she is making slow strides but there still certain activities that she cannot do without exacerbating her pain.  No other health changes since previous encounter.  Interval History 4/23/2018  One week ago, sudden worsened back pain when bending forwards with radiation into bilateral thighs and weakness.  Gradually improving, taking ibuprofen, but has been restricting activities secondary to persisting pain in the lower back.  Initial encounter:    Amita Lewis presents to the clinic for the evaluation of back pain. The pain started one year ago  and symptoms have been worsening.    Brief history:    Pain Description:    The pain is  located in the back area and radiates down the posterior legs to the calves. She describes symptoms of neurogenic versus vascular claudication.  Her pain significant worsens after walking for several blocks and gradually improves when sitting down.  She describes her pain symptoms as a radiating pain into her calf and a cramping sensation in her calf.    At BEST  0/10     At WORST  7/10 on the WORST day.      On average pain is rated as 7/10.     Today the pain is rated as 1/10    The pain is described as burning, dull and tight band      Symptoms interfere with daily activity and sleeping.     Exacerbating factors: Walking.      Mitigating factors medications.     Patient denies .  Patient denies any suicidal or homicidal ideations.    Pain Medications:  Current:  Ibuprofen - with relief      Tried in Past:  NSAIDs - Ibuprofen  TCA -Never  SNRI -Never  Anti-convulsants -lyrica and gabapentin in the past with significant sedating side effects limiting their use  Muscle Relaxants -Never  Opioids-tramadol without improvement    Physical Therapy/Home Exercise: yes       report:  Reviewed and consistent with medication use as prescribed.    Pain Procedures:   2018: Bilateral SI joint injection: 95% relief for 4-5 months  18: Bilateral Sacroiliac Joint Injections: 95% relief for 3 months  3/14/2018 - left L3 and L4 TFESI  2017 LUMBAR LEFT L4 AND L5 TRANSFORAMINAL JILLIAN     Chiropractor -yes, provides limited relief  Acupuncture - never  TENS unit -never  Spinal decompression -Lumbar microdiskectomy in  with complication of dural tear requiring repair.  Joint replacement -never    Imagin2017 MRI L SPINE   Impression          #1. Degenerative change of the lumbar spine most significant at L3-L4 and L4-L5 as detailed above.  On the prior MRI there appears to have been a left-sided paracentral disc herniation at L4-L5 that is not seen on today's exam           2017 XRAY L  SPINE  Impression        Mild degenerative disc disease of the inferior lumbar spine without instability.       Past Medical History:   Diagnosis Date    Abnormal Pap smear     Asthma     Depression 3/17/2016    Environmental and seasonal allergies 3/17/2016    Heartburn 3/17/2016    HLD (hyperlipidemia) 03/22/2016    ASCVD 2018 6.3%; can't tolerate statins    Menopausal syndrome (hot flashes) 3/17/2016    Mild intermittent asthma without complication 3/17/2016    Obesity (BMI 30-39.9) 3/17/2016    Sciatica 3/17/2016     Past Surgical History:   Procedure Laterality Date    CERVICAL BIOPSY  W/ LOOP ELECTRODE EXCISION      INJECTION, JOINT  Bilateral SI joint Bilateral 9/18/2018    [facility]    INJECTION-JOINT Bilateral 5/31/2018    [facility]    INJECTION-STEROID-EPIDURAL-TRANSFORAMINAL Left 3/14/2018    [facility]    INJECTION-STEROID-EPIDURAL-TRANSFORAMINAL Left 12/27/2017    [facility]    microdiscetomy      L4-L5    OVARIAN CYST REMOVAL  2001     Social History     Socioeconomic History    Marital status:      Spouse name: Not on file    Number of children: Not on file    Years of education: Not on file    Highest education level: Not on file   Social Needs    Financial resource strain: Not on file    Food insecurity - worry: Not on file    Food insecurity - inability: Not on file    Transportation needs - medical: Not on file    Transportation needs - non-medical: Not on file   Occupational History    Not on file   Tobacco Use    Smoking status: Current Some Day Smoker     Types: Cigarettes    Smokeless tobacco: Never Used   Substance and Sexual Activity    Alcohol use: Yes     Alcohol/week: 0.6 oz     Types: 1 Glasses of wine per week    Drug use: No    Sexual activity: Yes     Partners: Male     Birth  control/protection: None   Other Topics Concern    Not on file   Social History Narrative    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    Works for  at [employer]    Walks a lot at work and for exercise     Family History   Problem Relation Age of Onset    Cancer Father         lung; non smoker, worked in [employer] and gas station    Breast cancer Mother     Heart failure Mother     Diabetes Mellitus Mother         ?secondary to chronic steroids    Ovarian cancer Neg Hx     Hypertension Neg Hx        Review of patient's allergies indicates:  No Known Allergies    Current Outpatient Medications   Medication Sig    AFLURIA QUAD 4201-7432, PF, 60 mcg/0.5 mL vaccine ADM 0.5ML IM UTD    albuterol (PROVENTIL/VENTOLIN HFA) 90 mcg/actuation inhaler ProAir HFA 90 mcg/actuation aerosol inhaler   INHALE ONE TO TWO PUFFS PO INTO THE LUNGS Q 4 H PRF WHEEZING OR SOB    ASPIRIN (ASPIR-81 ORAL) Take 1 tablet by mouth once daily.    boric acid (BORIC ACID) vaginal suppository Place 650 mg vaginally.    buPROPion (WELLBUTRIN XL) 150 MG TB24 tablet Take 1 tablet (150 mg total) by mouth once daily.    buPROPion (WELLBUTRIN XL) 300 MG 24 hr tablet Take 1 tablet (300 mg total) by mouth every morning.    estradiol (ESTRACE) 0.01 % (0.1 mg/gram) vaginal cream Use 1/2 gram twice weekly    fexofenadine (ALLEGRA) 180 MG tablet Take 1 tablet (180 mg total) by mouth once daily.    FLAXSEED ORAL Take by mouth.    fluticasone (FLONASE) 50 mcg/actuation nasal spray 1 spray by Each Nare route 2 (two) times daily as needed for Rhinitis or Allergies.    ibuprofen (ADVIL,MOTRIN) 800 MG tablet Take 1 tablet (800 mg total) by mouth every 8 (eight) hours as needed for Pain. Take with food.    LACTOBAC NO.41/BIFIDOBACT NO.7 (PROBIOTIC-10 ORAL) Take by mouth.    multivitamin (ONE DAILY MULTIVITAMIN) per tablet Take 1 tablet by mouth every evening.     nystatin (MYCOSTATIN) powder Apply to affected areas of skin twice daily as needed  "for skin infection.  Re treat as needed.    PROAIR HFA 90 mcg/actuation inhaler Inhale 1-2 puffs into the lungs every 6 (six) hours as needed for Wheezing or Shortness of Breath (chest tightness). Rescue    sumatriptan (IMITREX) 50 MG tablet Take 1 tab by mouth as needed for migraine headache.  Repeat dose every 2 hours as needed.  Max 200mg in 24 hours.    (Magic mouthwash) 1:1:1 Benadryl 12.5mg/5ml liq, aluminum & magnesium hydroxide-simehticone (Maalox), lidocaine viscous 2% 10 cc swish and spit every 4 hours as needed for mouth sores or sore throat     No current facility-administered medications for this visit.        REVIEW OF SYSTEMS:    GENERAL:  No weight loss, malaise or fevers.  RESPIRATORY:  Negative for cough, wheezing or shortness of breath, patient denies any recent URI.  CARDIOVASCULAR:  Takes daily baby ASA. Negative for chest pain, leg swelling or palpitations.  GI:  Negative for bowel changes  MUSCULOSKELETAL:  See HPI.  SKIN:  Negative for lesions, rash, and itching.  PSYCH:  Reports depression improved on medication.  Patients sleep is not disturbed secondary to pain.  HEMATOLOGY/LYMPHOLOGY:  Negative for prolonged bleeding, bruising easily or swollen nodes.    ENDO: No history of diabetes or thyroid dysfunction  NEURO:   No history of headaches, syncope, paralysis, seizures or tremors.  All other reviewed and negative other than HPI.    OBJECTIVE:    /69   Pulse 74   Temp 98.3 °F (36.8 °C)   Ht 5' 7" (1.702 m)   Wt 89.7 kg (197 lb 12 oz)   LMP 10/20/2013   BMI 30.97 kg/m²     PHYSICAL EXAMINATION:    GENERAL: Well appearing, in no acute distress, alert.  PSYCH:  Mood and affect appropriate.  SKIN: Surgical scar in midline of spine well healed  HEAD/FACE:  Normocephalic, atraumatic. Cranial nerves grossly intact.  CV: Non-diaphoretic, pulses intact in BUE.   PULM: No evidence of respiratory difficulty, symmetric chest rise.  BACK: Straight leg raising in the supine position is " positive at 45 degrees for radicular pain on the left side. Negative SLR on the right. There is no pain with palpation over the facet joints of the lumbar spine bilaterally. There is decreased range of motion with extension to 15 degrees, and facet loading maneuvers cause reproducible pain.   EXTREMITIES: No deformities, edema, or skin discoloration.   MUSCULOSKELETAL: There is  pain with palpation over the left sacroiliac joint and none on the right.  Positive Deshaun and FADIR maneuver on the left. There is no pain to palpation over the greater trochanteric bursa bilaterally.  No atrophy or tone abnormalities are noted.   NEURO: Bilateral lower extremity strength is normal and symmetric except for 4/5 strength in left EHL.  Bilateral patella reflexes are +2 and symmetric. No clonus.  No loss of sensation is noted.  GAIT: Antalgic, ambulates without assistance    Lab Results   Component Value Date    WBC 6.53 11/20/2018    HGB 12.4 11/20/2018    HCT 39.2 11/20/2018    MCV 90 11/20/2018     11/20/2018     BMP  Lab Results   Component Value Date     11/20/2018    K 4.1 11/20/2018     11/20/2018    CO2 28 11/20/2018    BUN 14 11/20/2018    CREATININE 0.8 11/20/2018    CALCIUM 9.3 11/20/2018    ANIONGAP 10 11/20/2018    ESTGFRAFRICA >60 11/20/2018    EGFRNONAA >60 11/20/2018         ASSESSMENT: 58 y.o. year old female with pain, consistent with     Encounter Diagnoses   Name Primary?    Lumbar radiculopathy Yes    DDD (degenerative disc disease), lumbar     Mechanical foot pain, unspecified laterality        PLAN:   I will schedule patient for left L4 and L5 TFESI for her left lumbar radiculopathy as this seems to be her primary pain generator for her.     Continue home exercises    Will refer to Podiatry as well for new shoe inserts as she is requesting new inserts.     RTC 2 weeks after procedure. At that time, can send for physical therapy if she has not experienced adequate pain relief. Can  consider lumbar MRI if her pain is not improved with above mentioned injection.     The  above plan and management options were discussed at length with patient. Patient is in agreement with the above and verbalized understanding. It will be communicated with the referring physician via electronic record, fax, or mail.    Ward Serrano  03/06/2019     I reviewed and edited the  resident's note, I conducted my own interview and physical examination and agree with the findings.      Lynette Rowley 03/06/2019

## 2019-03-06 NOTE — H&P (VIEW-ONLY)
"  Chronic Pain -Follow Up    Referring Physician: No ref. provider found    Chief Complaint:   Chief Complaint   Patient presents with    Leg Pain     Left Side    Foot Pain     Left Side    Knee Pain     Left Side        SUBJECTIVE: Disclaimer: This note has been generated using voice-recognition software. There may be typographical errors that have been missed during proof-reading    Interval History 3/6/2019:  She returns to clinic today for follow up. Reports she had 95% pain relief from bilateral SI joint injections on 9/18/2018 for approximately 4-5 months. She reports new left leg pain that started approximately 1 month ago out of the blue without any inciting event. Pain seems to start at times in her buttocks and radiates down her entire left leg. The pain is described as "crushing" and associated with numbness/tingling in the entire left leg that is worse in the hamstrings and dorsum of her foot. She also has cramping in her left hamstring at times. Pain is worse with using the stairs and after walking about 1 to 1.5 blocks. Pain better at rest, when leaning forward, and gets limited relief from chiropractor. No cardiovascular history or issues per patient. She is taking Ibuprofen 800mg qhs that provides some relief. Pain is 2/10 at best and 9/10 at worst. Today her pain is 5/10. + generalized weakness/limping due to the pain. No loss of bowel/bladder control or saddle anesthesia. No pain in her RLE.     Interval History 10/5/2018:  The patient returns to clinic today for follow up. She is s/p bilateral SI joint injection on 9/18/2018. She reports 95% relief of her low back and buttock pain. She denies any radiating leg pain today. She continues to participate in physical therapy with benefit. She denies any other health changes. Her pain today is 1/10.    Interval History: 9/5/18:  She states that 3 weeks ago her pain returned after hearing a pop and twisting while getting into her car. She states that " pain has returned in the same character and distribution as prior to the bilateral SIJ on 5/31 but may be more severe now with pain going down both legs instead just one. She is hoping to repeat injections. She denies any other health changes in the interim. She takes a baby ASA. She takes ibuprofen nightly for pain.     Interval History 6/14/2018:  The patient returns for follow up.  She is s/p bilateral SI joint injections on 5/31/18 with 100% pain relief.  She has not had any back or buttock pain since the procedure.  She still has some cramping to her calf which she feels is unrelated.  She takes Ibuprofen 800 mg at night with benefit.  She has had benefit with PT exercises and continues to be active.  Her pain today is 0/10.    Interval history 5/15/2018:  Since previous encounter the patient continues to have significant lower back pain over the area of the sacral iliac joints.  She states it is worse when she first starts to get going and gradually improves as she walks.  She feels as if she is able to walk longer distances after previous epidural steroid injections but overall reports that 10% improvement in her pain.  The previous Medrol Dosepak did not help significant only tramadol was also not helping with her pain.  She has been doing physical therapy and feels as if she is making slow strides but there still certain activities that she cannot do without exacerbating her pain.  No other health changes since previous encounter.  Interval History 4/23/2018  One week ago, sudden worsened back pain when bending forwards with radiation into bilateral thighs and weakness.  Gradually improving, taking ibuprofen, but has been restricting activities secondary to persisting pain in the lower back.  Initial encounter:    Amita Lewis presents to the clinic for the evaluation of back pain. The pain started one year ago  and symptoms have been worsening.    Brief history:    Pain Description:    The pain is  located in the back area and radiates down the posterior legs to the calves. She describes symptoms of neurogenic versus vascular claudication.  Her pain significant worsens after walking for several blocks and gradually improves when sitting down.  She describes her pain symptoms as a radiating pain into her calf and a cramping sensation in her calf.    At BEST  0/10     At WORST  7/10 on the WORST day.      On average pain is rated as 7/10.     Today the pain is rated as 1/10    The pain is described as burning, dull and tight band      Symptoms interfere with daily activity and sleeping.     Exacerbating factors: Walking.      Mitigating factors medications.     Patient denies .  Patient denies any suicidal or homicidal ideations.    Pain Medications:  Current:  Ibuprofen - with relief      Tried in Past:  NSAIDs - Ibuprofen  TCA -Never  SNRI -Never  Anti-convulsants -lyrica and gabapentin in the past with significant sedating side effects limiting their use  Muscle Relaxants -Never  Opioids-tramadol without improvement    Physical Therapy/Home Exercise: yes       report:  Reviewed and consistent with medication use as prescribed.    Pain Procedures:   2018: Bilateral SI joint injection: 95% relief for 4-5 months  18: Bilateral Sacroiliac Joint Injections: 95% relief for 3 months  3/14/2018 - left L3 and L4 TFESI  2017 LUMBAR LEFT L4 AND L5 TRANSFORAMINAL JILLIAN     Chiropractor -yes, provides limited relief  Acupuncture - never  TENS unit -never  Spinal decompression -Lumbar microdiskectomy in  with complication of dural tear requiring repair.  Joint replacement -never    Imagin2017 MRI L SPINE   Impression          #1. Degenerative change of the lumbar spine most significant at L3-L4 and L4-L5 as detailed above.  On the prior MRI there appears to have been a left-sided paracentral disc herniation at L4-L5 that is not seen on today's exam           2017 XRAY L  SPINE  Impression        Mild degenerative disc disease of the inferior lumbar spine without instability.       Past Medical History:   Diagnosis Date    Abnormal Pap smear     Asthma     Depression 3/17/2016    Environmental and seasonal allergies 3/17/2016    Heartburn 3/17/2016    HLD (hyperlipidemia) 03/22/2016    ASCVD 2018 6.3%; can't tolerate statins    Menopausal syndrome (hot flashes) 3/17/2016    Mild intermittent asthma without complication 3/17/2016    Obesity (BMI 30-39.9) 3/17/2016    Sciatica 3/17/2016     Past Surgical History:   Procedure Laterality Date    CERVICAL BIOPSY  W/ LOOP ELECTRODE EXCISION      INJECTION, JOINT  Bilateral SI joint Bilateral 9/18/2018    [facility]    INJECTION-JOINT Bilateral 5/31/2018    [facility]    INJECTION-STEROID-EPIDURAL-TRANSFORAMINAL Left 3/14/2018    [facility]    INJECTION-STEROID-EPIDURAL-TRANSFORAMINAL Left 12/27/2017    [facility]    microdiscetomy      L4-L5    OVARIAN CYST REMOVAL  2001     Social History     Socioeconomic History    Marital status:      Spouse name: Not on file    Number of children: Not on file    Years of education: Not on file    Highest education level: Not on file   Social Needs    Financial resource strain: Not on file    Food insecurity - worry: Not on file    Food insecurity - inability: Not on file    Transportation needs - medical: Not on file    Transportation needs - non-medical: Not on file   Occupational History    Not on file   Tobacco Use    Smoking status: Current Some Day Smoker     Types: Cigarettes    Smokeless tobacco: Never Used   Substance and Sexual Activity    Alcohol use: Yes     Alcohol/week: 0.6 oz     Types: 1 Glasses of wine per week    Drug use: No    Sexual activity: Yes     Partners: Male     Birth  control/protection: None   Other Topics Concern    Not on file   Social History Narrative    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    Works for  at [employer]    Walks a lot at work and for exercise     Family History   Problem Relation Age of Onset    Cancer Father         lung; non smoker, worked in [employer] and [employer] station    Breast cancer Mother     Heart failure Mother     Diabetes Mellitus Mother         ?secondary to chronic steroids    Ovarian cancer Neg Hx     Hypertension Neg Hx        Review of patient's allergies indicates:  No Known Allergies    Current Outpatient Medications   Medication Sig    AFLURIA QUAD [id number], PF, 60 mcg/0.5 mL vaccine ADM 0.5ML IM UTD    albuterol (PROVENTIL/VENTOLIN HFA) 90 mcg/actuation inhaler ProAir HFA 90 mcg/actuation aerosol inhaler   INHALE ONE TO TWO PUFFS PO INTO THE LUNGS Q 4 H PRF WHEEZING OR SOB    ASPIRIN (ASPIR-81 ORAL) Take 1 tablet by mouth once daily.    boric acid (BORIC ACID) vaginal suppository Place 650 mg vaginally.    buPROPion (WELLBUTRIN XL) 150 MG TB24 tablet Take 1 tablet (150 mg total) by mouth once daily.    buPROPion (WELLBUTRIN XL) 300 MG 24 hr tablet Take 1 tablet (300 mg total) by mouth every morning.    estradiol (ESTRACE) 0.01 % (0.1 mg/gram) vaginal cream Use 1/2 gram twice weekly    fexofenadine (ALLEGRA) 180 MG tablet Take 1 tablet (180 mg total) by mouth once daily.    FLAXSEED ORAL Take by mouth.    fluticasone (FLONASE) 50 mcg/actuation nasal spray 1 spray by Each Nare route 2 (two) times daily as needed for Rhinitis or Allergies.    ibuprofen (ADVIL,MOTRIN) 800 MG tablet Take 1 tablet (800 mg total) by mouth every 8 (eight) hours as needed for Pain. Take with food.    LACTOBAC NO.41/BIFIDOBACT NO.7 (PROBIOTIC-10 ORAL) Take by mouth.    multivitamin (ONE DAILY MULTIVITAMIN) per tablet Take 1 tablet by mouth every evening.     nystatin (MYCOSTATIN) powder Apply to affected areas of skin twice daily as needed  "for skin infection.  Re treat as needed.    PROAIR HFA 90 mcg/actuation inhaler Inhale 1-2 puffs into the lungs every 6 (six) hours as needed for Wheezing or Shortness of Breath (chest tightness). Rescue    sumatriptan (IMITREX) 50 MG tablet Take 1 tab by mouth as needed for migraine headache.  Repeat dose every 2 hours as needed.  Max 200mg in 24 hours.    (Magic mouthwash) 1:1:1 Benadryl 12.5mg/5ml liq, aluminum & magnesium hydroxide-simehticone (Maalox), lidocaine viscous 2% 10 cc swish and spit every 4 hours as needed for mouth sores or sore throat     No current facility-administered medications for this visit.        REVIEW OF SYSTEMS:    GENERAL:  No weight loss, malaise or fevers.  RESPIRATORY:  Negative for cough, wheezing or shortness of breath, patient denies any recent URI.  CARDIOVASCULAR:  Takes daily baby ASA. Negative for chest pain, leg swelling or palpitations.  GI:  Negative for bowel changes  MUSCULOSKELETAL:  See HPI.  SKIN:  Negative for lesions, rash, and itching.  PSYCH:  Reports depression improved on medication.  Patients sleep is not disturbed secondary to pain.  HEMATOLOGY/LYMPHOLOGY:  Negative for prolonged bleeding, bruising easily or swollen nodes.    ENDO: No history of diabetes or thyroid dysfunction  NEURO:   No history of headaches, syncope, paralysis, seizures or tremors.  All other reviewed and negative other than HPI.    OBJECTIVE:    /69   Pulse 74   Temp 98.3 °F (36.8 °C)   Ht 5' 7" (1.702 m)   Wt 89.7 kg (197 lb 12 oz)   LMP 10/20/2013   BMI 30.97 kg/m²     PHYSICAL EXAMINATION:    GENERAL: Well appearing, in no acute distress, alert.  PSYCH:  Mood and affect appropriate.  SKIN: Surgical scar in midline of spine well healed  HEAD/FACE:  Normocephalic, atraumatic. Cranial nerves grossly intact.  CV: Non-diaphoretic, pulses intact in BUE.   PULM: No evidence of respiratory difficulty, symmetric chest rise.  BACK: Straight leg raising in the supine position is " positive at 45 degrees for radicular pain on the left side. Negative SLR on the right. There is no pain with palpation over the facet joints of the lumbar spine bilaterally. There is decreased range of motion with extension to 15 degrees, and facet loading maneuvers cause reproducible pain.   EXTREMITIES: No deformities, edema, or skin discoloration.   MUSCULOSKELETAL: There is  pain with palpation over the left sacroiliac joint and none on the right.  Positive Deshaun and FADIR maneuver on the left. There is no pain to palpation over the greater trochanteric bursa bilaterally.  No atrophy or tone abnormalities are noted.   NEURO: Bilateral lower extremity strength is normal and symmetric except for 4/5 strength in left EHL.  Bilateral patella reflexes are +2 and symmetric. No clonus.  No loss of sensation is noted.  GAIT: Antalgic, ambulates without assistance    Lab Results   Component Value Date    WBC 6.53 11/20/2018    HGB 12.4 11/20/2018    HCT 39.2 11/20/2018    MCV 90 11/20/2018     11/20/2018     BMP  Lab Results   Component Value Date     11/20/2018    K 4.1 11/20/2018     11/20/2018    CO2 28 11/20/2018    BUN 14 11/20/2018    CREATININE 0.8 11/20/2018    CALCIUM 9.3 11/20/2018    ANIONGAP 10 11/20/2018    ESTGFRAFRICA >60 11/20/2018    EGFRNONAA >60 11/20/2018         ASSESSMENT: 58 y.o. year old female with pain, consistent with     Encounter Diagnoses   Name Primary?    Lumbar radiculopathy Yes    DDD (degenerative disc disease), lumbar     Mechanical foot pain, unspecified laterality        PLAN:   I will schedule patient for left L4 and L5 TFESI for her left lumbar radiculopathy as this seems to be her primary pain generator for her.     Continue home exercises    Will refer to Podiatry as well for new shoe inserts as she is requesting new inserts.     RTC 2 weeks after procedure. At that time, can send for physical therapy if she has not experienced adequate pain relief. Can  consider lumbar MRI if her pain is not improved with above mentioned injection.     The  above plan and management options were discussed at length with patient. Patient is in agreement with the above and verbalized understanding. It will be communicated with the referring physician via electronic record, fax, or mail.    Ward Serrano  03/06/2019     I reviewed and edited the  resident's note, I conducted my own interview and physical examination and agree with the findings.      Lynette Rowley 03/06/2019

## 2019-03-21 ENCOUNTER — HOSPITAL ENCOUNTER (OUTPATIENT)
Facility: OTHER | Age: 59
Discharge: HOME | End: 2019-03-21
Attending: ANESTHESIOLOGY | Admitting: ANESTHESIOLOGY
Payer: COMMERCIAL

## 2019-03-21 VITALS
DIASTOLIC BLOOD PRESSURE: 65 MMHG | SYSTOLIC BLOOD PRESSURE: 147 MMHG | RESPIRATION RATE: 17 BRPM | HEART RATE: 64 BPM | OXYGEN SATURATION: 99 %

## 2019-03-21 DIAGNOSIS — M54.16 LUMBAR RADICULOPATHY: Primary | ICD-10-CM

## 2019-03-21 PROCEDURE — 25000003 PHARM REV CODE 250: Performed by: ANESTHESIOLOGY

## 2019-03-21 PROCEDURE — 64483 NJX AA&/STRD TFRM EPI L/S 1: CPT | Mod: LT,,, | Performed by: PHYSICAL MEDICINE & REHABILITATION

## 2019-03-21 PROCEDURE — 64484 PRA INJECT ANES/STEROID FORAMEN LUMBAR/SACRAL W IMG GUIDE ,EA ADD LEVEL: ICD-10-PCS | Mod: LT,,, | Performed by: PHYSICAL MEDICINE & REHABILITATION

## 2019-03-21 PROCEDURE — 25500020 PHARM REV CODE 255: Performed by: ANESTHESIOLOGY

## 2019-03-21 PROCEDURE — 64484 NJX AA&/STRD TFRM EPI L/S EA: CPT | Mod: LT,,, | Performed by: PHYSICAL MEDICINE & REHABILITATION

## 2019-03-21 PROCEDURE — 64483 NJX AA&/STRD TFRM EPI L/S 1: CPT | Performed by: ANESTHESIOLOGY

## 2019-03-21 PROCEDURE — 64484 NJX AA&/STRD TFRM EPI L/S EA: CPT | Performed by: ANESTHESIOLOGY

## 2019-03-21 PROCEDURE — 63600175 PHARM REV CODE 636 W HCPCS: Performed by: ANESTHESIOLOGY

## 2019-03-21 PROCEDURE — 64483 PR EPIDURAL INJ, ANES/STEROID, TRANSFORAMINAL, LUMB/SACR, SNGL LEVL: ICD-10-PCS | Mod: LT,,, | Performed by: PHYSICAL MEDICINE & REHABILITATION

## 2019-03-21 RX ORDER — FENTANYL CITRATE 50 UG/ML
INJECTION, SOLUTION INTRAMUSCULAR; INTRAVENOUS
Status: DISCONTINUED | OUTPATIENT
Start: 2019-03-21 | End: 2019-03-21 | Stop reason: HOSPADM

## 2019-03-21 RX ORDER — MIDAZOLAM HYDROCHLORIDE 1 MG/ML
INJECTION INTRAMUSCULAR; INTRAVENOUS
Status: DISCONTINUED | OUTPATIENT
Start: 2019-03-21 | End: 2019-03-21 | Stop reason: HOSPADM

## 2019-03-21 RX ORDER — BUPIVACAINE HYDROCHLORIDE 2.5 MG/ML
INJECTION, SOLUTION EPIDURAL; INFILTRATION; INTRACAUDAL
Status: DISCONTINUED | OUTPATIENT
Start: 2019-03-21 | End: 2019-03-21 | Stop reason: HOSPADM

## 2019-03-21 RX ORDER — LIDOCAINE HYDROCHLORIDE 10 MG/ML
INJECTION INFILTRATION; PERINEURAL
Status: DISCONTINUED | OUTPATIENT
Start: 2019-03-21 | End: 2019-03-21 | Stop reason: HOSPADM

## 2019-03-21 RX ORDER — SODIUM CHLORIDE 9 MG/ML
INJECTION, SOLUTION INTRAVENOUS CONTINUOUS
Status: DISCONTINUED | OUTPATIENT
Start: 2019-03-21 | End: 2019-03-21 | Stop reason: HOSPADM

## 2019-03-21 RX ORDER — METHYLPREDNISOLONE ACETATE 40 MG/ML
INJECTION, SUSPENSION INTRA-ARTICULAR; INTRALESIONAL; INTRAMUSCULAR; SOFT TISSUE
Status: DISCONTINUED | OUTPATIENT
Start: 2019-03-21 | End: 2019-03-21 | Stop reason: HOSPADM

## 2019-03-21 RX ADMIN — SODIUM CHLORIDE: 0.9 INJECTION, SOLUTION INTRAVENOUS at 11:03

## 2019-03-21 NOTE — PLAN OF CARE
Pt was able to stand without difficulty. No noted distress. Vitals stable. Pt was picked up by significant other in the [facility]. RN brought to [facility].

## 2019-03-21 NOTE — DISCHARGE SUMMARY
Discharge Note  Short Stay      SUMMARY     Admit Date: 3/21/2019    Attending Physician: Lynette Rowley MD      Discharge Physician: Lynette Rowley MD      Discharge Date: 3/21/2019 12:35 PM    Procedure(s) (LRB):  INJECTION, STEROID, EPIDURAL, TRANSFORAMINAL APPROACH, L4 AND L5 (Left)    Final Diagnosis: Lumbar radiculopathy [M54.16]    Disposition: Home or self care    Patient Instructions:   Current Discharge Medication List      CONTINUE these medications which have NOT CHANGED    Details   (Magic mouthwash) 1:1:1 Benadryl 12.5mg/5ml liq, aluminum & magnesium hydroxide-simehticone (Maalox), lidocaine viscous 2% 10 cc swish and spit every 4 hours as needed for mouth sores or sore throat  Qty: 90 mL, Refills: 0      AFLURIA QUAD [id number], PF, 60 mcg/0.5 mL vaccine ADM 0.5ML IM UTD  Refills: 0      !! albuterol (PROVENTIL/VENTOLIN HFA) 90 mcg/actuation inhaler ProAir HFA 90 mcg/actuation aerosol inhaler   INHALE ONE TO TWO PUFFS PO INTO THE LUNGS Q 4 H PRF WHEEZING OR SOB      ASPIRIN (ASPIR-81 ORAL) Take 1 tablet by mouth once daily.      boric acid (BORIC ACID) vaginal suppository Place 650 mg vaginally.      !! buPROPion (WELLBUTRIN XL) 150 MG TB24 tablet Take 1 tablet (150 mg total) by mouth once daily.  Qty: 90 tablet, Refills: 3    Associated Diagnoses: Depression, unspecified depression type      !! buPROPion (WELLBUTRIN XL) 300 MG 24 hr tablet Take 1 tablet (300 mg total) by mouth every morning.  Qty: 90 tablet, Refills: 3    Associated Diagnoses: Depression, unspecified depression type      estradiol (ESTRACE) 0.01 % (0.1 mg/gram) vaginal cream Use 1/2 gram twice weekly  Qty: 42.5 g, Refills: 10    Associated Diagnoses: Vaginal atrophy      fexofenadine (ALLEGRA) 180 MG tablet Take 1 tablet (180 mg total) by mouth once daily.  Qty: 90 tablet, Refills: 3    Associated Diagnoses: Environmental and seasonal allergies      FLAXSEED ORAL Take by mouth.      fluticasone (FLONASE) 50 mcg/actuation nasal spray  1 spray by Each Nare route 2 (two) times daily as needed for Rhinitis or Allergies.  Qty: 1 Bottle, Refills: 6    Associated Diagnoses: Environmental and seasonal allergies      ibuprofen (ADVIL,MOTRIN) 800 MG tablet Take 1 tablet (800 mg total) by mouth every 8 (eight) hours as needed for Pain. Take with food.  Qty: 90 tablet, Refills: 11    Associated Diagnoses: Sciatica, unspecified laterality      LACTOBAC NO.41/BIFIDOBACT NO.7 (PROBIOTIC-10 ORAL) Take by mouth.      multivitamin (ONE DAILY MULTIVITAMIN) per tablet Take 1 tablet by mouth every evening.       nystatin (MYCOSTATIN) powder Apply to affected areas of skin twice daily as needed for skin infection.  Re treat as needed.  Qty: 30 g, Refills: 3    Associated Diagnoses: Candidal skin infection      !! PROAIR HFA 90 mcg/actuation inhaler Inhale 1-2 puffs into the lungs every 6 (six) hours as needed for Wheezing or Shortness of Breath (chest tightness). Rescue  Qty: 18 g, Refills: 3    Associated Diagnoses: Mild intermittent asthma without complication      sumatriptan (IMITREX) 50 MG tablet Take 1 tab by mouth as needed for migraine headache.  Repeat dose every 2 hours as needed.  Max 200mg in 24 hours.  Qty: 30 tablet, Refills: 1    Associated Diagnoses: Other migraine without status migrainosus, not intractable       !! - Potential duplicate medications found. Please discuss with provider.              Discharge Diagnosis: Lumbar radiculopathy [M54.16]  Condition on Discharge: Stable with no complications to procedure   Diet on Discharge: Same as before.  Activity: as per instruction sheet.  Discharge to: Home with a responsible adult.  Follow up: 2-4 weeks

## 2019-03-21 NOTE — OP NOTE
"Problem: Patient Care Overview  Goal: Plan of Care/Patient Progress Review  Outcome: No Change  C/o nausea/vomiting most of this shift, was able to finally finish AM meds around 1300.  Zofran x 1, compazine IV x 1.  No pain except for diffuse abdominal pain from nausea.  Ate a small amount of lunch.  Started D5 1/2 NS + 20 K today. Per patient she has several \"mini seizures\" daily, none noted this shift, seizure pads in place. Continue to monitor.        " INFORMED CONSENT: The procedure, risks, benefits and options were discussed with patient. There are no contraindications to the procedure. The patient expressed understanding and agreed to proceed. The personnel performing the procedure was discussed.    03/21/2019    Surgeon: Lynette Rowley MD    Assistants: None    PROCEDURE:    1)  Left  L4 TRANSFORAMINAL EPIDURAL STEROID INJECTION    2)  Left  L5 TRANSFORAMINAL EPIDURAL STEROID INJECTION    Pre Procedure diagnosis:    Left  L4 and L5  Lumbar radiculopathy [M54.16]    Post-Procedure diagnosis:   same    Complications: None    Specimens: None      DESCRIPTION OF PROCEDURE: The patient was brought to the procedure room. IV access was obtained prior to the procedure. The patient was positioned prone on the fluoroscopy table. Continuous hemodynamic monitoring was initiated including blood pressure, EKG, and pulse oximetry. . The skin was prepped with chlorhexidine and draped in a sterile fashion. Skin anesthesia was achieved using a total of 10mL of lidocaine, 5mL over each respective injection site.     The  L4 and L5  transforaminal spaces were identified with fluoroscopy in the  AP, oblique, and lateral views.  A 22 gauge spinal quinke needle was then advanced into the area of the trans foraminal spaces at the above levels with confirmation of proper needle position using AP, oblique, and lateral fluoroscopic views. Once the needle tip was in the area of the transforaminal space, and there was no blood, CSF or paraesthesias,  1 mL of Omnipaque 300mg/ml was injected on each side for a total of 2 mL.  Fluoroscopic imaging in the AP and lateral views revealed a clear outline of the spinal nerve with proximal spread of agent through the neural foramen into the epidural space. A total combination of 1 mL of Bupivicaine 0.25% and 40 mg depo medrol was injected into each level for a total of 4mL of injected medications with displacement of the contrast dye confirming  that the medication went into the area of the transforaminal spaces at each level. A sterile dressing was applied.   Patient tolerated the procedure well.    Conscious sedation provided by M.D    The patient was monitored with continuous pulse oximetry, EKG, and intermittent blood pressure monitors.  The patient was hemodynamically stable throughout the entire process was responsive to voice, and breathing spontaneously.  Supplemental O2 was provided at 2L/min via nasal cannula.  Patient was comfortable for the duration of the procedure. (See nurse documentation and case log for sedation time)    There was a total of 2mg IV Midazolam & 100mcg IV Fentanyl was titrated for the procedure    Patient was taken back to the recovery room for further observation.     The patient was discharged to home in stable condition

## 2019-03-21 NOTE — DISCHARGE INSTRUCTIONS
Recovery After Procedural Sedation (Adult)  You have been given medicine by vein to make you sleep during your surgery. This may have included both a pain medicine and sleeping medicine. Most of the effects have worn off. But you may still have some drowsiness for the next 6 to 8 hours.  Home care  Follow these guidelines when you get home:  · For the next 8 hours, you should be watched by a responsible adult. This person should make sure your condition is not getting worse.  · Don't drink any alcohol for the next 24 hours.  · Don't drive, operate dangerous machinery, or make important business or personal decisions during the next 24 hours.  Note: Your healthcare provider may tell you not to take any medicine by mouth for pain or sleep in the next 4 hours. These medicines may react with the medicines you were given in the hospital. This could cause a much stronger response than usual.  Follow-up care  Follow up with your healthcare provider if you are not alert and back to your usual level of activity within 12 hours.  When to seek medical advice  Call your healthcare provider right away if any of these occur:  · Drowsiness gets worse  · Weakness or dizziness gets worse  · Repeated vomiting  · You can't be awakened   Date Last Reviewed: 10/18/2016  © 4669-9128 [employer]. [address] [place] [address], [place] [address]. All rights reserved. This information is not intended as a substitute for professional medical care. Always follow your healthcare professional's instructions.

## 2019-04-01 ENCOUNTER — PATIENT MESSAGE (OUTPATIENT)
Dept: FAMILY MEDICINE | Facility: CLINIC | Age: 59
End: 2019-04-01

## 2019-04-01 DIAGNOSIS — F33.0 MILD EPISODE OF RECURRENT MAJOR DEPRESSIVE DISORDER: Primary | ICD-10-CM

## 2019-04-04 ENCOUNTER — OFFICE VISIT (OUTPATIENT)
Dept: SPINE | Facility: CLINIC | Age: 59
End: 2019-04-04
Payer: COMMERCIAL

## 2019-04-04 VITALS
BODY MASS INDEX: 30.13 KG/M2 | DIASTOLIC BLOOD PRESSURE: 67 MMHG | WEIGHT: 192 LBS | HEIGHT: 67 IN | HEART RATE: 78 BPM | SYSTOLIC BLOOD PRESSURE: 133 MMHG | TEMPERATURE: 98 F

## 2019-04-04 DIAGNOSIS — G89.29 CHRONIC LEFT-SIDED LOW BACK PAIN WITH LEFT-SIDED SCIATICA: ICD-10-CM

## 2019-04-04 DIAGNOSIS — M79.671 PAIN IN BOTH FEET: ICD-10-CM

## 2019-04-04 DIAGNOSIS — M51.36 DDD (DEGENERATIVE DISC DISEASE), LUMBAR: ICD-10-CM

## 2019-04-04 DIAGNOSIS — M54.16 LUMBAR RADICULOPATHY: Primary | ICD-10-CM

## 2019-04-04 DIAGNOSIS — M79.672 PAIN IN BOTH FEET: ICD-10-CM

## 2019-04-04 DIAGNOSIS — M54.42 CHRONIC LEFT-SIDED LOW BACK PAIN WITH LEFT-SIDED SCIATICA: ICD-10-CM

## 2019-04-04 PROCEDURE — 99213 PR OFFICE/OUTPT VISIT, EST, LEVL III, 20-29 MIN: ICD-10-PCS | Mod: S$GLB,,, | Performed by: NURSE PRACTITIONER

## 2019-04-04 PROCEDURE — 99999 PR PBB SHADOW E&M-EST. PATIENT-LVL III: CPT | Mod: PBBFAC,,, | Performed by: NURSE PRACTITIONER

## 2019-04-04 PROCEDURE — 99213 OFFICE O/P EST LOW 20 MIN: CPT | Mod: S$GLB,,, | Performed by: NURSE PRACTITIONER

## 2019-04-04 PROCEDURE — 99999 PR PBB SHADOW E&M-EST. PATIENT-LVL III: ICD-10-PCS | Mod: PBBFAC,,, | Performed by: NURSE PRACTITIONER

## 2019-04-04 PROCEDURE — 3008F PR BODY MASS INDEX (BMI) DOCUMENTED: ICD-10-PCS | Mod: CPTII,S$GLB,, | Performed by: NURSE PRACTITIONER

## 2019-04-04 PROCEDURE — 3008F BODY MASS INDEX DOCD: CPT | Mod: CPTII,S$GLB,, | Performed by: NURSE PRACTITIONER

## 2019-04-04 NOTE — PROGRESS NOTES
"  Chronic Pain -Follow Up    Referring Physician: No ref. provider found    Chief Complaint:   No chief complaint on file.       SUBJECTIVE: Disclaimer: This note has been generated using voice-recognition software. There may be typographical errors that have been missed during proof-reading    Interval History 4/4/2019:  The patient returns for procedure follow up.  She is s/p left L4 and L5 TF JILLIAN on 3/21/19 with about 50% relief of left leg pain.  She still has a burning pain to left calf and anterior foot.  Her right sided pain has improved.  Her lower back pain has been mild.  She would like a podiatry referral as previously discussed.  She has some benefit with Ibuprofen PRN.  She continues to remain active and stretches.  Her pain today is 1/10.    Interval History 3/6/2019:  She returns to clinic today for follow up. Reports she had 95% pain relief from bilateral SI joint injections on 9/18/2018 for approximately 4-5 months. She reports new left leg pain that started approximately 1 month ago out of the blue without any inciting event. Pain seems to start at times in her buttocks and radiates down her entire left leg. The pain is described as "crushing" and associated with numbness/tingling in the entire left leg that is worse in the hamstrings and dorsum of her foot. She also has cramping in her left hamstring at times. Pain is worse with using the stairs and after walking about 1 to 1.5 blocks. Pain better at rest, when leaning forward, and gets limited relief from chiropractor. No cardiovascular history or issues per patient. She is taking Ibuprofen 800mg qhs that provides some relief. Pain is 2/10 at best and 9/10 at worst. Today her pain is 5/10. + generalized weakness/limping due to the pain. No loss of bowel/bladder control or saddle anesthesia. No pain in her RLE.     Interval History 10/5/2018:  The patient returns to clinic today for follow up. She is s/p bilateral SI joint injection on 9/18/2018. " She reports 95% relief of her low back and buttock pain. She denies any radiating leg pain today. She continues to participate in physical therapy with benefit. She denies any other health changes. Her pain today is 1/10.    Interval History: 9/5/18:  She states that 3 weeks ago her pain returned after hearing a pop and twisting while getting into her car. She states that pain has returned in the same character and distribution as prior to the bilateral SIJ on 5/31 but may be more severe now with pain going down both legs instead just one. She is hoping to repeat injections. She denies any other health changes in the interim. She takes a baby ASA. She takes ibuprofen nightly for pain.     Interval History 6/14/2018:  The patient returns for follow up.  She is s/p bilateral SI joint injections on 5/31/18 with 100% pain relief.  She has not had any back or buttock pain since the procedure.  She still has some cramping to her calf which she feels is unrelated.  She takes Ibuprofen 800 mg at night with benefit.  She has had benefit with PT exercises and continues to be active.  Her pain today is 0/10.    Interval history 5/15/2018:  Since previous encounter the patient continues to have significant lower back pain over the area of the sacral iliac joints.  She states it is worse when she first starts to get going and gradually improves as she walks.  She feels as if she is able to walk longer distances after previous epidural steroid injections but overall reports that 10% improvement in her pain.  The previous Medrol Dosepak did not help significant only tramadol was also not helping with her pain.  She has been doing physical therapy and feels as if she is making slow strides but there still certain activities that she cannot do without exacerbating her pain.  No other health changes since previous encounter.  Interval History 4/23/2018  One week ago, sudden worsened back pain when bending forwards with radiation into  bilateral thighs and weakness.  Gradually improving, taking ibuprofen, but has been restricting activities secondary to persisting pain in the lower back.  Initial encounter:    Amita Lewis presents to the clinic for the evaluation of back pain. The pain started one year ago  and symptoms have been worsening.    Brief history:    Pain Description:    The pain is located in the back area and radiates down the posterior legs to the calves. She describes symptoms of neurogenic versus vascular claudication.  Her pain significant worsens after walking for several blocks and gradually improves when sitting down.  She describes her pain symptoms as a radiating pain into her calf and a cramping sensation in her calf.    At BEST  0/10     At WORST  7/10 on the WORST day.      On average pain is rated as 7/10.     Today the pain is rated as 1/10    The pain is described as burning, dull and tight band      Symptoms interfere with daily activity and sleeping.     Exacerbating factors: Walking.      Mitigating factors medications.     Patient denies .  Patient denies any suicidal or homicidal ideations.    Pain Medications:  Current:  Ibuprofen - with relief      Tried in Past:  NSAIDs - Ibuprofen  TCA -Never  SNRI -Never  Anti-convulsants -lyrica and gabapentin in the past with significant sedating side effects limiting their use  Muscle Relaxants -Never  Opioids-tramadol without improvement    Physical Therapy/Home Exercise: yes       report:  Reviewed and consistent with medication use as prescribed.    Pain Procedures:   3/21/19 Left L4 and L5 TF JILLIAN- 50% relief  9/18/2018: Bilateral SI joint injection: 95% relief for 4-5 months  5/31/18: Bilateral Sacroiliac Joint Injections: 95% relief for 3 months  3/14/2018 - left L3 and L4 TFESI  12/27/2017 LUMBAR LEFT L4 AND L5 TRANSFORAMINAL JILLIAN     Chiropractor -yes, provides limited relief  Acupuncture - never  TENS unit -never  Spinal decompression -Lumbar  microdiskectomy in  with complication of dural tear requiring repair.  Joint replacement -never    Imagin2017 MRI L SPINE   Impression          #1. Degenerative change of the lumbar spine most significant at L3-L4 and L4-L5 as detailed above.  On the prior MRI there appears to have been a left-sided paracentral disc herniation at L4-L5 that is not seen on today's exam           2017 XRAY L SPINE  Impression        Mild degenerative disc disease of the inferior lumbar spine without instability.       Past Medical History:   Diagnosis Date    Abnormal Pap smear     Asthma     Depression 3/17/2016    Environmental and seasonal allergies 3/17/2016    Heartburn 3/17/2016    HLD (hyperlipidemia) 2016    ASCVD 2018 6.3%; can't tolerate statins    Menopausal syndrome (hot flashes) 3/17/2016    Mild intermittent asthma without complication 3/17/2016    Obesity (BMI 30-39.9) 3/17/2016    Sciatica 3/17/2016     Past Surgical History:   Procedure Laterality Date    CERVICAL BIOPSY  W/ LOOP ELECTRODE EXCISION      INJECTION, JOINT  Bilateral SI joint Bilateral 2018    [facility]    INJECTION, STEROID, EPIDURAL, TRANSFORAMINAL APPROACH, L4 AND L5 Left 3/21/2019    [facility]    INJECTION-JOINT Bilateral 2018    [facility]    INJECTION-STEROID-EPIDURAL-TRANSFORAMINAL Left 3/14/2018    [facility]    INJECTION-STEROID-EPIDURAL-TRANSFORAMINAL Left 2017    [facility]    microdiscetomy      L4-L5    OVARIAN CYST REMOVAL       Social History     Socioeconomic History    Marital status:      Spouse name: Not on file    Number of children: Not on file    Years of education: Not on file    Highest education level: Not on file   Occupational History    Not on file    Social Needs    Financial resource strain: Not on file    Food insecurity:     Worry: Not on file     Inability: Not on file    Transportation needs:     Medical: Not on file     Non-medical: Not on file   Tobacco Use    Smoking status: Current Some Day Smoker     Types: Cigarettes    Smokeless tobacco: Never Used   Substance and Sexual Activity    Alcohol use: Yes     Alcohol/week: 0.6 oz     Types: 1 Glasses of wine per week    Drug use: No    Sexual activity: Yes     Partners: Male     Birth control/protection: None   Lifestyle    Physical activity:     Days per week: Not on file     Minutes per session: Not on file    Stress: Not on file   Relationships    Social connections:     Talks on phone: Not on file     Gets together: Not on file     Attends Islam service: Not on file     Active member of club or organization: Not on file     Attends meetings of clubs or organizations: Not on file     Relationship status: Not on file   Other Topics Concern    Not on file   Social History Narrative    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    Works for [employer] at [email or web address]    Walks a lot at work and for exercise     Family History   Problem Relation Age of Onset    Cancer Father         lung; non smoker, worked in [employer] and gas station    Breast cancer Mother     Heart failure Mother     Diabetes Mellitus Mother         ?secondary to chronic steroids    Ovarian cancer Neg Hx     Hypertension Neg Hx        Review of patient's allergies indicates:  No Known Allergies    Current Outpatient Medications   Medication Sig    (Magic mouthwash) 1:1:1 Benadryl 12.5mg/5ml liq, aluminum & magnesium hydroxide-simehticone (Maalox), lidocaine viscous 2% 10 cc swish and spit every 4 hours as needed for mouth sores or sore throat    AFLURIA QUAD [id number], PF, 60 mcg/0.5 mL vaccine ADM 0.5ML IM UTD    albuterol (PROVENTIL/VENTOLIN HFA) 90 mcg/actuation inhaler ProAir HFA 90 mcg/actuation aerosol inhaler    INHALE ONE TO TWO PUFFS PO INTO THE LUNGS Q 4 H PRF WHEEZING OR SOB    ASPIRIN (ASPIR-81 ORAL) Take 1 tablet by mouth once daily.    boric acid (BORIC ACID) vaginal suppository Place 650 mg vaginally.    buPROPion (WELLBUTRIN XL) 150 MG TB24 tablet Take 1 tablet (150 mg total) by mouth once daily.    buPROPion (WELLBUTRIN XL) 300 MG 24 hr tablet Take 1 tablet (300 mg total) by mouth every morning.    estradiol (ESTRACE) 0.01 % (0.1 mg/gram) vaginal cream Use 1/2 gram twice weekly    fexofenadine (ALLEGRA) 180 MG tablet Take 1 tablet (180 mg total) by mouth once daily.    FLAXSEED ORAL Take by mouth.    fluticasone (FLONASE) 50 mcg/actuation nasal spray 1 spray by Each Nare route 2 (two) times daily as needed for Rhinitis or Allergies.    ibuprofen (ADVIL,MOTRIN) 800 MG tablet Take 1 tablet (800 mg total) by mouth every 8 (eight) hours as needed for Pain. Take with food.    LACTOBAC NO.41/BIFIDOBACT NO.7 (PROBIOTIC-10 ORAL) Take by mouth.    multivitamin (ONE DAILY MULTIVITAMIN) per tablet Take 1 tablet by mouth every evening.     nystatin (MYCOSTATIN) powder Apply to affected areas of skin twice daily as needed for skin infection.  Re treat as needed.    PROAIR HFA 90 mcg/actuation inhaler Inhale 1-2 puffs into the lungs every 6 (six) hours as needed for Wheezing or Shortness of Breath (chest tightness). Rescue    sumatriptan (IMITREX) 50 MG tablet Take 1 tab by mouth as needed for migraine headache.  Repeat dose every 2 hours as needed.  Max 200mg in 24 hours.     No current facility-administered medications for this visit.        REVIEW OF SYSTEMS:    GENERAL:  No weight loss, malaise or fevers.  RESPIRATORY:  Negative for cough, wheezing or shortness of breath, patient denies any recent URI.  CARDIOVASCULAR:  Takes daily baby ASA. Negative for chest pain, leg swelling or palpitations.  GI:  Negative for bowel changes  MUSCULOSKELETAL:  See HPI.  SKIN:  Negative for lesions, rash, and  "itching.  PSYCH:  Reports depression improved on medication.  Patients sleep is not disturbed secondary to pain.  HEMATOLOGY/LYMPHOLOGY:  Negative for prolonged bleeding, bruising easily or swollen nodes.    ENDO: No history of diabetes or thyroid dysfunction  NEURO:   No history of headaches, syncope, paralysis, seizures or tremors.  All other reviewed and negative other than HPI.    OBJECTIVE:    /67   Pulse 78   Temp 98.1 °F (36.7 °C)   Ht 5' 7" (1.702 m)   Wt 87.1 kg (192 lb 0.3 oz)   LMP 10/20/2013   BMI 30.07 kg/m²     PHYSICAL EXAMINATION:    GENERAL: Well appearing, in no acute distress, alert.  PSYCH:  Mood and affect appropriate.  SKIN: Surgical scar in midline of spine well healed.  HEAD/FACE:  Normocephalic, atraumatic. Cranial nerves grossly intact.  CV: Non-diaphoretic, pulses intact in BUE.   PULM: No evidence of respiratory difficulty, symmetric chest rise.  BACK: Straight leg raising in the supine position is positive at 45 degrees for radicular pain on the left side at L5 distribution.  There is no pain with palpation over the facet joints of the lumbar spine bilaterally. There is decreased range of motion with extension to 15 degrees, and facet loading maneuvers cause reproducible pain bilaterally, L>R.   EXTREMITIES: No deformities, edema, or skin discoloration.   MUSCULOSKELETAL: There is no pain with palpation over the SI joints.  Positive Deshaun and FADIR maneuver on the left. There is no pain to palpation over the greater trochanteric bursa bilaterally.  No atrophy or tone abnormalities are noted.   NEURO: Bilateral lower extremity strength is normal and symmetric except for 4/5 strength in left hip flexion.  No clonus.  Decreased sensation to LLE.  GAIT: Antalgic, ambulates without assistance    Lab Results   Component Value Date    WBC 6.53 11/20/2018    HGB 12.4 11/20/2018    HCT 39.2 11/20/2018    MCV 90 11/20/2018     11/20/2018     BMP  Lab Results   Component Value " Date     11/20/2018    K 4.1 11/20/2018     11/20/2018    CO2 28 11/20/2018    BUN 14 11/20/2018    CREATININE 0.8 11/20/2018    CALCIUM 9.3 11/20/2018    ANIONGAP 10 11/20/2018    ESTGFRAFRICA >60 11/20/2018    EGFRNONAA >60 11/20/2018         ASSESSMENT: 58 y.o. year old female with pain, consistent with     Encounter Diagnoses   Name Primary?    Lumbar radiculopathy Yes    DDD (degenerative disc disease), lumbar     Chronic left-sided low back pain with left-sided sciatica     Pain in both feet        PLAN:     - Order updated lumbar MRI.  She may have reherniation of L4-5 or bulge at L5-S1.    - She would like to bring her previous MRI images for comparison at next visit.    - Continue home exercises and stretching.    - Will place referral to Podiatry.    - RTC after MRI.      The  above plan and management options were discussed at length with patient. Patient is in agreement with the above and verbalized understanding.     Patience Vinson  04/04/2019

## 2019-04-15 ENCOUNTER — HOSPITAL ENCOUNTER (OUTPATIENT)
Dept: RADIOLOGY | Facility: OTHER | Age: 59
Discharge: HOME | End: 2019-04-15
Attending: NURSE PRACTITIONER
Payer: COMMERCIAL

## 2019-04-15 DIAGNOSIS — M54.42 CHRONIC LEFT-SIDED LOW BACK PAIN WITH LEFT-SIDED SCIATICA: ICD-10-CM

## 2019-04-15 DIAGNOSIS — G89.29 CHRONIC LEFT-SIDED LOW BACK PAIN WITH LEFT-SIDED SCIATICA: ICD-10-CM

## 2019-04-15 DIAGNOSIS — M54.16 LUMBAR RADICULOPATHY: ICD-10-CM

## 2019-04-15 DIAGNOSIS — M51.36 DDD (DEGENERATIVE DISC DISEASE), LUMBAR: ICD-10-CM

## 2019-04-15 PROCEDURE — 72148 MRI LUMBAR SPINE WITHOUT CONTRAST: ICD-10-PCS | Mod: 26,,, | Performed by: RADIOLOGY

## 2019-04-15 PROCEDURE — 72148 MRI LUMBAR SPINE W/O DYE: CPT | Mod: 26,,, | Performed by: RADIOLOGY

## 2019-04-15 PROCEDURE — 72148 MRI LUMBAR SPINE W/O DYE: CPT | Mod: TC

## 2019-04-18 ENCOUNTER — TELEPHONE (OUTPATIENT)
Dept: PAIN MEDICINE | Facility: CLINIC | Age: 59
End: 2019-04-18

## 2019-04-18 ENCOUNTER — TELEPHONE (OUTPATIENT)
Dept: SPINE | Facility: CLINIC | Age: 59
End: 2019-04-18

## 2019-04-18 ENCOUNTER — OFFICE VISIT (OUTPATIENT)
Dept: SPINE | Facility: CLINIC | Age: 59
End: 2019-04-18
Payer: COMMERCIAL

## 2019-04-18 VITALS
HEART RATE: 73 BPM | DIASTOLIC BLOOD PRESSURE: 65 MMHG | HEIGHT: 67 IN | WEIGHT: 194 LBS | SYSTOLIC BLOOD PRESSURE: 160 MMHG | TEMPERATURE: 99 F | BODY MASS INDEX: 30.45 KG/M2

## 2019-04-18 DIAGNOSIS — M54.42 CHRONIC LEFT-SIDED LOW BACK PAIN WITH LEFT-SIDED SCIATICA: ICD-10-CM

## 2019-04-18 DIAGNOSIS — M54.30 SCIATICA, UNSPECIFIED LATERALITY: ICD-10-CM

## 2019-04-18 DIAGNOSIS — M54.16 LUMBAR RADICULOPATHY: Primary | ICD-10-CM

## 2019-04-18 DIAGNOSIS — M53.3 SACROILIAC JOINT PAIN: ICD-10-CM

## 2019-04-18 DIAGNOSIS — G89.29 CHRONIC LEFT-SIDED LOW BACK PAIN WITH LEFT-SIDED SCIATICA: ICD-10-CM

## 2019-04-18 DIAGNOSIS — M51.36 DDD (DEGENERATIVE DISC DISEASE), LUMBAR: ICD-10-CM

## 2019-04-18 PROCEDURE — 3008F PR BODY MASS INDEX (BMI) DOCUMENTED: ICD-10-PCS | Mod: CPTII,S$GLB,, | Performed by: NURSE PRACTITIONER

## 2019-04-18 PROCEDURE — 3008F BODY MASS INDEX DOCD: CPT | Mod: CPTII,S$GLB,, | Performed by: NURSE PRACTITIONER

## 2019-04-18 PROCEDURE — 99214 PR OFFICE/OUTPT VISIT, EST, LEVL IV, 30-39 MIN: ICD-10-PCS | Mod: S$GLB,,, | Performed by: NURSE PRACTITIONER

## 2019-04-18 PROCEDURE — 99214 OFFICE O/P EST MOD 30 MIN: CPT | Mod: S$GLB,,, | Performed by: NURSE PRACTITIONER

## 2019-04-18 PROCEDURE — 99999 PR PBB SHADOW E&M-EST. PATIENT-LVL III: ICD-10-PCS | Mod: PBBFAC,,, | Performed by: NURSE PRACTITIONER

## 2019-04-18 PROCEDURE — 99999 PR PBB SHADOW E&M-EST. PATIENT-LVL III: CPT | Mod: PBBFAC,,, | Performed by: NURSE PRACTITIONER

## 2019-04-18 NOTE — TELEPHONE ENCOUNTER
Contacted and spoke with patient offering to come in earlier do to the weather.    Patient stated that someone is picking her up at 1:00 pm and she will come straight here.    Patient verbalized understanding.

## 2019-04-18 NOTE — PROGRESS NOTES
"  Chronic Pain -Follow Up    Referring Physician: No ref. provider found    Chief Complaint:   No chief complaint on file.       SUBJECTIVE: Disclaimer: This note has been generated using voice-recognition software. There may be typographical errors that have been missed during proof-reading    Interval History 4/18/2019:  The patient returns for follow up and lumbar MRI results.  Her updated lumbar MRI does not show any significant change from previous.  Additionally, she has two other MRI cds with her today that she would like me to review.  Unfortunately, I was only able to open one, from 2010.  She thinks that ESIs have given her some benefit in the past.  She is having left sided back and buttock pain today with radiation down the posterior leg, sometimes stopping at her knee and sometimes extending into the calf.  She does feel that PT was helpful in the past and would like to repeat this.  Her pain today is 7/10.    Interval History 4/4/2019:  The patient returns for procedure follow up.  She is s/p left L4 and L5 TF JILLIAN on 3/21/19 with about 50% relief of left leg pain.  She still has a burning pain to left calf and anterior foot.  Her right sided pain has improved.  Her lower back pain has been mild.  She would like a podiatry referral as previously discussed.  She has some benefit with Ibuprofen PRN.  She continues to remain active and stretches.  Her pain today is 1/10.    Interval History 3/6/2019:  She returns to clinic today for follow up. Reports she had 95% pain relief from bilateral SI joint injections on 9/18/2018 for approximately 4-5 months. She reports new left leg pain that started approximately 1 month ago out of the blue without any inciting event. Pain seems to start at times in her buttocks and radiates down her entire left leg. The pain is described as "crushing" and associated with numbness/tingling in the entire left leg that is worse in the hamstrings and dorsum of her foot. She also " has cramping in her left hamstring at times. Pain is worse with using the stairs and after walking about 1 to 1.5 blocks. Pain better at rest, when leaning forward, and gets limited relief from chiropractor. No cardiovascular history or issues per patient. She is taking Ibuprofen 800mg qhs that provides some relief. Pain is 2/10 at best and 9/10 at worst. Today her pain is 5/10. + generalized weakness/limping due to the pain. No loss of bowel/bladder control or saddle anesthesia. No pain in her RLE.     Interval History 10/5/2018:  The patient returns to clinic today for follow up. She is s/p bilateral SI joint injection on 9/18/2018. She reports 95% relief of her low back and buttock pain. She denies any radiating leg pain today. She continues to participate in physical therapy with benefit. She denies any other health changes. Her pain today is 1/10.    Interval History: 9/5/18:  She states that 3 weeks ago her pain returned after hearing a pop and twisting while getting into her car. She states that pain has returned in the same character and distribution as prior to the bilateral SIJ on 5/31 but may be more severe now with pain going down both legs instead just one. She is hoping to repeat injections. She denies any other health changes in the interim. She takes a baby ASA. She takes ibuprofen nightly for pain.     Interval History 6/14/2018:  The patient returns for follow up.  She is s/p bilateral SI joint injections on 5/31/18 with 100% pain relief.  She has not had any back or buttock pain since the procedure.  She still has some cramping to her calf which she feels is unrelated.  She takes Ibuprofen 800 mg at night with benefit.  She has had benefit with PT exercises and continues to be active.  Her pain today is 0/10.    Interval history 5/15/2018:  Since previous encounter the patient continues to have significant lower back pain over the area of the sacral iliac joints.  She states it is worse when she  first starts to get going and gradually improves as she walks.  She feels as if she is able to walk longer distances after previous epidural steroid injections but overall reports that 10% improvement in her pain.  The previous Medrol Dosepak did not help significant only tramadol was also not helping with her pain.  She has been doing physical therapy and feels as if she is making slow strides but there still certain activities that she cannot do without exacerbating her pain.  No other health changes since previous encounter.  Interval History 4/23/2018  One week ago, sudden worsened back pain when bending forwards with radiation into bilateral thighs and weakness.  Gradually improving, taking ibuprofen, but has been restricting activities secondary to persisting pain in the lower back.  Initial encounter:    Amita Lewis presents to the clinic for the evaluation of back pain. The pain started one year ago  and symptoms have been worsening.    Brief history:    Pain Description:    The pain is located in the back area and radiates down the posterior legs to the calves. She describes symptoms of neurogenic versus vascular claudication.  Her pain significant worsens after walking for several blocks and gradually improves when sitting down.  She describes her pain symptoms as a radiating pain into her calf and a cramping sensation in her calf.    At BEST  0/10     At WORST  7/10 on the WORST day.      On average pain is rated as 7/10.     Today the pain is rated as 1/10    The pain is described as burning, dull and tight band      Symptoms interfere with daily activity and sleeping.     Exacerbating factors: Walking.      Mitigating factors medications.     Patient denies .  Patient denies any suicidal or homicidal ideations.    Pain Medications:  Current:  Ibuprofen - with relief    Tried in Past:  NSAIDs - Ibuprofen  TCA -Never  SNRI -Never  Anti-convulsants -lyrica and gabapentin in the past with  significant sedating side effects limiting their use  Muscle Relaxants -Never  Opioids-tramadol without improvement    Physical Therapy/Home Exercise: yes- helpful     report:  Reviewed and consistent with medication use as prescribed.    Pain Procedures:   3/21/19 Left L4 and L5 TF JILLIAN- 50% relief  9/18/2018: Bilateral SI joint injection: 95% relief for 4-5 months  5/31/18: Bilateral Sacroiliac Joint Injections: 95% relief for 3 months  3/14/2018 - left L3 and L4 TFESI  12/27/2017 LUMBAR LEFT L4 AND L5 TRANSFORAMINAL JILLIAN     Chiropractor -yes, provides limited relief  Acupuncture - never  TENS unit -never  Spinal decompression -Lumbar microdiskectomy in 2008 with complication of dural tear requiring repair.  Joint replacement -never    Imaging:  Narrative     EXAMINATION:  MRI LUMBAR SPINE WITHOUT CONTRAST    CLINICAL HISTORY:  Low back pain, >6wks conservative tx, persistent-progressive sx, surgical candidate; Radiculopathy, lumbar region    TECHNIQUE:  Sagittal T1, T2, stir and axial T1-T2 imaging of the lumbar spine without contrast.    COMPARISON:  09/25/2017    FINDINGS:  Remote operative change status post right L4 hemilaminectomy.  The lumbar sagittal alignment is relatively stable.  Lumbar vertebral body heights contour and bone marrow signal is relatively stable without evidence for acute fracture or subluxation.    Distal spinal cord and conus normal in signal and contour tip of the conus approximates inferior L1 level.    No aneurysmal dilatation of the visualized abdominal aorta.    T12/L1 through L1/L2: No significant disc bulge, central canal or neural foraminal stenosis.    L2/L3: Small disc bulge without significant central canal or neural foraminal stenosis there is a small fluid signal focus in the dorsal epidural space at this level unchanged from prior    L3/L4:Bulging disc with ligamentum flavum hypertrophy and facet joint arthropathy mild central canal and bilateral neural foraminal  stenosis.    L4/L5:Bulging disc with ligamentum flavum hypertrophy and facet joint arthropathy with superimposed right hemilaminectomy.  No significant central canal stenosis.  No evidence for recurrent disc herniation.  There is mild moderate bilateral neural foraminal stenosis.    L5/S1: No significant disc bulge central canal or neural foraminal stenosis.      Impression       No significant change from prior.  Remote operative change right L4 hemilaminectomy without evidence for recurrent disc herniation.    Scattered superimposed degenerative change most prominent at L3/L4 with posterior disc osteophyte, ligamentum flavum hypertrophy and facet joint arthropathy with mild central canal and bilateral neural foraminal stenosis.             Past Medical History:   Diagnosis Date    Abnormal Pap smear     Asthma     Depression 3/17/2016    Environmental and seasonal allergies 3/17/2016    Heartburn 3/17/2016    HLD (hyperlipidemia) 03/22/2016    ASCVD 2018 6.3%; can't tolerate statins    Menopausal syndrome (hot flashes) 3/17/2016    Mild intermittent asthma without complication 3/17/2016    Obesity (BMI 30-39.9) 3/17/2016    Sciatica 3/17/2016     Past Surgical History:   Procedure Laterality Date    CERVICAL BIOPSY  W/ LOOP ELECTRODE EXCISION      INJECTION, JOINT  Bilateral SI joint Bilateral 9/18/2018    [facility]    INJECTION, STEROID, EPIDURAL, TRANSFORAMINAL APPROACH, L4 AND L5 Left 3/21/2019    [facility] at [facility]    INJECTION-JOINT Bilateral 5/31/2018    [facility]    INJECTION-STEROID-EPIDURAL-TRANSFORAMINAL Left 3/14/2018    [facility]    INJECTION-STEROID-EPIDURAL-TRANSFORAMINAL Left 12/27/2017    [facility]    microdiscetomy      L4-L5    OVARIAN CYST REMOVAL  2001     Social History      Socioeconomic History    Marital status:      Spouse name: Not on file    Number of children: Not on file    Years of education: Not on file    Highest education level: Not on file   Occupational History    Not on file   Social Needs    Financial resource strain: Not on file    Food insecurity:     Worry: Not on file     Inability: Not on file    Transportation needs:     Medical: Not on file     Non-medical: Not on file   Tobacco Use    Smoking status: Current Some Day Smoker     Types: Cigarettes    Smokeless tobacco: Never Used   Substance and Sexual Activity    Alcohol use: Yes     Alcohol/week: 0.6 oz     Types: 1 Glasses of wine per week    Drug use: No    Sexual activity: Yes     Partners: Male     Birth control/protection: None   Lifestyle    Physical activity:     Days per week: Not on file     Minutes per session: Not on file    Stress: Not on file   Relationships    Social connections:     Talks on phone: Not on file     Gets together: Not on file     Attends Church service: Not on file     Active member of club or organization: Not on file     Attends meetings of clubs or organizations: Not on file     Relationship status: Not on file   Other Topics Concern    Not on file   Social History Narrative    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    Works for [employer] at [employer]    Walks a lot at work and for exercise     Family History   Problem Relation Age of Onset    Cancer Father         lung; non smoker, worked in [employer] and gas station    Breast cancer Mother     Heart failure Mother     Diabetes Mellitus Mother         ?secondary to chronic steroids    Ovarian cancer Neg Hx     Hypertension Neg Hx        Review of patient's allergies indicates:  No Known Allergies    Current Outpatient Medications   Medication Sig    (Magic mouthwash) 1:1:1 Benadryl 12.5mg/5ml liq, aluminum & magnesium hydroxide-simehticone (Maalox), lidocaine viscous 2% 10 cc swish and spit every  4 hours as needed for mouth sores or sore throat    AFLURIA QUAD 8378-0300, PF, 60 mcg/0.5 mL vaccine ADM 0.5ML IM UTD    albuterol (PROVENTIL/VENTOLIN HFA) 90 mcg/actuation inhaler ProAir HFA 90 mcg/actuation aerosol inhaler   INHALE ONE TO TWO PUFFS PO INTO THE LUNGS Q 4 H PRF WHEEZING OR SOB    ASPIRIN (ASPIR-81 ORAL) Take 1 tablet by mouth once daily.    boric acid (BORIC ACID) vaginal suppository Place 650 mg vaginally.    buPROPion (WELLBUTRIN XL) 150 MG TB24 tablet Take 1 tablet (150 mg total) by mouth once daily.    buPROPion (WELLBUTRIN XL) 300 MG 24 hr tablet Take 1 tablet (300 mg total) by mouth every morning.    estradiol (ESTRACE) 0.01 % (0.1 mg/gram) vaginal cream Use 1/2 gram twice weekly    fexofenadine (ALLEGRA) 180 MG tablet Take 1 tablet (180 mg total) by mouth once daily.    FLAXSEED ORAL Take by mouth.    fluticasone (FLONASE) 50 mcg/actuation nasal spray 1 spray by Each Nare route 2 (two) times daily as needed for Rhinitis or Allergies.    ibuprofen (ADVIL,MOTRIN) 800 MG tablet Take 1 tablet (800 mg total) by mouth every 8 (eight) hours as needed for Pain. Take with food.    LACTOBAC NO.41/BIFIDOBACT NO.7 (PROBIOTIC-10 ORAL) Take by mouth.    multivitamin (ONE DAILY MULTIVITAMIN) per tablet Take 1 tablet by mouth every evening.     nystatin (MYCOSTATIN) powder Apply to affected areas of skin twice daily as needed for skin infection.  Re treat as needed.    PROAIR HFA 90 mcg/actuation inhaler Inhale 1-2 puffs into the lungs every 6 (six) hours as needed for Wheezing or Shortness of Breath (chest tightness). Rescue    sumatriptan (IMITREX) 50 MG tablet Take 1 tab by mouth as needed for migraine headache.  Repeat dose every 2 hours as needed.  Max 200mg in 24 hours.     No current facility-administered medications for this visit.        REVIEW OF SYSTEMS:    GENERAL:  No weight loss, malaise or fevers.  RESPIRATORY:  Negative for cough, wheezing or shortness of breath, patient  "denies any recent URI.  CARDIOVASCULAR:  Takes daily baby ASA. Negative for chest pain, leg swelling or palpitations.  GI:  Negative for bowel changes  MUSCULOSKELETAL:  See HPI.  SKIN:  Negative for lesions, rash, and itching.  PSYCH:  Reports depression improved on medication.  Patients sleep is not disturbed secondary to pain.  HEMATOLOGY/LYMPHOLOGY:  Negative for prolonged bleeding, bruising easily or swollen nodes.    ENDO: No history of diabetes or thyroid dysfunction  NEURO:   No history of headaches, syncope, paralysis, seizures or tremors.  All other reviewed and negative other than HPI.    OBJECTIVE:    BP (!) 160/65   Pulse 73   Temp 98.7 °F (37.1 °C)   Ht 5' 7" (1.702 m)   Wt 88 kg (194 lb 0.1 oz)   LMP 10/20/2013   BMI 30.39 kg/m²     PHYSICAL EXAMINATION:    GENERAL: Well appearing, in no acute distress, alert.  PSYCH:  Mood and affect appropriate.  SKIN: Surgical scar in midline of spine well healed.  HEAD/FACE:  Normocephalic, atraumatic. Cranial nerves grossly intact.  CV: Non-diaphoretic, pulses intact in BUE.   PULM: No evidence of respiratory difficulty, symmetric chest rise.  BACK: Straight leg raising in the supine position is positive at 45 degrees for radicular pain on the left side at L5 distribution.  There is no pain with palpation over the facet joints of the lumbar spine bilaterally. There is decreased range of motion with extension to 15 degrees, and facet loading maneuvers cause reproducible pain bilaterally.  EXTREMITIES: No deformities, edema, or skin discoloration.   MUSCULOSKELETAL: There is pain with palpation over the left SI joint.  Positive MANN and FADIR maneuver on the left.  Pain with palpation to left piriformis muscle.  There is no pain to palpation over the greater trochanteric bursa bilaterally.  No atrophy or tone abnormalities are noted.   NEURO: Bilateral lower extremity strength is normal and symmetric except for 4/5 strength in left hip flexion.  No clonus.  " Decreased sensation to LLE.  GAIT: Antalgic, ambulates without assistance    Lab Results   Component Value Date    WBC 6.53 11/20/2018    HGB 12.4 11/20/2018    HCT 39.2 11/20/2018    MCV 90 11/20/2018     11/20/2018     BMP  Lab Results   Component Value Date     11/20/2018    K 4.1 11/20/2018     11/20/2018    CO2 28 11/20/2018    BUN 14 11/20/2018    CREATININE 0.8 11/20/2018    CALCIUM 9.3 11/20/2018    ANIONGAP 10 11/20/2018    ESTGFRAFRICA >60 11/20/2018    EGFRNONAA >60 11/20/2018         ASSESSMENT: 58 y.o. year old female with pain, consistent with     Encounter Diagnoses   Name Primary?    Lumbar radiculopathy Yes    DDD (degenerative disc disease), lumbar     Chronic left-sided low back pain with left-sided sciatica     Sacroiliac joint pain     Sciatica, unspecified laterality        PLAN:     - Recent lumbar MRI reviewed.  No obvious reherniation at previous surgical site.    - Schedule for physical therapy.    - Continue home exercises and stretching.    - Continue current medications.    - RTC in 6-8 weeks or sooner if needed.      The  above plan and management options were discussed at length with patient. Patient is in agreement with the above and verbalized understanding.     Patience Vinson  04/18/2019

## 2019-05-27 ENCOUNTER — TELEPHONE (OUTPATIENT)
Dept: SURGERY | Facility: CLINIC | Age: 59
End: 2019-05-27

## 2019-05-27 NOTE — TELEPHONE ENCOUNTER
Spoke with patient about rescheduling her appt with Dr. Huitron on 6/20 due to Dr. Huitron being out of town. Patient requested a new appt in August. Rescheduled for 8/15. Patient voiced understanding of new appt date and time. Reminder letter sent.

## 2019-05-28 ENCOUNTER — PATIENT MESSAGE (OUTPATIENT)
Dept: PRIMARY CARE CLINIC | Facility: CLINIC | Age: 59
End: 2019-05-28

## 2019-06-03 ENCOUNTER — OFFICE VISIT (OUTPATIENT)
Dept: PRIMARY CARE CLINIC | Facility: CLINIC | Age: 59
End: 2019-06-03
Payer: COMMERCIAL

## 2019-06-03 VITALS
OXYGEN SATURATION: 98 % | BODY MASS INDEX: 31.6 KG/M2 | HEART RATE: 80 BPM | HEIGHT: 66 IN | TEMPERATURE: 99 F | SYSTOLIC BLOOD PRESSURE: 110 MMHG | DIASTOLIC BLOOD PRESSURE: 60 MMHG | WEIGHT: 196.63 LBS

## 2019-06-03 DIAGNOSIS — E78.5 HYPERLIPIDEMIA, UNSPECIFIED HYPERLIPIDEMIA TYPE: ICD-10-CM

## 2019-06-03 DIAGNOSIS — M54.16 LUMBAR RADICULOPATHY: Primary | ICD-10-CM

## 2019-06-03 DIAGNOSIS — R20.9 ABNORMAL SENSATION OF LEG: ICD-10-CM

## 2019-06-03 DIAGNOSIS — M51.36 DDD (DEGENERATIVE DISC DISEASE), LUMBAR: ICD-10-CM

## 2019-06-03 PROBLEM — L03.114 CELLULITIS OF LEFT HAND: Status: RESOLVED | Noted: 2018-11-19 | Resolved: 2019-06-03

## 2019-06-03 PROCEDURE — 3008F BODY MASS INDEX DOCD: CPT | Mod: CPTII,S$GLB,, | Performed by: INTERNAL MEDICINE

## 2019-06-03 PROCEDURE — 99214 OFFICE O/P EST MOD 30 MIN: CPT | Mod: S$GLB,,, | Performed by: INTERNAL MEDICINE

## 2019-06-03 PROCEDURE — 99999 PR PBB SHADOW E&M-EST. PATIENT-LVL III: ICD-10-PCS | Mod: PBBFAC,,, | Performed by: INTERNAL MEDICINE

## 2019-06-03 PROCEDURE — 3008F PR BODY MASS INDEX (BMI) DOCUMENTED: ICD-10-PCS | Mod: CPTII,S$GLB,, | Performed by: INTERNAL MEDICINE

## 2019-06-03 PROCEDURE — 99214 PR OFFICE/OUTPT VISIT, EST, LEVL IV, 30-39 MIN: ICD-10-PCS | Mod: S$GLB,,, | Performed by: INTERNAL MEDICINE

## 2019-06-03 PROCEDURE — 99999 PR PBB SHADOW E&M-EST. PATIENT-LVL III: CPT | Mod: PBBFAC,,, | Performed by: INTERNAL MEDICINE

## 2019-06-03 RX ORDER — GABAPENTIN 100 MG/1
100 CAPSULE ORAL NIGHTLY
Qty: 30 CAPSULE | Refills: 11 | Status: SHIPPED | OUTPATIENT
Start: 2019-06-03 | End: 2019-12-17 | Stop reason: SDUPTHER

## 2019-06-03 RX ORDER — ROSUVASTATIN CALCIUM 5 MG/1
5 TABLET, COATED ORAL DAILY
Qty: 30 TABLET | Refills: 11 | Status: SHIPPED | OUTPATIENT
Start: 2019-06-03 | End: 2019-12-17 | Stop reason: SDUPTHER

## 2019-06-03 NOTE — PROGRESS NOTES
Subjective:        Patient ID: Amita Lewis is a 58 y.o. female.    Chief Complaint: Tingling (left foot)    HPI   Amita Lewis presents with c/o tingling and cold sensation in the L foot.  This started ~3 weeks ago.  It only happens when she is lying supine at night to sleep.  After lying down for 10 minutes, her L leg and foot start to tingle then feel cold.  She reports it feels like her leg has fallen asleep.  When she reaches down to touch her L foot, it feels cold.  No leg weakness.    Pt reports her chronic LBP has recently been worse.  She's not sure why.  She hasn't been able to walk as far lately but when she does walk, this does not cause leg cramping or pain or reproduce the above sx.    Review of Systems   Constitutional: Negative for activity change and unexpected weight change.   HENT: Negative for hearing loss, rhinorrhea and trouble swallowing.    Eyes: Negative for discharge and visual disturbance.   Respiratory: Negative for chest tightness and wheezing.    Cardiovascular: Positive for palpitations. Negative for chest pain.   Gastrointestinal: Negative for blood in stool, constipation, diarrhea and vomiting.   Endocrine: Negative for polydipsia and polyuria.   Genitourinary: Negative for difficulty urinating, dysuria, hematuria and menstrual problem.   Musculoskeletal: Positive for arthralgias and neck pain. Negative for joint swelling.   Neurological: Positive for headaches. Negative for weakness.   Psychiatric/Behavioral: Negative for confusion and dysphoric mood.           Objective:        Vitals:    06/03/19 1348   BP: 110/60   Pulse: 80   Temp: 98.9 °F (37.2 °C)     Physical Exam   Constitutional: She is oriented to person, place, and time. She appears well-developed and well-nourished. No distress.   Musculoskeletal: She exhibits no edema or deformity.   - 2+ DP pulses b/l  - sensation to light touch intact in b/l legs and feet  - b/l feet warm to touch, no  discoloration  - toenails painted, unable to check cap refill   Neurological: She is alert and oriented to person, place, and time.   Vitals reviewed.          Assessment:         1. Lumbar radiculopathy    2. DDD (degenerative disc disease), lumbar    3. Abnormal sensation of leg    4. Hyperlipidemia, unspecified hyperlipidemia type              Plan:         Amita was seen today for tingling.    Diagnoses and all orders for this visit:    Lumbar radiculopathy  DDD (degenerative disc disease), lumbar  Abnormal sensation of leg: suspect sx are related to lumbar disc disease vs more peripheral neuropathy, compression neuropathy given sx only occur when pt is supine  - pt not interested in RFA evaluation  - will try gabapentin 100mg qhs for symptom relief; pt will send me a message in 1-2 weeks with update on sx  - consider EMG/NCS if sx are not better with doing PT for low back  -     gabapentin (NEURONTIN) 100 MG capsule; Take 1 capsule (100 mg total) by mouth every evening.    Hyperlipidemia, unspecified hyperlipidemia type: Pt would like to try a different statin.  She has not tolerated Simvastatin or Zetia in the past.  -     rosuvastatin (CRESTOR) 5 MG tablet; Take 1 tablet (5 mg total) by mouth once daily.

## 2019-06-11 ENCOUNTER — PATIENT MESSAGE (OUTPATIENT)
Dept: PRIMARY CARE CLINIC | Facility: CLINIC | Age: 59
End: 2019-06-11

## 2019-06-11 ENCOUNTER — TELEPHONE (OUTPATIENT)
Dept: PRIMARY CARE CLINIC | Facility: CLINIC | Age: 59
End: 2019-06-11

## 2019-06-16 ENCOUNTER — PATIENT MESSAGE (OUTPATIENT)
Dept: PRIMARY CARE CLINIC | Facility: CLINIC | Age: 59
End: 2019-06-16

## 2019-07-10 ENCOUNTER — TELEPHONE (OUTPATIENT)
Dept: PAIN MEDICINE | Facility: CLINIC | Age: 59
End: 2019-07-10

## 2019-07-10 NOTE — TELEPHONE ENCOUNTER
Staff left a detail message to patient informing her that JACKIE Patience will not be available for 08.16.19(Fri), staff wanted to get patient reschedule to a newer date and time.    **Staff went ahead and canceled pt original appt; if patient returns call to clinic please have her reschedule.**

## 2019-10-04 ENCOUNTER — TELEPHONE (OUTPATIENT)
Dept: OBSTETRICS AND GYNECOLOGY | Facility: CLINIC | Age: 59
End: 2019-10-04

## 2019-10-04 DIAGNOSIS — Z12.39 BREAST CANCER SCREENING: Primary | ICD-10-CM

## 2019-10-04 NOTE — TELEPHONE ENCOUNTER
----- Message from Zhanna Price LPN sent at 10/4/2019  3:24 PM CDT -----  Contact: DANIELLE OJEDA [[id number]]   Need mmg orders, wwe scheduled  ----- Message -----  From: Lizzy Mills  Sent: 10/4/2019   3:00 PM CDT  To: Florencio Tristan Staff    Name of Who is Calling: DANIELLE OJEDA [[id number]]    What is the request in detail:   Patient called requesting to schedule her annual mammogram. Please put the orders in at your earliest convenience and further advise.      THANKS!      Can the clinic reply by [facility]: no      What Number to Call Back: DANIELLE OJEDA // # [phone]

## 2019-10-17 ENCOUNTER — TELEPHONE (OUTPATIENT)
Dept: SURGERY | Facility: CLINIC | Age: 59
End: 2019-10-17

## 2019-10-17 NOTE — TELEPHONE ENCOUNTER
Spoke with pt to pt to get r/s pt was upset that she had to r/s appointment again she has been trying to get seen for this appointment for quite a while pt stats she will call back when she clears her schedule

## 2019-10-17 NOTE — TELEPHONE ENCOUNTER
Left message for pt to call back and schedule her appt ----- Message from Dante Zambrano sent at 10/17/2019 10:45 AM CDT -----  Contact: Pt  Type:  Needs Medical Advice    Who Called: The Pt would like for you to call her back to schedule the appt.  The Pt states that the number you left doesn't get her to you.  Please contact the Pt asap    Best Call Back Number: Work [phone] or Cell [phone]

## 2019-11-01 ENCOUNTER — OFFICE VISIT (OUTPATIENT)
Dept: SURGERY | Facility: CLINIC | Age: 59
End: 2019-11-01
Payer: COMMERCIAL

## 2019-11-01 VITALS
DIASTOLIC BLOOD PRESSURE: 62 MMHG | HEART RATE: 73 BPM | TEMPERATURE: 98 F | WEIGHT: 200.75 LBS | BODY MASS INDEX: 32.26 KG/M2 | SYSTOLIC BLOOD PRESSURE: 139 MMHG | HEIGHT: 66 IN

## 2019-11-01 DIAGNOSIS — Z91.89 AT HIGH RISK FOR BREAST CANCER: ICD-10-CM

## 2019-11-01 PROCEDURE — 99203 PR OFFICE/OUTPT VISIT, NEW, LEVL III, 30-44 MIN: ICD-10-PCS | Mod: S$GLB,,, | Performed by: PHYSICIAN ASSISTANT

## 2019-11-01 PROCEDURE — 3008F BODY MASS INDEX DOCD: CPT | Mod: CPTII,S$GLB,, | Performed by: PHYSICIAN ASSISTANT

## 2019-11-01 PROCEDURE — 99999 PR PBB SHADOW E&M-EST. PATIENT-LVL III: ICD-10-PCS | Mod: PBBFAC,,, | Performed by: PHYSICIAN ASSISTANT

## 2019-11-01 PROCEDURE — 3008F PR BODY MASS INDEX (BMI) DOCUMENTED: ICD-10-PCS | Mod: CPTII,S$GLB,, | Performed by: PHYSICIAN ASSISTANT

## 2019-11-01 PROCEDURE — 99999 PR PBB SHADOW E&M-EST. PATIENT-LVL III: CPT | Mod: PBBFAC,,, | Performed by: PHYSICIAN ASSISTANT

## 2019-11-01 PROCEDURE — 99203 OFFICE O/P NEW LOW 30 MIN: CPT | Mod: S$GLB,,, | Performed by: PHYSICIAN ASSISTANT

## 2019-11-01 RX ORDER — MINERAL OIL
ENEMA (ML) RECTAL
COMMUNITY
End: 2019-12-17 | Stop reason: SDUPTHER

## 2019-11-01 RX ORDER — BUPROPION HYDROCHLORIDE 300 MG/1
TABLET ORAL
COMMUNITY
End: 2019-11-25 | Stop reason: SDUPTHER

## 2019-11-01 NOTE — PROGRESS NOTES
BREAST HIGH RISK CLINIC  [facility]  Breast Surgery      Date of Visit:  2019    PCP:  Patricia Cope MD    HIGH RISK    Amita Lewis is a 58 y.o. postmenopausal female referred for evaluation of an increased risk of breast cancer based on her Tyrer-Cuzick score.  She is here today to discuss options of high risk screening and risk reduction.    Other breast cancer risk factors include mom with breast CA, nulliparous and menarche before age 13.     She denies any history of breast biopsies.  She denies any nipple discharge or palpating any breast masses bilaterally.      Patient denies exercising or eating healthy.  She was tearful and admitted to being depressed over the past few months due to her partner having oral cancer.  Patient states she works full time and then has to take care of her and it is exhausting.  She doesn't have any family members or friends who can help and her sciatic nerve and back pain prevent her from exercising.    Patient states she recently met some people at [facility] who have been supportive for her.  She denies any thoughts of harming herself or anyone else.  She is currently taking Wellbutrin for depression.      Genetic testing: none  Ashkenazi inheritance: no  OB/Gyn history:    Number of pregnancies & age at first gestation:    Age of menarche & menopause:11; 50   Body mass index is 32.4 kg/m².   Contraceptive Use/ HRT: Denies HRT; OCP's several years ago.     Breastfeeding: N/A   Number of previous biopsies:0    Tyrer-Cuzick Score: 25.8% (re-calculated in clinic today).      Family History: Mother was diagnosed with breast cancer at age 65 and  at age 79.  Father had lung cancer.      Past Medical History:   Diagnosis Date    Abnormal Pap smear     Asthma     Depression 3/17/2016    Environmental and seasonal allergies 3/17/2016    Heartburn 3/17/2016    HLD (hyperlipidemia) 2016    ASCVD 2018 6.3%; can't tolerate statins     Menopausal syndrome (hot flashes) 3/17/2016    Mild intermittent asthma without complication 3/17/2016    Obesity (BMI 30-39.9) 3/17/2016    Sciatica 3/17/2016     Social History     Socioeconomic History    Marital status:      Spouse name: Not on file    Number of children: Not on file    Years of education: Not on file    Highest education level: Not on file   Occupational History    Not on file   Social Needs    Financial resource strain: Not on file    Food insecurity:     Worry: Not on file     Inability: Not on file    Transportation needs:     Medical: Not on file     Non-medical: Not on file   Tobacco Use    Smoking status: Current Some Day Smoker     Types: Cigarettes    Smokeless tobacco: Never Used   Substance and Sexual Activity    Alcohol use: Yes     Alcohol/week: 1.0 standard drinks     Types: 1 Glasses of wine per week    Drug use: No    Sexual activity: Yes     Partners: Male     Birth control/protection: None   Lifestyle    Physical activity:     Days per week: Not on file     Minutes per session: Not on file    Stress: Not on file   Relationships    Social connections:     Talks on phone: Not on file     Gets together: Not on file     Attends Scientologist service: Not on file     Active member of club or organization: Not on file     Attends meetings of clubs or organizations: Not on file     Relationship status: Not on file   Other Topics Concern    Not on file   Social History Narrative    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  Denies any chest pain or shortness of breath.      Objective:     Vitals:    11/01/19 1306   BP: 139/62   Pulse: 73   Temp: 98.4 °F (36.9 °C)      Physical Exam:  HEENT: Normocephalic, atraumatic.    General: alert and oriented; tired and sad;no acute distress.  Breast: No masses or tenderness bilaterally.  No nipple  discharge, nipple inversion, or skin changes bilaterally.  Lymph: No adjacent axillary lymphadenopathy bilaterally. No cervical or supraclavicular lymphadenopathy.      Imaging, 12/17/18 Bilateral Screening Mammogram: BIRADS 1, negative.    Assessment:     This is a 58 y.o. female with an increased risk of breast cancer based on Tyrer Cuzick score of 25.8%.      Plan:   The patient's estimated lifetime risk of Breast Cancer (to age 85) based on the Tyrer-Cuzick 7 risk assessment model is 25.8 %.  According to the American Cancer Society, patients with a lifetime breast cancer risk of 20% or higher might benefit from supplemental screening tests.    We discussed that she would benefit from annual MRI's in addition to yearly mammograms, alternating imaging every 6 months until age 76.  The pros and cons of MRI screening were presented.  I also recommended two physical exams per year, one can be with her OB/GYN.  Risk reduction options with chemoprevention such as Tamoxifen or Raloxifene were discussed.  These have been shown to lower the risk of breast cancer incidence, however there is no survival benefit in patients who don't have breast cancer.  I explained the most common side effects and risks including hot flashes, thromboembolism, stroke, endometrial cancer, weight changes, and pain.   We also discussed modifiable measures that affect breast cancer risk including diet, exercise, avoiding obesity, and limiting alcohol intake. I recommended at least 30 minutes of cardiovascular exercise 4-5 times per week.  Exercise can lower the relative risk of breast cancer by ~18-20%.  We also discussed that obesity is linked to a higher risk of breast cancer; therefore, exercise is very important.  I recommended proper nutrition with fresh fruits and vegetables and lean meats and avoidance of processed foods.  Non-modifiable risk factors were also discussed such as age at menarche, age at menopause, family history, and age  at first pregnancy.  We did discuss hormone replacement therapy as well.  In patients at increased risk, I usually do not recommend HRT for long periods of time.      Patient is requesting an MRI of the breast soon rather than in 6 months because she has met her deductible.  There was nothing concerning on clinical exam today, and she can follow up with me in 6 months.  She is already scheduled for a screening mammogram on 12/18/19.      Sandrita Reece PA-C

## 2019-11-25 DIAGNOSIS — M54.30 SCIATICA, UNSPECIFIED LATERALITY: ICD-10-CM

## 2019-11-25 RX ORDER — IBUPROFEN 800 MG/1
800 TABLET ORAL EVERY 8 HOURS PRN
Qty: 90 TABLET | Refills: 0 | Status: SHIPPED | OUTPATIENT
Start: 2019-11-25 | End: 2019-12-26

## 2019-11-25 RX ORDER — BUPROPION HYDROCHLORIDE 300 MG/1
TABLET ORAL
Qty: 30 TABLET | Refills: 0 | Status: SHIPPED | OUTPATIENT
Start: 2019-11-25 | End: 2019-12-17 | Stop reason: SDUPTHER

## 2019-12-17 ENCOUNTER — TELEPHONE (OUTPATIENT)
Dept: PAIN MEDICINE | Facility: CLINIC | Age: 59
End: 2019-12-17

## 2019-12-17 ENCOUNTER — OFFICE VISIT (OUTPATIENT)
Dept: PODIATRY | Facility: CLINIC | Age: 59
End: 2019-12-17
Payer: COMMERCIAL

## 2019-12-17 ENCOUNTER — IMMUNIZATION (OUTPATIENT)
Dept: PHARMACY | Facility: CLINIC | Age: 59
End: 2019-12-17
Payer: COMMERCIAL

## 2019-12-17 ENCOUNTER — OFFICE VISIT (OUTPATIENT)
Dept: INTERNAL MEDICINE | Facility: CLINIC | Age: 59
End: 2019-12-17
Attending: INTERNAL MEDICINE
Payer: COMMERCIAL

## 2019-12-17 VITALS
BODY MASS INDEX: 31.96 KG/M2 | DIASTOLIC BLOOD PRESSURE: 70 MMHG | WEIGHT: 198.88 LBS | OXYGEN SATURATION: 97 % | HEIGHT: 66 IN | SYSTOLIC BLOOD PRESSURE: 116 MMHG | HEART RATE: 69 BPM

## 2019-12-17 VITALS
HEART RATE: 82 BPM | WEIGHT: 198 LBS | HEIGHT: 66 IN | SYSTOLIC BLOOD PRESSURE: 155 MMHG | BODY MASS INDEX: 31.82 KG/M2 | DIASTOLIC BLOOD PRESSURE: 69 MMHG

## 2019-12-17 DIAGNOSIS — M21.42 PES PLANUS OF BOTH FEET: ICD-10-CM

## 2019-12-17 DIAGNOSIS — I73.9 CLAUDICATION OF LEFT LOWER EXTREMITY: ICD-10-CM

## 2019-12-17 DIAGNOSIS — Z12.11 COLON CANCER SCREENING: ICD-10-CM

## 2019-12-17 DIAGNOSIS — M54.16 LUMBAR RADICULOPATHY: ICD-10-CM

## 2019-12-17 DIAGNOSIS — F32.A DEPRESSION, UNSPECIFIED DEPRESSION TYPE: ICD-10-CM

## 2019-12-17 DIAGNOSIS — M51.36 DDD (DEGENERATIVE DISC DISEASE), LUMBAR: ICD-10-CM

## 2019-12-17 DIAGNOSIS — M79.605 LEFT LEG PAIN: ICD-10-CM

## 2019-12-17 DIAGNOSIS — M79.671 BILATERAL FOOT PAIN: Primary | ICD-10-CM

## 2019-12-17 DIAGNOSIS — L60.9: ICD-10-CM

## 2019-12-17 DIAGNOSIS — R20.9 ABNORMAL SENSATION OF LEG: ICD-10-CM

## 2019-12-17 DIAGNOSIS — E78.5 HYPERLIPIDEMIA, UNSPECIFIED HYPERLIPIDEMIA TYPE: ICD-10-CM

## 2019-12-17 DIAGNOSIS — M79.672 BILATERAL FOOT PAIN: Primary | ICD-10-CM

## 2019-12-17 DIAGNOSIS — M21.41 PES PLANUS OF BOTH FEET: ICD-10-CM

## 2019-12-17 DIAGNOSIS — E55.9 VITAMIN D DEFICIENCY: ICD-10-CM

## 2019-12-17 DIAGNOSIS — J45.20 MILD INTERMITTENT ASTHMA WITHOUT COMPLICATION: ICD-10-CM

## 2019-12-17 DIAGNOSIS — L84 CORN OR CALLUS: ICD-10-CM

## 2019-12-17 DIAGNOSIS — Z00.00 ANNUAL PHYSICAL EXAM: Primary | ICD-10-CM

## 2019-12-17 DIAGNOSIS — B35.1 DERMATOPHYTOSIS OF NAIL: ICD-10-CM

## 2019-12-17 DIAGNOSIS — I73.9 PAD (PERIPHERAL ARTERY DISEASE): ICD-10-CM

## 2019-12-17 PROCEDURE — 3008F PR BODY MASS INDEX (BMI) DOCUMENTED: ICD-10-PCS | Mod: CPTII,S$GLB,, | Performed by: PODIATRIST

## 2019-12-17 PROCEDURE — 99999 PR PBB SHADOW E&M-EST. PATIENT-LVL III: ICD-10-PCS | Mod: PBBFAC,,, | Performed by: PODIATRIST

## 2019-12-17 PROCEDURE — 99396 PREV VISIT EST AGE 40-64: CPT | Mod: S$GLB,,, | Performed by: INTERNAL MEDICINE

## 2019-12-17 PROCEDURE — 99999 PR PBB SHADOW E&M-EST. PATIENT-LVL IV: CPT | Mod: PBBFAC,,, | Performed by: INTERNAL MEDICINE

## 2019-12-17 PROCEDURE — 11719 ROUTINE FOOT CARE: ICD-10-PCS | Mod: 59,S$GLB,, | Performed by: PODIATRIST

## 2019-12-17 PROCEDURE — 11055 PARING/CUTG B9 HYPRKER LES 1: CPT | Mod: S$GLB,,, | Performed by: PODIATRIST

## 2019-12-17 PROCEDURE — 11055 ROUTINE FOOT CARE: ICD-10-PCS | Mod: S$GLB,,, | Performed by: PODIATRIST

## 2019-12-17 PROCEDURE — 3008F BODY MASS INDEX DOCD: CPT | Mod: CPTII,S$GLB,, | Performed by: PODIATRIST

## 2019-12-17 PROCEDURE — 99203 OFFICE O/P NEW LOW 30 MIN: CPT | Mod: 25,S$GLB,, | Performed by: PODIATRIST

## 2019-12-17 PROCEDURE — 11719 TRIM NAIL(S) ANY NUMBER: CPT | Mod: 59,S$GLB,, | Performed by: PODIATRIST

## 2019-12-17 PROCEDURE — 99999 PR PBB SHADOW E&M-EST. PATIENT-LVL III: CPT | Mod: PBBFAC,,, | Performed by: PODIATRIST

## 2019-12-17 PROCEDURE — 87106 FUNGI IDENTIFICATION YEAST: CPT

## 2019-12-17 PROCEDURE — 87102 FUNGUS ISOLATION CULTURE: CPT

## 2019-12-17 PROCEDURE — 99203 PR OFFICE/OUTPT VISIT, NEW, LEVL III, 30-44 MIN: ICD-10-PCS | Mod: 25,S$GLB,, | Performed by: PODIATRIST

## 2019-12-17 PROCEDURE — 99999 PR PBB SHADOW E&M-EST. PATIENT-LVL IV: ICD-10-PCS | Mod: PBBFAC,,, | Performed by: INTERNAL MEDICINE

## 2019-12-17 PROCEDURE — 99396 PR PREVENTIVE VISIT,EST,40-64: ICD-10-PCS | Mod: S$GLB,,, | Performed by: INTERNAL MEDICINE

## 2019-12-17 RX ORDER — GABAPENTIN 100 MG/1
100 CAPSULE ORAL NIGHTLY
Qty: 90 CAPSULE | Refills: 3 | Status: SHIPPED | OUTPATIENT
Start: 2019-12-17 | End: 2021-08-04 | Stop reason: ALTCHOICE

## 2019-12-17 RX ORDER — ROSUVASTATIN CALCIUM 5 MG/1
5 TABLET, COATED ORAL DAILY
Qty: 90 TABLET | Refills: 3 | Status: SHIPPED | OUTPATIENT
Start: 2019-12-17 | End: 2020-01-21

## 2019-12-17 RX ORDER — BUPROPION HYDROCHLORIDE 150 MG/1
150 TABLET ORAL DAILY
Refills: 0 | COMMUNITY
Start: 2019-10-15 | End: 2019-12-17 | Stop reason: SDUPTHER

## 2019-12-17 RX ORDER — TOBRAMYCIN AND DEXAMETHASONE 3; 1 MG/ML; MG/ML
SUSPENSION/ DROPS OPHTHALMIC
Refills: 0 | COMMUNITY
Start: 2019-11-07 | End: 2020-12-03

## 2019-12-17 RX ORDER — BUPROPION HYDROCHLORIDE 150 MG/1
150 TABLET ORAL DAILY
Qty: 90 TABLET | Refills: 1 | Status: SHIPPED | OUTPATIENT
Start: 2019-12-17 | End: 2020-04-30 | Stop reason: SDUPTHER

## 2019-12-17 RX ORDER — ALBUTEROL SULFATE 90 UG/1
2 AEROSOL, METERED RESPIRATORY (INHALATION) EVERY 6 HOURS PRN
Qty: 3 INHALER | Refills: 3 | Status: SHIPPED | OUTPATIENT
Start: 2019-12-17 | End: 2020-12-21

## 2019-12-17 RX ORDER — BUPROPION HYDROCHLORIDE 300 MG/1
300 TABLET ORAL DAILY
Qty: 90 TABLET | Refills: 1 | Status: SHIPPED | OUTPATIENT
Start: 2019-12-17 | End: 2020-04-30 | Stop reason: SDUPTHER

## 2019-12-17 NOTE — PATIENT INSTRUCTIONS
In the breast clinic - Dr Brady    Plan on alternating screening - MRI, then mammogram 6 months later, then MRI 6 months after that    The test we are doing to check circulation is called an Ankle-Brachial Index

## 2019-12-17 NOTE — PROGRESS NOTES
Chief Complaint   Patient presents with    Foot Pain     Bilateral     Nail Problem     Fungus Nails           HPI:   Amita Lewis is a 59 y.o. female who presents to clinic with complaints of ?fungal toenails.  Patient states nails have been abnormal since months and gradually worsening over time. Pt has tried tea tree oil without clearing of nails. Pt requests treatment options.   Also has painful callus and elongated toenails that she would like trimmed.  She also has custom foot orthotics, about 10 years old.  Needs new ones.           Patient Active Problem List   Diagnosis    Obesity (BMI 30-39.9)    Vitamin D deficiency    Heartburn    Depression    Menopausal syndrome (hot flashes)    Mild intermittent asthma without complication    Sciatica    Environmental and seasonal allergies    HLD (hyperlipidemia)    Candidal skin infection    Myalgia    Sacroiliac joint pain    Cat bite    Tonsil stone    Lumbar radiculopathy    DDD (degenerative disc disease), lumbar           Current Outpatient Medications on File Prior to Visit   Medication Sig Dispense Refill    albuterol (PROVENTIL/VENTOLIN HFA) 90 mcg/actuation inhaler Inhale 2 puffs into the lungs every 6 (six) hours as needed for Wheezing or Shortness of Breath. Rescue 3 Inhaler 3    ASPIRIN (ASPIR-81 ORAL) Take 1 tablet by mouth once daily.      boric acid (BORIC ACID) vaginal suppository Place 650 mg vaginally.      buPROPion (WELLBUTRIN XL) 150 MG TB24 tablet Take 1 tablet (150 mg total) by mouth once daily. Plus 300mg tablet for total dose of 450mg daily 90 tablet 1    buPROPion (WELLBUTRIN XL) 300 MG 24 hr tablet Take 1 tablet (300 mg total) by mouth once daily. Plus 150mg tablet for total dose of 450mg daily 90 tablet 1    ECHINACEA 1X ORAL       estradiol (ESTRACE) 0.01 % (0.1 mg/gram) vaginal cream Use 1/2 gram twice weekly 42.5 g 10    fexofenadine (ALLEGRA) 180 MG tablet Take 1 tablet (180 mg total) by mouth once  daily. 90 tablet 3    FLAXSEED ORAL Take by mouth.      fluticasone (FLONASE) 50 mcg/actuation nasal spray 1 spray by Each Nare route 2 (two) times daily as needed for Rhinitis or Allergies. 1 Bottle 6    gabapentin (NEURONTIN) 100 MG capsule Take 1 capsule (100 mg total) by mouth every evening. For back pain 90 capsule 3    ibuprofen (ADVIL,MOTRIN) 800 MG tablet Take 1 tablet (800 mg total) by mouth every 8 (eight) hours as needed for Pain. Take with food. 90 tablet 0    LACTOBAC NO.41/BIFIDOBACT NO.7 (PROBIOTIC-10 ORAL) Take by mouth.      multivitamin (ONE DAILY MULTIVITAMIN) per tablet Take 1 tablet by mouth every evening.       nystatin (MYCOSTATIN) powder Apply to affected areas of skin twice daily as needed for skin infection.  Re treat as needed. 30 g 3    rosuvastatin (CRESTOR) 5 MG tablet Take 1 tablet (5 mg total) by mouth once daily. 90 tablet 3    sumatriptan (IMITREX) 50 MG tablet Take 1 tab by mouth as needed for migraine headache.  Repeat dose every 2 hours as needed.  Max 200mg in 24 hours. 30 tablet 1    tobramycin-dexamethasone 0.3-0.1% (TOBRADEX) 0.3-0.1 % DrpS   0    [DISCONTINUED] albuterol (PROVENTIL/VENTOLIN HFA) 90 mcg/actuation inhaler ProAir HFA 90 mcg/actuation aerosol inhaler   INHALE ONE TO TWO PUFFS PO INTO THE LUNGS Q 4 H PRF WHEEZING OR SOB      [DISCONTINUED] buPROPion (WELLBUTRIN XL) 150 MG TB24 tablet Take 150 mg by mouth once daily.   0    [DISCONTINUED] buPROPion (WELLBUTRIN XL) 300 MG 24 hr tablet Take 1 tablet (300 mg total) by mouth every morning. 90 tablet 3    [DISCONTINUED] buPROPion (WELLBUTRIN XL) 300 MG 24 hr tablet bupropion HCl  mg 24 hr tablet, extended release  TK 1 T PO QAM 30 tablet 0    [DISCONTINUED] fexofenadine (ALLEGRA) 180 MG tablet fexofenadine 180 mg tablet   TK 1 T PO ONCE D      [DISCONTINUED] flu vaccine ts 2016-17,4yr up, 45 mcg (15 mcg x 3)/0.5 mL Susp Fluvirin [id number] 45 mcg (15 mcg x 3)/0.5 mL intramuscular suspension   ADM  0.5ML IM UTD      [DISCONTINUED] gabapentin (NEURONTIN) 100 MG capsule Take 1 capsule (100 mg total) by mouth every evening. 30 capsule 11    [DISCONTINUED] PROAIR HFA 90 mcg/actuation inhaler Inhale 1-2 puffs into the lungs every 6 (six) hours as needed for Wheezing or Shortness of Breath (chest tightness). Rescue 18 g 3    [DISCONTINUED] rosuvastatin (CRESTOR) 5 MG tablet Take 1 tablet (5 mg total) by mouth once daily. 30 tablet 11     No current facility-administered medications on file prior to visit.             Review of patient's allergies indicates:  No Known Allergies        Social History     Socioeconomic History    Marital status:      Spouse name: Not on file    Number of children: Not on file    Years of education: Not on file    Highest education level: Not on file   Occupational History    Not on file   Social Needs    Financial resource strain: Not hard at all    Food insecurity:     Worry: Never true     Inability: Never true    Transportation needs:     Medical: No     Non-medical: No   Tobacco Use    Smoking status: Former Smoker     Types: Cigarettes     Last attempt to quit: 10/1/2019     Years since quittin.2    Smokeless tobacco: Never Used   Substance and Sexual Activity    Alcohol use: Yes     Alcohol/week: 1.0 standard drinks     Types: 1 Glasses of wine per week     Frequency: Monthly or less     Drinks per session: 1 or 2     Binge frequency: Never    Drug use: No    Sexual activity: Yes     Partners: Male     Birth control/protection: None   Lifestyle    Physical activity:     Days per week: 1 day     Minutes per session: 20 min    Stress: Only a little   Relationships    Social connections:     Talks on phone: Once a week     Gets together: Once a week     Attends Anglican service: Not on file     Active member of club or organization: No     Attends meetings of clubs or organizations: Never     Relationship status:    Other Topics Concern    Not  "on file   Social History Narrative    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  no itching.       EXAM:   Vitals:    12/17/19 1531   BP: (!) 155/69   Pulse: 82   Weight: 89.8 kg (198 lb)   Height: 5' 6" (1.676 m)        General:  alert, no distress, cooperative    Vasc:    Pedal pulses: DP 2/4 - B/L and PT 2/4 - B/L  Capillary Refill Time: Normal - B/L  Temperature: Cool - B/L  Hair Growth: Decreased - B/L  Edema:  Absent bilaterally      Neuro Motor:   [place] present bilaterally,   Reflexes normal bilaterally,   Tinels absent  Mulders absent      Derm:   Whitish -yellow toenail bilateral hallux with scant subungual debris at the distal corners.  No paronychia  No bruising  Callus right 5th toe medial aspect. No petechiae.         Msk:    tenderness absent bilaterally, masses absent bilaterally, range of movement non-painful and equal, crepitation absent bilaterally, strength normal bilaterally and gait normal          ASSESSMENT / PLAN:     Problem List Items Addressed This Visit     None      Visit Diagnoses     Bilateral foot pain    -  Primary    Relevant Orders    ORTHOTIC DEVICE (DME)    Corn or callus        Dermatophytosis of nail        Relevant Medications    efinaconazole (JUBLIA) 10 % Shelley    Other Relevant Orders    Fungus culture    Pes planus of both feet        Relevant Orders    ORTHOTIC DEVICE (DME)            · I counseled the patient on the patient's conditions, their implications and medical management.   Fungal culture today - if culture positive, start therapy.    Nail Unit Biospy:  With patient's permission,  The left hallux toenail was prepped with alcohol swab and a " sterile nail clipper was used to obtain a toenail specimen, which was placed in a sterile specimen container and appropriately labelled.  Patient tolerated well and no blood was drawn.   Specimen will be sent for  fungus culture.   See orders above.       Routine Foot Care  Date/Time: 12/17/2019 3:30 PM  Performed by: Leslie Trejo DPM  Authorized by: Leslie Trejo DPM     Consent Done?:  Yes (Verbal)  Hyperkeratotic Skin Lesions?: Yes    Number of trimmed lesions:  1  Location(s):  Right 5th Toe    Nail Care Type:  Trim  Location(s): All  (Left 1st Toe, Left 3rd Toe, Left 2nd Toe, Left 4th Toe, Left 5th Toe, Right 1st Toe, Right 2nd Toe, Right 3rd Toe, Right 4th Toe and Right 5th Toe)  Patient tolerance:  Patient tolerated the procedure well with no immediate complications     With patient's permission, the toenails mentioned above were aggressively reduced and debrided using a nail nipper, removing all offending nail and debris. Utilizing a #15 scalpel, I trimmed the corns and calluses at the above mentioned location.      The patient will continue to monitor the areas daily, inspect the feet, wear protective shoe gear when ambulatory, and moisturizer to maintain skin integrity.

## 2019-12-17 NOTE — Clinical Note
Patient referred to Vascular Med for follow up on abnormal KRISTOPHER testing / peripheral artery disease. Please contact her to help schedule for appt as soon as possible -- can schedule with Dr Lowery if possible.

## 2019-12-17 NOTE — PROGRESS NOTES
Subjective:       Patient ID: Amita Lewis is a 59 y.o. female.    Chief Complaint: Annual Exam    HPI  60 y/o woman with asthma, allergies, DDD/back pain, HLD, vitamin D deficiency, depression, family history of breast cancer here to establish care; due for annual exam.  Previously followed with Dr Cope.    Changes in both thumbnails - noticed sometime in the fall - soft / splitting at base of nailbed. Takes with women's MVI with vitamin D + calcium  Seeing podiatrist today as well for onychomycosis / nail changes  Follows with dermatologist Dr Rita Savage, hasn't checked in with her about her nails.    Back pain, chronic - h/o lumbar disc disease  Has seen spine clinic in the past, last 4/2019, referred to PT at that time but hasn't yet been able to go. Going back for follow up with this tomorrow.  Left leg goes numb and gets cold when she lies down to go to sleep  Improves with standing up. Never happens when sitting, standing, walking.  Does report pain in L calf when walking, only able to walk about a block or so before having to rest.   Tried gabapentin for this which hasn't helped with the numbness but has helped with the back pain  Takes ibuprofen 800mg once at night usually, takes this up to TID for a flare of back pain    Asthma - albuterol, not using often  Allergies - allegra, fluticasone    HLD - on rosuvastatin    Vitamin D deficiency - on supplement    Depression - on bupropion 450mg (300 + 150mg), stable on this dose. Significant stressors recently with partner's health issues    Family history of breast cancer - Follows with breast high-risk clinic, seen 11/2019. TC score 25.8%. Mother with breast cancer.  Mammogram scheduled for tomorrow    Follows with Dr Matias for Gyn    Colon cancer screening - FIT ordered in the past but not done  Has had zostavax, not yet shingrix    Review of Systems   Constitutional: Positive for activity change. Negative for unexpected weight change.    HENT: Negative for hearing loss, rhinorrhea and trouble swallowing.    Eyes: Positive for visual disturbance. Negative for discharge.   Respiratory: Negative for chest tightness and wheezing.    Cardiovascular: Positive for palpitations (intermittent, no associated symptoms). Negative for chest pain and leg swelling.   Gastrointestinal: Negative for blood in stool, constipation, diarrhea and vomiting.   Endocrine: Negative for polydipsia and polyuria.   Genitourinary: Negative for difficulty urinating, dysuria, hematuria and menstrual problem.   Musculoskeletal: Positive for neck pain. Negative for arthralgias and joint swelling. Myalgias: pain if calf.   Neurological: Negative for weakness and headaches.   Psychiatric/Behavioral: Positive for dysphoric mood. Negative for confusion.        Significant stress recently         Past Medical History:   Diagnosis Date    Abnormal Pap smear     Asthma     Depression 3/17/2016    Environmental and seasonal allergies 3/17/2016    Heartburn 3/17/2016    HLD (hyperlipidemia) 03/22/2016    ASCVD 2018 6.3%; can't tolerate statins    Menopausal syndrome (hot flashes) 3/17/2016    Mild intermittent asthma without complication 3/17/2016    Obesity (BMI 30-39.9) 3/17/2016    Sciatica 3/17/2016     Past Surgical History:   Procedure Laterality Date    CERVICAL BIOPSY  W/ LOOP ELECTRODE EXCISION      INJECTION OF JOINT Bilateral 5/31/2018    Procedure: INJECTION-JOINT;  Surgeon: Lynette Rowley MD;  Location: [facility];  Service: Pain Management;  Laterality: Bilateral;  B/L SI Joint Injs  50033, [id number]    INJECTION OF JOINT Bilateral 9/18/2018    Procedure: INJECTION, JOINT  Bilateral SI joint;  Surgeon: Lynette Rowley MD;  Location: [facility];  Service: Pain Management;  Laterality: Bilateral;    microdiscetomy      L4-L5    OVARIAN CYST REMOVAL  2001    TRANSFORAMINAL EPIDURAL INJECTION OF STEROID Left 3/21/2019    Procedure: INJECTION, STEROID,  "EPIDURAL, TRANSFORAMINAL APPROACH, L4 AND L5;  Surgeon: Lynette Rwoley MD;  Location: [facility];  Service: Pain Management;  Laterality: Left;     Family History   Problem Relation Age of Onset    Cancer Father         lung; non smoker, worked in [employer] and gas station    Alcohol abuse Father     Early death Father     Breast cancer Mother     Heart failure Mother     Diabetes Mellitus Mother         ?secondary to chronic steroids    Diabetes Mother     Heart disease Mother     Alcohol abuse Brother     Ovarian cancer Neg Hx     Hypertension Neg Hx        Social History     Tobacco Use    Smoking status: Current Some Day Smoker     Packs/day: 0.00     Years: 0.00     Pack years: 0.00     Types: Cigarettes     Last attempt to quit: 10/1/2019     Years since quittin.2    Smokeless tobacco: Never Used   Substance Use Topics    Alcohol use: Yes     Alcohol/week: 1.0 standard drinks     Types: 1 Glasses of wine per week     Frequency: Monthly or less     Drinks per session: 1 or 2     Binge frequency: Never     Comment: 3 x a month    Drug use: No       Medications and allergies reviewed.     Objective:          Vitals:    19 0819   BP: 116/70   BP Location: Left arm   Patient Position: Sitting   BP Method: Large (Manual)   Pulse: 69   SpO2: 97%   Weight: 90.2 kg (198 lb 13.7 oz)   Height: 5' 6" (1.676 m)     Body mass index is 32.1 kg/m².  Physical Exam   Constitutional: She is oriented to person, place, and time. She appears well-developed and well-nourished. No distress.   HENT:   Head: Normocephalic and atraumatic.   Mouth/Throat: Oropharynx is clear and moist.   Eyes: Pupils are equal, round, and reactive to light. Conjunctivae and EOM are normal.   Neck: Neck supple.   Cardiovascular: Normal rate, regular rhythm and normal heart sounds.   No murmur heard.  DP pulses palpable but faint b/l, not able to palpate PT pulse    Pulmonary/Chest: Effort normal and breath sounds normal. No " respiratory distress.   Abdominal: Soft. Bowel sounds are normal. She exhibits no distension. There is no tenderness.   Musculoskeletal: She exhibits no edema or tenderness (no calf swelling or tenderness).   ROM of back/spine somewhat limited   Neurological: She is alert and oriented to person, place, and time. No cranial nerve deficit or sensory deficit.   Skin: Skin is warm and dry. Capillary refill takes 2 to 3 seconds.   Psychiatric: She has a normal mood and affect. Her behavior is normal.   Vitals reviewed.  +callus of R foot  +onychomycosis    Lab Results   Component Value Date    WBC 6.53 11/20/2018    HGB 12.4 11/20/2018    HCT 39.2 11/20/2018     11/20/2018    CHOL 264 (H) 11/12/2018    TRIG 153 (H) 11/12/2018    HDL 43 11/12/2018    ALT 19 11/12/2018    AST 22 11/12/2018     11/20/2018    K 4.1 11/20/2018     11/20/2018    CREATININE 0.8 11/20/2018    BUN 14 11/20/2018    CO2 28 11/20/2018    HGBA1C 5.3 11/12/2018       Assessment:       1. Annual physical exam    2. DDD (degenerative disc disease), lumbar    3. Depression, unspecified depression type    4. Mild intermittent asthma without complication    5. Lumbar radiculopathy    6. Abnormal sensation of leg    7. Hyperlipidemia, unspecified hyperlipidemia type    8. Left leg pain    9. Claudication of left lower extremity    10. Vitamin D deficiency    11. Dysmorphic fingernail, acquired    12. Colon cancer screening        Plan:   Amita was seen today for annual exam.    Diagnoses and all orders for this visit:    Annual physical exam    DDD (degenerative disc disease), lumbar  -     gabapentin (NEURONTIN) 100 MG capsule; Take 1 capsule (100 mg total) by mouth every evening. For back pain    Depression, unspecified depression type  -     buPROPion (WELLBUTRIN XL) 150 MG TB24 tablet; Take 1 tablet (150 mg total) by mouth once daily. Plus 300mg tablet for total dose of 450mg daily  -     buPROPion (WELLBUTRIN XL) 300 MG 24 hr  tablet; Take 1 tablet (300 mg total) by mouth once daily. Plus 150mg tablet for total dose of 450mg daily  -     CBC auto differential; Future  -     Comprehensive metabolic panel; Future  -     TSH; Future    Mild intermittent asthma without complication  -     albuterol (PROVENTIL/VENTOLIN HFA) 90 mcg/actuation inhaler; Inhale 2 puffs into the lungs every 6 (six) hours as needed for Wheezing or Shortness of Breath. Rescue    Lumbar radiculopathy  -     gabapentin (NEURONTIN) 100 MG capsule; Take 1 capsule (100 mg total) by mouth every evening. For back pain  -     CV Exercise KRISTOPHER; Future    Abnormal sensation of leg  -     gabapentin (NEURONTIN) 100 MG capsule; Take 1 capsule (100 mg total) by mouth every evening. For back pain    Hyperlipidemia, unspecified hyperlipidemia type  -     rosuvastatin (CRESTOR) 5 MG tablet; Take 1 tablet (5 mg total) by mouth once daily.  -     Lipid panel; Future    Left leg pain  -     CV Exercise KRISTOPHER; Future    Claudication of left lower extremity  -     CV Exercise KRISTOPHER; Future  -     Comprehensive metabolic panel; Future  -     Lipid panel; Future    Vitamin D deficiency  -     Vitamin D; Future    Dysmorphic fingernail, acquired  -     Vitamin D; Future  -     Vitamin B12; Future  -     TSH; Future    Colon cancer screening  -     Fecal Immunochemical Test (iFOBT); Future      Given progressive symptoms in leg now with claudication symptoms when walking, proceed with ABIs to evaluate for PAD vs neurogenic claudication  Follow up with pain management clinic    Health maintenance reviewed with patient.   Fasting labs today  Flu shot today  Scheduling for KRISTOPHER testing  Reviewed breast cancer screening plans    Follow up in about 4 months (around 4/17/2020) for coordination of care / follow up.    Kirk Elizabeth MD  Internal Medicine  [facility]  12/17/2019    On review of KRISTOPHER testing after visit, noted to have severe PAD on left, mod-severe PAD on  right. Referring to Vascular specialist, message sent with information. Should continue ASA, statin.

## 2019-12-17 NOTE — TELEPHONE ENCOUNTER
My name is Staff, I am contacting you from [facility] regarding your appointment scheduled for 12.18.19, with JACKIE, just confirming you will be able to make it.    If you feel you need to reschedule or canceled please give our office a call so we can better assist you.      Staff requesting patient to arrive 15 mins ahead of schedule appointment time.    Pt verbalized understanding and has confirmed appt

## 2019-12-18 ENCOUNTER — OFFICE VISIT (OUTPATIENT)
Dept: PAIN MEDICINE | Facility: CLINIC | Age: 59
End: 2019-12-18
Payer: COMMERCIAL

## 2019-12-18 ENCOUNTER — HOSPITAL ENCOUNTER (OUTPATIENT)
Dept: RADIOLOGY | Facility: OTHER | Age: 59
Discharge: HOME | End: 2019-12-18
Attending: PHYSICIAN ASSISTANT
Payer: COMMERCIAL

## 2019-12-18 ENCOUNTER — OFFICE VISIT (OUTPATIENT)
Dept: OBSTETRICS AND GYNECOLOGY | Facility: CLINIC | Age: 59
End: 2019-12-18
Attending: OBSTETRICS & GYNECOLOGY
Payer: COMMERCIAL

## 2019-12-18 VITALS
RESPIRATION RATE: 18 BRPM | BODY MASS INDEX: 31.94 KG/M2 | TEMPERATURE: 98 F | HEART RATE: 80 BPM | OXYGEN SATURATION: 100 % | DIASTOLIC BLOOD PRESSURE: 76 MMHG | HEIGHT: 67 IN | WEIGHT: 203.5 LBS | SYSTOLIC BLOOD PRESSURE: 155 MMHG

## 2019-12-18 VITALS
SYSTOLIC BLOOD PRESSURE: 120 MMHG | HEIGHT: 67 IN | WEIGHT: 203.5 LBS | DIASTOLIC BLOOD PRESSURE: 76 MMHG | BODY MASS INDEX: 31.94 KG/M2

## 2019-12-18 DIAGNOSIS — M54.16 LUMBAR RADICULOPATHY: ICD-10-CM

## 2019-12-18 DIAGNOSIS — R29.818 NEUROGENIC CLAUDICATION: ICD-10-CM

## 2019-12-18 DIAGNOSIS — M51.36 DDD (DEGENERATIVE DISC DISEASE), LUMBAR: ICD-10-CM

## 2019-12-18 DIAGNOSIS — Z91.89 INCREASED RISK OF BREAST CANCER: ICD-10-CM

## 2019-12-18 DIAGNOSIS — Z01.419 WELL WOMAN EXAM: Primary | ICD-10-CM

## 2019-12-18 DIAGNOSIS — M79.10 MYALGIA: ICD-10-CM

## 2019-12-18 DIAGNOSIS — M47.816 LUMBAR SPONDYLOSIS: ICD-10-CM

## 2019-12-18 DIAGNOSIS — M46.1 BILATERAL SACROILIITIS: Primary | ICD-10-CM

## 2019-12-18 DIAGNOSIS — Z91.89 AT HIGH RISK FOR BREAST CANCER: ICD-10-CM

## 2019-12-18 PROCEDURE — 77049 MRI BREAST C-+ W/CAD BI: CPT | Mod: 26,,, | Performed by: RADIOLOGY

## 2019-12-18 PROCEDURE — 3008F BODY MASS INDEX DOCD: CPT | Mod: CPTII,S$GLB,, | Performed by: NURSE PRACTITIONER

## 2019-12-18 PROCEDURE — 99213 PR OFFICE/OUTPT VISIT, EST, LEVL III, 20-29 MIN: ICD-10-PCS | Mod: S$GLB,,, | Performed by: NURSE PRACTITIONER

## 2019-12-18 PROCEDURE — 99999 PR PBB SHADOW E&M-EST. PATIENT-LVL III: CPT | Mod: PBBFAC,,, | Performed by: OBSTETRICS & GYNECOLOGY

## 2019-12-18 PROCEDURE — 99213 OFFICE O/P EST LOW 20 MIN: CPT | Mod: S$GLB,,, | Performed by: NURSE PRACTITIONER

## 2019-12-18 PROCEDURE — 77049 MRI BREAST W/WO CONTRAST, W/CAD, BILATERAL: ICD-10-PCS | Mod: 26,,, | Performed by: RADIOLOGY

## 2019-12-18 PROCEDURE — 88175 CYTOPATH C/V AUTO FLUID REDO: CPT

## 2019-12-18 PROCEDURE — 99999 PR PBB SHADOW E&M-EST. PATIENT-LVL III: ICD-10-PCS | Mod: PBBFAC,,, | Performed by: OBSTETRICS & GYNECOLOGY

## 2019-12-18 PROCEDURE — 99999 PR PBB SHADOW E&M-EST. PATIENT-LVL IV: ICD-10-PCS | Mod: PBBFAC,,, | Performed by: NURSE PRACTITIONER

## 2019-12-18 PROCEDURE — 25500020 PHARM REV CODE 255: Performed by: PHYSICIAN ASSISTANT

## 2019-12-18 PROCEDURE — 87624 HPV HI-RISK TYP POOLED RSLT: CPT

## 2019-12-18 PROCEDURE — 99396 PREV VISIT EST AGE 40-64: CPT | Mod: S$GLB,,, | Performed by: OBSTETRICS & GYNECOLOGY

## 2019-12-18 PROCEDURE — 99396 PR PREVENTIVE VISIT,EST,40-64: ICD-10-PCS | Mod: S$GLB,,, | Performed by: OBSTETRICS & GYNECOLOGY

## 2019-12-18 PROCEDURE — 77049 MRI BREAST C-+ W/CAD BI: CPT | Mod: TC

## 2019-12-18 PROCEDURE — 99999 PR PBB SHADOW E&M-EST. PATIENT-LVL IV: CPT | Mod: PBBFAC,,, | Performed by: NURSE PRACTITIONER

## 2019-12-18 PROCEDURE — A9577 INJ MULTIHANCE: HCPCS | Performed by: PHYSICIAN ASSISTANT

## 2019-12-18 PROCEDURE — 3008F PR BODY MASS INDEX (BMI) DOCUMENTED: ICD-10-PCS | Mod: CPTII,S$GLB,, | Performed by: NURSE PRACTITIONER

## 2019-12-18 RX ADMIN — GADOBENATE DIMEGLUMINE 18 ML: 529 INJECTION, SOLUTION INTRAVENOUS at 12:12

## 2019-12-18 NOTE — H&P (VIEW-ONLY)
"  Chronic Pain -Follow Up    Referring Physician: No ref. provider found    Chief Complaint:   Chief Complaint   Patient presents with    Low-back Pain        SUBJECTIVE: Disclaimer: This note has been generated using voice-recognition software. There may be typographical errors that have been missed during proof-reading    Interval History 12/18/2019:  The patient returns to clinic today for follow up. Since last visit, her partner has been diagnosed with oral cancer. She has been caring for her and doing multiple trips to [place]. She reports increased low back pain that radiates into both buttocks and down the posterior aspect of her left leg to her foot. She reports increased numbness and coldness with laying down to her calf and top of her foot. Her greatest pain is in her buttocks. She reports increased pain with sitting and walking. She is currently participating in a work up to rule out vascular issues. She denies any other health changes. Her pain today is 6/10.      Interval History 4/4/2019:  The patient returns for procedure follow up.  She is s/p left L4 and L5 TF JILLIAN on 3/21/19 with about 50% relief of left leg pain.  She still has a burning pain to left calf and anterior foot.  Her right sided pain has improved.  Her lower back pain has been mild.  She would like a podiatry referral as previously discussed.  She has some benefit with Ibuprofen PRN.  She continues to remain active and stretches.  Her pain today is 1/10.     Interval History 3/6/2019:  She returns to clinic today for follow up. Reports she had 95% pain relief from bilateral SI joint injections on 9/18/2018 for approximately 4-5 months. She reports new left leg pain that started approximately 1 month ago out of the blue without any inciting event. Pain seems to start at times in her buttocks and radiates down her entire left leg. The pain is described as "crushing" and associated with numbness/tingling in the entire left leg that is " worse in the hamstrings and dorsum of her foot. She also has cramping in her left hamstring at times. Pain is worse with using the stairs and after walking about 1 to 1.5 blocks. Pain better at rest, when leaning forward, and gets limited relief from chiropractor. No cardiovascular history or issues per patient. She is taking Ibuprofen 800mg qhs that provides some relief. Pain is 2/10 at best and 9/10 at worst. Today her pain is 5/10. + generalized weakness/limping due to the pain. No loss of bowel/bladder control or saddle anesthesia. No pain in her RLE.     Interval History 10/5/2018:  The patient returns to clinic today for follow up. She is s/p bilateral SI joint injection on 9/18/2018. She reports 95% relief of her low back and buttock pain. She denies any radiating leg pain today. She continues to participate in physical therapy with benefit. She denies any other health changes. Her pain today is 1/10.    Interval History: 9/5/18:  She states that 3 weeks ago her pain returned after hearing a pop and twisting while getting into her car. She states that pain has returned in the same character and distribution as prior to the bilateral SIJ on 5/31 but may be more severe now with pain going down both legs instead just one. She is hoping to repeat injections. She denies any other health changes in the interim. She takes a baby ASA. She takes ibuprofen nightly for pain.     Interval History 6/14/2018:  The patient returns for follow up.  She is s/p bilateral SI joint injections on 5/31/18 with 100% pain relief.  She has not had any back or buttock pain since the procedure.  She still has some cramping to her calf which she feels is unrelated.  She takes Ibuprofen 800 mg at night with benefit.  She has had benefit with PT exercises and continues to be active.  Her pain today is 0/10.    Interval history 5/15/2018:  Since previous encounter the patient continues to have significant lower back pain over the area of  the sacral iliac joints.  She states it is worse when she first starts to get going and gradually improves as she walks.  She feels as if she is able to walk longer distances after previous epidural steroid injections but overall reports that 10% improvement in her pain.  The previous Medrol Dosepak did not help significant only tramadol was also not helping with her pain.  She has been doing physical therapy and feels as if she is making slow strides but there still certain activities that she cannot do without exacerbating her pain.  No other health changes since previous encounter.    Interval History 4/23/2018  One week ago, sudden worsened back pain when bending forwards with radiation into bilateral thighs and weakness.  Gradually improving, taking ibuprofen, but has been restricting activities secondary to persisting pain in the lower back.    Initial encounter:    Amita Lewis presents to the clinic for the evaluation of back pain. The pain started one year ago  and symptoms have been worsening.    Brief history:    Pain Description:    The pain is located in the back area and radiates to the leg in the L3-4 distribution.  She describes symptoms of neurogenic versus vascular claudication.  Her pain significant worsens after walking for several blocks and gradually improves when sitting down.  She describes her pain symptoms as a radiating pain into her calf and a cramping sensation in her calf.    At BEST  0/10     At WORST  7/10 on the WORST day.      On average pain is rated as 7/10.     Today the pain is rated as 1/10    The pain is described as burning, dull and tight band      Symptoms interfere with daily activity and sleeping.     Exacerbating factors: Walking.      Mitigating factors medications.     Patient denies .  Patient denies any suicidal or homicidal ideations    Pain Medications:  Current:  Ibuprofen - with relief    Tried in Past:  NSAIDs -Never  TCA -Never  SNRI  -Never  Anti-convulsants -lyrica and gabapentin in the past with significant sedating side effects limiting their use  Muscle Relaxants -Never  Opioids-Never    Physical Therapy/Home Exercise: yes       report:  Reviewed and consistent with medication use as prescribed.    Pain Procedures:   12/27/2017 LUMBAR LEFT L4 AND L5 TRANSFORAMINAL JILLIAN   3/14/2018 - left L3 and L4 TF JILLIAN  5/31/18 Bilateral SI joint injections- 100% relief  9/18/2018- Bilateral SI joint injections  3/21/2019- Left L4 and L5 TF JILLIAN    Chiropractor -never  Acupuncture - never  TENS unit -never  Spinal decompression -Lumbar microdiskectomy in 2008 with complication of dural tear requiring repair.  Joint replacement -never    Imaging:  MRI Lumbar Spine 4/15/2019:  FINDINGS:  Remote operative change status post right L4 hemilaminectomy.  The lumbar sagittal alignment is relatively stable.  Lumbar vertebral body heights contour and bone marrow signal is relatively stable without evidence for acute fracture or subluxation.    Distal spinal cord and conus normal in signal and contour tip of the conus approximates inferior L1 level.    No aneurysmal dilatation of the visualized abdominal aorta.    T12/L1 through L1/L2: No significant disc bulge, central canal or neural foraminal stenosis.    L2/L3: Small disc bulge without significant central canal or neural foraminal stenosis there is a small fluid signal focus in the dorsal epidural space at this level unchanged from prior    L3/L4:Bulging disc with ligamentum flavum hypertrophy and facet joint arthropathy mild central canal and bilateral neural foraminal stenosis.    L4/L5:Bulging disc with ligamentum flavum hypertrophy and facet joint arthropathy with superimposed right hemilaminectomy.  No significant central canal stenosis.  No evidence for recurrent disc herniation.  There is mild moderate bilateral neural foraminal stenosis.    L5/S1: No significant disc bulge central canal or neural  foraminal stenosis.      Impression       No significant change from prior.  Remote operative change right L4 hemilaminectomy without evidence for recurrent disc herniation.    Scattered superimposed degenerative change most prominent at L3/L4 with posterior disc osteophyte, ligamentum flavum hypertrophy and facet joint arthropathy with mild central canal and bilateral neural foraminal stenosis.           Past Medical History:   Diagnosis Date    Abnormal Pap smear     Asthma     Depression 3/17/2016    Environmental and seasonal allergies 3/17/2016    Heartburn 3/17/2016    HLD (hyperlipidemia) 03/22/2016    ASCVD 2018 6.3%; can't tolerate statins    Menopausal syndrome (hot flashes) 3/17/2016    Mild intermittent asthma without complication 3/17/2016    Obesity (BMI 30-39.9) 3/17/2016    Sciatica 3/17/2016     Past Surgical History:   Procedure Laterality Date    CERVICAL BIOPSY  W/ LOOP ELECTRODE EXCISION      INJECTION OF JOINT Bilateral 5/31/2018    Procedure: INJECTION-JOINT;  Surgeon: Lynette Rowley MD;  Location: [facility];  Service: Pain Management;  Laterality: Bilateral;  B/L SI Joint Injs  28338, [id number]    INJECTION OF JOINT Bilateral 9/18/2018    Procedure: INJECTION, JOINT  Bilateral SI joint;  Surgeon: Lynette Rowley MD;  Location: [facility];  Service: Pain Management;  Laterality: Bilateral;    microdiscetomy      L4-L5    OVARIAN CYST REMOVAL  2001    TRANSFORAMINAL EPIDURAL INJECTION OF STEROID Left 3/21/2019    Procedure: INJECTION, STEROID, EPIDURAL, TRANSFORAMINAL APPROACH, L4 AND L5;  Surgeon: Lynette Rowley MD;  Location: [facility];  Service: Pain Management;  Laterality: Left;     Social History     Socioeconomic History    Marital status:      Spouse name: Not on file    Number of children: Not on file    Years of education: Not on file    Highest education level: Not on file   Occupational History    Not on file   Social Needs    Financial  resource strain: Not hard at all    Food insecurity:     Worry: Never true     Inability: Never true    Transportation needs:     Medical: No     Non-medical: No   Tobacco Use    Smoking status: Former Smoker     Types: Cigarettes     Last attempt to quit: 10/1/2019     Years since quittin.2    Smokeless tobacco: Never Used   Substance and Sexual Activity    Alcohol use: Yes     Alcohol/week: 1.0 standard drinks     Types: 1 Glasses of wine per week     Frequency: Monthly or less     Drinks per session: 1 or 2     Binge frequency: Never    Drug use: No    Sexual activity: Yes     Partners: Male     Birth control/protection: None   Lifestyle    Physical activity:     Days per week: 1 day     Minutes per session: 20 min    Stress: Only a little   Relationships    Social connections:     Talks on phone: Once a week     Gets together: Once a week     Attends Hinduism service: Not on file     Active member of club or organization: No     Attends meetings of clubs or organizations: Never     Relationship status:    Other Topics Concern    Not on file   Social History Narrative    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    Works for [employer]    Walks a lot at work and for exercise     Family History   Problem Relation Age of Onset    Cancer Father         lung; non smoker, worked in [employer] and gas station    Breast cancer Mother     Heart failure Mother     Diabetes Mellitus Mother         ?secondary to chronic steroids    Ovarian cancer Neg Hx     Hypertension Neg Hx        Review of patient's allergies indicates:  No Known Allergies    Current Outpatient Medications   Medication Sig    albuterol (PROVENTIL/VENTOLIN HFA) 90 mcg/actuation inhaler Inhale 2 puffs into the lungs every 6 (six) hours as needed for Wheezing or Shortness of Breath. Rescue    ASPIRIN (ASPIR-81 ORAL) Take 1 tablet by mouth once daily.    boric acid (BORIC ACID) vaginal suppository Place 650 mg vaginally.     buPROPion (WELLBUTRIN XL) 150 MG TB24 tablet Take 1 tablet (150 mg total) by mouth once daily. Plus 300mg tablet for total dose of 450mg daily    buPROPion (WELLBUTRIN XL) 300 MG 24 hr tablet Take 1 tablet (300 mg total) by mouth once daily. Plus 150mg tablet for total dose of 450mg daily    ECHINACEA 1X ORAL     efinaconazole (JUBLIA) 10 % Shelley Apply 1 application topically once daily. To affected toenail.  Do not use nail polish on the nails.    estradiol (ESTRACE) 0.01 % (0.1 mg/gram) vaginal cream Use 1/2 gram twice weekly    fexofenadine (ALLEGRA) 180 MG tablet Take 1 tablet (180 mg total) by mouth once daily.    FLAXSEED ORAL Take by mouth.    flu vacc [id number] 6mos up,PF, (FLUARIX QUAD [id number], PF,) 60 mcg (15 mcg x 4)/0.5 mL Syrg Inject 0.5 mLs into the muscle once. For one dose. for 1 dose    fluticasone (FLONASE) 50 mcg/actuation nasal spray 1 spray by Each Nare route 2 (two) times daily as needed for Rhinitis or Allergies.    gabapentin (NEURONTIN) 100 MG capsule Take 1 capsule (100 mg total) by mouth every evening. For back pain    ibuprofen (ADVIL,MOTRIN) 800 MG tablet Take 1 tablet (800 mg total) by mouth every 8 (eight) hours as needed for Pain. Take with food.    LACTOBAC NO.41/BIFIDOBACT NO.7 (PROBIOTIC-10 ORAL) Take by mouth.    multivitamin (ONE DAILY MULTIVITAMIN) per tablet Take 1 tablet by mouth every evening.     nystatin (MYCOSTATIN) powder Apply to affected areas of skin twice daily as needed for skin infection.  Re treat as needed.    rosuvastatin (CRESTOR) 5 MG tablet Take 1 tablet (5 mg total) by mouth once daily.    sumatriptan (IMITREX) 50 MG tablet Take 1 tab by mouth as needed for migraine headache.  Repeat dose every 2 hours as needed.  Max 200mg in 24 hours.    tobramycin-dexamethasone 0.3-0.1% (TOBRADEX) 0.3-0.1 % DrpS      No current facility-administered medications for this visit.        REVIEW OF SYSTEMS:    GENERAL:  No weight loss, malaise or  "fevers.  HEENT:   No recent changes in vision or hearing  NECK:  Negative for lumps, no difficulty with swallowing.  RESPIRATORY:  Negative for cough, wheezing or shortness of breath, patient denies any recent URI.  CARDIOVASCULAR:  Negative for chest pain, leg swelling or palpitations.  GI:  Negative for abdominal discomfort, blood in stools or black stools or change in bowel habits.  MUSCULOSKELETAL:  See HPI.  SKIN:  Negative for lesions, rash, and itching.  PSYCH:  No mood disorder or recent psychosocial stressors.  Patient's sleep is disturbed secondary to pain.  HEMATOLOGY/LYMPHOLOGY:  Negative for prolonged bleeding, bruising easily or swollen nodes.  Patient is not currently taking any anti-coagulants  ENDO: No history of diabetes or thyroid dysfunction  NEURO: Occasional headaches.  All other reviewed and negative other than HPI.    OBJECTIVE:    BP (!) 155/76   Pulse 80   Temp 98.3 °F (36.8 °C)   Resp 18   Ht 5' 7" (1.702 m)   Wt 92.3 kg (203 lb 7.8 oz)   LMP 10/20/2013   SpO2 100%   BMI 31.87 kg/m²     PHYSICAL EXAMINATION:    GENERAL: Well appearing, in no acute distress, alert and oriented x3.  PSYCH:  Mood and affect appropriate.  SKIN: No rashes or lesions.  HEAD/FACE:  Normocephalic, atraumatic. Cranial nerves grossly intact.  CV: RRR with palpation of the radial artery.  PULM: No evidence of respiratory difficulty, symmetric chest rise.  BACK: Straight leg raising in the sitting and supine positions is negative to radicular pain.  There is pain with palpation over the facet joints of the lumbar spine bilaterally. Full ROM with pain on flexion and extension.  Negative facet loading bilaterally.  EXTREMITIES: Peripheral joint ROM is full and pain free without obvious instability or laxity in all four extremities. No deformities, edema, or skin discoloration. Good capillary refill.  MUSCULOSKELETAL: Hip, and knee provocative maneuvers are negative.  There is pain with palpation over the " sacroiliac joints bilaterally.  There is no pain to palpation over the greater trochanteric bursa bilaterally.  FABERs test is positive bilaterally.  FADIRs test is negative.   Bilateral lower extremity strength is normal and symmetric.  No atrophy or tone abnormalities are noted.  NEURO: Bilateral lower extremity coordination and muscle stretch reflexes are physiologic and symmetric.  Plantar response are downgoing. No clonus.  No loss of sensation is noted.  GAIT: Antalgic- ambulates without assistance.     Lab Results   Component Value Date    WBC 7.05 12/17/2019    HGB 12.6 12/17/2019    HCT 37.9 12/17/2019    MCV 88 12/17/2019     12/17/2019     BMP  Lab Results   Component Value Date     12/17/2019    K 4.1 12/17/2019     12/17/2019    CO2 24 12/17/2019    BUN 19 12/17/2019    CREATININE 0.8 12/17/2019    CALCIUM 10.0 12/17/2019    ANIONGAP 10 12/17/2019    ESTGFRAFRICA >60 12/17/2019    EGFRNONAA >60 12/17/2019         ASSESSMENT: 59 y.o. year old female with back and leg pain, consistent with the following diagnoses:    Encounter Diagnoses   Name Primary?    Bilateral sacroiliitis Yes    Lumbar radiculopathy     Lumbar spondylosis     DDD (degenerative disc disease), lumbar     Neurogenic claudication     Myalgia        PLAN:     - Previous imaging was reviewed and discussed with the patient today.    - Schedule for bilateral SI joint injections.   The procedure, risks, benefits and options were discussed with patient. There are no contraindications to the procedure. The patient expressed understanding and agreed to proceed.  Consent obtained today.    - Consider TF JILLIAN in the future.     - Increase Gabapentin to 100 mg BID. We can escalate this further in the future.     - Continue with PT exercises.    - RTC 2 weeks after above procedure.      - Dr. Rowley was consulted on the patient and agrees with this plan.    The  above plan and management options were discussed at length  with patient. Patient is in agreement with the above and verbalized understanding.     Carolann Hi NP  12/18/2019

## 2019-12-18 NOTE — PROGRESS NOTES
CC: Well woman exam    Amita Lewis is a 59 y.o. female  presents for well woman exam.  LMP: Patient's last menstrual period was 10/20/2013..  No issues, problems, or complaints.  Not using estrace.     &  LEEP.  No abnormal pap after that.    10/15 Pap negative;     Pap & HPV negative.    1217 pap normal   pap normal  Patient prefers yearly testing.    Past Medical History:   Diagnosis Date    Abnormal Pap smear     Asthma     Depression 3/17/2016    Environmental and seasonal allergies 3/17/2016    Heartburn 3/17/2016    HLD (hyperlipidemia) 2016    ASCVD 2018 6.3%; can't tolerate statins    Menopausal syndrome (hot flashes) 3/17/2016    Mild intermittent asthma without complication 3/17/2016    Obesity (BMI 30-39.9) 3/17/2016    Sciatica 3/17/2016     Past Surgical History:   Procedure Laterality Date    CERVICAL BIOPSY  W/ LOOP ELECTRODE EXCISION      INJECTION OF JOINT Bilateral 2018    Procedure: INJECTION-JOINT;  Surgeon: Lynette Rowley MD;  Location: [facility];  Service: Pain Management;  Laterality: Bilateral;  B/L SI Joint Injs  86362, 98506    INJECTION OF JOINT Bilateral 2018    Procedure: INJECTION, JOINT  Bilateral SI joint;  Surgeon: Lynette Rowley MD;  Location: [facility];  Service: Pain Management;  Laterality: Bilateral;    microdiscetomy      L4-L5    OVARIAN CYST REMOVAL      TRANSFORAMINAL EPIDURAL INJECTION OF STEROID Left 3/21/2019    Procedure: INJECTION, STEROID, EPIDURAL, TRANSFORAMINAL APPROACH, L4 AND L5;  Surgeon: Lynette Rowley MD;  Location: [facility];  Service: Pain Management;  Laterality: Left;     Social History     Socioeconomic History    Marital status:      Spouse name: Not on file    Number of children: Not on file    Years of education: Not on file    Highest education level: Not on file   Occupational History    Not on file   Social Needs    Financial resource strain:  "Not hard at all    Food insecurity:     Worry: Never true     Inability: Never true    Transportation needs:     Medical: No     Non-medical: No   Tobacco Use    Smoking status: Former Smoker     Types: Cigarettes     Last attempt to quit: 10/1/2019     Years since quittin.2    Smokeless tobacco: Never Used   Substance and Sexual Activity    Alcohol use: Yes     Alcohol/week: 1.0 standard drinks     Types: 1 Glasses of wine per week     Frequency: Monthly or less     Drinks per session: 1 or 2     Binge frequency: Never    Drug use: No    Sexual activity: Yes     Partners: Male     Birth control/protection: None   Lifestyle    Physical activity:     Days per week: 1 day     Minutes per session: 20 min    Stress: Only a little   Relationships    Social connections:     Talks on phone: Once a week     Gets together: Once a week     Attends Episcopalian service: Not on file     Active member of club or organization: No     Attends meetings of clubs or organizations: Never     Relationship status:    Other Topics Concern    Not on file   Social History Narrative    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    Works for [employer]    Walks a lot at work and for exercise     Family History   Problem Relation Age of Onset    Cancer Father         lung; non smoker, worked in [employer] and gas station    Breast cancer Mother     Heart failure Mother     Diabetes Mellitus Mother         ?secondary to chronic steroids    Ovarian cancer Neg Hx     Hypertension Neg Hx      OB History        0    Para        Term   0            AB        Living           SAB        TAB        Ectopic        Multiple        Live Births                     /76   Ht 5' 7" (1.702 m)   Wt 92.3 kg (203 lb 7.8 oz)   LMP 10/20/2013   BMI 31.87 kg/m²       ROS:  GENERAL: Denies weight gain or weight loss. Feeling well overall.   SKIN: Denies rash or lesions.   HEAD: Denies head injury or headache. "   NODES: Denies enlarged lymph nodes.   CHEST: Denies chest pain or shortness of breath.   CARDIOVASCULAR: Denies palpitations or left sided chest pain.   ABDOMEN: No abdominal pain, constipation, diarrhea, nausea, vomiting or rectal bleeding.   URINARY: No frequency, dysuria, hematuria, or burning on urination.  REPRODUCTIVE: See HPI.   BREASTS: The patient performs breast self-examination and denies pain, lumps, or nipple discharge.   HEMATOLOGIC: No easy bruisability or excessive bleeding.   MUSCULOSKELETAL: Denies joint pain or swelling.   NEUROLOGIC: Denies syncope or weakness.   PSYCHIATRIC: Denies depression, anxiety or mood swings.    PHYSICAL EXAM:  APPEARANCE: Well nourished, well developed, in no acute distress.  AFFECT: WNL, alert and oriented x 3  SKIN: No acne or hirsutism  NECK: Neck symmetric without masses or thyromegaly  NODES: No inguinal, cervical, axillary, or femoral lymph node enlargement  CHEST: Good respiratory effect  ABDOMEN: Soft.  No tenderness or masses.  No hepatosplenomegaly.  No hernias.  BREASTS: Symmetrical, no skin changes or visible lesions.  No palpable masses, nipple discharge bilaterally.  PELVIC: Normal external genitalia without lesions.  Normal hair distribution.  Adequate perineal body, normal urethral meatus.  Vagina moist and well rugated without lesions or discharge.  Cervix pink, without lesions, discharge or tenderness.  No significant cystocele or rectocele.  Bimanual exam shows uterus to be normal size, regular, mobile and nontender.  Adnexa without masses or tenderness.    EXTREMITIES: No edema.    ASSESSMENT    ICD-10-CM ICD-9-CM    1. Well woman exam Z01.419 V72.31 Liquid-Based Pap Smear, Screening      HPV High Risk Genotypes, PCR   2. Increased risk of breast cancer Z91.89 V15.89          PLAN:  Well woman exam  -     Liquid-Based Pap Smear, Screening  -     HPV High Risk Genotypes, PCR    Increased risk of breast cancer      Patient was counseled today on  A.C.S. Pap guidelines and recommendations for yearly pelvic exams, mammograms and monthly self breast exams; to see her PCP for other health maintenance.

## 2019-12-18 NOTE — PROGRESS NOTES
"  Chronic Pain -Follow Up    Referring Physician: No ref. provider found    Chief Complaint:   Chief Complaint   Patient presents with    Low-back Pain        SUBJECTIVE: Disclaimer: This note has been generated using voice-recognition software. There may be typographical errors that have been missed during proof-reading    Interval History 12/18/2019:  The patient returns to clinic today for follow up. Since last visit, her partner has been diagnosed with oral cancer. She has been caring for her and doing multiple trips to [place]. She reports increased low back pain that radiates into both buttocks and down the posterior aspect of her left leg to her foot. She reports increased numbness and coldness with laying down to her calf and top of her foot. Her greatest pain is in her buttocks. She reports increased pain with sitting and walking. She is currently participating in a work up to rule out vascular issues. She denies any other health changes. Her pain today is 6/10.      Interval History 4/4/2019:  The patient returns for procedure follow up.  She is s/p left L4 and L5 TF JILLIAN on 3/21/19 with about 50% relief of left leg pain.  She still has a burning pain to left calf and anterior foot.  Her right sided pain has improved.  Her lower back pain has been mild.  She would like a podiatry referral as previously discussed.  She has some benefit with Ibuprofen PRN.  She continues to remain active and stretches.  Her pain today is 1/10.     Interval History 3/6/2019:  She returns to clinic today for follow up. Reports she had 95% pain relief from bilateral SI joint injections on 9/18/2018 for approximately 4-5 months. She reports new left leg pain that started approximately 1 month ago out of the blue without any inciting event. Pain seems to start at times in her buttocks and radiates down her entire left leg. The pain is described as "crushing" and associated with numbness/tingling in the entire left leg that is " worse in the hamstrings and dorsum of her foot. She also has cramping in her left hamstring at times. Pain is worse with using the stairs and after walking about 1 to 1.5 blocks. Pain better at rest, when leaning forward, and gets limited relief from chiropractor. No cardiovascular history or issues per patient. She is taking Ibuprofen 800mg qhs that provides some relief. Pain is 2/10 at best and 9/10 at worst. Today her pain is 5/10. + generalized weakness/limping due to the pain. No loss of bowel/bladder control or saddle anesthesia. No pain in her RLE.     Interval History 10/5/2018:  The patient returns to clinic today for follow up. She is s/p bilateral SI joint injection on 9/18/2018. She reports 95% relief of her low back and buttock pain. She denies any radiating leg pain today. She continues to participate in physical therapy with benefit. She denies any other health changes. Her pain today is 1/10.    Interval History: 9/5/18:  She states that 3 weeks ago her pain returned after hearing a pop and twisting while getting into her car. She states that pain has returned in the same character and distribution as prior to the bilateral SIJ on 5/31 but may be more severe now with pain going down both legs instead just one. She is hoping to repeat injections. She denies any other health changes in the interim. She takes a baby ASA. She takes ibuprofen nightly for pain.     Interval History 6/14/2018:  The patient returns for follow up.  She is s/p bilateral SI joint injections on 5/31/18 with 100% pain relief.  She has not had any back or buttock pain since the procedure.  She still has some cramping to her calf which she feels is unrelated.  She takes Ibuprofen 800 mg at night with benefit.  She has had benefit with PT exercises and continues to be active.  Her pain today is 0/10.    Interval history 5/15/2018:  Since previous encounter the patient continues to have significant lower back pain over the area of  the sacral iliac joints.  She states it is worse when she first starts to get going and gradually improves as she walks.  She feels as if she is able to walk longer distances after previous epidural steroid injections but overall reports that 10% improvement in her pain.  The previous Medrol Dosepak did not help significant only tramadol was also not helping with her pain.  She has been doing physical therapy and feels as if she is making slow strides but there still certain activities that she cannot do without exacerbating her pain.  No other health changes since previous encounter.    Interval History 4/23/2018  One week ago, sudden worsened back pain when bending forwards with radiation into bilateral thighs and weakness.  Gradually improving, taking ibuprofen, but has been restricting activities secondary to persisting pain in the lower back.    Initial encounter:    Amita Lewis presents to the clinic for the evaluation of back pain. The pain started one year ago  and symptoms have been worsening.    Brief history:    Pain Description:    The pain is located in the back area and radiates to the leg in the L3-4 distribution.  She describes symptoms of neurogenic versus vascular claudication.  Her pain significant worsens after walking for several blocks and gradually improves when sitting down.  She describes her pain symptoms as a radiating pain into her calf and a cramping sensation in her calf.    At BEST  0/10     At WORST  7/10 on the WORST day.      On average pain is rated as 7/10.     Today the pain is rated as 1/10    The pain is described as burning, dull and tight band      Symptoms interfere with daily activity and sleeping.     Exacerbating factors: Walking.      Mitigating factors medications.     Patient denies .  Patient denies any suicidal or homicidal ideations    Pain Medications:  Current:  Ibuprofen - with relief    Tried in Past:  NSAIDs -Never  TCA -Never  SNRI  -Never  Anti-convulsants -lyrica and gabapentin in the past with significant sedating side effects limiting their use  Muscle Relaxants -Never  Opioids-Never    Physical Therapy/Home Exercise: yes       report:  Reviewed and consistent with medication use as prescribed.    Pain Procedures:   12/27/2017 LUMBAR LEFT L4 AND L5 TRANSFORAMINAL JILLIAN   3/14/2018 - left L3 and L4 TF JILLIAN  5/31/18 Bilateral SI joint injections- 100% relief  9/18/2018- Bilateral SI joint injections  3/21/2019- Left L4 and L5 TF JILLIAN    Chiropractor -never  Acupuncture - never  TENS unit -never  Spinal decompression -Lumbar microdiskectomy in 2008 with complication of dural tear requiring repair.  Joint replacement -never    Imaging:  MRI Lumbar Spine 4/15/2019:  FINDINGS:  Remote operative change status post right L4 hemilaminectomy.  The lumbar sagittal alignment is relatively stable.  Lumbar vertebral body heights contour and bone marrow signal is relatively stable without evidence for acute fracture or subluxation.    Distal spinal cord and conus normal in signal and contour tip of the conus approximates inferior L1 level.    No aneurysmal dilatation of the visualized abdominal aorta.    T12/L1 through L1/L2: No significant disc bulge, central canal or neural foraminal stenosis.    L2/L3: Small disc bulge without significant central canal or neural foraminal stenosis there is a small fluid signal focus in the dorsal epidural space at this level unchanged from prior    L3/L4:Bulging disc with ligamentum flavum hypertrophy and facet joint arthropathy mild central canal and bilateral neural foraminal stenosis.    L4/L5:Bulging disc with ligamentum flavum hypertrophy and facet joint arthropathy with superimposed right hemilaminectomy.  No significant central canal stenosis.  No evidence for recurrent disc herniation.  There is mild moderate bilateral neural foraminal stenosis.    L5/S1: No significant disc bulge central canal or neural  foraminal stenosis.      Impression       No significant change from prior.  Remote operative change right L4 hemilaminectomy without evidence for recurrent disc herniation.    Scattered superimposed degenerative change most prominent at L3/L4 with posterior disc osteophyte, ligamentum flavum hypertrophy and facet joint arthropathy with mild central canal and bilateral neural foraminal stenosis.           Past Medical History:   Diagnosis Date    Abnormal Pap smear     Asthma     Depression 3/17/2016    Environmental and seasonal allergies 3/17/2016    Heartburn 3/17/2016    HLD (hyperlipidemia) 03/22/2016    ASCVD 2018 6.3%; can't tolerate statins    Menopausal syndrome (hot flashes) 3/17/2016    Mild intermittent asthma without complication 3/17/2016    Obesity (BMI 30-39.9) 3/17/2016    Sciatica 3/17/2016     Past Surgical History:   Procedure Laterality Date    CERVICAL BIOPSY  W/ LOOP ELECTRODE EXCISION      INJECTION OF JOINT Bilateral 5/31/2018    Procedure: INJECTION-JOINT;  Surgeon: Lynette Rowley MD;  Location: [facility];  Service: Pain Management;  Laterality: Bilateral;  B/L SI Joint Injs  [id number], [id number]    INJECTION OF JOINT Bilateral 9/18/2018    Procedure: INJECTION, JOINT  Bilateral SI joint;  Surgeon: Lynette Rowley MD;  Location: [facility];  Service: Pain Management;  Laterality: Bilateral;    microdiscetomy      L4-L5    OVARIAN CYST REMOVAL  2001    TRANSFORAMINAL EPIDURAL INJECTION OF STEROID Left 3/21/2019    Procedure: INJECTION, STEROID, EPIDURAL, TRANSFORAMINAL APPROACH, L4 AND L5;  Surgeon: Lynette Rowley MD;  Location: [facility];  Service: Pain Management;  Laterality: Left;     Social History     Socioeconomic History    Marital status:      Spouse name: Not on file    Number of children: Not on file    Years of education: Not on file    Highest education level: Not on file   Occupational History    Not on file   Social Needs    Financial  resource strain: Not hard at all    Food insecurity:     Worry: Never true     Inability: Never true    Transportation needs:     Medical: No     Non-medical: No   Tobacco Use    Smoking status: Former Smoker     Types: Cigarettes     Last attempt to quit: 10/1/2019     Years since quittin.2    Smokeless tobacco: Never Used   Substance and Sexual Activity    Alcohol use: Yes     Alcohol/week: 1.0 standard drinks     Types: 1 Glasses of wine per week     Frequency: Monthly or less     Drinks per session: 1 or 2     Binge frequency: Never    Drug use: No    Sexual activity: Yes     Partners: Male     Birth control/protection: None   Lifestyle    Physical activity:     Days per week: 1 day     Minutes per session: 20 min    Stress: Only a little   Relationships    Social connections:     Talks on phone: Once a week     Gets together: Once a week     Attends Confucianist service: Not on file     Active member of club or organization: No     Attends meetings of clubs or organizations: Never     Relationship status:    Other Topics Concern    Not on file   Social History Narrative    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    Works for [employer]    Walks a lot at work and for exercise     Family History   Problem Relation Age of Onset    Cancer Father         lung; non smoker, worked in [employer] and gas station    Breast cancer Mother     Heart failure Mother     Diabetes Mellitus Mother         ?secondary to chronic steroids    Ovarian cancer Neg Hx     Hypertension Neg Hx        Review of patient's allergies indicates:  No Known Allergies    Current Outpatient Medications   Medication Sig    albuterol (PROVENTIL/VENTOLIN HFA) 90 mcg/actuation inhaler Inhale 2 puffs into the lungs every 6 (six) hours as needed for Wheezing or Shortness of Breath. Rescue    ASPIRIN (ASPIR-81 ORAL) Take 1 tablet by mouth once daily.    boric acid (BORIC ACID) vaginal suppository Place 650 mg vaginally.     buPROPion (WELLBUTRIN XL) 150 MG TB24 tablet Take 1 tablet (150 mg total) by mouth once daily. Plus 300mg tablet for total dose of 450mg daily    buPROPion (WELLBUTRIN XL) 300 MG 24 hr tablet Take 1 tablet (300 mg total) by mouth once daily. Plus 150mg tablet for total dose of 450mg daily    ECHINACEA 1X ORAL     efinaconazole (JUBLIA) 10 % Shelley Apply 1 application topically once daily. To affected toenail.  Do not use nail polish on the nails.    estradiol (ESTRACE) 0.01 % (0.1 mg/gram) vaginal cream Use 1/2 gram twice weekly    fexofenadine (ALLEGRA) 180 MG tablet Take 1 tablet (180 mg total) by mouth once daily.    FLAXSEED ORAL Take by mouth.    flu vacc [id number] 6mos up,PF, (FLUARIX QUAD [id number], PF,) 60 mcg (15 mcg x 4)/0.5 mL Syrg Inject 0.5 mLs into the muscle once. For one dose. for 1 dose    fluticasone (FLONASE) 50 mcg/actuation nasal spray 1 spray by Each Nare route 2 (two) times daily as needed for Rhinitis or Allergies.    gabapentin (NEURONTIN) 100 MG capsule Take 1 capsule (100 mg total) by mouth every evening. For back pain    ibuprofen (ADVIL,MOTRIN) 800 MG tablet Take 1 tablet (800 mg total) by mouth every 8 (eight) hours as needed for Pain. Take with food.    LACTOBAC NO.41/BIFIDOBACT NO.7 (PROBIOTIC-10 ORAL) Take by mouth.    multivitamin (ONE DAILY MULTIVITAMIN) per tablet Take 1 tablet by mouth every evening.     nystatin (MYCOSTATIN) powder Apply to affected areas of skin twice daily as needed for skin infection.  Re treat as needed.    rosuvastatin (CRESTOR) 5 MG tablet Take 1 tablet (5 mg total) by mouth once daily.    sumatriptan (IMITREX) 50 MG tablet Take 1 tab by mouth as needed for migraine headache.  Repeat dose every 2 hours as needed.  Max 200mg in 24 hours.    tobramycin-dexamethasone 0.3-0.1% (TOBRADEX) 0.3-0.1 % DrpS      No current facility-administered medications for this visit.        REVIEW OF SYSTEMS:    GENERAL:  No weight loss, malaise or  "fevers.  HEENT:   No recent changes in vision or hearing  NECK:  Negative for lumps, no difficulty with swallowing.  RESPIRATORY:  Negative for cough, wheezing or shortness of breath, patient denies any recent URI.  CARDIOVASCULAR:  Negative for chest pain, leg swelling or palpitations.  GI:  Negative for abdominal discomfort, blood in stools or black stools or change in bowel habits.  MUSCULOSKELETAL:  See HPI.  SKIN:  Negative for lesions, rash, and itching.  PSYCH:  No mood disorder or recent psychosocial stressors.  Patient's sleep is disturbed secondary to pain.  HEMATOLOGY/LYMPHOLOGY:  Negative for prolonged bleeding, bruising easily or swollen nodes.  Patient is not currently taking any anti-coagulants  ENDO: No history of diabetes or thyroid dysfunction  NEURO: Occasional headaches.  All other reviewed and negative other than HPI.    OBJECTIVE:    BP (!) 155/76   Pulse 80   Temp 98.3 °F (36.8 °C)   Resp 18   Ht 5' 7" (1.702 m)   Wt 92.3 kg (203 lb 7.8 oz)   LMP 10/20/2013   SpO2 100%   BMI 31.87 kg/m²     PHYSICAL EXAMINATION:    GENERAL: Well appearing, in no acute distress, alert and oriented x3.  PSYCH:  Mood and affect appropriate.  SKIN: No rashes or lesions.  HEAD/FACE:  Normocephalic, atraumatic. Cranial nerves grossly intact.  CV: RRR with palpation of the radial artery.  PULM: No evidence of respiratory difficulty, symmetric chest rise.  BACK: Straight leg raising in the sitting and supine positions is negative to radicular pain.  There is pain with palpation over the facet joints of the lumbar spine bilaterally. Full ROM with pain on flexion and extension.  Negative facet loading bilaterally.  EXTREMITIES: Peripheral joint ROM is full and pain free without obvious instability or laxity in all four extremities. No deformities, edema, or skin discoloration. Good capillary refill.  MUSCULOSKELETAL: Hip, and knee provocative maneuvers are negative.  There is pain with palpation over the " sacroiliac joints bilaterally.  There is no pain to palpation over the greater trochanteric bursa bilaterally.  FABERs test is positive bilaterally.  FADIRs test is negative.   Bilateral lower extremity strength is normal and symmetric.  No atrophy or tone abnormalities are noted.  NEURO: Bilateral lower extremity coordination and muscle stretch reflexes are physiologic and symmetric.  Plantar response are downgoing. No clonus.  No loss of sensation is noted.  GAIT: Antalgic- ambulates without assistance.     Lab Results   Component Value Date    WBC 7.05 12/17/2019    HGB 12.6 12/17/2019    HCT 37.9 12/17/2019    MCV 88 12/17/2019     12/17/2019     BMP  Lab Results   Component Value Date     12/17/2019    K 4.1 12/17/2019     12/17/2019    CO2 24 12/17/2019    BUN 19 12/17/2019    CREATININE 0.8 12/17/2019    CALCIUM 10.0 12/17/2019    ANIONGAP 10 12/17/2019    ESTGFRAFRICA >60 12/17/2019    EGFRNONAA >60 12/17/2019         ASSESSMENT: 59 y.o. year old female with back and leg pain, consistent with the following diagnoses:    Encounter Diagnoses   Name Primary?    Bilateral sacroiliitis Yes    Lumbar radiculopathy     Lumbar spondylosis     DDD (degenerative disc disease), lumbar     Neurogenic claudication     Myalgia        PLAN:     - Previous imaging was reviewed and discussed with the patient today.    - Schedule for bilateral SI joint injections.   The procedure, risks, benefits and options were discussed with patient. There are no contraindications to the procedure. The patient expressed understanding and agreed to proceed.  Consent obtained today.    - Consider TF JILLIAN in the future.     - Increase Gabapentin to 100 mg BID. We can escalate this further in the future.     - Continue with PT exercises.    - RTC 2 weeks after above procedure.      - Dr. Rowley was consulted on the patient and agrees with this plan.    The  above plan and management options were discussed at length  with patient. Patient is in agreement with the above and verbalized understanding.     Carolann Hi NP  12/18/2019

## 2019-12-19 ENCOUNTER — PATIENT MESSAGE (OUTPATIENT)
Dept: SURGERY | Facility: CLINIC | Age: 59
End: 2019-12-19

## 2019-12-20 ENCOUNTER — CLINICAL SUPPORT (OUTPATIENT)
Dept: CARDIOLOGY | Facility: CLINIC | Age: 59
End: 2019-12-20
Attending: INTERNAL MEDICINE
Payer: COMMERCIAL

## 2019-12-20 DIAGNOSIS — M79.605 LEFT LEG PAIN: ICD-10-CM

## 2019-12-20 DIAGNOSIS — M54.16 LUMBAR RADICULOPATHY: ICD-10-CM

## 2019-12-20 DIAGNOSIS — I73.9 CLAUDICATION OF LEFT LOWER EXTREMITY: ICD-10-CM

## 2019-12-20 LAB
LEFT ABI: 0.26
LEFT ARM BP: 155 MMHG
LEFT DORSALIS PEDIS: 41 MMHG
LEFT POSTERIOR TIBIAL: 39 MMHG
RIGHT ABI: 0.39
RIGHT ARM BP: 150 MMHG
RIGHT DORSALIS PEDIS: 54 MMHG
RIGHT POSTERIOR TIBIAL: 60 MMHG

## 2019-12-20 PROCEDURE — 93924 CV US ANKLE / BRACHIAL INDICES W/ EXERCISE STRESS (CUPID ONLY): ICD-10-PCS | Mod: S$GLB,,, | Performed by: INTERNAL MEDICINE

## 2019-12-20 PROCEDURE — 93924 LWR XTR VASC STDY BILAT: CPT | Mod: S$GLB,,, | Performed by: INTERNAL MEDICINE

## 2019-12-24 LAB
HPV HR 12 DNA SPEC QL NAA+PROBE: NEGATIVE
HPV16 AG SPEC QL: NEGATIVE
HPV18 DNA SPEC QL NAA+PROBE: NEGATIVE

## 2019-12-25 DIAGNOSIS — M54.30 SCIATICA, UNSPECIFIED LATERALITY: ICD-10-CM

## 2019-12-26 ENCOUNTER — PATIENT MESSAGE (OUTPATIENT)
Dept: PAIN MEDICINE | Facility: OTHER | Age: 59
End: 2019-12-26

## 2019-12-26 ENCOUNTER — PATIENT MESSAGE (OUTPATIENT)
Dept: PAIN MEDICINE | Facility: CLINIC | Age: 59
End: 2019-12-26

## 2019-12-26 ENCOUNTER — PATIENT MESSAGE (OUTPATIENT)
Dept: PODIATRY | Facility: CLINIC | Age: 59
End: 2019-12-26

## 2019-12-26 DIAGNOSIS — B35.1 DERMATOPHYTOSIS OF NAIL: ICD-10-CM

## 2019-12-26 DIAGNOSIS — R29.818 NEUROGENIC CLAUDICATION: Primary | ICD-10-CM

## 2019-12-26 RX ORDER — IBUPROFEN 800 MG/1
TABLET ORAL
Qty: 90 TABLET | Refills: 0 | Status: SHIPPED | OUTPATIENT
Start: 2019-12-26 | End: 2021-08-04

## 2019-12-28 ENCOUNTER — HOSPITAL ENCOUNTER (OUTPATIENT)
Facility: OTHER | Age: 59
Discharge: HOME | End: 2019-12-28
Attending: ANESTHESIOLOGY | Admitting: ANESTHESIOLOGY
Payer: COMMERCIAL

## 2019-12-28 VITALS
RESPIRATION RATE: 16 BRPM | SYSTOLIC BLOOD PRESSURE: 140 MMHG | HEART RATE: 75 BPM | DIASTOLIC BLOOD PRESSURE: 69 MMHG | TEMPERATURE: 99 F | OXYGEN SATURATION: 99 %

## 2019-12-28 DIAGNOSIS — M51.36 DDD (DEGENERATIVE DISC DISEASE), LUMBAR: ICD-10-CM

## 2019-12-28 DIAGNOSIS — M54.16 LUMBAR RADICULOPATHY: ICD-10-CM

## 2019-12-28 DIAGNOSIS — M53.3 SACROILIAC JOINT PAIN: Primary | ICD-10-CM

## 2019-12-28 PROCEDURE — 25500020 PHARM REV CODE 255: Performed by: ANESTHESIOLOGY

## 2019-12-28 PROCEDURE — 27096 PR INJECTION,SACROILIAC JOINT: ICD-10-PCS | Mod: 50,,, | Performed by: ANESTHESIOLOGY

## 2019-12-28 PROCEDURE — 27096 INJECT SACROILIAC JOINT: CPT | Mod: 50,,, | Performed by: ANESTHESIOLOGY

## 2019-12-28 PROCEDURE — 63600175 PHARM REV CODE 636 W HCPCS: Performed by: ANESTHESIOLOGY

## 2019-12-28 PROCEDURE — 25000003 PHARM REV CODE 250: Performed by: ANESTHESIOLOGY

## 2019-12-28 PROCEDURE — 27096 INJECT SACROILIAC JOINT: CPT | Mod: 50 | Performed by: ANESTHESIOLOGY

## 2019-12-28 RX ORDER — LIDOCAINE HYDROCHLORIDE 10 MG/ML
INJECTION INFILTRATION; PERINEURAL
Status: DISCONTINUED | OUTPATIENT
Start: 2019-12-28 | End: 2019-12-28 | Stop reason: HOSPADM

## 2019-12-28 RX ORDER — BUPIVACAINE HYDROCHLORIDE 2.5 MG/ML
INJECTION, SOLUTION EPIDURAL; INFILTRATION; INTRACAUDAL
Status: DISCONTINUED | OUTPATIENT
Start: 2019-12-28 | End: 2019-12-28 | Stop reason: HOSPADM

## 2019-12-28 RX ORDER — METHYLPREDNISOLONE ACETATE 80 MG/ML
INJECTION, SUSPENSION INTRA-ARTICULAR; INTRALESIONAL; INTRAMUSCULAR; SOFT TISSUE
Status: DISCONTINUED | OUTPATIENT
Start: 2019-12-28 | End: 2019-12-28 | Stop reason: HOSPADM

## 2019-12-28 NOTE — INTERVAL H&P NOTE
The patient has been examined and the H&P has been reviewed:    I concur with the findings and changes have been noted since the H&P was written: Recent stress KRISTOPHER was performed which revealed severe P 80 at rest so the exercise portion was not performed, the patient will need to follow up with her primary care physician for a lower extremity arteriogram in the future.  We will hold off on any transforaminal epidural steroid injections until we have determine how much contribution of her lower extremity pain is coming from PA D.    Anesthesia/Surgery risks, benefits and alternative options discussed and understood by patient/family.          There are no hospital problems to display for this patient.

## 2019-12-28 NOTE — DISCHARGE INSTRUCTIONS

## 2019-12-28 NOTE — OP NOTE
Sacroiliac Joint Injection under Fluoroscopy    Date of procedure 12/28/2019    Time-out taken to identify patient and procedure side prior to starting the procedure.                                                                 PROCEDURE:  Bilateral sacroiliac joint injection under fluoroscopy.    1) Right  sacroiliac joint injection under fluoroscopy.    2) Left  sacroiliac joint injection under fluoroscopy.    REASON FOR PROCEDURE: Bilateral sacroiliitis [M46.1]    PHYSICIAN: Lynette Rowley MD  ASSISTANTS: >None    MEDICATIONS INJECTED:  Depo-Medrol 40mg and 4 mL Bupivacaine 0.25% into each joint    LOCAL ANESTHETIC USED: Xylocaine 1% 5mL    SEDATION MEDICATIONS: None    ESTIMATED BLOOD LOSS:  None.    COMPLICATIONS:  None.    TECHNIQUE:   Laying in the prone position, the patient was prepped and draped in the usual sterile fashion using ChloraPrep and fenestrated drape.  The area was determined under fluoroscopy.  Local Xylocaine was injected by raising a wheel and going down to the periosteum using a 27-gauge hypodermic needle.  The 3.5 inch 22-gauge spinal needle was introduce into bilateral sacroiliac joints.  Negative pressure applied to confirm no intravascular placement.  Omnipaque was injected to confirm placement and to confirm that there was no vascular runoff.  The medication was then injected slowly.  The patient tolerated the procedure well.    The patient was monitored for approximately 30 minutes after the procedure.  Patient was given post procedure and discharge instructions to follow at home.  We will see the patient back in two weeks or the patient may call to inform of status. The patient was discharged in a stable condition

## 2019-12-28 NOTE — DISCHARGE SUMMARY
Discharge Note  Short Stay      SUMMARY     Admit Date: 12/28/2019    Attending Physician: Lynette Rowley      Discharge Physician: Lynette Rowley      Discharge Date: 12/28/2019 10:32 AM    Procedure(s) (LRB):  INJECTION, SACROILIAC (SI) JOINT INJECTION (Bilateral)    Final Diagnosis: Bilateral sacroiliitis [M46.1]    Disposition: Home or self care    Patient Instructions:   Current Discharge Medication List      CONTINUE these medications which have NOT CHANGED    Details   albuterol (PROVENTIL/VENTOLIN HFA) 90 mcg/actuation inhaler Inhale 2 puffs into the lungs every 6 (six) hours as needed for Wheezing or Shortness of Breath. Rescue  Qty: 3 Inhaler, Refills: 3    Associated Diagnoses: Mild intermittent asthma without complication      ASPIRIN (ASPIR-81 ORAL) Take 1 tablet by mouth once daily.      boric acid (BORIC ACID) vaginal suppository Place 650 mg vaginally.      !! buPROPion (WELLBUTRIN XL) 150 MG TB24 tablet Take 1 tablet (150 mg total) by mouth once daily. Plus 300mg tablet for total dose of 450mg daily  Qty: 90 tablet, Refills: 1    Associated Diagnoses: Depression, unspecified depression type      !! buPROPion (WELLBUTRIN XL) 300 MG 24 hr tablet Take 1 tablet (300 mg total) by mouth once daily. Plus 150mg tablet for total dose of 450mg daily  Qty: 90 tablet, Refills: 1    Associated Diagnoses: Depression, unspecified depression type      ECHINACEA 1X ORAL       efinaconazole (JUBLIA) 10 % Shelley Apply 1 application topically once daily. To affected toenail.  Do not use nail polish on the nails.  Qty: 8 mL, Refills: 10    Associated Diagnoses: Dermatophytosis of nail      estradiol (ESTRACE) 0.01 % (0.1 mg/gram) vaginal cream Use 1/2 gram twice weekly  Qty: 42.5 g, Refills: 10    Associated Diagnoses: Vaginal atrophy      fexofenadine (ALLEGRA) 180 MG tablet Take 1 tablet (180 mg total) by mouth once daily.  Qty: 90 tablet, Refills: 3    Associated Diagnoses: Environmental and seasonal allergies       FLAXSEED ORAL Take by mouth.      fluticasone (FLONASE) 50 mcg/actuation nasal spray 1 spray by Each Nare route 2 (two) times daily as needed for Rhinitis or Allergies.  Qty: 1 Bottle, Refills: 6    Associated Diagnoses: Environmental and seasonal allergies      gabapentin (NEURONTIN) 100 MG capsule Take 1 capsule (100 mg total) by mouth every evening. For back pain  Qty: 90 capsule, Refills: 3    Associated Diagnoses: Lumbar radiculopathy; DDD (degenerative disc disease), lumbar; Abnormal sensation of leg      ibuprofen (ADVIL,MOTRIN) 800 MG tablet TAKE 1 TABLET(800 MG) BY MOUTH EVERY 8 HOURS WITH FOOD AS NEEDED FOR PAIN  Qty: 90 tablet, Refills: 0    Associated Diagnoses: Sciatica, unspecified laterality      LACTOBAC NO.41/BIFIDOBACT NO.7 (PROBIOTIC-10 ORAL) Take by mouth.      multivitamin (ONE DAILY MULTIVITAMIN) per tablet Take 1 tablet by mouth every evening.       nystatin (MYCOSTATIN) powder Apply to affected areas of skin twice daily as needed for skin infection.  Re treat as needed.  Qty: 30 g, Refills: 3    Associated Diagnoses: Candidal skin infection      rosuvastatin (CRESTOR) 5 MG tablet Take 1 tablet (5 mg total) by mouth once daily.  Qty: 90 tablet, Refills: 3    Associated Diagnoses: Hyperlipidemia, unspecified hyperlipidemia type      sumatriptan (IMITREX) 50 MG tablet Take 1 tab by mouth as needed for migraine headache.  Repeat dose every 2 hours as needed.  Max 200mg in 24 hours.  Qty: 30 tablet, Refills: 1    Associated Diagnoses: Other migraine without status migrainosus, not intractable      tobramycin-dexamethasone 0.3-0.1% (TOBRADEX) 0.3-0.1 % DrpS Refills: 0       !! - Potential duplicate medications found. Please discuss with provider.              Discharge Diagnosis: Bilateral sacroiliitis [M46.1]  Condition on Discharge: Stable with no complications to procedure   Diet on Discharge: Same as before.  Activity: as per instruction sheet.  Discharge to: Home with a responsible  adult.  Follow up: 2-4 weeks       Please call my office or pager at [phone] if experienced any weakness or loss of sensation, fever > 101.5, pain uncontrolled with oral medications, persistent nausea/vomiting/or diarrhea, redness or drainage from the incisions, or any other worrisome concerns. If physician on call was not reached or could not communicate with our office for any reason please go to the nearest emergency department

## 2019-12-31 PROBLEM — M79.605 LEFT LEG PAIN: Status: ACTIVE | Noted: 2019-12-31

## 2019-12-31 PROBLEM — I73.9 CLAUDICATION OF LEFT LOWER EXTREMITY: Status: ACTIVE | Noted: 2018-04-23

## 2020-01-09 LAB
FINAL PATHOLOGIC DIAGNOSIS: NORMAL
Lab: NORMAL

## 2020-01-17 ENCOUNTER — PATIENT OUTREACH (OUTPATIENT)
Dept: ADMINISTRATIVE | Facility: OTHER | Age: 60
End: 2020-01-17

## 2020-01-20 ENCOUNTER — PATIENT MESSAGE (OUTPATIENT)
Dept: PAIN MEDICINE | Facility: CLINIC | Age: 60
End: 2020-01-20

## 2020-01-20 ENCOUNTER — HOSPITAL ENCOUNTER (OUTPATIENT)
Dept: VASCULAR SURGERY | Facility: CLINIC | Age: 60
Discharge: HOME | End: 2020-01-20
Attending: SURGERY
Payer: COMMERCIAL

## 2020-01-20 ENCOUNTER — OFFICE VISIT (OUTPATIENT)
Dept: VASCULAR SURGERY | Facility: CLINIC | Age: 60
End: 2020-01-20
Attending: SURGERY
Payer: COMMERCIAL

## 2020-01-20 ENCOUNTER — TELEPHONE (OUTPATIENT)
Dept: INTERNAL MEDICINE | Facility: CLINIC | Age: 60
End: 2020-01-20

## 2020-01-20 ENCOUNTER — PATIENT MESSAGE (OUTPATIENT)
Dept: INTERNAL MEDICINE | Facility: CLINIC | Age: 60
End: 2020-01-20

## 2020-01-20 VITALS
BODY MASS INDEX: 31.49 KG/M2 | WEIGHT: 200.63 LBS | SYSTOLIC BLOOD PRESSURE: 133 MMHG | TEMPERATURE: 99 F | HEIGHT: 67 IN | DIASTOLIC BLOOD PRESSURE: 64 MMHG | HEART RATE: 75 BPM

## 2020-01-20 DIAGNOSIS — I73.9 CLAUDICATION: Primary | ICD-10-CM

## 2020-01-20 DIAGNOSIS — I73.9 PVD (PERIPHERAL VASCULAR DISEASE): ICD-10-CM

## 2020-01-20 DIAGNOSIS — I73.9 CLAUDICATION: ICD-10-CM

## 2020-01-20 DIAGNOSIS — I73.9 PAD (PERIPHERAL ARTERY DISEASE): Primary | ICD-10-CM

## 2020-01-20 PROCEDURE — 99204 OFFICE O/P NEW MOD 45 MIN: CPT | Mod: S$GLB,,, | Performed by: SURGERY

## 2020-01-20 PROCEDURE — 99999 PR PBB SHADOW E&M-EST. PATIENT-LVL IV: ICD-10-PCS | Mod: PBBFAC,,, | Performed by: SURGERY

## 2020-01-20 PROCEDURE — 3008F BODY MASS INDEX DOCD: CPT | Mod: CPTII,S$GLB,, | Performed by: SURGERY

## 2020-01-20 PROCEDURE — 93923 PR NON-INVASIVE PHYSIOLOGIC STUDY EXTREMITY 3 LEVELS: ICD-10-PCS | Mod: S$GLB,,, | Performed by: SURGERY

## 2020-01-20 PROCEDURE — 99204 PR OFFICE/OUTPT VISIT, NEW, LEVL IV, 45-59 MIN: ICD-10-PCS | Mod: S$GLB,,, | Performed by: SURGERY

## 2020-01-20 PROCEDURE — 93923 UPR/LXTR ART STDY 3+ LVLS: CPT | Mod: S$GLB,,, | Performed by: SURGERY

## 2020-01-20 PROCEDURE — 3008F PR BODY MASS INDEX (BMI) DOCUMENTED: ICD-10-PCS | Mod: CPTII,S$GLB,, | Performed by: SURGERY

## 2020-01-20 PROCEDURE — 93926 PR DUPLEX LO EXTREM ART UNILAT/LTD: ICD-10-PCS | Mod: S$GLB,,, | Performed by: SURGERY

## 2020-01-20 PROCEDURE — 99999 PR PBB SHADOW E&M-EST. PATIENT-LVL IV: CPT | Mod: PBBFAC,,, | Performed by: SURGERY

## 2020-01-20 PROCEDURE — 93926 LOWER EXTREMITY STUDY: CPT | Mod: S$GLB,,, | Performed by: SURGERY

## 2020-01-20 NOTE — H&P (VIEW-ONLY)
VASCULAR SURGERY NOTE    Patient ID: Amita Lewis is a 59 y.o. female.    I. HISTORY     Chief Complaint: LLE claudication    HPI: Amita Lewis is a 59 y.o. female who is here today for new patient initial appointment. She has short distance claudication in the left leg at 1/2 block. She describes the pain as deep cramping pain which occurs after 1/2 block and if she continues to walk through the pain for more than a block or two then her foot begins to go numb. The leg also gets cold and numb/tingly at night and she has to stand up to get sensation back and make it warm again. This has woken her up from sleep several times. She has a history of smoking and recently quit. She has no history of vascular surgery or heart surgery. Denies h/o MI or stroke.    Past Medical History:   Diagnosis Date    Abnormal Pap smear     Asthma     Depression 3/17/2016    Environmental and seasonal allergies 3/17/2016    Heartburn 3/17/2016    HLD (hyperlipidemia) 03/22/2016    ASCVD 2018 6.3%; can't tolerate statins    Menopausal syndrome (hot flashes) 3/17/2016    Mild intermittent asthma without complication 3/17/2016    Obesity (BMI 30-39.9) 3/17/2016    Sciatica 3/17/2016        Past Surgical History:   Procedure Laterality Date    CERVICAL BIOPSY  W/ LOOP ELECTRODE EXCISION      INJECTION OF JOINT Bilateral 5/31/2018    Procedure: INJECTION-JOINT;  Surgeon: Lynette Rowley MD;  Location: [facility];  Service: Pain Management;  Laterality: Bilateral;  B/L SI Joint Injs  14093, [id number]    INJECTION OF JOINT Bilateral 9/18/2018    Procedure: INJECTION, JOINT  Bilateral SI joint;  Surgeon: Lynette Rowley MD;  [facility];  Service: Pain Management;  Laterality: Bilateral;    microdiscetomy      L4-L5    OVARIAN CYST REMOVAL  2001    TRANSFORAMINAL EPIDURAL INJECTION OF STEROID Left 3/21/2019    Procedure: INJECTION, STEROID, EPIDURAL, TRANSFORAMINAL APPROACH, L4 AND L5;  Surgeon:  Lynette Rowley MD;  Location: [facility];  Service: Pain Management;  Laterality: Left;       Social History     Tobacco Use   Smoking Status Current Some Day Smoker    Packs/day: 0.00    Years: 0.00    Pack years: 0.00    Types: Cigarettes    Last attempt to quit: 10/1/2019    Years since quittin.3   Smokeless Tobacco Never Used        Review of Systems   Constitution: Negative for chills and fever.   HENT: Negative for ear pain and hoarse voice.    Eyes: Negative for discharge and pain.   Cardiovascular: Negative for chest pain and palpitations.   Respiratory: Negative for cough, hemoptysis and shortness of breath.    Endocrine: Negative for polydipsia and polyuria.   Skin: Positive for dry skin. Negative for rash.   Musculoskeletal: Negative for back pain and neck pain.   Gastrointestinal: Negative for abdominal pain, diarrhea, nausea and vomiting.   Genitourinary: Negative for dysuria and hematuria.   Neurological: Negative for dizziness, loss of balance and paresthesias.         II. PHYSICAL EXAM     Physical Exam   GEN: Alert/oriented  HEENT: atraumatic, normocephalic  CHEST: normal respiratory effort, symmetrical chest rise  CARD: RRR  EXT: Warm, no cyanosis/edema, LLE dependent rubor  RLE: 1+ femoral, biphasic PT signal, monophasic DP  LLE: 1+ femoral pulse, monophasic PT signal, weak monophasic DP    III. ASSESSMENT & PLAN (MEDICAL DECISION MAKING)     1. PAD (peripheral artery disease)        Imaging Results:  LLE Arterial Duplex 20:  Elevated velocities to 526cm/s in the common femoral artery.  CFA waveforms with brisk upstroke and spectral broadening suggestive of stenosis within the common femoral artery.  Waveforms do not suggest the presence of iliac artery disease.  The proximal SFA is patent.  The distal SFA is occluded with reconstitution of the above knee popliteal artery.  Waveforms in the popliteal artery and beyond are all monophasic.    BLE Segmental Pressures  1/20/20:  LLE low thigh pressure: 90  LLE calf pressure: 63  Profunda/popliteal collateral index: 0.3    KRISTOPHER/TP 1/20/20:  R L   0.49. TP NC 0.41  TP 50         Assessment/Diagnosis and Plan:  59 y.o. female with bilateral lower extremity PAD on non-invasive testing and physical exam. Her right leg is currently asymptomatic. In her left leg she has short distance LLE claudication and early signs of ischemic rest pain. Her non-invasive studies are consistent with her symptoms with KRISTOPHER/Toe pressure on the edge of critical limb ischemia. I discussed her findings and would recommend surgery to prevent progression to worsening rest pain. Profunda/popliteal index is >0.25 so would recommend Left CFA endarterectomy with left SFA angioplasty/stenting for direct revascularization. I discussed risks/benefits/alternatives with the patient and her partner. They would like to plan surgery some time in the next month.    - Will call to schedule pre-op testing and operation in hybrid OR  - Plan for consent on AM of surgery  - Increase to 10mg rosuvastatin (ultimately will plan to increase to 20mg daily if tolerated)  -Secondary prevention of atherosclerotic risk factors: Goal BP <140/90, goal LDL <100, goal HbA1C <7.0  -ASA 81mg daily for prevention of MI, stroke, and acute limb ischemia in setting of known PAD      RAYMUNDO Levine II, MD, [facility]  Vascular Surgeon  [facility]

## 2020-01-20 NOTE — LETTER
January 21, 2020      Kirk Elizabeth MD  [address]  [facility] [address]           [facility] - Vascular Surgery  [address]  [facility] [address]  Phone: [phone]  Fax: [phone]          Patient: Amita Lewis   MR Number: [id number]   YOB: 1960   Date of Visit: 1/20/2020       Dear Dr. Kirk Elizabeth:    Thank you for referring Amita Lewis to me for evaluation. Attached you will find relevant portions of my assessment and plan of care.    If you have questions, please do not hesitate to call me. I look forward to following Amita Lewis along with you.    Sincerely,    RAYMUNDO Levine II, MD    Enclosure  CC:  No Recipients    If you would like to receive this communication electronically, please contact [email or web address] or [phone] to request more information on [employer] Link access.    For providers and/or their staff who would like to refer a patient to [facility], please contact us through our one-stop-shop provider referral line, [facility], at [phone].    If you feel you have received this communication in error or would no longer like to receive these types of communications, please e-mail [email or web address]

## 2020-01-20 NOTE — TELEPHONE ENCOUNTER
Patient very upset she thinks that no one has called her back to schedule vas appt. I booked this for her there was an avail appt today.

## 2020-01-20 NOTE — PROGRESS NOTES
VASCULAR SURGERY NOTE    Patient ID: Amita Lewis is a 59 y.o. female.    I. HISTORY     Chief Complaint: LLE claudication    HPI: Amita Lewis is a 59 y.o. female who is here today for new patient initial appointment. She has short distance claudication in the left leg at 1/2 block. She describes the pain as deep cramping pain which occurs after 1/2 block and if she continues to walk through the pain for more than a block or two then her foot begins to go numb. The leg also gets cold and numb/tingly at night and she has to stand up to get sensation back and make it warm again. This has woken her up from sleep several times. She has a history of smoking and recently quit. She has no history of vascular surgery or heart surgery. Denies h/o MI or stroke.    Past Medical History:   Diagnosis Date    Abnormal Pap smear     Asthma     Depression 3/17/2016    Environmental and seasonal allergies 3/17/2016    Heartburn 3/17/2016    HLD (hyperlipidemia) 03/22/2016    ASCVD 2018 6.3%; can't tolerate statins    Menopausal syndrome (hot flashes) 3/17/2016    Mild intermittent asthma without complication 3/17/2016    Obesity (BMI 30-39.9) 3/17/2016    Sciatica 3/17/2016        Past Surgical History:   Procedure Laterality Date    CERVICAL BIOPSY  W/ LOOP ELECTRODE EXCISION      INJECTION OF JOINT Bilateral 5/31/2018    Procedure: INJECTION-JOINT;  Surgeon: Lynette Rowley MD;  Location: [facility];  Service: Pain Management;  Laterality: Bilateral;  B/L SI Joint Injs  [id number], [id number]    INJECTION OF JOINT Bilateral 9/18/2018    Procedure: INJECTION, JOINT  Bilateral SI joint;  Surgeon: Lynette Rowley MD;  [facility];  Service: Pain Management;  Laterality: Bilateral;    microdiscetomy      L4-L5    OVARIAN CYST REMOVAL  2001    TRANSFORAMINAL EPIDURAL INJECTION OF STEROID Left 3/21/2019    Procedure: INJECTION, STEROID, EPIDURAL, TRANSFORAMINAL APPROACH, L4 AND L5;  Surgeon:  Lynette Rowley MD;  Location: [facility];  Service: Pain Management;  Laterality: Left;       Social History     Tobacco Use   Smoking Status Current Some Day Smoker    Packs/day: 0.00    Years: 0.00    Pack years: 0.00    Types: Cigarettes    Last attempt to quit: 10/1/2019    Years since quittin.3   Smokeless Tobacco Never Used        Review of Systems   Constitution: Negative for chills and fever.   HENT: Negative for ear pain and hoarse voice.    Eyes: Negative for discharge and pain.   Cardiovascular: Negative for chest pain and palpitations.   Respiratory: Negative for cough, hemoptysis and shortness of breath.    Endocrine: Negative for polydipsia and polyuria.   Skin: Positive for dry skin. Negative for rash.   Musculoskeletal: Negative for back pain and neck pain.   Gastrointestinal: Negative for abdominal pain, diarrhea, nausea and vomiting.   Genitourinary: Negative for dysuria and hematuria.   Neurological: Negative for dizziness, loss of balance and paresthesias.         II. PHYSICAL EXAM     Physical Exam   GEN: Alert/oriented  HEENT: atraumatic, normocephalic  CHEST: normal respiratory effort, symmetrical chest rise  CARD: RRR  EXT: Warm, no cyanosis/edema, LLE dependent rubor  RLE: 1+ femoral, biphasic PT signal, monophasic DP  LLE: 1+ femoral pulse, monophasic PT signal, weak monophasic DP    III. ASSESSMENT & PLAN (MEDICAL DECISION MAKING)     1. PAD (peripheral artery disease)        Imaging Results:  LLE Arterial Duplex 20:  Elevated velocities to 526cm/s in the common femoral artery.  CFA waveforms with brisk upstroke and spectral broadening suggestive of stenosis within the common femoral artery.  Waveforms do not suggest the presence of iliac artery disease.  The proximal SFA is patent.  The distal SFA is occluded with reconstitution of the above knee popliteal artery.  Waveforms in the popliteal artery and beyond are all monophasic.    BLE Segmental Pressures  1/20/20:  LLE low thigh pressure: 90  LLE calf pressure: 63  Profunda/popliteal collateral index: 0.3    KRISTOPHER/TP 1/20/20:  R L   0.49. TP NC 0.41  TP 50         Assessment/Diagnosis and Plan:  59 y.o. female with bilateral lower extremity PAD on non-invasive testing and physical exam. Her right leg is currently asymptomatic. In her left leg she has short distance LLE claudication and early signs of ischemic rest pain. Her non-invasive studies are consistent with her symptoms with KRISTOPHER/Toe pressure on the edge of critical limb ischemia. I discussed her findings and would recommend surgery to prevent progression to worsening rest pain. Profunda/popliteal index is >0.25 so would recommend Left CFA endarterectomy with left SFA angioplasty/stenting for direct revascularization. I discussed risks/benefits/alternatives with the patient and her partner. They would like to plan surgery some time in the next month.    - Will call to schedule pre-op testing and operation in hybrid OR  - Plan for consent on AM of surgery  - Increase to 10mg rosuvastatin (ultimately will plan to increase to 20mg daily if tolerated)  -Secondary prevention of atherosclerotic risk factors: Goal BP <140/90, goal LDL <100, goal HbA1C <7.0  -ASA 81mg daily for prevention of MI, stroke, and acute limb ischemia in setting of known PAD      RAYMUNDO Levine II, MD, [facility]  Vascular Surgeon  [facility]

## 2020-01-20 NOTE — TELEPHONE ENCOUNTER
----- Message from Rain Jones sent at 1/20/2020 10:16 AM CST -----  Contact: self   Patient is returning a phone call.  Who left a message for the patient: ?  Does patient know what this is regarding:  results  Comments:

## 2020-01-21 LAB — FUNGUS SPEC CULT: ABNORMAL

## 2020-01-21 RX ORDER — ROSUVASTATIN CALCIUM 10 MG/1
10 TABLET, COATED ORAL DAILY
Qty: 90 TABLET | Refills: 3 | Status: SHIPPED | OUTPATIENT
Start: 2020-01-21 | End: 2020-05-04

## 2020-01-22 ENCOUNTER — OFFICE VISIT (OUTPATIENT)
Dept: PAIN MEDICINE | Facility: CLINIC | Age: 60
End: 2020-01-22
Payer: COMMERCIAL

## 2020-01-22 VITALS
OXYGEN SATURATION: 100 % | BODY MASS INDEX: 31.9 KG/M2 | SYSTOLIC BLOOD PRESSURE: 132 MMHG | DIASTOLIC BLOOD PRESSURE: 69 MMHG | HEIGHT: 67 IN | RESPIRATION RATE: 18 BRPM | WEIGHT: 203.25 LBS | HEART RATE: 63 BPM

## 2020-01-22 DIAGNOSIS — M47.816 LUMBAR SPONDYLOSIS: ICD-10-CM

## 2020-01-22 DIAGNOSIS — R29.818 NEUROGENIC CLAUDICATION: ICD-10-CM

## 2020-01-22 DIAGNOSIS — M79.10 MYALGIA: ICD-10-CM

## 2020-01-22 DIAGNOSIS — M51.36 DDD (DEGENERATIVE DISC DISEASE), LUMBAR: ICD-10-CM

## 2020-01-22 DIAGNOSIS — M53.3 SACROILIAC JOINT PAIN: Primary | ICD-10-CM

## 2020-01-22 DIAGNOSIS — M54.16 LUMBAR RADICULOPATHY: ICD-10-CM

## 2020-01-22 PROCEDURE — 3008F BODY MASS INDEX DOCD: CPT | Mod: CPTII,S$GLB,, | Performed by: NURSE PRACTITIONER

## 2020-01-22 PROCEDURE — 99999 PR PBB SHADOW E&M-EST. PATIENT-LVL III: ICD-10-PCS | Mod: PBBFAC,,, | Performed by: NURSE PRACTITIONER

## 2020-01-22 PROCEDURE — 3008F PR BODY MASS INDEX (BMI) DOCUMENTED: ICD-10-PCS | Mod: CPTII,S$GLB,, | Performed by: NURSE PRACTITIONER

## 2020-01-22 PROCEDURE — 99213 PR OFFICE/OUTPT VISIT, EST, LEVL III, 20-29 MIN: ICD-10-PCS | Mod: S$GLB,,, | Performed by: NURSE PRACTITIONER

## 2020-01-22 PROCEDURE — 99999 PR PBB SHADOW E&M-EST. PATIENT-LVL III: CPT | Mod: PBBFAC,,, | Performed by: NURSE PRACTITIONER

## 2020-01-22 PROCEDURE — 99213 OFFICE O/P EST LOW 20 MIN: CPT | Mod: S$GLB,,, | Performed by: NURSE PRACTITIONER

## 2020-01-22 NOTE — PROGRESS NOTES
Chronic Pain -Follow Up    Referring Physician: No ref. provider found    Chief Complaint:   Chief Complaint   Patient presents with    Low-back Pain    Leg Pain        SUBJECTIVE: Disclaimer: This note has been generated using voice-recognition software. There may be typographical errors that have been missed during proof-reading    Interval History 1/22/2020:  The patient returns to clinic today for follow up. She is s/p bilateral SI joint injections on 12/28/2019. She reports some relief of her low back pain. She is unable to quantify her relief as she is still experiencing leg pain and cramping. She was able to swim while on vacation. She reports numbness and coldness to her calf and top of her foot with prolonged walking. She has recently seen Vascular and is scheduling RLE angioplasty with stenting. She continues to take Gabapentin once daily. She continues to care for her partner who has cancer. She denies any other health changes. Her pain today is 3/10.    Interval History 12/18/2019:  The patient returns to clinic today for follow up. Since last visit, her partner has been diagnosed with oral cancer. She has been caring for her and doing multiple trips to [place]. She reports increased low back pain that radiates into both buttocks and down the posterior aspect of her left leg to her foot. She reports increased numbness and coldness with laying down to her calf and top of her foot. Her greatest pain is in her buttocks. She reports increased pain with sitting and walking. She is currently participating in a work up to rule out vascular issues. She denies any other health changes. Her pain today is 6/10.      Interval History 4/4/2019:  The patient returns for procedure follow up.  She is s/p left L4 and L5 TF JILLIAN on 3/21/19 with about 50% relief of left leg pain.  She still has a burning pain to left calf and anterior foot.  Her right sided pain has improved.  Her lower back pain has been mild.  She  "would like a podiatry referral as previously discussed.  She has some benefit with Ibuprofen PRN.  She continues to remain active and stretches.  Her pain today is 1/10.     Interval History 3/6/2019:  She returns to clinic today for follow up. Reports she had 95% pain relief from bilateral SI joint injections on 9/18/2018 for approximately 4-5 months. She reports new left leg pain that started approximately 1 month ago out of the blue without any inciting event. Pain seems to start at times in her buttocks and radiates down her entire left leg. The pain is described as "crushing" and associated with numbness/tingling in the entire left leg that is worse in the hamstrings and dorsum of her foot. She also has cramping in her left hamstring at times. Pain is worse with using the stairs and after walking about 1 to 1.5 blocks. Pain better at rest, when leaning forward, and gets limited relief from chiropractor. No cardiovascular history or issues per patient. She is taking Ibuprofen 800mg qhs that provides some relief. Pain is 2/10 at best and 9/10 at worst. Today her pain is 5/10. + generalized weakness/limping due to the pain. No loss of bowel/bladder control or saddle anesthesia. No pain in her RLE.     Interval History 10/5/2018:  The patient returns to clinic today for follow up. She is s/p bilateral SI joint injection on 9/18/2018. She reports 95% relief of her low back and buttock pain. She denies any radiating leg pain today. She continues to participate in physical therapy with benefit. She denies any other health changes. Her pain today is 1/10.    Interval History: 9/5/18:  She states that 3 weeks ago her pain returned after hearing a pop and twisting while getting into her car. She states that pain has returned in the same character and distribution as prior to the bilateral SIJ on 5/31 but may be more severe now with pain going down both legs instead just one. She is hoping to repeat injections. She denies " any other health changes in the interim. She takes a baby ASA. She takes ibuprofen nightly for pain.     Interval History 6/14/2018:  The patient returns for follow up.  She is s/p bilateral SI joint injections on 5/31/18 with 100% pain relief.  She has not had any back or buttock pain since the procedure.  She still has some cramping to her calf which she feels is unrelated.  She takes Ibuprofen 800 mg at night with benefit.  She has had benefit with PT exercises and continues to be active.  Her pain today is 0/10.    Interval history 5/15/2018:  Since previous encounter the patient continues to have significant lower back pain over the area of the sacral iliac joints.  She states it is worse when she first starts to get going and gradually improves as she walks.  She feels as if she is able to walk longer distances after previous epidural steroid injections but overall reports that 10% improvement in her pain.  The previous Medrol Dosepak did not help significant only tramadol was also not helping with her pain.  She has been doing physical therapy and feels as if she is making slow strides but there still certain activities that she cannot do without exacerbating her pain.  No other health changes since previous encounter.    Interval History 4/23/2018  One week ago, sudden worsened back pain when bending forwards with radiation into bilateral thighs and weakness.  Gradually improving, taking ibuprofen, but has been restricting activities secondary to persisting pain in the lower back.    Initial encounter:    Amita Lewis presents to the clinic for the evaluation of back pain. The pain started one year ago  and symptoms have been worsening.    Brief history:    Pain Description:    The pain is located in the back area and radiates to the leg in the L3-4 distribution.  She describes symptoms of neurogenic versus vascular claudication.  Her pain significant worsens after walking for several blocks and  gradually improves when sitting down.  She describes her pain symptoms as a radiating pain into her calf and a cramping sensation in her calf.    At BEST  0/10     At WORST  7/10 on the WORST day.      On average pain is rated as 7/10.     Today the pain is rated as 1/10    The pain is described as burning, dull and tight band      Symptoms interfere with daily activity and sleeping.     Exacerbating factors: Walking.      Mitigating factors medications.     Patient denies .  Patient denies any suicidal or homicidal ideations    Pain Medications:  Current:  Ibuprofen - with relief  Gabapentin    Tried in Past:  NSAIDs -Never  TCA -Never  SNRI -Never  Anti-convulsants -lyrica and gabapentin in the past with significant sedating side effects limiting their use  Muscle Relaxants -Never  Opioids-Never    Physical Therapy/Home Exercise: yes       report:  Reviewed and consistent with medication use as prescribed.    Pain Procedures:   12/27/2017 LUMBAR LEFT L4 AND L5 TRANSFORAMINAL JILLIAN   3/14/2018 - left L3 and L4 TF JILLIAN  5/31/18 Bilateral SI joint injections- 100% relief  9/18/2018- Bilateral SI joint injections  3/21/2019- Left L4 and L5 TF JILLIAN  12/28/2019- Bilateral SI joint injections    Chiropractor -never  Acupuncture - never  TENS unit -never  Spinal decompression -Lumbar microdiskectomy in 2008 with complication of dural tear requiring repair.  Joint replacement -never    Imaging:  MRI Lumbar Spine 4/15/2019:  FINDINGS:  Remote operative change status post right L4 hemilaminectomy.  The lumbar sagittal alignment is relatively stable.  Lumbar vertebral body heights contour and bone marrow signal is relatively stable without evidence for acute fracture or subluxation.    Distal spinal cord and conus normal in signal and contour tip of the conus approximates inferior L1 level.    No aneurysmal dilatation of the visualized abdominal aorta.    T12/L1 through L1/L2: No significant disc bulge, central canal or neural  foraminal stenosis.    L2/L3: Small disc bulge without significant central canal or neural foraminal stenosis there is a small fluid signal focus in the dorsal epidural space at this level unchanged from prior    L3/L4:Bulging disc with ligamentum flavum hypertrophy and facet joint arthropathy mild central canal and bilateral neural foraminal stenosis.    L4/L5:Bulging disc with ligamentum flavum hypertrophy and facet joint arthropathy with superimposed right hemilaminectomy.  No significant central canal stenosis.  No evidence for recurrent disc herniation.  There is mild moderate bilateral neural foraminal stenosis.    L5/S1: No significant disc bulge central canal or neural foraminal stenosis.      Impression       No significant change from prior.  Remote operative change right L4 hemilaminectomy without evidence for recurrent disc herniation.    Scattered superimposed degenerative change most prominent at L3/L4 with posterior disc osteophyte, ligamentum flavum hypertrophy and facet joint arthropathy with mild central canal and bilateral neural foraminal stenosis.           Past Medical History:   Diagnosis Date    Abnormal Pap smear     Asthma     Depression 3/17/2016    Environmental and seasonal allergies 3/17/2016    Heartburn 3/17/2016    HLD (hyperlipidemia) 03/22/2016    ASCVD 2018 6.3%; can't tolerate statins    Menopausal syndrome (hot flashes) 3/17/2016    Mild intermittent asthma without complication 3/17/2016    Obesity (BMI 30-39.9) 3/17/2016    Sciatica 3/17/2016     Past Surgical History:   Procedure Laterality Date    CERVICAL BIOPSY  W/ LOOP ELECTRODE EXCISION      INJECTION OF JOINT Bilateral 5/31/2018    Procedure: INJECTION-JOINT;  Surgeon: Lynette Rowley MD;  [facility];  Service: Pain Management;  Laterality: Bilateral;  B/L SI Joint Injs  82248, [id number]    INJECTION OF JOINT Bilateral 9/18/2018    Procedure: INJECTION, JOINT  Bilateral SI joint;  Surgeon: Lynette  CAPO Rowley MD;  Location: [facility];  Service: Pain Management;  Laterality: Bilateral;    microdiscetomy      L4-L5    OVARIAN CYST REMOVAL  2001    TRANSFORAMINAL EPIDURAL INJECTION OF STEROID Left 3/21/2019    Procedure: INJECTION, STEROID, EPIDURAL, TRANSFORAMINAL APPROACH, L4 AND L5;  Surgeon: Lynette Rowley MD;  Location: [facility];  Service: Pain Management;  Laterality: Left;     Social History     Socioeconomic History    Marital status:      Spouse name: Not on file    Number of children: Not on file    Years of education: Not on file    Highest education level: Not on file   Occupational History    Not on file   Social Needs    Financial resource strain: Not hard at all    Food insecurity:     Worry: Never true     Inability: Never true    Transportation needs:     Medical: No     Non-medical: No   Tobacco Use    Smoking status: Current Some Day Smoker     Packs/day: 0.00     Years: 0.00     Pack years: 0.00     Types: Cigarettes     Last attempt to quit: 10/1/2019     Years since quittin.3    Smokeless tobacco: Never Used   Substance and Sexual Activity    Alcohol use: Yes     Alcohol/week: 1.0 standard drinks     Types: 1 Glasses of wine per week     Frequency: Monthly or less     Drinks per session: 1 or 2     Binge frequency: Never     Comment: 3 x a month    Drug use: No    Sexual activity: Yes     Partners: Female     Birth control/protection: None     Comment: (partner Ivone is a trans woman)   Lifestyle    Physical activity:     Days per week: 1 day     Minutes per session: 20 min    Stress: Only a little   Relationships    Social connections:     Talks on phone: Once a week     Gets together: Once a week     Attends [employer] service: Not on file     Active member of club or organization: No     Attends meetings of clubs or organizations: Never     Relationship status:    Other Topics Concern    Not on file   Social History Narrative    Partner  Ivone Castellanos    Works for [employer] at [employer] [place]    Walks a lot at work and for exercise     Family History   Problem Relation Age of Onset    Cancer Father         lung; non smoker, worked in [employer] and gas station    Alcohol abuse Father     Early death Father     Breast cancer Mother     Heart failure Mother     Diabetes Mellitus Mother         ?secondary to chronic steroids    Diabetes Mother     Heart disease Mother     Alcohol abuse Brother     Ovarian cancer Neg Hx     Hypertension Neg Hx        Review of patient's allergies indicates:  No Known Allergies    Current Outpatient Medications   Medication Sig    albuterol (PROVENTIL/VENTOLIN HFA) 90 mcg/actuation inhaler Inhale 2 puffs into the lungs every 6 (six) hours as needed for Wheezing or Shortness of Breath. Rescue    ASPIRIN (ASPIR-81 ORAL) Take 1 tablet by mouth once daily.    boric acid (BORIC ACID) vaginal suppository Place 650 mg vaginally.    buPROPion (WELLBUTRIN XL) 150 MG TB24 tablet Take 1 tablet (150 mg total) by mouth once daily. Plus 300mg tablet for total dose of 450mg daily    buPROPion (WELLBUTRIN XL) 300 MG 24 hr tablet Take 1 tablet (300 mg total) by mouth once daily. Plus 150mg tablet for total dose of 450mg daily    ECHINACEA 1X ORAL     efinaconazole (JUBLIA) 10 % Shelley Apply 1 application topically once daily. To affected toenail.  Do not use nail polish on the nails.    estradiol (ESTRACE) 0.01 % (0.1 mg/gram) vaginal cream Use 1/2 gram twice weekly    fexofenadine (ALLEGRA) 180 MG tablet Take 1 tablet (180 mg total) by mouth once daily.    FLAXSEED ORAL Take by mouth.    fluticasone (FLONASE) 50 mcg/actuation nasal spray 1 spray by Each Nare route 2 (two) times daily as needed for Rhinitis or Allergies.    gabapentin (NEURONTIN) 100 MG capsule Take 1 capsule (100 mg total) by mouth every evening. For back pain    ibuprofen (ADVIL,MOTRIN) 800 MG tablet TAKE 1 TABLET(800 MG) BY MOUTH EVERY 8  "HOURS WITH FOOD AS NEEDED FOR PAIN    LACTOBAC NO.41/BIFIDOBACT NO.7 (PROBIOTIC-10 ORAL) Take by mouth.    multivitamin (ONE DAILY MULTIVITAMIN) per tablet Take 1 tablet by mouth every evening.     nystatin (MYCOSTATIN) powder Apply to affected areas of skin twice daily as needed for skin infection.  Re treat as needed.    rosuvastatin (CRESTOR) 10 MG tablet Take 1 tablet (10 mg total) by mouth once daily.    sumatriptan (IMITREX) 50 MG tablet Take 1 tab by mouth as needed for migraine headache.  Repeat dose every 2 hours as needed.  Max 200mg in 24 hours.    tobramycin-dexamethasone 0.3-0.1% (TOBRADEX) 0.3-0.1 % DrpS      No current facility-administered medications for this visit.        REVIEW OF SYSTEMS:    GENERAL:  No weight loss, malaise or fevers.  HEENT:   No recent changes in vision or hearing  NECK:  Negative for lumps, no difficulty with swallowing.  RESPIRATORY:  Negative for cough, wheezing or shortness of breath, patient denies any recent URI.  CARDIOVASCULAR:  Negative for chest pain, leg swelling or palpitations.  GI:  Negative for abdominal discomfort, blood in stools or black stools or change in bowel habits.  MUSCULOSKELETAL:  See HPI.  SKIN:  Negative for lesions, rash, and itching.  PSYCH:  No mood disorder or recent psychosocial stressors.  Patient's sleep is disturbed secondary to pain.  HEMATOLOGY/LYMPHOLOGY:  Negative for prolonged bleeding, bruising easily or swollen nodes.  Patient is not currently taking any anti-coagulants  ENDO: No history of diabetes or thyroid dysfunction  NEURO: Occasional headaches.  All other reviewed and negative other than HPI.    OBJECTIVE:    /69   Pulse 63   Resp 18   Ht 5' 7" (1.702 m)   Wt 92.2 kg (203 lb 4.2 oz)   LMP 10/20/2013   SpO2 100%   BMI 31.84 kg/m²     PHYSICAL EXAMINATION:    GENERAL: Well appearing, in no acute distress, alert and oriented x3.  PSYCH:  Mood and affect appropriate.  SKIN: No rashes or lesions.  HEAD/FACE:  " Normocephalic, atraumatic. Cranial nerves grossly intact.  CV: RRR with palpation of the radial artery.  PULM: No evidence of respiratory difficulty, symmetric chest rise.  BACK: Straight leg raising in the sitting position is negative to radicular pain bilaterally.  There is pain with palpation over the facet joints of the lumbar spine bilaterally. Full ROM with mild pain on flexion and extension.  Negative facet loading bilaterally.  EXTREMITIES: Peripheral joint ROM is full and pain free without obvious instability or laxity in all four extremities. No deformities, edema, or skin discoloration. Good capillary refill.  MUSCULOSKELETAL: Hip, and knee provocative maneuvers are negative.  There is mild pain with palpation over the sacroiliac joints bilaterally.  There is no pain to palpation over the greater trochanteric bursa bilaterally.  FABERs test is positive bilaterally.  FADIRs test is negative.   Bilateral lower extremity strength is normal and symmetric.  No atrophy or tone abnormalities are noted.  NEURO: Bilateral lower extremity coordination and muscle stretch reflexes are physiologic and symmetric.  Plantar response are downgoing. No clonus.  No loss of sensation is noted.  GAIT: Antalgic- ambulates without assistance.     Lab Results   Component Value Date    WBC 7.05 12/17/2019    HGB 12.6 12/17/2019    HCT 37.9 12/17/2019    MCV 88 12/17/2019     12/17/2019     BMP  Lab Results   Component Value Date     12/17/2019    K 4.1 12/17/2019     12/17/2019    CO2 24 12/17/2019    BUN 19 12/17/2019    CREATININE 0.8 12/17/2019    CALCIUM 10.0 12/17/2019    ANIONGAP 10 12/17/2019    ESTGFRAFRICA >60 12/17/2019    EGFRNONAA >60 12/17/2019         ASSESSMENT: 59 y.o. year old female with back and leg pain, consistent with the following diagnoses:    Encounter Diagnoses   Name Primary?    Sacroiliac joint pain Yes    Lumbar radiculopathy     Lumbar spondylosis     DDD (degenerative disc  disease), lumbar     Myalgia     Neurogenic claudication        PLAN:     - Previous imaging was reviewed and discussed with the patient today.    - She is s/p bilateral SI joint injections with some relief. We can repeat this as needed. If short term relief, consider sacral RFA.     - Consider TF JILLIAN in the future.     - Continue Gabapentin 100 mg daily.     - Continue with PT exercises.    - RTC in 3 months or sooner if needed.     The  above plan and management options were discussed at length with patient. Patient is in agreement with the above and verbalized understanding.     Carolann Hi NP  01/22/2020

## 2020-02-04 DIAGNOSIS — I73.9 PVD (PERIPHERAL VASCULAR DISEASE) WITH CLAUDICATION: Primary | ICD-10-CM

## 2020-02-04 DIAGNOSIS — I73.9 CLAUDICATION: Primary | ICD-10-CM

## 2020-02-07 ENCOUNTER — HOSPITAL ENCOUNTER (OUTPATIENT)
Dept: RADIOLOGY | Facility: HOSPITAL | Age: 60
Discharge: HOME | End: 2020-02-07
Attending: SURGERY
Payer: COMMERCIAL

## 2020-02-07 DIAGNOSIS — I73.9 CLAUDICATION: ICD-10-CM

## 2020-02-07 PROCEDURE — 25500020 PHARM REV CODE 255: Performed by: SURGERY

## 2020-02-07 PROCEDURE — 75635 CT ANGIO ABDOMINAL ARTERIES: CPT | Mod: 26,,, | Performed by: RADIOLOGY

## 2020-02-07 PROCEDURE — 75635 CTA RUNOFF ABD PEL BILAT LOWER EXT: ICD-10-PCS | Mod: 26,,, | Performed by: RADIOLOGY

## 2020-02-07 PROCEDURE — 75635 CT ANGIO ABDOMINAL ARTERIES: CPT | Mod: TC

## 2020-02-07 RX ADMIN — IOHEXOL 125 ML: 350 INJECTION, SOLUTION INTRAVENOUS at 08:02

## 2020-02-11 ENCOUNTER — PATIENT MESSAGE (OUTPATIENT)
Dept: SURGERY | Facility: HOSPITAL | Age: 60
End: 2020-02-11

## 2020-02-11 ENCOUNTER — ANESTHESIA EVENT (OUTPATIENT)
Dept: SURGERY | Facility: HOSPITAL | Age: 60
DRG: 254 | End: 2020-02-11
Payer: COMMERCIAL

## 2020-02-11 ENCOUNTER — TELEPHONE (OUTPATIENT)
Dept: VASCULAR SURGERY | Facility: CLINIC | Age: 60
End: 2020-02-11

## 2020-02-11 NOTE — PRE-PROCEDURE INSTRUCTIONS
PreOp Instructions given:     - Verbal medication information (what to hold and what to take)   - NPO guidelines   - Arrival place directions given;  - Bathing with antibacterial soap   - Don't wear any jewelry or bring any valuables AM of surgery   - No makeup or moisturizer to face   - No perfume/cologne, powder, lotions or aftershave   Pt. verbalized understanding.     Pt reports a h/o PONV    ARRIVAL TO [facility] - [address] - [facility] WILL BE PROVIDING TRANSPORTATION HOME UPON DISCHARGE.

## 2020-02-11 NOTE — ANESTHESIA PREPROCEDURE EVALUATION
[facility]  Anesthesia Pre-Operative Evaluation         Patient Name: Amita Lewis  YOB: 1960  MRN: [id number]    SUBJECTIVE:     Pre-operative evaluation for Procedure(s) (LRB):  Left Common Femoral Approach Endarterectomy with Left SFA Angioplasty Stenting Possible Bypass (Left)     02/11/2020    Amita Lewis is a 59 y.o. female w/ a significant PMHx of obesity, GERD, asthma, depression, HLD, DDD, lumbar radiculopathy, PAD.    Pt reports short distance claudication in the left leg at 1/2 block. She describes the pain as deep cramping pain which occurs after 1/2 block and if she continues to walk through the pain for more than a block or two then her foot begins to go numb. The leg also gets cold and numb/tingly at night and she has to stand up to get sensation back and make it warm again. This has woken her up from sleep several times. She has a history of smoking and recently quit. She has no history of vascular surgery or heart surgery. Denies h/o MI or stroke.    CTA runoff ABD Pel bilateral lower extremity showed: Significant disease in the left common iliac and bilateral external iliac as well as common femoral arteries as above.  There is occlusion of the superficial femoral arteries with reconstitution of the popliteal arteries.  Three-vessel runoff on the right and single-vessel runoff on the left as detailed above..  2. Mild atherosclerotic disease of distal portion of the abdominal aorta.    Patient now presents for the above procedure(s).      LDA: None documented.       Prev airway: None documented.    Drips: None documented.      Patient Active Problem List   Diagnosis    Obesity (BMI 30-39.9)    Vitamin D deficiency    Heartburn    Depression    Menopausal syndrome (hot flashes)    Mild intermittent asthma without complication    Sciatica    Environmental and seasonal allergies    HLD (hyperlipidemia)    Candidal skin infection     Claudication    Sacroiliac joint pain    Cat bite    Tonsil stone    Lumbar radiculopathy    DDD (degenerative disc disease), lumbar    Left leg pain    PAD (peripheral artery disease)       Review of patient's allergies indicates:  No Known Allergies    Current Inpatient Medications:      No current facility-administered medications on file prior to encounter.      Current Outpatient Medications on File Prior to Encounter   Medication Sig Dispense Refill    ASPIRIN (ASPIR-81 ORAL) Take 1 tablet by mouth every evening.       buPROPion (WELLBUTRIN XL) 150 MG TB24 tablet Take 1 tablet (150 mg total) by mouth once daily. Plus 300mg tablet for total dose of 450mg daily (Patient taking differently: Take 150 mg by mouth every evening. Plus 300mg tablet for total dose of 450mg daily) 90 tablet 1    buPROPion (WELLBUTRIN XL) 300 MG 24 hr tablet Take 1 tablet (300 mg total) by mouth once daily. Plus 150mg tablet for total dose of 450mg daily (Patient taking differently: Take 300 mg by mouth every evening. Plus 150mg tablet for total dose of 450mg daily) 90 tablet 1    estradiol (ESTRACE) 0.01 % (0.1 mg/gram) vaginal cream Use 1/2 gram twice weekly 42.5 g 10    gabapentin (NEURONTIN) 100 MG capsule Take 1 capsule (100 mg total) by mouth every evening. For back pain 90 capsule 3    ibuprofen (ADVIL,MOTRIN) 800 MG tablet TAKE 1 TABLET(800 MG) BY MOUTH EVERY 8 HOURS WITH FOOD AS NEEDED FOR PAIN 90 tablet 0    rosuvastatin (CRESTOR) 10 MG tablet Take 1 tablet (10 mg total) by mouth once daily. (Patient taking differently: Take 10 mg by mouth every evening. ) 90 tablet 3    albuterol (PROVENTIL/VENTOLIN HFA) 90 mcg/actuation inhaler Inhale 2 puffs into the lungs every 6 (six) hours as needed for Wheezing or Shortness of Breath. Rescue 3 Inhaler 3    boric acid (BORIC ACID) vaginal suppository Place 650 mg vaginally.      ECHINACEA 1X ORAL       efinaconazole (JUBLIA) 10 % Shelley Apply 1 application topically once  daily. To affected toenail.  Do not use nail polish on the nails. 8 mL 10    fexofenadine (ALLEGRA) 180 MG tablet Take 1 tablet (180 mg total) by mouth once daily. (Patient taking differently: Take 180 mg by mouth every evening. ) 90 tablet 3    FLAXSEED ORAL Take by mouth.      fluticasone (FLONASE) 50 mcg/actuation nasal spray 1 spray by Each Nare route 2 (two) times daily as needed for Rhinitis or Allergies. 1 Bottle 6    LACTOBAC NO.41/BIFIDOBACT NO.7 (PROBIOTIC-10 ORAL) Take by mouth every evening.       multivitamin (ONE DAILY MULTIVITAMIN) per tablet Take 1 tablet by mouth every evening.       nystatin (MYCOSTATIN) powder Apply to affected areas of skin twice daily as needed for skin infection.  Re treat as needed. 30 g 3    sumatriptan (IMITREX) 50 MG tablet Take 1 tab by mouth as needed for migraine headache.  Repeat dose every 2 hours as needed.  Max 200mg in 24 hours. 30 tablet 1    tobramycin-dexamethasone 0.3-0.1% (TOBRADEX) 0.3-0.1 % DrpS   0       Past Surgical History:   Procedure Laterality Date    CERVICAL BIOPSY  W/ LOOP ELECTRODE EXCISION      INJECTION OF JOINT Bilateral 5/31/2018    Procedure: INJECTION-JOINT;  Surgeon: Lynette Rowley MD;  Location: [facility];  Service: Pain Management;  Laterality: Bilateral;  B/L SI Joint Injs  [id number], [id number]    INJECTION OF JOINT Bilateral 9/18/2018    Procedure: INJECTION, JOINT  Bilateral SI joint;  Surgeon: Lynette Rowley MD;  Location: [facility] PAIN [facility];  Service: Pain Management;  Laterality: Bilateral;    microdiscetomy      L4-L5    OVARIAN CYST REMOVAL  2001    TRANSFORAMINAL EPIDURAL INJECTION OF STEROID Left 3/21/2019    Procedure: INJECTION, STEROID, EPIDURAL, TRANSFORAMINAL APPROACH, L4 AND L5;  Surgeon: Lynette Rowley MD;  Location: [facility];  Service: Pain Management;  Laterality: Left;       Social History     Socioeconomic History    Marital status:      Spouse name: Not on file    Number of children: Not  on file    Years of education: Not on file    Highest education level: Not on file   Occupational History    Not on file   Social Needs    Financial resource strain: Not hard at all    Food insecurity:     Worry: Never true     Inability: Never true    Transportation needs:     Medical: No     Non-medical: No   Tobacco Use    Smoking status: Current Some Day Smoker     Packs/day: 0.00     Years: 0.00     Pack years: 0.00     Types: Cigarettes     Last attempt to quit: 10/1/2019     Years since quittin.3    Smokeless tobacco: Never Used   Substance and Sexual Activity    Alcohol use: Yes     Alcohol/week: 1.0 standard drinks     Types: 1 Glasses of wine per week     Frequency: Monthly or less     Drinks per session: 1 or 2     Binge frequency: Never     Comment: 3 x a month    Drug use: No    Sexual activity: Yes     Partners: Female     Birth control/protection: None     Comment: (partner Ivone is a trans woman)   Lifestyle    Physical activity:     Days per week: 1 day     Minutes per session: 20 min    Stress: Only a little   Relationships    Social connections:     Talks on phone: Once a week     Gets together: Once a week     Attends Gnosticist service: Not on file     Active member of club or organization: No     Attends meetings of clubs or organizations: Never     Relationship status:    Other Topics Concern    Not on file   Social History Narrative    Partner Ivnoe Castellanos    Works for  at [employer]    Walks a lot at work and for exercise       OBJECTIVE:     Vital Signs Range (Last 24H):         Significant Labs:  Lab Results   Component Value Date    WBC 7.05 2019    HGB 12.6 2019    HCT 37.9 2019     2019    CHOL 174 2019    TRIG 121 2019    HDL 44 2019    ALT 20 2019    AST 19 2019     2019    K 4.1 2019     2019    CREATININE 0.8 2019    BUN 19 2019     CO2 24 12/17/2019    TSH 1.236 12/17/2019    HGBA1C 5.3 11/12/2018       Diagnostic Studies: No relevant studies.    EKG:   No results found for this or any previous visit.    2D ECHO:  TTE:  No results found for this or any previous visit.    REYNA:  No results found for this or any previous visit.    ASSESSMENT/PLAN:                                                                                                                 02/11/2020  Amita Lewis is a 59 y.o., female.    Anesthesia Evaluation    I have reviewed the Patient Summary Reports.    I have reviewed the Nursing Notes.      Review of Systems  Anesthesia Hx:  No problems with previous Anesthesia  History of prior surgery of interest to airway management or planning: Denies Family Hx of Anesthesia complications.   Denies Personal Hx of Anesthesia complications.   Social:  Smoker, Alcohol Use    Hematology/Oncology:        Denies Current/Recent Cancer   EENT/Dental:   denies chronic allergic rhinitis   Pulmonary:   Asthma    Renal/:   Denies Chronic Renal Disease.     Hepatic/GI:   GERD    Musculoskeletal:   Arthritis     Neurological:   Neuromuscular Disease, Denies Seizures.    Psych:   Psychiatric History          Physical Exam  General:  Well nourished    Airway/Jaw/Neck:  Airway Findings: Mouth Opening: Normal Tongue: Normal  General Airway Assessment: Adult, Good  Mallampati: II  TM Distance: Normal, at least 6 cm  Jaw/Neck Findings:  Neck ROM: Normal ROM       Chest/Lungs:  Chest/Lungs Findings: Clear to auscultation     Heart/Vascular:  Heart Findings: Rate: Normal  Rhythm: Regular Rhythm        Mental Status:  Mental Status Findings:  Alert and Oriented, Cooperative         Anesthesia Plan  Type of Anesthesia, risks & benefits discussed:  Anesthesia Type:  general  Patient's Preference:   Intra-op Monitoring Plan: standard ASA monitors and arterial line  Intra-op Monitoring Plan Comments:   Post Op Pain Control Plan: per primary service  following discharge from PACU  Post Op Pain Control Plan Comments:   Induction:   IV  Beta Blocker:         Informed Consent: Patient understands risks and agrees with Anesthesia plan.  Questions answered. Anesthesia consent signed with patient.  ASA Score: 3     Day of Surgery Review of History & Physical:    H&P update referred to the surgeon.         Ready For Surgery From Anesthesia Perspective.

## 2020-02-11 NOTE — TELEPHONE ENCOUNTER
"Spoke with Ms Lewis, time of arrival 0600am 2nd floor DOSC for surgery on 2/12/2020 confirmed. Also Ms Lewis states " I sent an unpleasant email to Dr Levine with some unpleasing concerns and I would like a call back from him." Dr Levine notified.  "

## 2020-02-12 ENCOUNTER — HOSPITAL ENCOUNTER (INPATIENT)
Facility: HOSPITAL | Age: 60
LOS: 3 days | Discharge: HOME | DRG: 254 | End: 2020-02-15
Attending: SURGERY | Admitting: SURGERY
Payer: COMMERCIAL

## 2020-02-12 ENCOUNTER — ANESTHESIA (OUTPATIENT)
Dept: SURGERY | Facility: HOSPITAL | Age: 60
DRG: 254 | End: 2020-02-12
Payer: COMMERCIAL

## 2020-02-12 DIAGNOSIS — I73.9 PAD (PERIPHERAL ARTERY DISEASE): Primary | ICD-10-CM

## 2020-02-12 LAB
ABO + RH BLD: NORMAL
ANION GAP SERPL CALC-SCNC: 8 MMOL/L (ref 8–16)
APTT BLDCRRT: 30.6 SEC (ref 21–32)
BASOPHILS # BLD AUTO: 0.06 K/UL (ref 0–0.2)
BASOPHILS NFR BLD: 0.9 % (ref 0–1.9)
BLD GP AB SCN CELLS X3 SERPL QL: NORMAL
BUN SERPL-MCNC: 17 MG/DL (ref 6–20)
CALCIUM SERPL-MCNC: 9.4 MG/DL (ref 8.7–10.5)
CHLORIDE SERPL-SCNC: 107 MMOL/L (ref 95–110)
CO2 SERPL-SCNC: 26 MMOL/L (ref 23–29)
CREAT SERPL-MCNC: 0.8 MG/DL (ref 0.5–1.4)
DIFFERENTIAL METHOD: ABNORMAL
EOSINOPHIL # BLD AUTO: 0.3 K/UL (ref 0–0.5)
EOSINOPHIL NFR BLD: 3.6 % (ref 0–8)
ERYTHROCYTE [DISTWIDTH] IN BLOOD BY AUTOMATED COUNT: 13 % (ref 11.5–14.5)
EST. GFR  (AFRICAN AMERICAN): >60 ML/MIN/1.73 M^2
EST. GFR  (NON AFRICAN AMERICAN): >60 ML/MIN/1.73 M^2
GLUCOSE SERPL-MCNC: 164 MG/DL (ref 70–110)
GLUCOSE SERPL-MCNC: 99 MG/DL (ref 70–110)
HCO3 UR-SCNC: 21.3 MMOL/L (ref 24–28)
HCO3 UR-SCNC: 24.5 MMOL/L (ref 24–28)
HCT VFR BLD AUTO: 37.7 % (ref 37–48.5)
HCT VFR BLD CALC: 30 %PCV (ref 36–54)
HGB BLD-MCNC: 12 G/DL (ref 12–16)
IMM GRANULOCYTES # BLD AUTO: 0.02 K/UL (ref 0–0.04)
IMM GRANULOCYTES NFR BLD AUTO: 0.3 % (ref 0–0.5)
INR PPP: 0.9 (ref 0.8–1.2)
LDH SERPL L TO P-CCNC: 0.81 MMOL/L (ref 0.36–1.25)
LYMPHOCYTES # BLD AUTO: 1.8 K/UL (ref 1–4.8)
LYMPHOCYTES NFR BLD: 25.8 % (ref 18–48)
MCH RBC QN AUTO: 29 PG (ref 27–31)
MCHC RBC AUTO-ENTMCNC: 31.8 G/DL (ref 32–36)
MCV RBC AUTO: 91 FL (ref 82–98)
MONOCYTES # BLD AUTO: 0.6 K/UL (ref 0.3–1)
MONOCYTES NFR BLD: 8.2 % (ref 4–15)
NEUTROPHILS # BLD AUTO: 4.2 K/UL (ref 1.8–7.7)
NEUTROPHILS NFR BLD: 61.2 % (ref 38–73)
NRBC BLD-RTO: 0 /100 WBC
PCO2 BLDA: 32.3 MMHG (ref 35–45)
PCO2 BLDA: 40.9 MMHG (ref 35–45)
PH SMN: 7.38 [PH] (ref 7.35–7.45)
PH SMN: 7.43 [PH] (ref 7.35–7.45)
PLATELET # BLD AUTO: 275 K/UL (ref 150–350)
PMV BLD AUTO: 11.4 FL (ref 9.2–12.9)
PO2 BLDA: 182 MMHG (ref 80–100)
PO2 BLDA: 208 MMHG (ref 80–100)
POC ACTIVATED CLOTTING TIME K: 142 SEC (ref 74–137)
POC ACTIVATED CLOTTING TIME K: 142 SEC (ref 74–137)
POC ACTIVATED CLOTTING TIME K: 202 SEC (ref 74–137)
POC ACTIVATED CLOTTING TIME K: 202 SEC (ref 74–137)
POC ACTIVATED CLOTTING TIME K: 208 SEC (ref 74–137)
POC ACTIVATED CLOTTING TIME K: 219 SEC (ref 74–137)
POC ACTIVATED CLOTTING TIME K: 241 SEC (ref 74–137)
POC BE: -1 MMOL/L
POC BE: -3 MMOL/L
POC IONIZED CALCIUM: 1.02 MMOL/L (ref 1.06–1.42)
POC SATURATED O2: 100 % (ref 95–100)
POC SATURATED O2: 100 % (ref 95–100)
POC TCO2: 22 MMOL/L (ref 23–27)
POC TCO2: 26 MMOL/L (ref 23–27)
POTASSIUM BLD-SCNC: 3.8 MMOL/L (ref 3.5–5.1)
POTASSIUM SERPL-SCNC: 3.7 MMOL/L (ref 3.5–5.1)
PROTHROMBIN TIME: 9.8 SEC (ref 9–12.5)
RBC # BLD AUTO: 4.14 M/UL (ref 4–5.4)
SAMPLE: ABNORMAL
SODIUM BLD-SCNC: 138 MMOL/L (ref 136–145)
SODIUM SERPL-SCNC: 141 MMOL/L (ref 136–145)
WBC # BLD AUTO: 6.86 K/UL (ref 3.9–12.7)

## 2020-02-12 PROCEDURE — 63600175 PHARM REV CODE 636 W HCPCS: Performed by: STUDENT IN AN ORGANIZED HEALTH CARE EDUCATION/TRAINING PROGRAM

## 2020-02-12 PROCEDURE — 85610 PROTHROMBIN TIME: CPT

## 2020-02-12 PROCEDURE — 35371 PR THROMBOENDARTECTMY FEMORAL COMMON: ICD-10-PCS | Mod: LT,,, | Performed by: SURGERY

## 2020-02-12 PROCEDURE — 63600175 PHARM REV CODE 636 W HCPCS: Performed by: SURGERY

## 2020-02-12 PROCEDURE — 86850 RBC ANTIBODY SCREEN: CPT

## 2020-02-12 PROCEDURE — 85025 COMPLETE CBC W/AUTO DIFF WBC: CPT

## 2020-02-12 PROCEDURE — 25000003 PHARM REV CODE 250: Performed by: STUDENT IN AN ORGANIZED HEALTH CARE EDUCATION/TRAINING PROGRAM

## 2020-02-12 PROCEDURE — C1725 CATH, TRANSLUMIN NON-LASER: HCPCS | Performed by: SURGERY

## 2020-02-12 PROCEDURE — 63600175 PHARM REV CODE 636 W HCPCS

## 2020-02-12 PROCEDURE — 71000033 HC RECOVERY, INTIAL HOUR: Performed by: SURGERY

## 2020-02-12 PROCEDURE — 71000015 HC POSTOP RECOV 1ST HR: Performed by: SURGERY

## 2020-02-12 PROCEDURE — C1894 INTRO/SHEATH, NON-LASER: HCPCS | Performed by: SURGERY

## 2020-02-12 PROCEDURE — 71000016 HC POSTOP RECOV ADDL HR: Performed by: SURGERY

## 2020-02-12 PROCEDURE — C1887 CATHETER, GUIDING: HCPCS | Performed by: SURGERY

## 2020-02-12 PROCEDURE — C1769 GUIDE WIRE: HCPCS | Performed by: SURGERY

## 2020-02-12 PROCEDURE — 37000009 HC ANESTHESIA EA ADD 15 MINS: Performed by: SURGERY

## 2020-02-12 PROCEDURE — 37221 PR REVASCULARIZE ILIAC ARTERY,ANGIOPLASTY/STENT, INITIAL VESSEL: CPT | Mod: 51,LT,, | Performed by: SURGERY

## 2020-02-12 PROCEDURE — 25500020 PHARM REV CODE 255: Performed by: SURGERY

## 2020-02-12 PROCEDURE — 85730 THROMBOPLASTIN TIME PARTIAL: CPT

## 2020-02-12 PROCEDURE — C1876 STENT, NON-COA/NON-COV W/DEL: HCPCS | Performed by: SURGERY

## 2020-02-12 PROCEDURE — C1768 GRAFT, VASCULAR: HCPCS | Performed by: SURGERY

## 2020-02-12 PROCEDURE — D9220A PRA ANESTHESIA: Mod: ,,, | Performed by: ANESTHESIOLOGY

## 2020-02-12 PROCEDURE — 27201423 OPTIME MED/SURG SUP & DEVICES STERILE SUPPLY: Performed by: SURGERY

## 2020-02-12 PROCEDURE — 80048 BASIC METABOLIC PNL TOTAL CA: CPT

## 2020-02-12 PROCEDURE — 25000003 PHARM REV CODE 250: Performed by: SURGERY

## 2020-02-12 PROCEDURE — 37224 PR FEM/POPL REVAS W/TLA: ICD-10-PCS | Mod: 51,LT,, | Performed by: SURGERY

## 2020-02-12 PROCEDURE — 63600175 PHARM REV CODE 636 W HCPCS: Performed by: ANESTHESIOLOGY

## 2020-02-12 PROCEDURE — 35371 RECHANNELING OF ARTERY: CPT | Mod: LT,,, | Performed by: SURGERY

## 2020-02-12 PROCEDURE — 36000706: Performed by: SURGERY

## 2020-02-12 PROCEDURE — 94761 N-INVAS EAR/PLS OXIMETRY MLT: CPT

## 2020-02-12 PROCEDURE — 36620 INSERTION CATHETER ARTERY: CPT | Mod: 59,,, | Performed by: ANESTHESIOLOGY

## 2020-02-12 PROCEDURE — 37000008 HC ANESTHESIA 1ST 15 MINUTES: Performed by: SURGERY

## 2020-02-12 PROCEDURE — 36620 ARTERIAL: ICD-10-PCS | Mod: 59,,, | Performed by: ANESTHESIOLOGY

## 2020-02-12 PROCEDURE — 37224 PR FEM/POPL REVAS W/TLA: CPT | Mod: 51,LT,, | Performed by: SURGERY

## 2020-02-12 PROCEDURE — 27201037 HC PRESSURE MONITORING SET UP

## 2020-02-12 PROCEDURE — 36000707: Performed by: SURGERY

## 2020-02-12 PROCEDURE — 37221 PR REVASCULARIZE ILIAC ARTERY,ANGIOPLASTY/STENT, INITIAL VESSEL: ICD-10-PCS | Mod: 51,LT,, | Performed by: SURGERY

## 2020-02-12 PROCEDURE — D9220A PRA ANESTHESIA: ICD-10-PCS | Mod: ,,, | Performed by: ANESTHESIOLOGY

## 2020-02-12 DEVICE — IMPLANTABLE DEVICE: Type: IMPLANTABLE DEVICE | Site: ARTERIAL | Status: FUNCTIONAL

## 2020-02-12 DEVICE — GRAFT VAS GUARD 0.8X8CM BOVINE: Type: IMPLANTABLE DEVICE | Site: ARTERIAL | Status: FUNCTIONAL

## 2020-02-12 RX ORDER — CEFAZOLIN SODIUM 1 G/3ML
2 INJECTION, POWDER, FOR SOLUTION INTRAMUSCULAR; INTRAVENOUS
Status: COMPLETED | OUTPATIENT
Start: 2020-02-12 | End: 2020-02-12

## 2020-02-12 RX ORDER — ONDANSETRON 2 MG/ML
4 INJECTION INTRAMUSCULAR; INTRAVENOUS DAILY PRN
Status: DISCONTINUED | OUTPATIENT
Start: 2020-02-12 | End: 2020-02-12 | Stop reason: HOSPADM

## 2020-02-12 RX ORDER — BACITRACIN 50000 [IU]/1
INJECTION, POWDER, FOR SOLUTION INTRAMUSCULAR
Status: DISCONTINUED | OUTPATIENT
Start: 2020-02-12 | End: 2020-02-12

## 2020-02-12 RX ORDER — MIDAZOLAM HYDROCHLORIDE 1 MG/ML
INJECTION, SOLUTION INTRAMUSCULAR; INTRAVENOUS
Status: DISCONTINUED | OUTPATIENT
Start: 2020-02-12 | End: 2020-02-12

## 2020-02-12 RX ORDER — HYDROMORPHONE HYDROCHLORIDE 1 MG/ML
INJECTION, SOLUTION INTRAMUSCULAR; INTRAVENOUS; SUBCUTANEOUS
Status: COMPLETED
Start: 2020-02-12 | End: 2020-02-12

## 2020-02-12 RX ORDER — LIDOCAINE HYDROCHLORIDE 10 MG/ML
1 INJECTION, SOLUTION EPIDURAL; INFILTRATION; INTRACAUDAL; PERINEURAL ONCE
Status: DISCONTINUED | OUTPATIENT
Start: 2020-02-12 | End: 2020-02-12

## 2020-02-12 RX ORDER — CALCIUM CARBONATE 200(500)MG
500 TABLET,CHEWABLE ORAL 2 TIMES DAILY PRN
Status: DISCONTINUED | OUTPATIENT
Start: 2020-02-13 | End: 2020-02-15 | Stop reason: HOSPADM

## 2020-02-12 RX ORDER — SODIUM CHLORIDE, SODIUM LACTATE, POTASSIUM CHLORIDE, CALCIUM CHLORIDE 600; 310; 30; 20 MG/100ML; MG/100ML; MG/100ML; MG/100ML
INJECTION, SOLUTION INTRAVENOUS CONTINUOUS
Status: DISCONTINUED | OUTPATIENT
Start: 2020-02-12 | End: 2020-02-12

## 2020-02-12 RX ORDER — PHENYLEPHRINE HYDROCHLORIDE 10 MG/ML
INJECTION INTRAVENOUS
Status: DISCONTINUED | OUTPATIENT
Start: 2020-02-12 | End: 2020-02-12

## 2020-02-12 RX ORDER — PROTAMINE SULFATE 10 MG/ML
INJECTION, SOLUTION INTRAVENOUS
Status: DISCONTINUED | OUTPATIENT
Start: 2020-02-12 | End: 2020-02-12

## 2020-02-12 RX ORDER — HYDROMORPHONE HYDROCHLORIDE 1 MG/ML
0.2 INJECTION, SOLUTION INTRAMUSCULAR; INTRAVENOUS; SUBCUTANEOUS EVERY 5 MIN PRN
Status: DISCONTINUED | OUTPATIENT
Start: 2020-02-12 | End: 2020-02-12 | Stop reason: HOSPADM

## 2020-02-12 RX ORDER — FENTANYL CITRATE 50 UG/ML
25 INJECTION, SOLUTION INTRAMUSCULAR; INTRAVENOUS EVERY 5 MIN PRN
Status: COMPLETED | OUTPATIENT
Start: 2020-02-12 | End: 2020-02-12

## 2020-02-12 RX ORDER — ACETAMINOPHEN 10 MG/ML
INJECTION, SOLUTION INTRAVENOUS
Status: DISCONTINUED | OUTPATIENT
Start: 2020-02-12 | End: 2020-02-12

## 2020-02-12 RX ORDER — HEPARIN SODIUM 1000 [USP'U]/ML
INJECTION, SOLUTION INTRAVENOUS; SUBCUTANEOUS
Status: DISCONTINUED | OUTPATIENT
Start: 2020-02-12 | End: 2020-02-12

## 2020-02-12 RX ORDER — SODIUM CHLORIDE 9 MG/ML
INJECTION, SOLUTION INTRAVENOUS CONTINUOUS
Status: DISCONTINUED | OUTPATIENT
Start: 2020-02-12 | End: 2020-02-12

## 2020-02-12 RX ORDER — BUPROPION HYDROCHLORIDE 150 MG/1
150 TABLET ORAL NIGHTLY
Status: DISCONTINUED | OUTPATIENT
Start: 2020-02-12 | End: 2020-02-15 | Stop reason: HOSPADM

## 2020-02-12 RX ORDER — VASOPRESSIN 20 [USP'U]/ML
INJECTION, SOLUTION INTRAMUSCULAR; SUBCUTANEOUS
Status: DISCONTINUED | OUTPATIENT
Start: 2020-02-12 | End: 2020-02-12

## 2020-02-12 RX ORDER — HEPARIN SODIUM 5000 [USP'U]/ML
5000 INJECTION, SOLUTION INTRAVENOUS; SUBCUTANEOUS EVERY 8 HOURS
Status: DISCONTINUED | OUTPATIENT
Start: 2020-02-12 | End: 2020-02-15 | Stop reason: HOSPADM

## 2020-02-12 RX ORDER — GABAPENTIN 100 MG/1
100 CAPSULE ORAL NIGHTLY
Status: DISCONTINUED | OUTPATIENT
Start: 2020-02-12 | End: 2020-02-15 | Stop reason: HOSPADM

## 2020-02-12 RX ORDER — DEXAMETHASONE SODIUM PHOSPHATE 4 MG/ML
INJECTION, SOLUTION INTRA-ARTICULAR; INTRALESIONAL; INTRAMUSCULAR; INTRAVENOUS; SOFT TISSUE
Status: DISCONTINUED | OUTPATIENT
Start: 2020-02-12 | End: 2020-02-12

## 2020-02-12 RX ORDER — ONDANSETRON 2 MG/ML
INJECTION INTRAMUSCULAR; INTRAVENOUS
Status: DISCONTINUED | OUTPATIENT
Start: 2020-02-12 | End: 2020-02-12

## 2020-02-12 RX ORDER — KETAMINE HCL IN 0.9 % NACL 50 MG/5 ML
SYRINGE (ML) INTRAVENOUS
Status: DISCONTINUED | OUTPATIENT
Start: 2020-02-12 | End: 2020-02-12

## 2020-02-12 RX ORDER — LIDOCAINE HCL/PF 100 MG/5ML
SYRINGE (ML) INTRAVENOUS
Status: DISCONTINUED | OUTPATIENT
Start: 2020-02-12 | End: 2020-02-12

## 2020-02-12 RX ORDER — ALBUTEROL SULFATE 90 UG/1
2 AEROSOL, METERED RESPIRATORY (INHALATION) EVERY 6 HOURS PRN
Status: DISCONTINUED | OUTPATIENT
Start: 2020-02-12 | End: 2020-02-15 | Stop reason: HOSPADM

## 2020-02-12 RX ORDER — ASPIRIN 81 MG/1
81 TABLET ORAL DAILY
Status: DISCONTINUED | OUTPATIENT
Start: 2020-02-12 | End: 2020-02-15 | Stop reason: HOSPADM

## 2020-02-12 RX ORDER — FENTANYL CITRATE 50 UG/ML
INJECTION, SOLUTION INTRAMUSCULAR; INTRAVENOUS
Status: DISCONTINUED | OUTPATIENT
Start: 2020-02-12 | End: 2020-02-12

## 2020-02-12 RX ORDER — LIDOCAINE HYDROCHLORIDE 10 MG/ML
1 INJECTION, SOLUTION EPIDURAL; INFILTRATION; INTRACAUDAL; PERINEURAL ONCE
Status: DISCONTINUED | OUTPATIENT
Start: 2020-02-12 | End: 2020-02-15 | Stop reason: HOSPADM

## 2020-02-12 RX ORDER — ONDANSETRON 2 MG/ML
4 INJECTION INTRAMUSCULAR; INTRAVENOUS EVERY 8 HOURS PRN
Status: DISCONTINUED | OUTPATIENT
Start: 2020-02-12 | End: 2020-02-15 | Stop reason: HOSPADM

## 2020-02-12 RX ORDER — AMOXICILLIN 250 MG
1 CAPSULE ORAL 2 TIMES DAILY
Status: DISCONTINUED | OUTPATIENT
Start: 2020-02-12 | End: 2020-02-15 | Stop reason: HOSPADM

## 2020-02-12 RX ORDER — ROCURONIUM BROMIDE 10 MG/ML
INJECTION, SOLUTION INTRAVENOUS
Status: DISCONTINUED | OUTPATIENT
Start: 2020-02-12 | End: 2020-02-12

## 2020-02-12 RX ORDER — ACETAMINOPHEN 325 MG/1
650 TABLET ORAL EVERY 4 HOURS PRN
Status: DISCONTINUED | OUTPATIENT
Start: 2020-02-12 | End: 2020-02-15 | Stop reason: HOSPADM

## 2020-02-12 RX ORDER — SODIUM CHLORIDE 0.9 % (FLUSH) 0.9 %
10 SYRINGE (ML) INJECTION
Status: DISCONTINUED | OUTPATIENT
Start: 2020-02-12 | End: 2020-02-12 | Stop reason: HOSPADM

## 2020-02-12 RX ORDER — PROPOFOL 10 MG/ML
VIAL (ML) INTRAVENOUS
Status: DISCONTINUED | OUTPATIENT
Start: 2020-02-12 | End: 2020-02-12

## 2020-02-12 RX ORDER — ROSUVASTATIN CALCIUM 10 MG/1
10 TABLET, COATED ORAL NIGHTLY
Status: DISCONTINUED | OUTPATIENT
Start: 2020-02-12 | End: 2020-02-15 | Stop reason: HOSPADM

## 2020-02-12 RX ORDER — OXYCODONE HYDROCHLORIDE 5 MG/1
5 TABLET ORAL EVERY 4 HOURS PRN
Status: DISCONTINUED | OUTPATIENT
Start: 2020-02-12 | End: 2020-02-15 | Stop reason: HOSPADM

## 2020-02-12 RX ORDER — FLUTICASONE PROPIONATE 50 MCG
1 SPRAY, SUSPENSION (ML) NASAL DAILY
Status: DISCONTINUED | OUTPATIENT
Start: 2020-02-13 | End: 2020-02-15 | Stop reason: HOSPADM

## 2020-02-12 RX ORDER — IODIXANOL 320 MG/ML
INJECTION, SOLUTION INTRAVASCULAR
Status: DISCONTINUED | OUTPATIENT
Start: 2020-02-12 | End: 2020-02-12 | Stop reason: HOSPADM

## 2020-02-12 RX ORDER — LABETALOL HYDROCHLORIDE 5 MG/ML
INJECTION, SOLUTION INTRAVENOUS
Status: DISCONTINUED | OUTPATIENT
Start: 2020-02-12 | End: 2020-02-12

## 2020-02-12 RX ORDER — BUPROPION HYDROCHLORIDE 150 MG/1
300 TABLET ORAL NIGHTLY
Status: DISCONTINUED | OUTPATIENT
Start: 2020-02-12 | End: 2020-02-15 | Stop reason: HOSPADM

## 2020-02-12 RX ORDER — MORPHINE SULFATE 2 MG/ML
4 INJECTION, SOLUTION INTRAMUSCULAR; INTRAVENOUS EVERY 4 HOURS PRN
Status: DISCONTINUED | OUTPATIENT
Start: 2020-02-12 | End: 2020-02-15 | Stop reason: HOSPADM

## 2020-02-12 RX ADMIN — FENTANYL CITRATE 25 MCG: 50 INJECTION INTRAMUSCULAR; INTRAVENOUS at 03:02

## 2020-02-12 RX ADMIN — ASPIRIN 81 MG: 81 TABLET, COATED ORAL at 02:02

## 2020-02-12 RX ADMIN — PROPOFOL 50 MG: 10 INJECTION, EMULSION INTRAVENOUS at 08:02

## 2020-02-12 RX ADMIN — Medication 10 MG: at 11:02

## 2020-02-12 RX ADMIN — HEPARIN SODIUM 1000 UNITS: 1000 INJECTION, SOLUTION INTRAVENOUS; SUBCUTANEOUS at 11:02

## 2020-02-12 RX ADMIN — FENTANYL CITRATE 50 MCG: 50 INJECTION, SOLUTION INTRAMUSCULAR; INTRAVENOUS at 12:02

## 2020-02-12 RX ADMIN — FENTANYL CITRATE 25 MCG: 50 INJECTION INTRAMUSCULAR; INTRAVENOUS at 02:02

## 2020-02-12 RX ADMIN — PROPOFOL 130 MG: 10 INJECTION, EMULSION INTRAVENOUS at 08:02

## 2020-02-12 RX ADMIN — PHENYLEPHRINE HYDROCHLORIDE 100 MCG: 10 INJECTION INTRAVENOUS at 08:02

## 2020-02-12 RX ADMIN — HYDROMORPHONE HYDROCHLORIDE 0.2 MG: 1 INJECTION, SOLUTION INTRAMUSCULAR; INTRAVENOUS; SUBCUTANEOUS at 03:02

## 2020-02-12 RX ADMIN — ROSUVASTATIN CALCIUM 10 MG: 10 TABLET, FILM COATED ORAL at 10:02

## 2020-02-12 RX ADMIN — PROTAMINE SULFATE 20 MG: 10 INJECTION, SOLUTION INTRAVENOUS at 12:02

## 2020-02-12 RX ADMIN — HEPARIN SODIUM 5000 UNITS: 5000 INJECTION, SOLUTION INTRAVENOUS; SUBCUTANEOUS at 09:02

## 2020-02-12 RX ADMIN — DEXAMETHASONE SODIUM PHOSPHATE 4 MG: 4 INJECTION, SOLUTION INTRAMUSCULAR; INTRAVENOUS at 12:02

## 2020-02-12 RX ADMIN — GABAPENTIN 100 MG: 100 CAPSULE ORAL at 10:02

## 2020-02-12 RX ADMIN — LABETALOL HYDROCHLORIDE 5 MG: 5 INJECTION INTRAVENOUS at 01:02

## 2020-02-12 RX ADMIN — CEFAZOLIN 2 G: 330 INJECTION, POWDER, FOR SOLUTION INTRAMUSCULAR; INTRAVENOUS at 08:02

## 2020-02-12 RX ADMIN — HYDROMORPHONE HYDROCHLORIDE 0.2 MG: 1 INJECTION, SOLUTION INTRAMUSCULAR; INTRAVENOUS; SUBCUTANEOUS at 04:02

## 2020-02-12 RX ADMIN — LIDOCAINE HYDROCHLORIDE 100 MG: 20 INJECTION, SOLUTION INTRAVENOUS at 08:02

## 2020-02-12 RX ADMIN — BUPROPION HYDROCHLORIDE 300 MG: 150 TABLET, FILM COATED, EXTENDED RELEASE ORAL at 10:02

## 2020-02-12 RX ADMIN — NOREPINEPHRINE BITARTRATE 0.02 MCG/KG/MIN: 1 INJECTION, SOLUTION, CONCENTRATE INTRAVENOUS at 08:02

## 2020-02-12 RX ADMIN — ROCURONIUM BROMIDE 20 MG: 10 INJECTION, SOLUTION INTRAVENOUS at 08:02

## 2020-02-12 RX ADMIN — SODIUM CHLORIDE, SODIUM GLUCONATE, SODIUM ACETATE, POTASSIUM CHLORIDE, MAGNESIUM CHLORIDE, SODIUM PHOSPHATE, DIBASIC, AND POTASSIUM PHOSPHATE: .53; .5; .37; .037; .03; .012; .00082 INJECTION, SOLUTION INTRAVENOUS at 08:02

## 2020-02-12 RX ADMIN — Medication 20 MG: at 08:02

## 2020-02-12 RX ADMIN — SODIUM CHLORIDE, SODIUM GLUCONATE, SODIUM ACETATE, POTASSIUM CHLORIDE, MAGNESIUM CHLORIDE, SODIUM PHOSPHATE, DIBASIC, AND POTASSIUM PHOSPHATE: .53; .5; .37; .037; .03; .012; .00082 INJECTION, SOLUTION INTRAVENOUS at 09:02

## 2020-02-12 RX ADMIN — SODIUM CHLORIDE: 0.9 INJECTION, SOLUTION INTRAVENOUS at 08:02

## 2020-02-12 RX ADMIN — VASOPRESSIN 1 UNITS: 20 INJECTION INTRAVENOUS at 11:02

## 2020-02-12 RX ADMIN — OXYCODONE HYDROCHLORIDE 5 MG: 5 TABLET ORAL at 11:02

## 2020-02-12 RX ADMIN — OXYCODONE HYDROCHLORIDE 5 MG: 5 TABLET ORAL at 02:02

## 2020-02-12 RX ADMIN — FENTANYL CITRATE 100 MCG: 50 INJECTION, SOLUTION INTRAMUSCULAR; INTRAVENOUS at 08:02

## 2020-02-12 RX ADMIN — ACETAMINOPHEN 1000 MG: 10 INJECTION, SOLUTION INTRAVENOUS at 12:02

## 2020-02-12 RX ADMIN — FENTANYL CITRATE 25 MCG: 50 INJECTION, SOLUTION INTRAMUSCULAR; INTRAVENOUS at 01:02

## 2020-02-12 RX ADMIN — HEPARIN SODIUM 8000 UNITS: 1000 INJECTION, SOLUTION INTRAVENOUS; SUBCUTANEOUS at 09:02

## 2020-02-12 RX ADMIN — ROCURONIUM BROMIDE 20 MG: 10 INJECTION, SOLUTION INTRAVENOUS at 11:02

## 2020-02-12 RX ADMIN — MIDAZOLAM HYDROCHLORIDE 2 MG: 1 INJECTION, SOLUTION INTRAMUSCULAR; INTRAVENOUS at 08:02

## 2020-02-12 RX ADMIN — PROTAMINE SULFATE 30 MG: 10 INJECTION, SOLUTION INTRAVENOUS at 12:02

## 2020-02-12 RX ADMIN — Medication 10 MG: at 12:02

## 2020-02-12 RX ADMIN — ROCURONIUM BROMIDE 10 MG: 10 INJECTION, SOLUTION INTRAVENOUS at 09:02

## 2020-02-12 RX ADMIN — HEPARIN SODIUM 1000 UNITS: 1000 INJECTION, SOLUTION INTRAVENOUS; SUBCUTANEOUS at 10:02

## 2020-02-12 RX ADMIN — HEPARIN SODIUM 2000 UNITS: 1000 INJECTION, SOLUTION INTRAVENOUS; SUBCUTANEOUS at 09:02

## 2020-02-12 RX ADMIN — ROCURONIUM BROMIDE 50 MG: 10 INJECTION, SOLUTION INTRAVENOUS at 08:02

## 2020-02-12 RX ADMIN — SUGAMMADEX 177 MG: 100 INJECTION, SOLUTION INTRAVENOUS at 01:02

## 2020-02-12 RX ADMIN — BUPROPION HYDROCHLORIDE 150 MG: 150 TABLET, FILM COATED, EXTENDED RELEASE ORAL at 10:02

## 2020-02-12 RX ADMIN — DOCUSATE SODIUM - SENNOSIDES 1 TABLET: 50; 8.6 TABLET, FILM COATED ORAL at 09:02

## 2020-02-12 RX ADMIN — OXYCODONE HYDROCHLORIDE 5 MG: 5 TABLET ORAL at 06:02

## 2020-02-12 RX ADMIN — ROCURONIUM BROMIDE 20 MG: 10 INJECTION, SOLUTION INTRAVENOUS at 10:02

## 2020-02-12 RX ADMIN — ONDANSETRON 4 MG: 2 INJECTION INTRAMUSCULAR; INTRAVENOUS at 12:02

## 2020-02-12 RX ADMIN — ONDANSETRON 4 MG: 2 INJECTION INTRAMUSCULAR; INTRAVENOUS at 02:02

## 2020-02-12 RX ADMIN — DEXAMETHASONE SODIUM PHOSPHATE 4 MG: 4 INJECTION, SOLUTION INTRAMUSCULAR; INTRAVENOUS at 08:02

## 2020-02-12 RX ADMIN — SODIUM CHLORIDE, SODIUM LACTATE, POTASSIUM CHLORIDE, AND CALCIUM CHLORIDE: .6; .31; .03; .02 INJECTION, SOLUTION INTRAVENOUS at 02:02

## 2020-02-12 NOTE — BRIEF OP NOTE
[facility]  Brief Operative Note    SUMMARY     Surgery Date: 2/12/2020     Surgeon(s) and Role:     * RAYMUNDO Levine II, MD - Primary     * David Arrieta MD - Fellow    Assisting Surgeon: None    Pre-op Diagnosis:     Lifestyle limiting left lower extremity claudication    Post-op Diagnosis:  Post-Op Diagnosis Codes:     * PVD (peripheral vascular disease) with claudication [I73.9]    Procedure:   1) Left common femoral endarterectomy with bovine pericardial patch angioplasty   2) US guided access right common femoral artery   2) Left common and external iliac stenting with 8x80mm EV3 stent   3) Left superficial femoral artery angioplasty with [id number] Sedrick     Anesthesia: General/MAC    Description/Findings:    Soft atheroma in left common femoral artery   Tight stenosis in REIA prevented us from intervening on L sided lesions antegrade   Retrograde LCIA/RANJEET stenting   Antegrade SFA PTA   Brisk runoff to L foot via peroneal and posterior tibial artery    Estimated Blood Loss: <200mL         Specimens:   Specimen (12h ago, onward)    None

## 2020-02-12 NOTE — ANESTHESIA PROCEDURE NOTES
Arterial    Diagnosis: PVD    Patient location during procedure: done in OR  Timeout: 2/12/2020 7:07 AM    Staffing  Authorizing Provider: Rhianna Dee MD  Performing Provider: Karthik Turner MD    Anesthesiologist was present at the time of the procedure.    Preanesthetic Checklist  Completed: patient identified, site marked, surgical consent, pre-op evaluation, timeout performed, IV checked, risks and benefits discussed, monitors and equipment checked and anesthesia consent givenArterial  Skin Prep: chlorhexidine gluconate  Orientation: left  Location: radial  Catheter Size: 18 G  Catheter placement by Ultrasound guidance. Heme positive aspiration all ports.  Vessel Caliber: medium, patent, compressibility normal  Needle advanced into vessel with real time Ultrasound guidance.Insertion Attempts: 1  Assessment  Dressing: secured with tape and tegaderm

## 2020-02-12 NOTE — OP NOTE
Vascular Surgery Op Note    Date of Operation/Procedure: 02/12/2020    Pre-operative Diagnosis:  Left leg peripheral arterial disease due to the native artery atherosclerosis with persistent claudication and early onset rest pain    Post-operative Diagnosis: same    Anesthesia: general    Operation/Procedure Performed:   1. Left femoral endarterectomy  2. Aortogram  3. Left common and external iliac artery stenting (8x80mm)  4. Left leg angiogram  5. Left superficial femoral artery balloon angioplasty ([id number])    Attending Surgeon: RAYMUNDO Levine II, MD    Resident/Fellow: David Arrieta MD PGY6    Indications:   59 y.o. female with bilateral lower extremity PAD on non-invasive testing and physical exam. Her right leg is currently asymptomatic. In her left leg she has short distance LLE claudication and early signs of ischemic rest pain. Her non-invasive studies are consistent with her symptoms with KRISTOPHER/Toe pressure on the edge of critical limb ischemia. I discussed her findings and would recommend surgery to prevent progression to worsening rest pain. Profunda/popliteal index is >0.25 so I recommended Left CFA endarterectomy, possible iliac stenting, and left SFA angioplasty/stenting for direct revascularization. I discussed risks/benefits/alternatives with the patient and her partner. They agreed to proceed.    Procedure in Detail:   After informed consent was obtained patient was taken to the operating room in supine position. Left leg was prepped and draped circumferentially. The right groin was prepped and draped in normal sterile fashion. Time-out was performed to confirm appropriate patient identification, laterality, procedure. Preoperative antibiotics were administered.    We began by making a longitudinal incision in the left groin over the common femoral artery.  This is deepened with a combination of electrocautery, blunt dissection, and sharp dissection.  The inguinal ligament was identified.  The  medial and lateral attachments were divided with electrocautery and blunt dissection.  The common femoral artery was identified. We dissected along the anterior surface of the common femoral artery using electrocautery and sharp dissection. The common femoral artery was dissected circumferentially within the distal aspect of the incision just above the takeoff of the profunda femoris artery.  We then dissected more proximally.  The inguinal ligament was retracted caudally and the proximal common femoral artery was encircled with vessel loops.  Having obtained proximal distal control within administered 8000 units of IV heparin for systemic anticoagulation.  Hemoclips were applied to the small side branches of the common femoral artery.  We made a longitudinal arteriotomy in the anterior surface of the artery using 11 blade. This was extended with Denise scissors. We endarterectomized the common femoral artery.  The arteriotomy was then closed with a bovine pericardial patch using running 5 0 Prolene suture. Prior to completion of the anastomosis the distal clamp and the proximal clamp were released sequentially.  There was adequate back bleeding with no thrombus.  The lumen was flushed with heparinized saline. The anastomosis was completed.    Next we used ultrasound and fluoroscopic guidance to gain percutaneous access to the right common femoral artery using a micropuncture access kit.  We performed an arteriogram through the micropuncture sheath which showed severe stenosis of the distal external iliac and proximal common femoral artery.  In light of this finding we felt that we would have better luck performing our intervention through the left groin rather than the right. We secured the micropuncture sheath to the skin and then removed the sheath and held manual pressure over the arteriotomy site at the close of the case.    We then stuck the bovine patch in the left groin in the retrograde direction wound and  advanced a wire up the external and common iliac arteries.  We placed a 5 Estonian sheath into the artery.  We then advanced a Glidewire followed by a straight flush catheter over the Glidewire into the distal aorta.  We performed an aortogram through the flush catheter.  This showed severe stenosis of the proximal common iliac as well as the mid external iliac artery measuring 7 cm in length.  The screen was marked and the distance measured based on the markings on the catheter.  We then opened a 8 x 80 mm Ev3 stent. The wire was replaced and catheter was removed. We advanced the stent over the wire and deployed the stent across the area of stenosis.  We post dilated the stent with a 7 x 80 mm balloon.  We then advanced a flush catheter over the wire and performed a repeat aortogram which showed widely patent left iliac arteries. The sheath was removed in the arteriotomy closed with a 6 0 Prolene suture.    We then stuck to bovine pericardial patch in the antegrade direction and sent to our micropuncture wire down the superficial femoral artery.  We performed an angiogram through the micropuncture sheath.  It showed occlusion of the superficial femoral artery in the proximal thigh.  Using a when wire and Seeker catheter we were able to cross the area of occlusion.  The wire and catheter which were advanced across the occlusion into the above knee popliteal artery.  A angiogram was performed through the seeker catheter which confirmed intraluminal placement.  A V14 wire was advanced through the catheter and parked at the distal end in the above knee popliteal artery.   The length of the occlusion was measured at 20 cm based on the markings on the catheter.  The catheter was removed over the wire. The micropuncture sheath was exchanged for a 4 Estonian sheath.  We selected a 4 x 200 mm balloon and advanced this over the wire and inflated to nominal pressure for 2 min across the occlusion.  The balloon was slowly deflated  and then removed over the wire.  Repeat angiography showed that the occlusion had resolved, but a residual area of stenosis remained in the proximal SFA.  This was treated with a 4 x 30 mm balloon.  Repeat angiogram showed widely patent SFA with inline flow to the foot via the posterior tibial and peroneal arteries.  The sheath was then removed and the arteriotomy closed with 6 0 Prolene suture.     The left groin incision was thoroughly irrigated with saline antibiotic irrigation.  Thrombin Gelfoam was added to the incision to assist with hemostasis. 50 mg protamine was administered to reverse anticoagulation.  The incision was then thoroughly irrigated with saline antibiotic irrigation.  There is no apparent ongoing bleeding. The femoral sheath was closed with running 2 0 Vicryl suture. The deep layers of the incision were closed with interrupted 2 0 Vicryl sutures.  The deep dermal layer was closed with interrupted 3 0 Vicryl sutures. The skin was closed with interrupted vertical mattress 3-0 nylon. The incision was dressed with antibiotic ointment and dry gauze secured in place with paper tape.    The patient tolerated the procedure well was transported to the PACU for recovery.    Estimated Blood loss: 100ml    Complications: none    RAYMUNDO Levine II, MD, [facility]  Vascular Surgeon  [facility]

## 2020-02-12 NOTE — PROGRESS NOTES
Spoke w/ Dr. Arrieta and pt should not need blood cx but he will double check w/ Dr. fisher and call me back.

## 2020-02-12 NOTE — TRANSFER OF CARE
"Anesthesia Transfer of Care Note    Patient: Amita Lewis    Procedure(s) Performed: Procedure(s) (LRB):  Left Common Femoral Approach Endarterectomy with Left SFA Angioplasty Stenting Possible Bypass (Left)    Patient location: PACU    Anesthesia Type: general    Transport from OR: Transported from OR on 6-10 L/min O2 by face mask with adequate spontaneous ventilation    Post pain: adequate analgesia    Post assessment: no apparent anesthetic complications    Post vital signs: stable    Level of consciousness: awake    Nausea/Vomiting: no nausea/vomiting    Complications: none    Transfer of care protocol was followed      Last vitals:   Visit Vitals  BP (!) 159/70   Pulse 82   Temp 36.9 °C (98.5 °F) (Oral)   Resp 16   Ht 5' 7" (1.702 m)   Wt 88.5 kg (195 lb)   LMP 10/20/2013   SpO2 100%   Breastfeeding? No   BMI 30.54 kg/m²     "

## 2020-02-12 NOTE — NURSING TRANSFER
Nursing Transfer Note      2/12/2020     Transfer To: ***    Transfer via bed    Transfer with telemetry    Transported by pct and rn    Medicines sent: none    Chart send with patient: Yes    Notified: family    Patient reassessed at: 2/12/20

## 2020-02-12 NOTE — ANESTHESIA PROCEDURE NOTES
Peripheral IV Insertion    Diagnosis: I99.8 Other disorder of circulatory system    Patient location during procedure: OR  Timeout: 2/12/2020 7:05 AM    Staffing  Authorizing Provider: Rhianna Dee MD  Performing Provider: Rhianna Dee MD    Anesthesiologist was present at the time of the procedure.    Preanesthetic Checklist  Completed: patient identified, site marked, surgical consent, pre-op evaluation, timeout performed, IV checked, risks and benefits discussed, monitors and equipment checked and anesthesia consent givenPeripheral IV Insertion  Skin Prep: chlorhexidine gluconate  Orientation: right  Location: forearm  Catheter Size: 18 G  Catheter placement by Ultrasound guidance. Heme positive aspiration all ports.  Vessel Caliber: small, patent  Needle advanced into vessel with real time Ultrasound guidance.Insertion Attempts: 1  Assessment  Dressing: secured with tape and tegaderm  Patient: Tolerated well  Line flushed easily.

## 2020-02-12 NOTE — ANESTHESIA POSTPROCEDURE EVALUATION
Anesthesia Post Evaluation    Patient: Amita Lewis    Procedure(s) Performed: Procedure(s) (LRB):  Left Common Femoral Approach Endarterectomy with Left SFA Angioplasty Stenting Possible Bypass (Left)    Final Anesthesia Type: general    Patient location during evaluation: PACU  Patient participation: Yes- Able to Participate  Level of consciousness: awake and alert  Post-procedure vital signs: reviewed and stable  Pain management: adequate  Airway patency: patent    PONV status at discharge: No PONV  Anesthetic complications: no      Cardiovascular status: hemodynamically stable  Respiratory status: spontaneous ventilation  Follow-up not needed.          Vitals Value Taken Time   /63 2/12/2020  2:09 PM   Temp 36.9 °C (98.5 °F) 2/12/2020  6:37 AM   Pulse 78 2/12/2020  2:09 PM   Resp 18 2/12/2020  2:09 PM   SpO2 97 % 2/12/2020  2:09 PM   Vitals shown include unvalidated device data.      No case tracking events are documented in the log.      Pain/Anahi Score: No data recorded

## 2020-02-12 NOTE — PLAN OF CARE
Pt vital signs wnl.  Pt verbalizes pain is tolerable.  Pt verbalizes no nausea.  Rt groin with no signs of bleeding.  Left groin dressing intact with no signs of bleeding.  All pulses able to be dopplered.

## 2020-02-13 LAB
ANION GAP SERPL CALC-SCNC: 9 MMOL/L (ref 8–16)
BASOPHILS # BLD AUTO: 0.01 K/UL (ref 0–0.2)
BASOPHILS NFR BLD: 0.1 % (ref 0–1.9)
BUN SERPL-MCNC: 11 MG/DL (ref 6–20)
CALCIUM SERPL-MCNC: 8.3 MG/DL (ref 8.7–10.5)
CHLORIDE SERPL-SCNC: 106 MMOL/L (ref 95–110)
CO2 SERPL-SCNC: 24 MMOL/L (ref 23–29)
CREAT SERPL-MCNC: 0.8 MG/DL (ref 0.5–1.4)
DIFFERENTIAL METHOD: ABNORMAL
EOSINOPHIL # BLD AUTO: 0 K/UL (ref 0–0.5)
EOSINOPHIL NFR BLD: 0 % (ref 0–8)
ERYTHROCYTE [DISTWIDTH] IN BLOOD BY AUTOMATED COUNT: 12.9 % (ref 11.5–14.5)
EST. GFR  (AFRICAN AMERICAN): >60 ML/MIN/1.73 M^2
EST. GFR  (NON AFRICAN AMERICAN): >60 ML/MIN/1.73 M^2
GLUCOSE SERPL-MCNC: 135 MG/DL (ref 70–110)
HCT VFR BLD AUTO: 32.4 % (ref 37–48.5)
HGB BLD-MCNC: 10.3 G/DL (ref 12–16)
IMM GRANULOCYTES # BLD AUTO: 0.06 K/UL (ref 0–0.04)
IMM GRANULOCYTES NFR BLD AUTO: 0.4 % (ref 0–0.5)
LYMPHOCYTES # BLD AUTO: 1.6 K/UL (ref 1–4.8)
LYMPHOCYTES NFR BLD: 10.3 % (ref 18–48)
MCH RBC QN AUTO: 28.7 PG (ref 27–31)
MCHC RBC AUTO-ENTMCNC: 31.8 G/DL (ref 32–36)
MCV RBC AUTO: 90 FL (ref 82–98)
MONOCYTES # BLD AUTO: 0.8 K/UL (ref 0.3–1)
MONOCYTES NFR BLD: 5.2 % (ref 4–15)
NEUTROPHILS # BLD AUTO: 12.9 K/UL (ref 1.8–7.7)
NEUTROPHILS NFR BLD: 84 % (ref 38–73)
NRBC BLD-RTO: 0 /100 WBC
PLATELET # BLD AUTO: 266 K/UL (ref 150–350)
PMV BLD AUTO: 11.9 FL (ref 9.2–12.9)
POTASSIUM SERPL-SCNC: 3.7 MMOL/L (ref 3.5–5.1)
RBC # BLD AUTO: 3.59 M/UL (ref 4–5.4)
SODIUM SERPL-SCNC: 139 MMOL/L (ref 136–145)
WBC # BLD AUTO: 15.4 K/UL (ref 3.9–12.7)

## 2020-02-13 PROCEDURE — 93922 UPR/L XTREMITY ART 2 LEVELS: CPT | Performed by: SURGERY

## 2020-02-13 PROCEDURE — 97165 OT EVAL LOW COMPLEX 30 MIN: CPT

## 2020-02-13 PROCEDURE — 63600175 PHARM REV CODE 636 W HCPCS: Performed by: STUDENT IN AN ORGANIZED HEALTH CARE EDUCATION/TRAINING PROGRAM

## 2020-02-13 PROCEDURE — 80048 BASIC METABOLIC PNL TOTAL CA: CPT

## 2020-02-13 PROCEDURE — 25000242 PHARM REV CODE 250 ALT 637 W/ HCPCS: Performed by: STUDENT IN AN ORGANIZED HEALTH CARE EDUCATION/TRAINING PROGRAM

## 2020-02-13 PROCEDURE — 97116 GAIT TRAINING THERAPY: CPT

## 2020-02-13 PROCEDURE — 25000003 PHARM REV CODE 250: Performed by: STUDENT IN AN ORGANIZED HEALTH CARE EDUCATION/TRAINING PROGRAM

## 2020-02-13 PROCEDURE — 97161 PT EVAL LOW COMPLEX 20 MIN: CPT

## 2020-02-13 PROCEDURE — 97530 THERAPEUTIC ACTIVITIES: CPT

## 2020-02-13 PROCEDURE — 20600001 HC STEP DOWN PRIVATE ROOM

## 2020-02-13 PROCEDURE — 85025 COMPLETE CBC W/AUTO DIFF WBC: CPT

## 2020-02-13 RX ORDER — OXYCODONE HYDROCHLORIDE 10 MG/1
10 TABLET ORAL EVERY 4 HOURS PRN
Status: DISCONTINUED | OUTPATIENT
Start: 2020-02-13 | End: 2020-02-15 | Stop reason: HOSPADM

## 2020-02-13 RX ORDER — HYDROMORPHONE HYDROCHLORIDE 1 MG/ML
0.5 INJECTION, SOLUTION INTRAMUSCULAR; INTRAVENOUS; SUBCUTANEOUS ONCE
Status: COMPLETED | OUTPATIENT
Start: 2020-02-13 | End: 2020-02-13

## 2020-02-13 RX ADMIN — FLUTICASONE PROPIONATE 50 MCG: 50 SPRAY, METERED NASAL at 09:02

## 2020-02-13 RX ADMIN — BUPROPION HYDROCHLORIDE 300 MG: 150 TABLET, FILM COATED, EXTENDED RELEASE ORAL at 10:02

## 2020-02-13 RX ADMIN — CALCIUM CARBONATE (ANTACID) CHEW TAB 500 MG 500 MG: 500 CHEW TAB at 12:02

## 2020-02-13 RX ADMIN — DOCUSATE SODIUM - SENNOSIDES 1 TABLET: 50; 8.6 TABLET, FILM COATED ORAL at 09:02

## 2020-02-13 RX ADMIN — HEPARIN SODIUM 5000 UNITS: 5000 INJECTION, SOLUTION INTRAVENOUS; SUBCUTANEOUS at 05:02

## 2020-02-13 RX ADMIN — HEPARIN SODIUM 5000 UNITS: 5000 INJECTION, SOLUTION INTRAVENOUS; SUBCUTANEOUS at 10:02

## 2020-02-13 RX ADMIN — ASPIRIN 81 MG: 81 TABLET, COATED ORAL at 09:02

## 2020-02-13 RX ADMIN — ROSUVASTATIN CALCIUM 10 MG: 10 TABLET, FILM COATED ORAL at 10:02

## 2020-02-13 RX ADMIN — OXYCODONE HYDROCHLORIDE 5 MG: 5 TABLET ORAL at 05:02

## 2020-02-13 RX ADMIN — OXYCODONE HYDROCHLORIDE 10 MG: 10 TABLET ORAL at 08:02

## 2020-02-13 RX ADMIN — MORPHINE SULFATE 4 MG: 2 INJECTION, SOLUTION INTRAMUSCULAR; INTRAVENOUS at 03:02

## 2020-02-13 RX ADMIN — OXYCODONE HYDROCHLORIDE 5 MG: 5 TABLET ORAL at 02:02

## 2020-02-13 RX ADMIN — HEPARIN SODIUM 5000 UNITS: 5000 INJECTION, SOLUTION INTRAVENOUS; SUBCUTANEOUS at 02:02

## 2020-02-13 RX ADMIN — MORPHINE SULFATE 4 MG: 2 INJECTION, SOLUTION INTRAMUSCULAR; INTRAVENOUS at 10:02

## 2020-02-13 RX ADMIN — DOCUSATE SODIUM - SENNOSIDES 1 TABLET: 50; 8.6 TABLET, FILM COATED ORAL at 10:02

## 2020-02-13 RX ADMIN — BUPROPION HYDROCHLORIDE 150 MG: 150 TABLET, FILM COATED, EXTENDED RELEASE ORAL at 10:02

## 2020-02-13 RX ADMIN — HYDROMORPHONE HYDROCHLORIDE 0.5 MG: 1 INJECTION, SOLUTION INTRAMUSCULAR; INTRAVENOUS; SUBCUTANEOUS at 06:02

## 2020-02-13 RX ADMIN — GABAPENTIN 100 MG: 100 CAPSULE ORAL at 10:02

## 2020-02-13 RX ADMIN — OXYCODONE HYDROCHLORIDE 5 MG: 5 TABLET ORAL at 09:02

## 2020-02-13 NOTE — PLAN OF CARE
Pt is independent with mobility, able to progress with family. Pt does not require further acute skilled therapy intervention. Discharge from PT services and re-consult if pt experiences a change in status.     Ophelia Tripp, PT, DPT  2/13/2020  [phone]

## 2020-02-13 NOTE — NURSING TRANSFER
Nursing Transfer Note      2/12/2020     Transfer To: 1025    Transfer via bed    Transfer with cardiac monitoring    Transported by RN    Medicines sent: n/a    Chart send with patient: Yes    Notified: spouse

## 2020-02-13 NOTE — PT/OT/SLP EVAL
Physical Therapy Evaluation, Treatment, and Discharge    Patient Name:  Amita Lewis   MRN:  [id number]    *co-treatment with OT   Recommendations:     Discharge Recommendations:  home   Discharge Equipment Recommendations: none   Barriers to discharge: None    Assessment:     Amita Lewis is a 59 y.o. female admitted with a medical diagnosis of <principal problem not specified>.  She presents with the following impairments/functional limitations:  (none). Pt ambulated in hallway, participated in stair training without skilled physical assistance. Pt able to continue progressing mobility and ambulating with family. Pt does not require further acute skilled therapy intervention. Discharge from PT services and re-consult if pt experiences a change in status.     Rehab Prognosis: Good;     Recent Surgery: Procedure(s) (LRB):  Left Common Femoral Approach Endarterectomy with Left SFA Angioplasty Stenting Possible Bypass (Left)  AORTOGRAM (N/A)  ANGIOGRAM, LOWER EXTREMITY (Left)  PTA, SUPERFICIAL FEMORAL ARTERY (Left)  STENT, ILIAC ARTERY (Left) 1 Day Post-Op    Plan:     · Plan of Care: Discharge from acute PT 2/13/2020    Subjective     Chief Complaint: Pt c/o some pain at surgical site, reports feeling better after walking   Patient/Family Comments/goals: to get better and return home   Pain/Comfort:  · Pain Rating 1: 6/10  · Location 1: (LLQ and groin )  · Pain Addressed 1: Pre-medicate for activity  · Pain Rating Post-Intervention 1: 5/10    Patients cultural, spiritual, Confucianism conflicts given the current situation:      Living Environment:  Pt lives with spouse in a [facility] with 2 NAHID home with no HR, bed and bath on 2nd floor with 23 steps to reach with B HR.   Prior to admission, patients level of function was independent with mobility and ADLs.  Equipment used at home: none.  DME owned (not currently used): single point cane.  Upon discharge, patient will have assistance from  spouse.    Objective:     Communicated with RN prior to session.  Patient found up in chair with arterial line, yap catheter  upon PT entry to room.    General Precautions: Standard, fall   Orthopedic Precautions:N/A   Braces: N/A     Exams:  · Cognitive Exam:  Patient is AAOx4, followed all commands, communicates clearly and fluently  · Gross Motor Coordination:  WFL  · RLE ROM: WFL  · RLE Strength: WFL  · LLE ROM: WFL  · LLE Strength: WFL    Functional Mobility:  · Bed Mobility:  Not assessed 2/2 pt up in chair upon arrival   · Transfers:     · Sit to Stand: 1x from chair, 1x from toilet with  supervision with no AD  · Gait: Pt ambulated >400 feet with independence, pt pushing IV pole. Pt with no LOB, no SOB, no dizziness.   · Stairs: Pt ascended/descended 3 stairs with R HR and supervision. Pt instructed to step up with R LE, step down with L LE, use step to method and HR for support. Pt demo'd understanding.       Therapeutic Activities and Exercises:   Pt educated on role of PT/POC. Pt verbalized understanding.   Pt encouraged to ambulate daily with assistance/supervision family. Pt agreeable.      AM-PAC 6 CLICK MOBILITY  Total Score:24     Patient left up in chair with all lines intact and call button in reach.    GOALS:   Multidisciplinary Problems     Physical Therapy Goals     Not on file          Multidisciplinary Problems (Resolved)        Problem: Physical Therapy Goal    Goal Priority Disciplines Outcome Goal Variances Interventions   Physical Therapy Goal   (Resolved)     PT, PT/OT Met                     History:     Past Medical History:   Diagnosis Date    Abnormal Pap smear     Asthma     Depression 3/17/2016    Environmental and seasonal allergies 3/17/2016    Heartburn 3/17/2016    HLD (hyperlipidemia) 03/22/2016    ASCVD 2018 6.3%; can't tolerate statins    Menopausal syndrome (hot flashes) 3/17/2016    Mild intermittent asthma without complication 3/17/2016    Obesity (BMI 30-39.9)  3/17/2016    Sciatica 3/17/2016       Past Surgical History:   Procedure Laterality Date    CERVICAL BIOPSY  W/ LOOP ELECTRODE EXCISION      INJECTION OF JOINT Bilateral 5/31/2018    Procedure: INJECTION-JOINT;  Surgeon: Lynette Rowley MD;  Location: [facility] PAIN MGT;  Service: Pain Management;  Laterality: Bilateral;  B/L SI Joint Injs  27974, 45062    INJECTION OF JOINT Bilateral 9/18/2018    Procedure: INJECTION, JOINT  Bilateral SI joint;  Surgeon: Lynette Rowley MD;  Location: [facility] PAIN MGT;  Service: Pain Management;  Laterality: Bilateral;    microdiscetomy      L4-L5    OVARIAN CYST REMOVAL  2001    TRANSFORAMINAL EPIDURAL INJECTION OF STEROID Left 3/21/2019    Procedure: INJECTION, STEROID, EPIDURAL, TRANSFORAMINAL APPROACH, L4 AND L5;  Surgeon: Lynette Rowley MD;  Location: [facility] PAIN T;  Service: Pain Management;  Laterality: Left;       Time Tracking:     PT Received On: 02/13/20  PT Start Time: 1005     PT Stop Time: 1026  PT Total Time (min): 21 min     Billable Minutes: Evaluation 10 mins  and Gait Training 10 mins       Ophelia Tripp, PT  02/13/2020

## 2020-02-13 NOTE — PLAN OF CARE
Plan of Care reviewed w/ pt and spouse at bedside. AVSS on RA. L groin w/ gauze and tape CDI. L queenie Mason removed this AM. Tolerating cardiac diet. Gatica removed this AM, voiding CYU in bathroom. NV checks done q4 w/ doppler. Ambulating in linn w/ standby assist and RW. PRN Oxycodone and Morphine for pain w/ some effect.

## 2020-02-13 NOTE — SUBJECTIVE & OBJECTIVE
Medications:  Continuous Infusions:  Scheduled Meds:   aspirin  81 mg Oral Daily    buPROPion  300 mg Oral QHS    buPROPion  150 mg Oral QHS    fluticasone propionate  1 spray Each Nostril Daily    gabapentin  100 mg Oral QHS    heparin (porcine)  5,000 Units Subcutaneous Q8H    lidocaine (PF) 10 mg/ml (1%)  1 mL Intradermal Once    rosuvastatin  10 mg Oral QHS    senna-docusate 8.6-50 mg  1 tablet Oral BID     PRN Meds:acetaminophen, albuterol, calcium carbonate, morphine, ondansetron, oxyCODONE     Objective:     Vital Signs (Most Recent):  Temp: 98.5 °F (36.9 °C) (02/13/20 0509)  Pulse: 91 (02/13/20 0509)  Resp: 18 (02/13/20 0509)  BP: 131/60 (02/13/20 0509)  SpO2: 98 % (02/13/20 0509) Vital Signs (24h Range):  Temp:  [97.4 °F (36.3 °C)-99.5 °F (37.5 °C)] 98.5 °F (36.9 °C)  Pulse:  [] 91  Resp:  [12-22] 18  SpO2:  [91 %-100 %] 98 %  BP: (108-158)/(54-71) 131/60         Physical Exam   Constitutional: She is oriented to person, place, and time. She appears well-developed and well-nourished. No distress.   HENT:   Head: Normocephalic and atraumatic.   Eyes: EOM are normal.   Neck: Normal range of motion. No tracheal deviation present.   Cardiovascular: Normal rate and intact distal pulses.   Left pedal: Biphasic PT and DP signals  Right pedal: Triphasic PT signals   Pulmonary/Chest: Effort normal. No respiratory distress.   Abdominal: Soft. She exhibits no distension.   Genitourinary:   Genitourinary Comments: Gatica in place.   Musculoskeletal: Normal range of motion. She exhibits edema (1+ pre tibial edema).   Groin access sites with minimal ecchymosis, no hematoma or bleeding.    Neurological: She is alert and oriented to person, place, and time.   Skin: She is not diaphoretic.   Vitals reviewed.      Significant Labs:  CBC:   Recent Labs   Lab 02/13/20  0428   WBC 15.40*   RBC 3.59*   HGB 10.3*   HCT 32.4*      MCV 90   MCH 28.7   MCHC 31.8*     CMP:   Recent Labs   Lab 02/13/20  042    *   CALCIUM 8.3*      K 3.7   CO2 24      BUN 11   CREATININE 0.8       Significant Diagnostics:  I have reviewed all pertinent imaging results/findings within the past 24 hours.

## 2020-02-13 NOTE — PLAN OF CARE
Problem: Occupational Therapy Goal  Goal: Occupational Therapy Goal  Outcome: Met    OT evaluation completed. Patient presents at [facility]. No further OT services needed at this time.  Tika Xavier OT  2/13/2020

## 2020-02-13 NOTE — ASSESSMENT & PLAN NOTE
58 yo F with history of PAD with symptoms of claudication L > R and night time numbness and tingling in LLE. She is now s/p Left common femoral endarterectomy with bovine pericardial patch angioplasty, Left common and external iliac stenting, and Left superficial femoral artery angioplasty on 2/12/20.  - Cardiac diet  - Home meds  - Oral pain control  - Q4h NV checks  - ASA, statin  - SQH  - D/C IVF, remove arterial line  - D/C yap, follow up void  - PT/OT, out of bed today

## 2020-02-13 NOTE — PLAN OF CARE
Plan of care reviewed with patient. Patient verbalized understanding. Patient is oriented x4. Patient tolerated cardiac diet. Patient bilateral groin wound remained intact, Left groin dressing remained intact. Patient received pain medication per MAR. Patient remained SL. Patient A-line remained but did not correlate. Patient yap remained intact. Patient remained free of any falls or acute events. VSS, WCTM

## 2020-02-13 NOTE — PROGRESS NOTES
[facility]  Vascular Surgery  Progress Note    Patient Name: Amita Lewis  MRN: [id number]  Admission Date: 2/12/2020  Primary Care Provider: Kirk Elizabeth MD    Subjective:     Interval History: NAEON. VSS. Pain well controlled and tolerated CLD overnight without nausea or vomiting. Stable NV exam and groin access sites. No complaints at this time.     Post-Op Info:  Procedure(s) (LRB):  Left Common Femoral Approach Endarterectomy with Left SFA Angioplasty Stenting Possible Bypass (Left)  AORTOGRAM (N/A)  ANGIOGRAM, LOWER EXTREMITY (Left)  PTA, SUPERFICIAL FEMORAL ARTERY (Left)  STENT, ILIAC ARTERY (Left)   1 Day Post-Op       Medications:  Continuous Infusions:  Scheduled Meds:   aspirin  81 mg Oral Daily    buPROPion  300 mg Oral QHS    buPROPion  150 mg Oral QHS    fluticasone propionate  1 spray Each Nostril Daily    gabapentin  100 mg Oral QHS    heparin (porcine)  5,000 Units Subcutaneous Q8H    lidocaine (PF) 10 mg/ml (1%)  1 mL Intradermal Once    rosuvastatin  10 mg Oral QHS    senna-docusate 8.6-50 mg  1 tablet Oral BID     PRN Meds:acetaminophen, albuterol, calcium carbonate, morphine, ondansetron, oxyCODONE     Objective:     Vital Signs (Most Recent):  Temp: 98.5 °F (36.9 °C) (02/13/20 0509)  Pulse: 91 (02/13/20 0509)  Resp: 18 (02/13/20 0509)  BP: 131/60 (02/13/20 0509)  SpO2: 98 % (02/13/20 0509) Vital Signs (24h Range):  Temp:  [97.4 °F (36.3 °C)-99.5 °F (37.5 °C)] 98.5 °F (36.9 °C)  Pulse:  [] 91  Resp:  [12-22] 18  SpO2:  [91 %-100 %] 98 %  BP: (108-158)/(54-71) 131/60         Physical Exam   Constitutional: She is oriented to person, place, and time. She appears well-developed and well-nourished. No distress.   HENT:   Head: Normocephalic and atraumatic.   Eyes: EOM are normal.   Neck: Normal range of motion. No tracheal deviation present.   Cardiovascular: Normal rate and intact distal pulses.   Left pedal: Biphasic PT and DP signals  Right pedal:  Triphasic PT signals   Pulmonary/Chest: Effort normal. No respiratory distress.   Abdominal: Soft. She exhibits no distension.   Genitourinary:   Genitourinary Comments: Yap in place.   Musculoskeletal: Normal range of motion. She exhibits edema (1+ pre tibial edema).   Left groin incision with dressing in place - clean, dry, and intact. No swelling or bleeding.    Neurological: She is alert and oriented to person, place, and time.   Skin: She is not diaphoretic.   Vitals reviewed.      Significant Labs:  CBC:   Recent Labs   Lab 02/13/20  0428   WBC 15.40*   RBC 3.59*   HGB 10.3*   HCT 32.4*      MCV 90   MCH 28.7   MCHC 31.8*     CMP:   Recent Labs   Lab 02/13/20  0428   *   CALCIUM 8.3*      K 3.7   CO2 24      BUN 11   CREATININE 0.8       Significant Diagnostics:  I have reviewed all pertinent imaging results/findings within the past 24 hours.    Assessment/Plan:     PAD (peripheral artery disease)  60 yo F with history of PAD with symptoms of claudication L > R and night time numbness and tingling in LLE. She is now s/p Left common femoral endarterectomy with bovine pericardial patch angioplasty, Left common and external iliac stenting, and Left superficial femoral artery angioplasty on 2/12/20.  - Cardiac diet  - Home meds  - Oral pain control  - Q4h NV checks  - ASA, statin  - SQH  - D/C IVF, remove arterial line  - D/C yap, follow up void  - PT/OT, out of bed today        Ciara Garber MD  Vascular Surgery  [facility]

## 2020-02-13 NOTE — PLAN OF CARE
Patient is a 59 year old female admitted from home 2/12/2020 and underwent Left Femoral Endarterectomy, Aortogram, Left Common and External Iliac Stent, Left Superficial Femoral Artery Balloon Angioplasty.  Patient is expected to discharge home with no needs +/- 2/13/2020.    Patient in room alone during visit.  Patient lives in a two story apartment with her wife, Ivone, who will drive her home and obtain anything she needs after discharge.  [facility] Discharge Booklet given to patient and/or family with understanding verbalized.  CM name and number written on white board in patient's room with request to call for any questions and concerns.  Will continue to follow for needs.    PCP  Kirk Elizabeth MD  [address] [facility] [id number] [phone]      [id number] - [place], [place] - [address]  [address]  [facility] [address]  Phone: [phone] Fax: [phone]      Extended Emergency Contact Information  Primary Emergency Contact: Ivone Castellanos  Address: [address] [address] [place]  Home Phone: [phone]  Mobile Phone: [phone]  Relation: Significant other       02/13/20 1117   Discharge Assessment   Assessment Type Discharge Planning Assessment   Confirmed/corrected address and phone number on facesheet? Yes   Assessment information obtained from? Patient   Expected Length of Stay (days) 3   Communicated expected length of stay with patient/caregiver yes   Prior to hospitilization cognitive status: Alert/Oriented   Prior to hospitalization functional status: Independent   Current cognitive status: Alert/Oriented   Current Functional Status: Independent   Lives With spouse   Able to Return to Prior Arrangements yes   Is patient able to care for self after discharge? Yes   Who are your caregiver(s) and their phone number(s)? wife   Patient's  perception of discharge disposition home or selfcare   Equipment Currently Used at Home none   Do you have any problems affording any of your prescribed medications? No   Is the patient taking medications as prescribed? yes   Does the patient have transportation home? Yes   Transportation Anticipated family or friend will provide   Discharge Plan A Home with family   Discharge Plan B Home with family;Home Health   DME Needed Upon Discharge  none   Patient/Family in Agreement with Plan yes

## 2020-02-13 NOTE — PT/OT/SLP EVAL
Occupational Therapy   Evaluation and Discharge Note    Name: Amita Lewis  MRN: [id number]  Admitting Diagnosis:  <principal problem not specified> 1 Day Post-Op   Left Common Femoral Approach Endarterectomy with Left SFA Angioplasty Stenting Possible Bypass (Left)  AORTOGRAM (N/A)  ANGIOGRAM, LOWER EXTREMITY (Left)  PTA, SUPERFICIAL FEMORAL ARTERY (Left)  STENT, ILIAC ARTERY (Left)    Recommendations:     Discharge Recommendations: home  Discharge Equipment Recommendations:  none  Barriers to discharge:  None    Assessment:     Amita Lewis is a 59 y.o. female with a medical diagnosis of <principal problem not specified>. At this time, patient is functioning at her prior level of function and does not require further acute OT services.     Plan:     During this hospitalization, patient does not require further acute OT services.  Please re-consult if situation changes.    · Plan of Care Reviewed with: patient    Subjective     Chief Complaint: Pain at surgical site  Patient/Family Comments/goals: To feel better and return home    Occupational Profile:  Living Environment: Patient lives with her spouse in a 2SH with 2 NAHID, no HR. There are 23 stairs to second floor (R HR) where her bed/bath are located. Her bathroom has a tub/shower combination.  Previous level of function: Independent PTA  Roles and Routines: Wife. Patient drives and recently resigned from her job.   Equipment Used at home:  cane, straight  Assistance upon Discharge: Spouse available to assist.    Pain/Comfort:  · Pain Rating 1: 6/10  · Location 1: groin  · Pain Addressed 1: Pre-medicate for activity  · Pain Rating Post-Intervention 1: 5/10    Patients cultural, spiritual, Synagogue conflicts given the current situation: no    Objective:     Communicated with: RN prior to session.  Patient found up in chair with arterial line, yap catheter upon OT entry to room.    General Precautions: Standard,     Orthopedic Precautions:N/A    Braces: N/A     Occupational Performance:    Bed Mobility:    · Did not observe    Functional Mobility/Transfers:  · Patient completed Sit <> Stand Transfer with supervision  with  no assistive device   · Patient completed Toilet Transfer onto the toilet in the restroom via Step Transfer technique with supervision with  no AD  · Functional Mobility: Patient completed functional mobility >400' independently with patient managing IV pole. No LOB/SOB noted. Patient ascended/descended 3 steps utilizing R HR with SPV and verbal cueing.    Activities of Daily Living:  · Did not observe    Cognitive/Visual Perceptual:  Cognitive/Psychosocial Skills:     -       Oriented to: Person, Place, Time and Situation   -       Follows Commands/attention:Follows multistep  commands    Physical Exam:  Upper Extremity Range of Motion:     -       Right Upper Extremity: WFL  -       Left Upper Extremity: WFL  Upper Extremity Strength:    -       Right Upper Extremity: WFL  -       Left Upper Extremity: WFL    AMPAC 6 Click ADL:  AMPAC Total Score: 24    Treatment & Education:  Role of OT and evaluation  Discussed d/c from skilled OT services with patient in agreement - no concerns/questions within OT scope of practice  Educated on importance of sitting UIC/OOB activity with staff assistance while in acute setting to prevent further debility  Call button for assistance  Education:    Patient left up in chair with all lines intact, call button in reach and RN notified    GOALS:   Multidisciplinary Problems     Occupational Therapy Goals     Not on file          Multidisciplinary Problems (Resolved)        Problem: Occupational Therapy Goal    Goal Priority Disciplines Outcome Interventions   Occupational Therapy Goal   (Resolved)     OT, PT/OT Met                    History:     Past Medical History:   Diagnosis Date    Abnormal Pap smear     Asthma     Depression 3/17/2016    Environmental and seasonal allergies 3/17/2016     Heartburn 3/17/2016    HLD (hyperlipidemia) 03/22/2016    ASCVD 2018 6.3%; can't tolerate statins    Menopausal syndrome (hot flashes) 3/17/2016    Mild intermittent asthma without complication 3/17/2016    Obesity (BMI 30-39.9) 3/17/2016    Sciatica 3/17/2016       Past Surgical History:   Procedure Laterality Date    CERVICAL BIOPSY  W/ LOOP ELECTRODE EXCISION      INJECTION OF JOINT Bilateral 5/31/2018    Procedure: INJECTION-JOINT;  Surgeon: Lynette Rowley MD;  Location: [facility] PAIN [facility]GT;  Service: Pain Management;  Laterality: Bilateral;  B/L SI Joint Injs  78091, [id number]    INJECTION OF JOINT Bilateral 9/18/2018    Procedure: INJECTION, JOINT  Bilateral SI joint;  Surgeon: Lynette Rowley MD;  Location: [facility] PAIN [facility]GT;  Service: Pain Management;  Laterality: Bilateral;    microdiscetomy      L4-L5    OVARIAN CYST REMOVAL  2001    TRANSFORAMINAL EPIDURAL INJECTION OF STEROID Left 3/21/2019    Procedure: INJECTION, STEROID, EPIDURAL, TRANSFORAMINAL APPROACH, L4 AND L5;  Surgeon: Lynette Rowley MD;  Location: [facility] PAIN MGT;  Service: Pain Management;  Laterality: Left;       Time Tracking:     OT Date of Treatment: 02/13/20  OT Start Time: 1005  OT Stop Time: 1026  OT Total Time (min): 21 min    Billable Minutes:Evaluation 10 minutes  Therapeutic Activity 10 minutes    Tika Xavier OT  2/13/2020

## 2020-02-14 PROCEDURE — 20600001 HC STEP DOWN PRIVATE ROOM

## 2020-02-14 PROCEDURE — 63600175 PHARM REV CODE 636 W HCPCS: Performed by: STUDENT IN AN ORGANIZED HEALTH CARE EDUCATION/TRAINING PROGRAM

## 2020-02-14 PROCEDURE — 25000003 PHARM REV CODE 250: Performed by: STUDENT IN AN ORGANIZED HEALTH CARE EDUCATION/TRAINING PROGRAM

## 2020-02-14 PROCEDURE — 25000003 PHARM REV CODE 250: Performed by: SURGERY

## 2020-02-14 RX ORDER — OXYCODONE HYDROCHLORIDE 5 MG/1
5 TABLET ORAL EVERY 4 HOURS PRN
Qty: 30 TABLET | Refills: 0 | Status: SHIPPED | OUTPATIENT
Start: 2020-02-14 | End: 2020-09-30

## 2020-02-14 RX ORDER — GLYCERIN 1 G/1
1 SUPPOSITORY RECTAL ONCE
Status: COMPLETED | OUTPATIENT
Start: 2020-02-14 | End: 2020-02-14

## 2020-02-14 RX ADMIN — BUPROPION HYDROCHLORIDE 150 MG: 150 TABLET, FILM COATED, EXTENDED RELEASE ORAL at 09:02

## 2020-02-14 RX ADMIN — HEPARIN SODIUM 5000 UNITS: 5000 INJECTION, SOLUTION INTRAVENOUS; SUBCUTANEOUS at 05:02

## 2020-02-14 RX ADMIN — MORPHINE SULFATE 4 MG: 2 INJECTION, SOLUTION INTRAMUSCULAR; INTRAVENOUS at 03:02

## 2020-02-14 RX ADMIN — HEPARIN SODIUM 5000 UNITS: 5000 INJECTION, SOLUTION INTRAVENOUS; SUBCUTANEOUS at 09:02

## 2020-02-14 RX ADMIN — OXYCODONE HYDROCHLORIDE 10 MG: 10 TABLET ORAL at 08:02

## 2020-02-14 RX ADMIN — OXYCODONE HYDROCHLORIDE 10 MG: 10 TABLET ORAL at 05:02

## 2020-02-14 RX ADMIN — DOCUSATE SODIUM - SENNOSIDES 1 TABLET: 50; 8.6 TABLET, FILM COATED ORAL at 08:02

## 2020-02-14 RX ADMIN — HEPARIN SODIUM 5000 UNITS: 5000 INJECTION, SOLUTION INTRAVENOUS; SUBCUTANEOUS at 03:02

## 2020-02-14 RX ADMIN — GABAPENTIN 100 MG: 100 CAPSULE ORAL at 09:02

## 2020-02-14 RX ADMIN — MORPHINE SULFATE 4 MG: 2 INJECTION, SOLUTION INTRAMUSCULAR; INTRAVENOUS at 12:02

## 2020-02-14 RX ADMIN — BUPROPION HYDROCHLORIDE 300 MG: 150 TABLET, FILM COATED, EXTENDED RELEASE ORAL at 09:02

## 2020-02-14 RX ADMIN — ASPIRIN 81 MG: 81 TABLET, COATED ORAL at 08:02

## 2020-02-14 RX ADMIN — FLUTICASONE PROPIONATE 50 MCG: 50 SPRAY, METERED NASAL at 08:02

## 2020-02-14 RX ADMIN — DOCUSATE SODIUM - SENNOSIDES 1 TABLET: 50; 8.6 TABLET, FILM COATED ORAL at 09:02

## 2020-02-14 RX ADMIN — ROSUVASTATIN CALCIUM 10 MG: 10 TABLET, FILM COATED ORAL at 09:02

## 2020-02-14 RX ADMIN — GLYCERIN 1 SUPPOSITORY: 2.1 SUPPOSITORY RECTAL at 06:02

## 2020-02-14 RX ADMIN — OXYCODONE HYDROCHLORIDE 10 MG: 10 TABLET ORAL at 12:02

## 2020-02-14 RX ADMIN — OXYCODONE HYDROCHLORIDE 10 MG: 10 TABLET ORAL at 09:02

## 2020-02-14 NOTE — PROGRESS NOTES
[facility]  Vascular Surgery  Progress Note    Patient Name: Amita Lewis  MRN: [id number]  Admission Date: 2/12/2020  Primary Care Provider: Kirk Elizabeth MD    Subjective:     Interval History: NAEON. VSS. Walked around all day yesterday and because of this is having much more pain than previously, but better controlled this morning. Groin incision examined and found to have small hematoma - non-pulsatile. Tolerating diet without N/V.    Post-Op Info:  Procedure(s) (LRB):  Left Common Femoral Approach Endarterectomy with Left SFA Angioplasty Stenting Possible Bypass (Left)  AORTOGRAM (N/A)  ANGIOGRAM, LOWER EXTREMITY (Left)  PTA, SUPERFICIAL FEMORAL ARTERY (Left)  STENT, ILIAC ARTERY (Left)   2 Days Post-Op       Medications:  Continuous Infusions:  Scheduled Meds:   aspirin  81 mg Oral Daily    buPROPion  300 mg Oral QHS    buPROPion  150 mg Oral QHS    fluticasone propionate  1 spray Each Nostril Daily    gabapentin  100 mg Oral QHS    heparin (porcine)  5,000 Units Subcutaneous Q8H    lidocaine (PF) 10 mg/ml (1%)  1 mL Intradermal Once    rosuvastatin  10 mg Oral QHS    senna-docusate 8.6-50 mg  1 tablet Oral BID     PRN Meds:acetaminophen, albuterol, calcium carbonate, morphine, ondansetron, oxyCODONE, oxyCODONE     Objective:     Vital Signs (Most Recent):  Temp: 98.5 °F (36.9 °C) (02/14/20 0801)  Pulse: 91 (02/14/20 0801)  Resp: 15 (02/14/20 0801)  BP: 138/61 (02/14/20 0801)  SpO2: 96 % (02/14/20 0801) Vital Signs (24h Range):  Temp:  [98.5 °F (36.9 °C)-99.5 °F (37.5 °C)] 98.5 °F (36.9 °C)  Pulse:  [78-91] 91  Resp:  [15-18] 15  SpO2:  [94 %-99 %] 96 %  BP: (116-141)/(56-65) 138/61         Physical Exam   Constitutional: She is oriented to person, place, and time. She appears well-developed and well-nourished. No distress.   HENT:   Head: Normocephalic and atraumatic.   Eyes: EOM are normal.   Neck: Normal range of motion. No tracheal deviation present.    Cardiovascular: Normal rate and intact distal pulses.   Left pedal: Biphasic PT and DP signals  Right pedal: Triphasic PT signals   Pulmonary/Chest: Effort normal. No respiratory distress.   Abdominal: Soft. She exhibits no distension.   Genitourinary:   Genitourinary Comments: Gatica in place.   Musculoskeletal: Normal range of motion. She exhibits edema (1+ pre tibial edema).   Groin access sites with minimal ecchymosis, small hematoma present, non-pulsatile.    Neurological: She is alert and oriented to person, place, and time.   Skin: She is not diaphoretic.   Vitals reviewed.      Significant Labs:  CBC:   Recent Labs   Lab 02/13/20 0428   WBC 15.40*   RBC 3.59*   HGB 10.3*   HCT 32.4*      MCV 90   MCH 28.7   MCHC 31.8*     CMP:   Recent Labs   Lab 02/13/20 0428   *   CALCIUM 8.3*      K 3.7   CO2 24      BUN 11   CREATININE 0.8       Significant Diagnostics:  I have reviewed all pertinent imaging results/findings within the past 24 hours.    Assessment/Plan:     * PAD (peripheral artery disease)  60 yo F with history of PAD with symptoms of claudication L > R and night time numbness and tingling in LLE. She is now s/p Left common femoral endarterectomy with bovine pericardial patch angioplasty, Left common and external iliac stenting, and Left superficial femoral artery angioplasty on 2/12/20.  - Cardiac diet  - Home meds  - Oral pain control  - Q4h NV checks  - ASA, statin  - SQH  - PT/OT signed off, continue to ambulate on own    Dispo: likely home later this afternoon        Ciara Garber MD  Vascular Surgery  [facility]

## 2020-02-14 NOTE — PLAN OF CARE
POC reviewed w/ patient who verbalized understanding.  AAOx4, vss on room air.  Tolerating cardiac diet well w/ no c/o nausea.  Up to the toilet w/ 1 person assist.  Adequate UOP.  Around the clock pain medicine given for pain control.  Q4 NV checks performed.  No acute events this shift.  Will continue to monitor patient.

## 2020-02-14 NOTE — SUBJECTIVE & OBJECTIVE
Medications:  Continuous Infusions:  Scheduled Meds:   aspirin  81 mg Oral Daily    buPROPion  300 mg Oral QHS    buPROPion  150 mg Oral QHS    fluticasone propionate  1 spray Each Nostril Daily    gabapentin  100 mg Oral QHS    heparin (porcine)  5,000 Units Subcutaneous Q8H    lidocaine (PF) 10 mg/ml (1%)  1 mL Intradermal Once    rosuvastatin  10 mg Oral QHS    senna-docusate 8.6-50 mg  1 tablet Oral BID     PRN Meds:acetaminophen, albuterol, calcium carbonate, morphine, ondansetron, oxyCODONE, oxyCODONE     Objective:     Vital Signs (Most Recent):  Temp: 98.5 °F (36.9 °C) (02/14/20 0801)  Pulse: 91 (02/14/20 0801)  Resp: 15 (02/14/20 0801)  BP: 138/61 (02/14/20 0801)  SpO2: 96 % (02/14/20 0801) Vital Signs (24h Range):  Temp:  [98.5 °F (36.9 °C)-99.5 °F (37.5 °C)] 98.5 °F (36.9 °C)  Pulse:  [78-91] 91  Resp:  [15-18] 15  SpO2:  [94 %-99 %] 96 %  BP: (116-141)/(56-65) 138/61         Physical Exam   Constitutional: She is oriented to person, place, and time. She appears well-developed and well-nourished. No distress.   HENT:   Head: Normocephalic and atraumatic.   Eyes: EOM are normal.   Neck: Normal range of motion. No tracheal deviation present.   Cardiovascular: Normal rate and intact distal pulses.   Left pedal: Biphasic PT and DP signals  Right pedal: Triphasic PT signals   Pulmonary/Chest: Effort normal. No respiratory distress.   Abdominal: Soft. She exhibits no distension.   Genitourinary:   Genitourinary Comments: Gatica in place.   Musculoskeletal: Normal range of motion. She exhibits edema (1+ pre tibial edema).   Groin access sites with minimal ecchymosis, no hematoma or bleeding.    Neurological: She is alert and oriented to person, place, and time.   Skin: She is not diaphoretic.   Vitals reviewed.      Significant Labs:  CBC:   Recent Labs   Lab 02/13/20  0428   WBC 15.40*   RBC 3.59*   HGB 10.3*   HCT 32.4*      MCV 90   MCH 28.7   MCHC 31.8*     CMP:   Recent Labs   Lab  02/13/20  0428   *   CALCIUM 8.3*      K 3.7   CO2 24      BUN 11   CREATININE 0.8       Significant Diagnostics:  I have reviewed all pertinent imaging results/findings within the past 24 hours.

## 2020-02-14 NOTE — ASSESSMENT & PLAN NOTE
58 yo F with history of PAD with symptoms of claudication L > R and night time numbness and tingling in LLE. She is now s/p Left common femoral endarterectomy with bovine pericardial patch angioplasty, Left common and external iliac stenting, and Left superficial femoral artery angioplasty on 2/12/20.  - Cardiac diet  - Home meds  - Oral pain control  - Q4h NV checks  - ASA, statin  - SQH  - PT/OT signed off, continue to ambulate on own    Dispo: likely home later this afternoon

## 2020-02-15 VITALS
RESPIRATION RATE: 16 BRPM | TEMPERATURE: 99 F | HEART RATE: 89 BPM | OXYGEN SATURATION: 96 % | HEIGHT: 67 IN | WEIGHT: 195 LBS | SYSTOLIC BLOOD PRESSURE: 120 MMHG | DIASTOLIC BLOOD PRESSURE: 58 MMHG | BODY MASS INDEX: 30.61 KG/M2

## 2020-02-15 PROCEDURE — 25000003 PHARM REV CODE 250: Performed by: STUDENT IN AN ORGANIZED HEALTH CARE EDUCATION/TRAINING PROGRAM

## 2020-02-15 PROCEDURE — 63600175 PHARM REV CODE 636 W HCPCS: Performed by: STUDENT IN AN ORGANIZED HEALTH CARE EDUCATION/TRAINING PROGRAM

## 2020-02-15 RX ADMIN — DOCUSATE SODIUM - SENNOSIDES 1 TABLET: 50; 8.6 TABLET, FILM COATED ORAL at 08:02

## 2020-02-15 RX ADMIN — OXYCODONE HYDROCHLORIDE 10 MG: 10 TABLET ORAL at 05:02

## 2020-02-15 RX ADMIN — HEPARIN SODIUM 5000 UNITS: 5000 INJECTION, SOLUTION INTRAVENOUS; SUBCUTANEOUS at 05:02

## 2020-02-15 RX ADMIN — ASPIRIN 81 MG: 81 TABLET, COATED ORAL at 08:02

## 2020-02-15 NOTE — PROGRESS NOTES
Discharge instructions reviewed. Prescriptions delivered at bedside. Pt . Verbalized understanding. All questions answered. PIV d'c'd with cath tip intact and discarded. Pt currently awaiting transport.

## 2020-02-15 NOTE — DISCHARGE SUMMARY
[facility]  General Surgery  Discharge Summary      Patient Name: Amita Lewis  MRN: [id number]  Admission Date: 2/12/2020  Hospital Length of Stay: 3 days  Discharge Date and Time:  02/15/2020 10:20 AM  Attending Physician: RAYMUNDO Levine II, MD   Discharging Provider: Dante Louis MD  Primary Care Provider: Kirk Elizabeth MD     HPI: Amita Lewis is a 59 y.o. female who is here today for new patient initial appointment. She has short distance claudication in the left leg at 1/2 block. She describes the pain as deep cramping pain which occurs after 1/2 block and if she continues to walk through the pain for more than a block or two then her foot begins to go numb. The leg also gets cold and numb/tingly at night and she has to stand up to get sensation back and make it warm again. This has woken her up from sleep several times. She has a history of smoking and recently quit. She has no history of vascular surgery or heart surgery. Denies h/o MI or stroke.    Procedure(s) (LRB):  Left Common Femoral Approach Endarterectomy with Left SFA Angioplasty Stenting Possible Bypass (Left)  AORTOGRAM (N/A)  ANGIOGRAM, LOWER EXTREMITY (Left)  PTA, SUPERFICIAL FEMORAL ARTERY (Left)  STENT, ILIAC ARTERY (Left)     Hospital Course: Pt had an uneventful post op course. POD 1 she over-exerted herself and had increased pain in left groin which kept her somewhat immobilized most of POD 2, but she began to become more comfortable and by POD 3 her pain was well controlled, she was tolerating diet, had good signals in her feet, and she was ready to go home. She was discharged with instructions to follow up in 2-3 weeks for suture removal.    Physical Exam   Constitutional: She is oriented to person, place, and time. She appears well-developed and well-nourished.   Cardiovascular: Normal rate and regular rhythm.   Pulmonary/Chest: Effort normal. No respiratory distress.   Abdominal: Soft. She  exhibits no distension.   Musculoskeletal:   Left groin with sutures in place, mildly indurated, mildly tender, no grainage  Bilateral feet with biphasic PT and DP   Neurological: She is alert and oriented to person, place, and time.   Psychiatric: She has a normal mood and affect. Her behavior is normal. Judgment and thought content normal.   Vitals reviewed.        Consults:     Significant Diagnostic Studies: Labs: BMP: No results for input(s): GLU, NA, K, CL, CO2, BUN, CREATININE, CALCIUM, MG in the last 48 hours. and CBC No results for input(s): WBC, HGB, HCT, PLT in the last 48 hours.    Pending Diagnostic Studies:     None        Final Active Diagnoses:    Diagnosis Date Noted POA    PRINCIPAL PROBLEM:  PAD (peripheral artery disease) [I73.9]  Yes      Problems Resolved During this Admission:      Discharged Condition: good    Disposition:     Follow Up:  Follow-up Information     RAYMUNDO Levine II, MD In 2 weeks.    Specialty:  Vascular Surgery  Contact information:  [address] [place] [id number]  [phone]                 Patient Instructions:      No driving until:   Order Comments: No driving while still taking pain medications daily.   Take a stool softener while taking pain medications daily.     Notify your health care provider if you experience any of the following:  increased confusion or weakness     Notify your health care provider if you experience any of the following:  persistent dizziness, light-headedness, or visual disturbances     Notify your health care provider if you experience any of the following:  worsening rash     Notify your health care provider if you experience any of the following:  severe persistent headache     Notify your health care provider if you experience any of the following:  difficulty breathing or increased cough     Notify your health care provider if you experience any of the following:  redness, tenderness, or signs of infection (pain,  swelling, redness, odor or green/yellow discharge around incision site)     Notify your health care provider if you experience any of the following:  severe uncontrolled pain     Notify your health care provider if you experience any of the following:  persistent nausea and vomiting or diarrhea     Notify your health care provider if you experience any of the following:  temperature >100.4     No dressing needed   Order Comments: Okay to take shower and get incision wet. Do NOT soak/submerge incision in water (aka no bathing, swimming, etc).  Please paint incision with betadine daily.     Activity as tolerated     Medications:  Reconciled Home Medications:      Medication List      START taking these medications    oxyCODONE 5 MG immediate release tablet  Commonly known as:  ROXICODONE  Take 1 tablet (5 mg total) by mouth every 4 (four) hours as needed.        CHANGE how you take these medications    * buPROPion 150 MG TB24 tablet  Commonly known as:  WELLBUTRIN XL  Take 1 tablet (150 mg total) by mouth once daily. Plus 300mg tablet for total dose of 450mg daily  What changed:  when to take this     * buPROPion 300 MG 24 hr tablet  Commonly known as:  WELLBUTRIN XL  Take 1 tablet (300 mg total) by mouth once daily. Plus 150mg tablet for total dose of 450mg daily  What changed:  when to take this     fexofenadine 180 MG tablet  Commonly known as:  ALLEGRA  Take 1 tablet (180 mg total) by mouth once daily.  What changed:  when to take this     rosuvastatin 10 MG tablet  Commonly known as:  CRESTOR  Take 1 tablet (10 mg total) by mouth once daily.  What changed:  when to take this         * This list has 2 medication(s) that are the same as other medications prescribed for you. Read the directions carefully, and ask your doctor or other care provider to review them with you.            CONTINUE taking these medications    albuterol 90 mcg/actuation inhaler  Commonly known as:  PROVENTIL/VENTOLIN HFA  Inhale 2 puffs  into the lungs every 6 (six) hours as needed for Wheezing or Shortness of Breath. Rescue     ASPIR-81 ORAL  Take 1 tablet by mouth every evening.     boric acid vaginal suppository  Commonly known as:  boric acid  Place 650 mg vaginally.     ECHINACEA 1X ORAL     efinaconazole 10 % Karen  Commonly known as:  JUBLIA  Apply 1 application topically once daily. To affected toenail.  Do not use nail polish on the nails.     estradiol 0.01 % (0.1 mg/gram) vaginal cream  Commonly known as:  ESTRACE  Use 1/2 gram twice weekly     FLAXSEED ORAL  Take by mouth.     fluticasone propionate 50 mcg/actuation nasal spray  Commonly known as:  FLONASE  1 spray by Each Nare route 2 (two) times daily as needed for Rhinitis or Allergies.     gabapentin 100 MG capsule  Commonly known as:  NEURONTIN  Take 1 capsule (100 mg total) by mouth every evening. For back pain     ibuprofen 800 MG tablet  Commonly known as:  ADVIL,MOTRIN  TAKE 1 TABLET(800 MG) BY MOUTH EVERY 8 HOURS WITH FOOD AS NEEDED FOR PAIN     nystatin powder  Commonly known as:  MYCOSTATIN  Apply to affected areas of skin twice daily as needed for skin infection.  Re treat as needed.     One Daily Multivitamin per tablet  Generic drug:  multivitamin  Take 1 tablet by mouth every evening.     PROBIOTIC-10 ORAL  Take by mouth every evening.     sumatriptan 50 MG tablet  Commonly known as:  IMITREX  Take 1 tab by mouth as needed for migraine headache.  Repeat dose every 2 hours as needed.  Max 200mg in 24 hours.     tobramycin-dexamethasone 0.3-0.1% 0.3-0.1 % Drps  Commonly known as:  TOBRADEX            Dante Louis MD  General Surgery  [facility]

## 2020-02-17 ENCOUNTER — TELEPHONE (OUTPATIENT)
Dept: VASCULAR SURGERY | Facility: CLINIC | Age: 60
End: 2020-02-17

## 2020-02-17 DIAGNOSIS — I73.9 PVD (PERIPHERAL VASCULAR DISEASE) WITH CLAUDICATION: Primary | ICD-10-CM

## 2020-02-17 NOTE — PLAN OF CARE
Patient discharged home 2/15/2020 with no needs.    Vascular clinic will schedule postop and contact patient.       02/17/20 0737   Final Note   Assessment Type Final Discharge Note   Anticipated Discharge Disposition Home   Right Care Referral Info   Post Acute Recommendation No Care   Post-Acute Status   Post-Acute Authorization Other   Other Status No Post-Acute Service Needs

## 2020-02-17 NOTE — TELEPHONE ENCOUNTER
----- Message from Dante Louis MD sent at 2/15/2020  9:39 AM CST -----  Please schedule for follow up in 2-3 weeks for suture removal, with ABIs with PVRs.    Thanks

## 2020-02-26 ENCOUNTER — PATIENT OUTREACH (OUTPATIENT)
Dept: ADMINISTRATIVE | Facility: OTHER | Age: 60
End: 2020-02-26

## 2020-03-02 ENCOUNTER — OFFICE VISIT (OUTPATIENT)
Dept: VASCULAR SURGERY | Facility: CLINIC | Age: 60
End: 2020-03-02
Attending: SURGERY
Payer: COMMERCIAL

## 2020-03-02 ENCOUNTER — HOSPITAL ENCOUNTER (OUTPATIENT)
Dept: VASCULAR SURGERY | Facility: CLINIC | Age: 60
Discharge: HOME | End: 2020-03-02
Attending: SURGERY
Payer: COMMERCIAL

## 2020-03-02 VITALS
HEIGHT: 67 IN | WEIGHT: 202.81 LBS | HEART RATE: 65 BPM | DIASTOLIC BLOOD PRESSURE: 67 MMHG | SYSTOLIC BLOOD PRESSURE: 135 MMHG | BODY MASS INDEX: 31.83 KG/M2 | TEMPERATURE: 99 F

## 2020-03-02 DIAGNOSIS — I73.9 PVD (PERIPHERAL VASCULAR DISEASE) WITH CLAUDICATION: ICD-10-CM

## 2020-03-02 DIAGNOSIS — I70.219 ATHEROSCLEROSIS OF ARTERY OF EXTREMITY WITH INTERMITTENT CLAUDICATION: ICD-10-CM

## 2020-03-02 PROCEDURE — 99999 PR PBB SHADOW E&M-EST. PATIENT-LVL IV: ICD-10-PCS | Mod: PBBFAC,,, | Performed by: SURGERY

## 2020-03-02 PROCEDURE — 93923 UPR/LXTR ART STDY 3+ LVLS: CPT | Mod: S$GLB,,, | Performed by: SURGERY

## 2020-03-02 PROCEDURE — 99999 PR PBB SHADOW E&M-EST. PATIENT-LVL IV: CPT | Mod: PBBFAC,,, | Performed by: SURGERY

## 2020-03-02 PROCEDURE — 99024 PR POST-OP FOLLOW-UP VISIT: ICD-10-PCS | Mod: S$GLB,,, | Performed by: SURGERY

## 2020-03-02 PROCEDURE — 99024 POSTOP FOLLOW-UP VISIT: CPT | Mod: S$GLB,,, | Performed by: SURGERY

## 2020-03-02 PROCEDURE — 93923 PR NON-INVASIVE PHYSIOLOGIC STUDY EXTREMITY 3 LEVELS: ICD-10-PCS | Mod: S$GLB,,, | Performed by: SURGERY

## 2020-03-02 NOTE — PROGRESS NOTES
VASCULAR SURGERY NOTE    Patient ID: Amita Lewis is a 59 y.o. female.    I. HISTORY     Chief Complaint: post-op    HPI: Amita Lewis is a 59 y.o. female who is here today for post-op appointment. She had left CFA endarterectomy with L ANNIA/EIA stenting and L SFA angioplasty with me on 2/12/20. Since discharge she has been doing well, no pain, weakness, numbness, or claudication in the L leg. Complains of some pain and numbness in the R leg with walking, unchanged from pre-op.         Past Medical History:   Diagnosis Date    Abnormal Pap smear     Asthma     Depression 3/17/2016    Environmental and seasonal allergies 3/17/2016    Heartburn 3/17/2016    HLD (hyperlipidemia) 03/22/2016    ASCVD 2018 6.3%; can't tolerate statins    Menopausal syndrome (hot flashes) 3/17/2016    Mild intermittent asthma without complication 3/17/2016    Obesity (BMI 30-39.9) 3/17/2016    Sciatica 3/17/2016        Past Surgical History:   Procedure Laterality Date    ANGIOGRAPHY OF LOWER EXTREMITY Left 2/12/2020    Procedure: ANGIOGRAM, LOWER EXTREMITY;  Surgeon: RAYMUNDO Levine II, MD;  Location: [address];  Service: Vascular;  Laterality: Left;    AORTOGRAPHY N/A 2/12/2020    Procedure: AORTOGRAM;  Surgeon: RAYMUNDO Levine II, MD;  Location: [address];  Service: Vascular;  Laterality: N/A;    CERVICAL BIOPSY  W/ LOOP ELECTRODE EXCISION      ENDARTERECTOMY OF FEMORAL ARTERY Left 2/12/2020    Procedure: Left Common Femoral Approach Endarterectomy with Left SFA Angioplasty Stenting Possible Bypass;  Surgeon: RAYMUNDO Levine II, MD;  Location: [address];  Service: Vascular;  Laterality: Left;  contrast: 52ml  fluoro time: 16.3 min  mGy: 921.68    INJECTION OF JOINT Bilateral 5/31/2018    Procedure: INJECTION-JOINT;  Surgeon: Lynette Rowley MD;  Location: [facility]IN [facility];  Service: Pain Management;  Laterality: Bilateral;  B/L SI Joint Injs  [id number], [id number]    INJECTION OF JOINT  Bilateral 9/18/2018    Procedure: INJECTION, JOINT  Bilateral SI joint;  Surgeon: Lynette Rowley MD;  Location: [facility] [place] PAIN T;  Service: Pain Management;  Laterality: Bilateral;    microdiscetomy      L4-L5    OVARIAN CYST REMOVAL  2001    TRANSFORAMINAL EPIDURAL INJECTION OF STEROID Left 3/21/2019    Procedure: INJECTION, STEROID, EPIDURAL, TRANSFORAMINAL APPROACH, L4 AND L5;  Surgeon: Lynette Rowley MD;  Location: [facility] [place] PAIN T;  Service: Pain Management;  Laterality: Left;       Social History     Tobacco Use   Smoking Status Current Every Day Smoker    Packs/day: 0.00    Years: 40.00    Pack years: 0.00    Types: Cigarettes   Smokeless Tobacco Never Used        II. PHYSICAL EXAM     Physical Exam   Constitutional: She is oriented to person, place, and time. She appears well-developed and well-nourished.   Eyes: Pupils are equal, round, and reactive to light.   Neck: Normal range of motion. Neck supple.   Cardiovascular: Normal rate, regular rhythm and intact distal pulses.   Pulmonary/Chest: No respiratory distress.   Abdominal: Soft.   Musculoskeletal: Normal range of motion.   Incision c/d/i. Nylon sutures were removed, no erythema   Neurological: She is alert and oriented to person, place, and time.   Skin: Skin is warm.     RLE: 1+ femoral pulse  LLE: 1+ femoral pulse, triphasic PT signal       III. ASSESSMENT & PLAN (MEDICAL DECISION MAKING)       Imaging Results:  LLE Arterial Duplex 1/20/20:  Elevated velocities to 526cm/s in the common femoral artery.  CFA waveforms with brisk upstroke and spectral broadening suggestive of stenosis within the common femoral artery.  Waveforms do not suggest the presence of iliac artery disease.  The proximal SFA is patent.  The distal SFA is occluded with reconstitution of the above knee popliteal artery.  Waveforms in the popliteal artery and beyond are all monophasic.     BLE Segmental Pressures 1/20/20:  LLE low thigh pressure: 90  LLE calf pressure:  63  Profunda/popliteal collateral index: 0.3     KRISTOPHER/TP 1/20/20:  R L   0.49. TP NC 0.41  TP 50         KRISTOPHER 3/2/20:  R L    0.59 0.93         Assessment/Diagnosis and Plan:  59 y.o. female s/p  left CFA endarterectomy with L ANNIA/EIA stenting and L SFA angioplasty with me on 2/12/20. Doing well with wound healed and symptoms resolved in the LLE. Still with claudication symptoms in RLE but manageable at this time. Continue medical mgmt of RLE claudication.    - continue ASA and statin daily  - Walk regularly and maintain adequate hydration  - Will follow up in 3 months for symptom reassessment    RAYMUNDO Levine II, MD, VI  Vascular Surgeon  [facility]

## 2020-03-18 ENCOUNTER — PATIENT MESSAGE (OUTPATIENT)
Dept: INTERNAL MEDICINE | Facility: CLINIC | Age: 60
End: 2020-03-18

## 2020-03-18 DIAGNOSIS — B37.2 CANDIDAL SKIN INFECTION: ICD-10-CM

## 2020-03-19 RX ORDER — NYSTATIN 100000 [USP'U]/G
POWDER TOPICAL
Qty: 30 G | Refills: 3 | Status: SHIPPED | OUTPATIENT
Start: 2020-03-19 | End: 2020-08-17

## 2020-04-02 ENCOUNTER — TELEPHONE (OUTPATIENT)
Dept: ADMINISTRATIVE | Facility: OTHER | Age: 60
End: 2020-04-02

## 2020-04-03 ENCOUNTER — TELEPHONE (OUTPATIENT)
Dept: SURGERY | Facility: CLINIC | Age: 60
End: 2020-04-03

## 2020-04-03 ENCOUNTER — TELEPHONE (OUTPATIENT)
Dept: INTERNAL MEDICINE | Facility: CLINIC | Age: 60
End: 2020-04-03

## 2020-04-03 NOTE — TELEPHONE ENCOUNTER
Contacted the patient regarding rescheduling appointment due to COVID-19 concerns.  The patient is rescheduled to be seen on Tuesday 7/21/2020, 8:30 am with Sandrita Reece PA-C at [facility].  The patient voiced understanding of appointment date, time, and location.  Reminder letter mailed to the patient.

## 2020-04-03 NOTE — TELEPHONE ENCOUNTER
I spoke with patient to offer a virtual visit or reschedule to a later date. Patient preferred to reschedule to a later date. She also wanted a morning appointment, the next morning available was August 8th. Patient thought this date was too far out. Told me never mind, to cancel the appointment, and have a nice day, and she would find a new doctor  then hung up the phone.           KIA.

## 2020-04-30 ENCOUNTER — OFFICE VISIT (OUTPATIENT)
Dept: FAMILY MEDICINE | Facility: CLINIC | Age: 60
End: 2020-04-30
Attending: FAMILY MEDICINE
Payer: COMMERCIAL

## 2020-04-30 VITALS
BODY MASS INDEX: 31.71 KG/M2 | HEIGHT: 67 IN | HEART RATE: 82 BPM | WEIGHT: 202 LBS | SYSTOLIC BLOOD PRESSURE: 145 MMHG | DIASTOLIC BLOOD PRESSURE: 65 MMHG

## 2020-04-30 DIAGNOSIS — E78.2 MIXED HYPERLIPIDEMIA: Primary | ICD-10-CM

## 2020-04-30 DIAGNOSIS — F32.A DEPRESSION, UNSPECIFIED DEPRESSION TYPE: ICD-10-CM

## 2020-04-30 PROCEDURE — 3008F PR BODY MASS INDEX (BMI) DOCUMENTED: ICD-10-PCS | Mod: CPTII,S$GLB,, | Performed by: FAMILY MEDICINE

## 2020-04-30 PROCEDURE — 99213 PR OFFICE/OUTPT VISIT, EST, LEVL III, 20-29 MIN: ICD-10-PCS | Mod: S$GLB,,, | Performed by: FAMILY MEDICINE

## 2020-04-30 PROCEDURE — 3008F BODY MASS INDEX DOCD: CPT | Mod: CPTII,S$GLB,, | Performed by: FAMILY MEDICINE

## 2020-04-30 PROCEDURE — 99213 OFFICE O/P EST LOW 20 MIN: CPT | Mod: S$GLB,,, | Performed by: FAMILY MEDICINE

## 2020-04-30 PROCEDURE — 99999 PR PBB SHADOW E&M-EST. PATIENT-LVL III: ICD-10-PCS | Mod: PBBFAC,,, | Performed by: FAMILY MEDICINE

## 2020-04-30 PROCEDURE — 99999 PR PBB SHADOW E&M-EST. PATIENT-LVL III: CPT | Mod: PBBFAC,,, | Performed by: FAMILY MEDICINE

## 2020-04-30 RX ORDER — BUPROPION HYDROCHLORIDE 150 MG/1
150 TABLET ORAL DAILY
Qty: 90 TABLET | Refills: 1 | Status: SHIPPED | OUTPATIENT
Start: 2020-04-30 | End: 2020-07-01

## 2020-04-30 RX ORDER — BUPROPION HYDROCHLORIDE 300 MG/1
300 TABLET ORAL DAILY
Qty: 90 TABLET | Refills: 1 | Status: SHIPPED | OUTPATIENT
Start: 2020-04-30 | End: 2020-07-01 | Stop reason: SDUPTHER

## 2020-04-30 NOTE — PROGRESS NOTES
"Subjective:       Patient ID: Amita Lewis is a 59 y.o. female.    Chief Complaint: Establish Care    HPI   Pt is here for follow up of hypercholesterolemia and establish care pt is generally well no sob/cp no change in bowel habits   Pt has depression stable on wellbutrin no si/hi no panic attacks    Review of Systems   Constitutional: Negative for activity change, chills, fatigue, fever and unexpected weight change.   HENT: Negative for hearing loss, rhinorrhea and trouble swallowing.    Eyes: Negative for discharge and visual disturbance.   Respiratory: Negative for chest tightness and wheezing.    Cardiovascular: Positive for palpitations. Negative for chest pain.   Gastrointestinal: Negative for abdominal distention, blood in stool, constipation, diarrhea and vomiting.   Endocrine: Negative for polydipsia and polyuria.   Genitourinary: Negative for difficulty urinating, dysuria, hematuria and menstrual problem.   Musculoskeletal: Negative for arthralgias, joint swelling and neck pain.   Neurological: Negative for weakness and headaches.   Psychiatric/Behavioral: Negative for confusion and dysphoric mood.       Objective:      Physical Exam   Constitutional: She appears well-developed and well-nourished. No distress.   Cardiovascular: Normal rate and regular rhythm. Exam reveals no gallop.   Pulmonary/Chest: Effort normal and breath sounds normal. No stridor. No respiratory distress.   Abdominal: Soft. Bowel sounds are normal. She exhibits no distension. There is no tenderness.       Assessment:       1. Mixed hyperlipidemia       2. Depression, unspecified depression type        Plan:     cont meds  Low salt diet  Graded exercise  rtc 6 months and prn        "This note will not be shared with the patient."   "

## 2020-05-02 ENCOUNTER — PATIENT MESSAGE (OUTPATIENT)
Dept: VASCULAR SURGERY | Facility: CLINIC | Age: 60
End: 2020-05-02

## 2020-05-04 RX ORDER — ROSUVASTATIN CALCIUM 20 MG/1
20 TABLET, COATED ORAL DAILY
Qty: 90 TABLET | Refills: 3 | Status: SHIPPED | OUTPATIENT
Start: 2020-05-04 | End: 2021-07-06 | Stop reason: SDUPTHER

## 2020-05-21 ENCOUNTER — OFFICE VISIT (OUTPATIENT)
Dept: PAIN MEDICINE | Facility: CLINIC | Age: 60
End: 2020-05-21
Attending: ANESTHESIOLOGY
Payer: COMMERCIAL

## 2020-05-21 DIAGNOSIS — M53.3 SACROILIAC JOINT PAIN: ICD-10-CM

## 2020-05-21 DIAGNOSIS — I70.219 ATHEROSCLEROSIS OF ARTERY OF EXTREMITY WITH INTERMITTENT CLAUDICATION: ICD-10-CM

## 2020-05-21 DIAGNOSIS — I73.9 PVD (PERIPHERAL VASCULAR DISEASE) WITH CLAUDICATION: ICD-10-CM

## 2020-05-21 DIAGNOSIS — M54.16 LUMBAR RADICULOPATHY: ICD-10-CM

## 2020-05-21 DIAGNOSIS — M51.36 DDD (DEGENERATIVE DISC DISEASE), LUMBAR: Primary | ICD-10-CM

## 2020-05-21 PROCEDURE — 99214 PR OFFICE/OUTPT VISIT, EST, LEVL IV, 30-39 MIN: ICD-10-PCS | Mod: 95,,, | Performed by: ANESTHESIOLOGY

## 2020-05-21 PROCEDURE — 99214 OFFICE O/P EST MOD 30 MIN: CPT | Mod: 95,,, | Performed by: ANESTHESIOLOGY

## 2020-05-21 NOTE — PATIENT INSTRUCTIONS
Exercise guide:    https://order.kaylene.nih.gov/sites/default/files/2018-04/kaylene-exercise-guide.pdf    https://qx1ombx.kaylene.nih.gov/workout-videos/

## 2020-05-21 NOTE — PROGRESS NOTES
Chronic Pain -Follow Up    Referring Physician: No ref. provider found    Chief Complaint:   No chief complaint on file.       SUBJECTIVE: Disclaimer: This note has been generated using voice-recognition software. There may be typographical errors that have been missed during proof-reading  Interval history 05/21/2020:  Since previous encounter patient has been evaluated for her vascular lesions and had stenting placed in her left lower extremity which have some her pain.  She additionally has been scheduled to have right-sided occlusions addressed in the next several weeks.  It seems like based on her response that arterial fusion lower extremities was majority of her source of her pain.  Overall the patient has been doing well requiring minimal medications.  Interval History 1/22/2020:  The patient returns to clinic today for follow up. She is s/p bilateral SI joint injections on 12/28/2019. She reports some relief of her low back pain. She is unable to quantify her relief as she is still experiencing leg pain and cramping. She was able to swim while on vacation. She reports numbness and coldness to her calf and top of her foot with prolonged walking. She has recently seen Vascular and is scheduling RLE angioplasty with stenting. She continues to take Gabapentin once daily. She continues to care for her partner who has cancer. She denies any other health changes. Her pain today is 3/10.    Interval History 12/18/2019:  The patient returns to clinic today for follow up. Since last visit, her partner has been diagnosed with oral cancer. She has been caring for her and doing multiple trips to [place]. She reports increased low back pain that radiates into both buttocks and down the posterior aspect of her left leg to her foot. She reports increased numbness and coldness with laying down to her calf and top of her foot. Her greatest pain is in her buttocks. She reports increased pain with sitting and walking.  "She is currently participating in a work up to rule out vascular issues. She denies any other health changes. Her pain today is 6/10.      Interval History 4/4/2019:  The patient returns for procedure follow up.  She is s/p left L4 and L5 TF JILLIAN on 3/21/19 with about 50% relief of left leg pain.  She still has a burning pain to left calf and anterior foot.  Her right sided pain has improved.  Her lower back pain has been mild.  She would like a podiatry referral as previously discussed.  She has some benefit with Ibuprofen PRN.  She continues to remain active and stretches.  Her pain today is 1/10.     Interval History 3/6/2019:  She returns to clinic today for follow up. Reports she had 95% pain relief from bilateral SI joint injections on 9/18/2018 for approximately 4-5 months. She reports new left leg pain that started approximately 1 month ago out of the blue without any inciting event. Pain seems to start at times in her buttocks and radiates down her entire left leg. The pain is described as "crushing" and associated with numbness/tingling in the entire left leg that is worse in the hamstrings and dorsum of her foot. She also has cramping in her left hamstring at times. Pain is worse with using the stairs and after walking about 1 to 1.5 blocks. Pain better at rest, when leaning forward, and gets limited relief from chiropractor. No cardiovascular history or issues per patient. She is taking Ibuprofen 800mg qhs that provides some relief. Pain is 2/10 at best and 9/10 at worst. Today her pain is 5/10. + generalized weakness/limping due to the pain. No loss of bowel/bladder control or saddle anesthesia. No pain in her RLE.     Interval History 10/5/2018:  The patient returns to clinic today for follow up. She is s/p bilateral SI joint injection on 9/18/2018. She reports 95% relief of her low back and buttock pain. She denies any radiating leg pain today. She continues to participate in physical therapy with " benefit. She denies any other health changes. Her pain today is 1/10.    Interval History: 9/5/18:  She states that 3 weeks ago her pain returned after hearing a pop and twisting while getting into her car. She states that pain has returned in the same character and distribution as prior to the bilateral SIJ on 5/31 but may be more severe now with pain going down both legs instead just one. She is hoping to repeat injections. She denies any other health changes in the interim. She takes a baby ASA. She takes ibuprofen nightly for pain.     Interval History 6/14/2018:  The patient returns for follow up.  She is s/p bilateral SI joint injections on 5/31/18 with 100% pain relief.  She has not had any back or buttock pain since the procedure.  She still has some cramping to her calf which she feels is unrelated.  She takes Ibuprofen 800 mg at night with benefit.  She has had benefit with PT exercises and continues to be active.  Her pain today is 0/10.    Interval history 5/15/2018:  Since previous encounter the patient continues to have significant lower back pain over the area of the sacral iliac joints.  She states it is worse when she first starts to get going and gradually improves as she walks.  She feels as if she is able to walk longer distances after previous epidural steroid injections but overall reports that 10% improvement in her pain.  The previous Medrol Dosepak did not help significant only tramadol was also not helping with her pain.  She has been doing physical therapy and feels as if she is making slow strides but there still certain activities that she cannot do without exacerbating her pain.  No other health changes since previous encounter.    Interval History 4/23/2018  One week ago, sudden worsened back pain when bending forwards with radiation into bilateral thighs and weakness.  Gradually improving, taking ibuprofen, but has been restricting activities secondary to persisting pain in the lower  back.    Initial encounter:    Amita Lewis presents to the clinic for the evaluation of back pain. The pain started one year ago  and symptoms have been worsening.    Brief history:    Pain Description:    The pain is located in the back area and radiates to the leg in the L3-4 distribution.  She describes symptoms of neurogenic versus vascular claudication.  Her pain significant worsens after walking for several blocks and gradually improves when sitting down.  She describes her pain symptoms as a radiating pain into her calf and a cramping sensation in her calf.    At BEST  0/10     At WORST  7/10 on the WORST day.      On average pain is rated as 7/10.     Today the pain is rated as 1/10    The pain is described as burning, dull and tight band      Symptoms interfere with daily activity and sleeping.     Exacerbating factors: Walking.      Mitigating factors medications.     Patient denies .  Patient denies any suicidal or homicidal ideations    Pain Medications:  Current:  Ibuprofen - with relief  Gabapentin    Tried in Past:  NSAIDs -Never  TCA -Never  SNRI -Never  Anti-convulsants -lyrica and gabapentin in the past with significant sedating side effects limiting their use  Muscle Relaxants -Never  Opioids-Never    Physical Therapy/Home Exercise: yes       report:  Reviewed and consistent with medication use as prescribed.    Pain Procedures:   12/27/2017 LUMBAR LEFT L4 AND L5 TRANSFORAMINAL JILLIAN   3/14/2018 - left L3 and L4 TF JILLIAN  5/31/18 Bilateral SI joint injections- 100% relief  9/18/2018- Bilateral SI joint injections  3/21/2019- Left L4 and L5 TF JILLIAN  12/28/2019- Bilateral SI joint injections    Chiropractor -never  Acupuncture - never  TENS unit -never  Spinal decompression -Lumbar microdiskectomy in 2008 with complication of dural tear requiring repair.  Joint replacement -never    Imaging:  MRI Lumbar Spine 4/15/2019:  FINDINGS:  Remote operative change status post right L4  hemilaminectomy.  The lumbar sagittal alignment is relatively stable.  Lumbar vertebral body heights contour and bone marrow signal is relatively stable without evidence for acute fracture or subluxation.    Distal spinal cord and conus normal in signal and contour tip of the conus approximates inferior L1 level.    No aneurysmal dilatation of the visualized abdominal aorta.    T12/L1 through L1/L2: No significant disc bulge, central canal or neural foraminal stenosis.    L2/L3: Small disc bulge without significant central canal or neural foraminal stenosis there is a small fluid signal focus in the dorsal epidural space at this level unchanged from prior    L3/L4:Bulging disc with ligamentum flavum hypertrophy and facet joint arthropathy mild central canal and bilateral neural foraminal stenosis.    L4/L5:Bulging disc with ligamentum flavum hypertrophy and facet joint arthropathy with superimposed right hemilaminectomy.  No significant central canal stenosis.  No evidence for recurrent disc herniation.  There is mild moderate bilateral neural foraminal stenosis.    L5/S1: No significant disc bulge central canal or neural foraminal stenosis.      Impression       No significant change from prior.  Remote operative change right L4 hemilaminectomy without evidence for recurrent disc herniation.    Scattered superimposed degenerative change most prominent at L3/L4 with posterior disc osteophyte, ligamentum flavum hypertrophy and facet joint arthropathy with mild central canal and bilateral neural foraminal stenosis.           Past Medical History:   Diagnosis Date    Abnormal Pap smear     Asthma     Depression 3/17/2016    Environmental and seasonal allergies 3/17/2016    Heartburn 3/17/2016    HLD (hyperlipidemia) 03/22/2016    ASCVD 2018 6.3%; can't tolerate statins    Menopausal syndrome (hot flashes) 3/17/2016    Mild intermittent asthma without complication 3/17/2016    Obesity (BMI 30-39.9) 3/17/2016     Sciatica 3/17/2016     Past Surgical History:   Procedure Laterality Date    ANGIOGRAPHY OF LOWER EXTREMITY Left 2/12/2020    Procedure: ANGIOGRAM, LOWER EXTREMITY;  Surgeon: RAYMUNDO Levine II, MD;  Location: [facility] OR 2ND FLR;  Service: Vascular;  Laterality: Left;    AORTOGRAPHY N/A 2/12/2020    Procedure: AORTOGRAM;  Surgeon: RAYMUNDO Levine II, MD;  Location: [facility] OR 2ND FLR;  Service: Vascular;  Laterality: N/A;    CERVICAL BIOPSY  W/ LOOP ELECTRODE EXCISION      ENDARTERECTOMY OF FEMORAL ARTERY Left 2/12/2020    Procedure: Left Common Femoral Approach Endarterectomy with Left SFA Angioplasty Stenting Possible Bypass;  Surgeon: RAYMUNDO Levine II, MD;  Location: [facility] OR 2ND FLR;  Service: Vascular;  Laterality: Left;  contrast: 52ml  fluoro time: 16.3 min  mGy: 921.68    INJECTION OF JOINT Bilateral 5/31/2018    Procedure: INJECTION-JOINT;  Surgeon: Lynette Rowley MD;  Location: [facility] PAIN MGT;  Service: Pain Management;  Laterality: Bilateral;  B/L SI Joint Injs  [id number], [id number]    INJECTION OF JOINT Bilateral 9/18/2018    Procedure: INJECTION, JOINT  Bilateral SI joint;  Surgeon: Lynette Rowley MD;  Location: [facility]IN [facility]GT;  Service: Pain Management;  Laterality: Bilateral;    microdiscetomy      L4-L5    OVARIAN CYST REMOVAL  2001    TRANSFORAMINAL EPIDURAL INJECTION OF STEROID Left 3/21/2019    Procedure: INJECTION, STEROID, EPIDURAL, TRANSFORAMINAL APPROACH, L4 AND L5;  Surgeon: Lynette Rowley MD;  Location: [facility] PAIN MGT;  Service: Pain Management;  Laterality: Left;     Social History     Socioeconomic History    Marital status:      Spouse name: Not on file    Number of children: Not on file    Years of education: Not on file    Highest education level: Not on file   Occupational History    Not on file   Social Needs    Financial resource strain: Not hard at all    Food insecurity:     Worry: Never true     Inability: Never true    Transportation needs:      Medical: No     Non-medical: No   Tobacco Use    Smoking status: Current Every Day Smoker     Packs/day: 0.00     Years: 40.00     Pack years: 0.00     Types: Cigarettes    Smokeless tobacco: Never Used   Substance and Sexual Activity    Alcohol use: Yes     Alcohol/week: 1.0 standard drinks     Types: 1 Glasses of wine per week     Frequency: Monthly or less     Drinks per session: 1 or 2     Binge frequency: Never     Comment: 3 x a month    Drug use: No    Sexual activity: Yes     Partners: Female     Birth control/protection: None     Comment: (partner Ivone is a trans woman)   Lifestyle    Physical activity:     Days per week: 1 day     Minutes per session: 20 min    Stress: Only a little   Relationships    Social connections:     Talks on phone: Once a week     Gets together: Once a week     Attends Yarsani service: Not on file     Active member of club or organization: No     Attends meetings of clubs or organizations: Never     Relationship status:    Other Topics Concern    Not on file   Social History Narrative    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    Works for [employer].    Walks a lot at work and for exercise     Family History   Problem Relation Age of Onset    Cancer Father         lung; non smoker, worked in [place] and gas station    Alcohol abuse Father     Early death Father     Breast cancer Mother     Heart failure Mother     Diabetes Mellitus Mother         ?secondary to chronic steroids    Diabetes Mother     Heart disease Mother     Alcohol abuse Brother     Ovarian cancer Neg Hx     Hypertension Neg Hx        Review of patient's allergies indicates:  No Known Allergies    Current Outpatient Medications   Medication Sig    albuterol (PROVENTIL/VENTOLIN HFA) 90 mcg/actuation inhaler Inhale 2 puffs into the lungs every 6 (six) hours as needed for Wheezing or Shortness of Breath. Rescue    ASPIRIN (ASPIR-81 ORAL) Take 1 tablet by mouth every evening.      boric acid (BORIC ACID) vaginal suppository Place 650 mg vaginally.    buPROPion (WELLBUTRIN XL) 150 MG TB24 tablet Take 1 tablet (150 mg total) by mouth once daily. Plus 300mg tablet for total dose of 450mg daily    buPROPion (WELLBUTRIN XL) 300 MG 24 hr tablet Take 1 tablet (300 mg total) by mouth once daily. Plus 150mg tablet for total dose of 450mg daily    ECHINACEA 1X ORAL     efinaconazole (JUBLIA) 10 % Shelley Apply 1 application topically once daily. To affected toenail.  Do not use nail polish on the nails.    estradiol (ESTRACE) 0.01 % (0.1 mg/gram) vaginal cream Use 1/2 gram twice weekly    fexofenadine (ALLEGRA) 180 MG tablet Take 1 tablet (180 mg total) by mouth once daily. (Patient taking differently: Take 180 mg by mouth every evening. )    FLAXSEED ORAL Take by mouth.    fluticasone (FLONASE) 50 mcg/actuation nasal spray 1 spray by Each Nare route 2 (two) times daily as needed for Rhinitis or Allergies.    gabapentin (NEURONTIN) 100 MG capsule Take 1 capsule (100 mg total) by mouth every evening. For back pain    ibuprofen (ADVIL,MOTRIN) 800 MG tablet TAKE 1 TABLET(800 MG) BY MOUTH EVERY 8 HOURS WITH FOOD AS NEEDED FOR PAIN    LACTOBAC NO.41/BIFIDOBACT NO.7 (PROBIOTIC-10 ORAL) Take by mouth every evening.     multivitamin (ONE DAILY MULTIVITAMIN) per tablet Take 1 tablet by mouth every evening.     nystatin (MYCOSTATIN) powder Apply to affected areas of skin twice daily as needed for skin infection.  Re treat as needed.    oxyCODONE (ROXICODONE) 5 MG immediate release tablet Take 1 tablet (5 mg total) by mouth every 4 (four) hours as needed.    rosuvastatin (CRESTOR) 20 MG tablet Take 1 tablet (20 mg total) by mouth once daily.    sumatriptan (IMITREX) 50 MG tablet Take 1 tab by mouth as needed for migraine headache.  Repeat dose every 2 hours as needed.  Max 200mg in 24 hours.    tobramycin-dexamethasone 0.3-0.1% (TOBRADEX) 0.3-0.1 % DrpS      No current facility-administered  medications for this visit.        REVIEW OF SYSTEMS:    GENERAL:  No weight loss, malaise or fevers.  HEENT:   No recent changes in vision or hearing  NECK:  Negative for lumps, no difficulty with swallowing.  RESPIRATORY:  Negative for cough, wheezing or shortness of breath, patient denies any recent URI.  CARDIOVASCULAR:  Negative for chest pain, leg swelling or palpitations.  GI:  Negative for abdominal discomfort, blood in stools or black stools or change in bowel habits.  MUSCULOSKELETAL:  See HPI.  SKIN:  Negative for lesions, rash, and itching.  PSYCH:  No mood disorder or recent psychosocial stressors.  Patient's sleep is disturbed secondary to pain.  HEMATOLOGY/LYMPHOLOGY:  Negative for prolonged bleeding, bruising easily or swollen nodes.  Patient is not currently taking any anti-coagulants  ENDO: No history of diabetes or thyroid dysfunction  NEURO: Occasional headaches.  All other reviewed and negative other than HPI.    OBJECTIVE:    LMP 10/20/2013     PHYSICAL EXAMINATION:    GENERAL: Well appearing, in no acute distress, alert and oriented x3.  PSYCH:  Mood and affect appropriate.  SKIN: No rashes or lesions.  HEAD/FACE:  Normocephalic, atraumatic. Cranial nerves grossly intact.  CV: RRR with palpation of the radial artery.  PULM: No evidence of respiratory difficulty, symmetric chest rise.  BACK: Straight leg raising in the sitting position is negative to radicular pain bilaterally.  There is pain with palpation over the facet joints of the lumbar spine bilaterally. Full ROM with mild pain on flexion and extension.  Negative facet loading bilaterally.  EXTREMITIES: Peripheral joint ROM is full and pain free without obvious instability or laxity in all four extremities. No deformities, edema, or skin discoloration. Good capillary refill.  MUSCULOSKELETAL: Hip, and knee provocative maneuvers are negative.  There is mild pain with palpation over the sacroiliac joints bilaterally.  There is no pain to  palpation over the greater trochanteric bursa bilaterally.  FABERs test is positive bilaterally.  FADIRs test is negative.   Bilateral lower extremity strength is normal and symmetric.  No atrophy or tone abnormalities are noted.  NEURO: Bilateral lower extremity coordination and muscle stretch reflexes are physiologic and symmetric.  Plantar response are downgoing. No clonus.  No loss of sensation is noted.  GAIT: Antalgic- ambulates without assistance.     Lab Results   Component Value Date    WBC 15.40 (H) 02/13/2020    HGB 10.3 (L) 02/13/2020    HCT 32.4 (L) 02/13/2020    MCV 90 02/13/2020     02/13/2020     BMP  Lab Results   Component Value Date     02/13/2020    K 3.7 02/13/2020     02/13/2020    CO2 24 02/13/2020    BUN 11 02/13/2020    CREATININE 0.8 02/13/2020    CALCIUM 8.3 (L) 02/13/2020    ANIONGAP 9 02/13/2020    ESTGFRAFRICA >60.0 02/13/2020    EGFRNONAA >60.0 02/13/2020         ASSESSMENT: 59 y.o. year old female with back and leg pain, consistent with the following diagnoses:    Encounter Diagnoses   Name Primary?    DDD (degenerative disc disease), lumbar Yes    PVD (peripheral vascular disease) with claudication     Sacroiliac joint pain     Lumbar radiculopathy     Atherosclerosis of artery of extremity with intermittent claudication        PLAN:     - Previous imaging was reviewed and discussed with the patient today.    - continue follow-up with vascular surgery as this appears to be the source of her pain    - Consider TF JILLIAN in the future vs SI joint injection if still having pain.     - Continue Gabapentin 100 mg daily.     - Continue with PT exercises.    - RTC in 6 months or sooner if needed.     The  above plan and management options were discussed at length with patient. Patient is in agreement with the above and verbalized understanding.     Lynette Rowley MD  05/21/2020

## 2020-06-01 ENCOUNTER — LAB VISIT (OUTPATIENT)
Dept: LAB | Facility: HOSPITAL | Age: 60
End: 2020-06-01
Payer: COMMERCIAL

## 2020-06-01 ENCOUNTER — OFFICE VISIT (OUTPATIENT)
Dept: VASCULAR SURGERY | Facility: CLINIC | Age: 60
End: 2020-06-01
Attending: SURGERY
Payer: COMMERCIAL

## 2020-06-01 VITALS
SYSTOLIC BLOOD PRESSURE: 181 MMHG | BODY MASS INDEX: 33.71 KG/M2 | DIASTOLIC BLOOD PRESSURE: 80 MMHG | HEART RATE: 64 BPM | HEIGHT: 67 IN | TEMPERATURE: 98 F | WEIGHT: 214.75 LBS

## 2020-06-01 DIAGNOSIS — I70.219 ATHEROSCLEROSIS OF ARTERY OF EXTREMITY WITH INTERMITTENT CLAUDICATION: ICD-10-CM

## 2020-06-01 DIAGNOSIS — R60.0 BILATERAL LEG EDEMA: ICD-10-CM

## 2020-06-01 DIAGNOSIS — I70.219 ATHEROSCLEROSIS OF ARTERY OF EXTREMITY WITH INTERMITTENT CLAUDICATION: Primary | ICD-10-CM

## 2020-06-01 DIAGNOSIS — E78.2 MIXED HYPERLIPIDEMIA: ICD-10-CM

## 2020-06-01 DIAGNOSIS — E11.9 TYPE 2 DIABETES MELLITUS WITHOUT COMPLICATION, WITHOUT LONG-TERM CURRENT USE OF INSULIN: ICD-10-CM

## 2020-06-01 LAB
ALBUMIN SERPL BCP-MCNC: 4 G/DL (ref 3.5–5.2)
ALP SERPL-CCNC: 95 U/L (ref 55–135)
ALT SERPL W/O P-5'-P-CCNC: 29 U/L (ref 10–44)
ANION GAP SERPL CALC-SCNC: 8 MMOL/L (ref 8–16)
AST SERPL-CCNC: 25 U/L (ref 10–40)
BILIRUB SERPL-MCNC: 0.4 MG/DL (ref 0.1–1)
BUN SERPL-MCNC: 12 MG/DL (ref 6–20)
CALCIUM SERPL-MCNC: 9.8 MG/DL (ref 8.7–10.5)
CHLORIDE SERPL-SCNC: 105 MMOL/L (ref 95–110)
CHOLEST SERPL-MCNC: 154 MG/DL (ref 120–199)
CHOLEST/HDLC SERPL: 3 {RATIO} (ref 2–5)
CO2 SERPL-SCNC: 28 MMOL/L (ref 23–29)
CREAT SERPL-MCNC: 0.8 MG/DL (ref 0.5–1.4)
EST. GFR  (AFRICAN AMERICAN): >60 ML/MIN/1.73 M^2
EST. GFR  (NON AFRICAN AMERICAN): >60 ML/MIN/1.73 M^2
ESTIMATED AVG GLUCOSE: 108 MG/DL (ref 68–131)
GLUCOSE SERPL-MCNC: 94 MG/DL (ref 70–110)
GLUCOSE SERPL-MCNC: 94 MG/DL (ref 70–110)
HBA1C MFR BLD HPLC: 5.4 % (ref 4–5.6)
HDLC SERPL-MCNC: 51 MG/DL (ref 40–75)
HDLC SERPL: 33.1 % (ref 20–50)
LDLC SERPL CALC-MCNC: 72.4 MG/DL (ref 63–159)
NONHDLC SERPL-MCNC: 103 MG/DL
POTASSIUM SERPL-SCNC: 4.3 MMOL/L (ref 3.5–5.1)
PROT SERPL-MCNC: 7.5 G/DL (ref 6–8.4)
SODIUM SERPL-SCNC: 141 MMOL/L (ref 136–145)
TRIGL SERPL-MCNC: 153 MG/DL (ref 30–150)

## 2020-06-01 PROCEDURE — 3044F HG A1C LEVEL LT 7.0%: CPT | Mod: CPTII,S$GLB,, | Performed by: SURGERY

## 2020-06-01 PROCEDURE — 99213 PR OFFICE/OUTPT VISIT, EST, LEVL III, 20-29 MIN: ICD-10-PCS | Mod: S$GLB,,, | Performed by: SURGERY

## 2020-06-01 PROCEDURE — 83036 HEMOGLOBIN GLYCOSYLATED A1C: CPT

## 2020-06-01 PROCEDURE — 3044F PR MOST RECENT HEMOGLOBIN A1C LEVEL <7.0%: ICD-10-PCS | Mod: CPTII,S$GLB,, | Performed by: SURGERY

## 2020-06-01 PROCEDURE — 99999 PR PBB SHADOW E&M-EST. PATIENT-LVL IV: ICD-10-PCS | Mod: PBBFAC,,, | Performed by: SURGERY

## 2020-06-01 PROCEDURE — 80053 COMPREHEN METABOLIC PANEL: CPT

## 2020-06-01 PROCEDURE — 99999 PR PBB SHADOW E&M-EST. PATIENT-LVL IV: CPT | Mod: PBBFAC,,, | Performed by: SURGERY

## 2020-06-01 PROCEDURE — 3008F BODY MASS INDEX DOCD: CPT | Mod: CPTII,S$GLB,, | Performed by: SURGERY

## 2020-06-01 PROCEDURE — 99213 OFFICE O/P EST LOW 20 MIN: CPT | Mod: S$GLB,,, | Performed by: SURGERY

## 2020-06-01 PROCEDURE — 80061 LIPID PANEL: CPT

## 2020-06-01 PROCEDURE — 3008F PR BODY MASS INDEX (BMI) DOCUMENTED: ICD-10-PCS | Mod: CPTII,S$GLB,, | Performed by: SURGERY

## 2020-06-01 PROCEDURE — 36415 COLL VENOUS BLD VENIPUNCTURE: CPT

## 2020-06-01 PROCEDURE — 82947 ASSAY GLUCOSE BLOOD QUANT: CPT

## 2020-06-01 RX ORDER — CILOSTAZOL 50 MG/1
TABLET ORAL
Qty: 60 TABLET | Refills: 1 | Status: SHIPPED | OUTPATIENT
Start: 2020-06-01 | End: 2020-07-08

## 2020-06-01 NOTE — PROGRESS NOTES
VASCULAR SURGERY NOTE    Patient ID: Amita Lewis is a 59 y.o. female.    I. HISTORY     Chief Complaint: right leg claudication    HPI: Amita Lewis is a 59 y.o. female s/p CFA endarterectomy with L ANNIA/EIA stenting and L SFA angioplasty on 2/12/20. At last clinic visit on 3/2/20, reported mild claudication symptoms in RLE; counseled to continue medical mgmt with ASA/statin, daily exercise. She returns to clinic today for a 3-month symptoms reassessment.     Ms. Lewis reports that she continues to experience R sided calf claudication that begins after walking approximately one block. She can continue walking after the one block but the pain eventually makes her stop and rest. The pain resolves with rest. She denies left sided claudication symptoms. Additionally, the patient reports bilateral dependent pedal edema, worse on the left side which she estimates began 1 month ago. She has been active remodeling her house, working outdoors for multiple hours during the day, with exacerbation of the pedal edema. She also noticed a rash that developed above her sock line. She denies rest pain, numbness, weakness, paresthesias bilaterally. Ms. Lewis additionally quit smoking since her surgery and reports associated increased appetite and dry mouth.      Past Medical History:   Diagnosis Date    Abnormal Pap smear     Asthma     Depression 3/17/2016    Environmental and seasonal allergies 3/17/2016    Heartburn 3/17/2016    HLD (hyperlipidemia) 03/22/2016    ASCVD 2018 6.3%; can't tolerate statins    Menopausal syndrome (hot flashes) 3/17/2016    Mild intermittent asthma without complication 3/17/2016    Obesity (BMI 30-39.9) 3/17/2016    Sciatica 3/17/2016        Past Surgical History:   Procedure Laterality Date    ANGIOGRAPHY OF LOWER EXTREMITY Left 2/12/2020    Procedure: ANGIOGRAM, LOWER EXTREMITY;  Surgeon: RAYMUNDO Levine II, MD;  Location: [facility] OR [address] [place] [address];  Service: Vascular;   Laterality: Left;    AORTOGRAPHY N/A 2/12/2020    Procedure: AORTOGRAM;  Surgeon: RAYMUNDO Levine II, MD;  Location: [facility] OR 2ND FLR;  Service: Vascular;  Laterality: N/A;    CERVICAL BIOPSY  W/ LOOP ELECTRODE EXCISION      ENDARTERECTOMY OF FEMORAL ARTERY Left 2/12/2020    Procedure: Left Common Femoral Approach Endarterectomy with Left SFA Angioplasty Stenting Possible Bypass;  Surgeon: RAYMUNDO Levine II, MD;  Location: [facility] OR 2ND FLR;  Service: Vascular;  Laterality: Left;  contrast: 52ml  fluoro time: 16.3 min  mGy: 921.68    INJECTION OF JOINT Bilateral 5/31/2018    Procedure: INJECTION-JOINT;  Surgeon: Lynette Rowley MD;  Location: [facility] PAIN MGT;  Service: Pain Management;  Laterality: Bilateral;  B/L SI Joint Injs  56631, 15349    INJECTION OF JOINT Bilateral 9/18/2018    Procedure: INJECTION, JOINT  Bilateral SI joint;  Surgeon: Lynette Rowley MD;  Location: [facility] PAIN MGT;  Service: Pain Management;  Laterality: Bilateral;    microdiscetomy      L4-L5    OVARIAN CYST REMOVAL  2001    TRANSFORAMINAL EPIDURAL INJECTION OF STEROID Left 3/21/2019    Procedure: INJECTION, STEROID, EPIDURAL, TRANSFORAMINAL APPROACH, L4 AND L5;  Surgeon: Lynette Rowley MD;  Location: [facility] PAIN MGT;  Service: Pain Management;  Laterality: Left;       Social History     Tobacco Use   Smoking Status Current Every Day Smoker    Packs/day: 0.00    Years: 40.00    Pack years: 0.00    Types: Cigarettes   Smokeless Tobacco Never Used      Review of Systems   Constitutional: Negative for chills and fever.   HENT: Positive for nosebleeds.    Eyes: Negative for pain and discharge.   Respiratory: Negative for cough, hemoptysis and shortness of breath.    Cardiovascular: Positive for leg swelling. Negative for chest pain and palpitations.   Gastrointestinal: Negative for abdominal pain, diarrhea, nausea and vomiting.   Genitourinary: Negative for dysuria and hematuria.   Musculoskeletal: Negative for back pain and  neck pain.   Skin: Positive for rash. Negative for itching.   Neurological: Negative for dizziness and tingling.   Endo/Heme/Allergies: Negative for polydipsia.         II. PHYSICAL EXAM     Physical Exam   Constitutional: She is oriented to person, place, and time. She appears well-developed and well-nourished.   Eyes: Pupils are equal, round, and reactive to light.   Neck: Normal range of motion. Neck supple.   Cardiovascular: Normal rate, regular rhythm and intact distal pulses.   1+ femoral pulses bilaterally. LLE biphasic PT signal, RLE monophasic PT signal    Pulmonary/Chest: No respiratory distress.   Abdominal: Soft.   Neurological: She is alert and oriented to person, place, and time.   Skin: Skin is warm.   L groin scar well-healed     LLE: strong biphasic DP/PT signals  RLE: biphasic DP, monophasic PT signal       III. ASSESSMENT & PLAN (MEDICAL DECISION MAKING)       Imaging Results:       KRISTOPHER/TP 1/20/20:  R L   0.49. TP NC 0.41  TP 50         KRISTOPHER 3/2/20:  R L    0.59 0.93         Assessment/Diagnosis and Plan:  59 y.o. female s/p  left CFA endarterectomy with L ANNIA/EIA stenting and L SFA angioplasty with me on 2/12/20, with post-op RLE claudication symptoms. Bilateral peripheral edema, left worse than right likely  venous in origin. Counseled patient on wearing 20-30 mmHg compression socks to be worn at all times while awake. Will see back in 3 months with venous imaging to re-evaluate edema. Patient is taking ASA, statin without issue, counseled to continue. Educated patient daily exercise of at least 30 minutes of walking, as well as initiating cilostazol to increase pain free walking interval. Counseled patient on potential side effects, including the sensation of palpitations, flushing and headaches.     - Continue taking ASA, statin   - Encouraging walking 30 minutes at least 4x/week  - 20-30 mm Hg compression socks to be worn at all times while awake  - Risk factor reduction with continued smoking  cessation, blood pressure and blood glucose control  - Labs today (CMP, fasting glucose, lipids, HbA1C), patient has been fasting  - Cilostazol 50mg BID and increase to 100mg BID if tolerated  - Will see back in clinic in 3 months to evaluate RLE PAD symptoms, as well as obtain venous reflux ultrasound to evaluate bilateral pedal edema    RAYMUNDO Levine II, MD, VI  Vascular Surgeon  [facility]

## 2020-06-02 ENCOUNTER — PATIENT MESSAGE (OUTPATIENT)
Dept: FAMILY MEDICINE | Facility: CLINIC | Age: 60
End: 2020-06-02

## 2020-06-16 DIAGNOSIS — I87.2 VENOUS INSUFFICIENCY: Primary | ICD-10-CM

## 2020-06-22 ENCOUNTER — HOSPITAL ENCOUNTER (OUTPATIENT)
Dept: RADIOLOGY | Facility: OTHER | Age: 60
Discharge: HOME | End: 2020-06-22
Attending: OBSTETRICS & GYNECOLOGY
Payer: COMMERCIAL

## 2020-06-22 DIAGNOSIS — Z12.39 BREAST CANCER SCREENING: ICD-10-CM

## 2020-06-22 DIAGNOSIS — Z12.31 VISIT FOR SCREENING MAMMOGRAM: ICD-10-CM

## 2020-06-22 PROCEDURE — 77067 SCR MAMMO BI INCL CAD: CPT | Mod: 26,,, | Performed by: RADIOLOGY

## 2020-06-22 PROCEDURE — 77063 BREAST TOMOSYNTHESIS BI: CPT | Mod: 26,,, | Performed by: RADIOLOGY

## 2020-06-22 PROCEDURE — 77063 MAMMO DIGITAL SCREENING BILAT WITH TOMOSYNTHESIS_CAD: ICD-10-PCS | Mod: 26,,, | Performed by: RADIOLOGY

## 2020-06-22 PROCEDURE — 77067 SCR MAMMO BI INCL CAD: CPT | Mod: TC

## 2020-06-22 PROCEDURE — 77067 MAMMO DIGITAL SCREENING BILAT WITH TOMOSYNTHESIS_CAD: ICD-10-PCS | Mod: 26,,, | Performed by: RADIOLOGY

## 2020-06-29 DIAGNOSIS — F32.A DEPRESSION, UNSPECIFIED DEPRESSION TYPE: ICD-10-CM

## 2020-06-30 NOTE — PROGRESS NOTES
Refill Routing Note   Medication(s) are not appropriate for processing by [facility]:       - Required vitals are abnormal        Medication Therapy Plan: Elevated BP at [facility] surgery ov and FM ov; abnormal vitals; defer to you  Medication reconciliation completed: No      Automatic Epic Generated Protocol Data:    Requested Prescriptions   Pending Prescriptions Disp Refills    buPROPion (WELLBUTRIN XL) 150 MG TB24 tablet [Pharmacy Med Name: BUPROPION XL 150MG TABLETS (24 H)] 90 tablet 1     Sig: TAKE 1 TABLET BY MOUTH ONCE DAILY. PLUS 300 MG TABLET FOR TOTAL DOSE OF 450MG.       Psychiatry: Antidepressants - bupropion Failed - 6/29/2020  3:29 AM        Failed - Last BP in normal range within 360 days.     BP Readings from Last 3 Encounters:   06/01/20 (!) 181/80   04/30/20 (!) 145/65   03/02/20 135/67              Passed - Patient is at least 18 years old        Passed - Office visit in past 6 months or future 90 days.     Recent Outpatient Visits            4 weeks ago Atherosclerosis of artery of extremity with intermittent claudication    [facility] RAYMUNDO Levine II, MD    1 month ago DDD (degenerative disc disease), lumbar    [facility] Lynette Rowley MD    2 months ago Mixed hyperlipidemia    [facility] - Family Medicine Shannon Figueroa MD    3 months ago Atherosclerosis of artery of extremity with intermittent claudication    [facility] - Vascular Asiya Levine II, MD    5 months ago Sacroiliac joint pain    [facility] [address] Carolann Hi NP          Future Appointments              In 3 weeks [facility], [facility]    In 2 months VASCULAR, LAB [facility]    In 2 months MD [facility] - [facility], [address]                      Appointments  past 12m or future 3m with PCP    Date Provider   Last Visit   12/17/2019 Kirk Elizabeth,  MD   Next Visit   Visit date not found Kirk Elizabeth MD   ED visits in past 90 days: 0     Note composed:7:22 PM 06/29/2020

## 2020-07-01 RX ORDER — BUPROPION HYDROCHLORIDE 150 MG/1
TABLET ORAL
Qty: 90 TABLET | Refills: 1 | Status: SHIPPED | OUTPATIENT
Start: 2020-07-01 | End: 2020-12-23

## 2020-07-08 ENCOUNTER — PATIENT MESSAGE (OUTPATIENT)
Dept: VASCULAR SURGERY | Facility: CLINIC | Age: 60
End: 2020-07-08

## 2020-07-08 ENCOUNTER — PATIENT OUTREACH (OUTPATIENT)
Dept: ADMINISTRATIVE | Facility: HOSPITAL | Age: 60
End: 2020-07-08

## 2020-07-08 RX ORDER — CILOSTAZOL 100 MG/1
100 TABLET ORAL 2 TIMES DAILY
Qty: 180 TABLET | Refills: 3 | Status: SHIPPED | OUTPATIENT
Start: 2020-07-08 | End: 2021-11-04 | Stop reason: SDUPTHER

## 2020-07-08 NOTE — TELEPHONE ENCOUNTER
Patient tolerating cilostazol 100mg BID. New Rx written for 100mg BID with 3 refills.      RAYMUNDO Levine II, MD, RPVI  Vascular Surgeon  [facility]

## 2020-08-17 DIAGNOSIS — B37.2 CANDIDAL SKIN INFECTION: ICD-10-CM

## 2020-08-17 RX ORDER — NYSTATIN 100000 [USP'U]/G
POWDER TOPICAL
Qty: 30 G | Refills: 3 | Status: SHIPPED | OUTPATIENT
Start: 2020-08-17 | End: 2021-08-09 | Stop reason: SDUPTHER

## 2020-08-17 NOTE — PROGRESS NOTES
Refill Routing Note   Medication(s) are not appropriate for processing by [facility]:       - Outside of protocol           Medication reconciliation completed: No      Automatic Epic Generated Protocol Data:        Requested Prescriptions   Pending Prescriptions Disp Refills    nystatin (NYSTOP) powder [Pharmacy Med Name: NYSTOP 100,000 U/GM TOP POWDER 30GM] 30 g 3     Sig: APPLY TO THE AFFECTED AREA TWICE DAILY AS NEEDED FOR FOR SKIN INFECTION. RETREAT AS NEEDED       Off-Protocol Failed - 8/17/2020  3:33 AM        Failed - Medication not assigned to a protocol, review manually.        Passed - Office visit in past 12 months or future 90 days.     Recent Outpatient Visits            2 months ago Atherosclerosis of artery of extremity with intermittent claudication    [facility] RAYMUNDO Levine II, MD    2 months ago DDD (degenerative disc disease), lumbar    [facility] Lynette Rowley MD    3 months ago Mixed hyperlipidemia    [facility] - Family Medicine Shannon Figueroa MD    5 months ago Atherosclerosis of artery of extremity with intermittent claudication    [facility] RAYMUNDO Levine II, MD    6 months ago Sacroiliac joint pain    [facility] [address] NP          Future Appointments              In 4 weeks VASCULAR, LAB [facility]    In 4 weeks MD [facility], [facility]                      Appointments  past 12m or future 3m with PCP    Date Provider   Last Visit   12/17/2019 Kirk Elizabeth MD   Next Visit   Visit date not found Kirk Elizabeth MD   ED visits in past 90 days: 0     Note composed:9:50 AM 08/17/2020

## 2020-09-11 ENCOUNTER — PATIENT OUTREACH (OUTPATIENT)
Dept: ADMINISTRATIVE | Facility: OTHER | Age: 60
End: 2020-09-11

## 2020-09-11 NOTE — PROGRESS NOTES
LINKS immunization registry updated  Care Everywhere updated  Health Maintenance updated  Chart reviewed for overdue [facility] health maintenance testing (CRS, Breast Ca, Diabetic Eye Exam)   Orders entered:N/A

## 2020-09-14 ENCOUNTER — HOSPITAL ENCOUNTER (OUTPATIENT)
Dept: VASCULAR SURGERY | Facility: CLINIC | Age: 60
Discharge: HOME | End: 2020-09-14
Attending: SURGERY
Payer: COMMERCIAL

## 2020-09-14 ENCOUNTER — OFFICE VISIT (OUTPATIENT)
Dept: VASCULAR SURGERY | Facility: CLINIC | Age: 60
End: 2020-09-14
Attending: SURGERY
Payer: COMMERCIAL

## 2020-09-14 VITALS
TEMPERATURE: 99 F | RESPIRATION RATE: 18 BRPM | HEART RATE: 66 BPM | HEIGHT: 67 IN | DIASTOLIC BLOOD PRESSURE: 80 MMHG | BODY MASS INDEX: 33.91 KG/M2 | SYSTOLIC BLOOD PRESSURE: 170 MMHG | WEIGHT: 216.06 LBS

## 2020-09-14 DIAGNOSIS — I70.211 ATHEROSCLEROSIS OF NATIVE ARTERIES OF EXTREMITIES WITH INTERMITTENT CLAUDICATION, RIGHT LEG: ICD-10-CM

## 2020-09-14 DIAGNOSIS — I87.2 VENOUS INSUFFICIENCY: ICD-10-CM

## 2020-09-14 DIAGNOSIS — I87.2 VENOUS INSUFFICIENCY OF LEFT LEG: Primary | ICD-10-CM

## 2020-09-14 DIAGNOSIS — R60.9 EDEMA: ICD-10-CM

## 2020-09-14 PROCEDURE — 93970 EXTREMITY STUDY: CPT | Mod: S$GLB,,, | Performed by: SURGERY

## 2020-09-14 PROCEDURE — 99999 PR PBB SHADOW E&M-EST. PATIENT-LVL IV: CPT | Mod: PBBFAC,,, | Performed by: SURGERY

## 2020-09-14 PROCEDURE — 93970 PR US DUPLEX, UPPER OR LOWER EXT VENOUS,COMPLETE BILAT: ICD-10-PCS | Mod: S$GLB,,, | Performed by: SURGERY

## 2020-09-14 PROCEDURE — 99211 PR OFFICE/OUTPT VISIT, EST, LEVL I: ICD-10-PCS | Mod: S$GLB,,, | Performed by: SURGERY

## 2020-09-14 PROCEDURE — 99999 PR PBB SHADOW E&M-EST. PATIENT-LVL IV: ICD-10-PCS | Mod: PBBFAC,,, | Performed by: SURGERY

## 2020-09-14 PROCEDURE — 99211 OFF/OP EST MAY X REQ PHY/QHP: CPT | Mod: S$GLB,,, | Performed by: SURGERY

## 2020-09-14 NOTE — PROGRESS NOTES
"VASCULAR SURGERY NOTE    Patient ID: Amita Lewis is a 59 y.o. female.    I. HISTORY     Chief Complaint: right leg claudication    HPI: Amita Lewis is a 59 y.o. female s/p LEFT CFA endarterectomy with L ANNIA/EIA stenting and L SFA angioplasty on 2/12/20. At last clinic visit in June 2020 I wrote the following:    "Ms. Lewis reports that she continues to experience RIGHT sided calf claudication that begins after walking approximately one block. She can continue walking after the one block but the pain eventually makes her stop and rest. The pain resolves with rest. She denies left sided claudication symptoms. Additionally, the patient reports bilateral dependent lower extremity edema, worse on the left side which she estimates began 1 month ago. She has been active remodeling her house, working outdoors for multiple hours during the day, with exacerbation of the pedal edema."    Since her last appointment continued to have right lower extremity claudication.  She also has had persistent swelling in her left lower extremity.  She has had difficulty wearing her compression hose consistently.  She has been trying to walk more frequently and remain hydrated.  Although she does experience pain in her right lower extremity with walking, she does feel like she is able to accomplish her activities of daily living.  She has been tolerating the 100 mg cilostazol well.      Past Medical History:   Diagnosis Date    Abnormal Pap smear     Asthma     Depression 3/17/2016    Environmental and seasonal allergies 3/17/2016    Heartburn 3/17/2016    HLD (hyperlipidemia) 03/22/2016    ASCVD 2018 6.3%; can't tolerate statins    Menopausal syndrome (hot flashes) 3/17/2016    Mild intermittent asthma without complication 3/17/2016    Obesity (BMI 30-39.9) 3/17/2016    Sciatica 3/17/2016        Past Surgical History:   Procedure Laterality Date    ANGIOGRAPHY OF LOWER EXTREMITY Left 2/12/2020    Procedure: " ANGIOGRAM, LOWER EXTREMITY;  Surgeon: RAYMUNDO Levine II, MD;  Location: [facility] OR [facility] FLR;  Service: Vascular;  Laterality: Left;    AORTOGRAPHY N/A 2/12/2020    Procedure: AORTOGRAM;  Surgeon: RAYMUNDO Levine II, MD;  Location: [facility] OR [facility] FLR;  Service: Vascular;  Laterality: N/A;    CERVICAL BIOPSY  W/ LOOP ELECTRODE EXCISION      ENDARTERECTOMY OF FEMORAL ARTERY Left 2/12/2020    Procedure: Left Common Femoral Approach Endarterectomy with Left SFA Angioplasty Stenting Possible Bypass;  Surgeon: RAYMUNDO Levine II, MD;  Location: [facility] OR 2ND FLR;  Service: Vascular;  Laterality: Left;  contrast: 52ml  fluoro time: 16.3 min  mGy: 921.68    INJECTION OF JOINT Bilateral 5/31/2018    Procedure: INJECTION-JOINT;  Surgeon: Lynette Rowley MD;  Location: [facility] PAIN MGT;  Service: Pain Management;  Laterality: Bilateral;  B/L SI Joint Injs  87510, 91360    INJECTION OF JOINT Bilateral 9/18/2018    Procedure: INJECTION, JOINT  Bilateral SI joint;  Surgeon: Lynette Rowley MD;  Location: [facility] PAIN MGT;  Service: Pain Management;  Laterality: Bilateral;    microdiscetomy      L4-L5    OVARIAN CYST REMOVAL  2001    TRANSFORAMINAL EPIDURAL INJECTION OF STEROID Left 3/21/2019    Procedure: INJECTION, STEROID, EPIDURAL, TRANSFORAMINAL APPROACH, L4 AND L5;  Surgeon: Lynette Rowley MD;  Location: [facility] PAIN MGT;  Service: Pain Management;  Laterality: Left;       Social History     Tobacco Use   Smoking Status Current Every Day Smoker    Packs/day: 0.00    Years: 40.00    Pack years: 0.00    Types: Cigarettes   Smokeless Tobacco Never Used      ROS      II. PHYSICAL EXAM     GEN: Alert/oriented, normal affect, normal speech, obese  HEENT: atraumatic, normocephalic  CHEST: normal respiratory effort, symmetrical chest rise  CARD: Regular rate, regular rhythm  SKIN: no rash, normal color, normal texture, no venous stasis pigmentation  EXT: Warm, no cyanosis/edema  VASC:  LLE: strong biphasic PT, triphasic AT  signal  RLE: biphasic DP, monophasic PT signal       III. ASSESSMENT & PLAN (MEDICAL DECISION MAKING)       Imaging Results:          KRISTOPHER/TP 1/20/20:  R L   0.49. TP NC 0.41  TP 50         KRISTOPHER 3/2/20:  R L    0.59 0.93       BLE Venous Ultrasound 9/14/20:  LLE:  +reflux in GSV, No reflux in LSV.  No reflux and deep system.  No DVT  RLE:  No reflux in GSV or LSV. No reflux and deep system.  No DVT    Assessment/Diagnosis and Plan:  59 y.o. female s/p  left CFA endarterectomy with L ANNIA/EIA stenting and L SFA angioplasty with me on 2/12/20.     - Continue taking ASA, statin   - Encouraging walking 30 minutes at least 4x/week  - Continue 20-30 mm Hg compression socks and elevation for LE swelling  - Risk factor reduction with continued smoking cessation and BP control  - Continue Cilostazol 100mg BID  - f/u in 3 months for surveillance LLE arterial duplex and BLE KRISTOPHER's      RAYMUNDO Levine II, MD, RPVI  Vascular Surgeon  [facility]

## 2020-09-21 DIAGNOSIS — I73.9 PVD (PERIPHERAL VASCULAR DISEASE) WITH CLAUDICATION: Primary | ICD-10-CM

## 2020-09-28 ENCOUNTER — PATIENT MESSAGE (OUTPATIENT)
Dept: RESEARCH | Facility: OTHER | Age: 60
End: 2020-09-28

## 2020-09-29 ENCOUNTER — TELEPHONE (OUTPATIENT)
Dept: ORTHOPEDICS | Facility: CLINIC | Age: 60
End: 2020-09-29

## 2020-09-29 DIAGNOSIS — M79.672 PAIN OF LEFT HEEL: Primary | ICD-10-CM

## 2020-09-29 NOTE — TELEPHONE ENCOUNTER
Spoke to pt and confirmed she is coming in for her left heel and she will get xrays at the imaging center.----- Message from Olamide Dorman sent at 9/29/2020 11:02 AM CDT -----  Pt is returning your call about to let you know its a foot problem. Asking for a call back.    Contact info [phone]

## 2020-09-30 ENCOUNTER — OFFICE VISIT (OUTPATIENT)
Dept: ORTHOPEDICS | Facility: CLINIC | Age: 60
End: 2020-09-30
Payer: COMMERCIAL

## 2020-09-30 ENCOUNTER — HOSPITAL ENCOUNTER (OUTPATIENT)
Dept: RADIOLOGY | Facility: HOSPITAL | Age: 60
Discharge: HOME | End: 2020-09-30
Attending: ORTHOPAEDIC SURGERY
Payer: COMMERCIAL

## 2020-09-30 DIAGNOSIS — G89.29 CHRONIC HEEL PAIN, LEFT: ICD-10-CM

## 2020-09-30 DIAGNOSIS — M79.672 CHRONIC HEEL PAIN, LEFT: ICD-10-CM

## 2020-09-30 DIAGNOSIS — M79.672 PAIN OF LEFT HEEL: ICD-10-CM

## 2020-09-30 PROCEDURE — 73630 XR FOOT COMPLETE 3 VIEW LEFT: ICD-10-PCS | Mod: 26,LT,, | Performed by: RADIOLOGY

## 2020-09-30 PROCEDURE — 73630 X-RAY EXAM OF FOOT: CPT | Mod: TC,LT

## 2020-09-30 PROCEDURE — 99244 PR OFFICE CONSULTATION,LEVEL IV: ICD-10-PCS | Mod: S$GLB,,, | Performed by: ORTHOPAEDIC SURGERY

## 2020-09-30 PROCEDURE — 73630 X-RAY EXAM OF FOOT: CPT | Mod: 26,LT,, | Performed by: RADIOLOGY

## 2020-09-30 PROCEDURE — 99244 OFF/OP CNSLTJ NEW/EST MOD 40: CPT | Mod: S$GLB,,, | Performed by: ORTHOPAEDIC SURGERY

## 2020-09-30 PROCEDURE — 99999 PR PBB SHADOW E&M-EST. PATIENT-LVL III: CPT | Mod: PBBFAC,,, | Performed by: ORTHOPAEDIC SURGERY

## 2020-09-30 PROCEDURE — 99999 PR PBB SHADOW E&M-EST. PATIENT-LVL III: ICD-10-PCS | Mod: PBBFAC,,, | Performed by: ORTHOPAEDIC SURGERY

## 2020-09-30 NOTE — PROGRESS NOTES
Subjective:   Chief complaint:   Chief Complaint   Patient presents with    Left Foot - Pain       HPI:   Amita Lewis is a 59 y.o. female PMHx of lumbar radiculopathy (s/p L4/L5 diskectomy with chronic LBP), PVD (s/p SFA angioplasty/stenting by Dr. Levine Feb 2020), prior plantar fasciitis on the right referred to me today by Dr. RAYMUNDO Levine II for evaluation of left heel pain.  Rates pain as 5-6/10 at its worst, but 1/10 at baseline.  Pain has been ongoing for 2 months. The patient states the pain is worst in the morning/middle of night when she gets out of bed, after excessive walking, and when getting up from sitting. The patient reports prior to her angioplasty in Feb, she was having significant calf pain with ambulation and was fairly sedentary. After her procedure, she did increase her activity level significantly for a few months before onset of these symptoms. She has tried orthotics which she has had for 20 years which are painful. She takes Advil occasionally for her symptoms, and has tried calf and plantar fascii stretching exercises at home. She has not had formal PT for this. The patient denies paresthesias to BLE. The patient also complains of left knee pain which has also been present for the past few months.     Takes gabapentin too.  Has tried inserts which make pain worse.  Stretches haven't helped much.    ROS:  Musculoskeletal: per HPI  Constitutional: Negative for fever  Cardiovascular: Negative for chest pain  Respiratory: Negative for shortness of breath  Skin: Negative for ulcers or lesions  Neurological: Negative for burning, tingling and numbness  Vascular: Negative for peripheral edema  Heme: Negative for chronic anticoagulation; Negative for history of blood clot  Endocrine: Negative for diabetes  Immune: Negative for inflammatory arthritis  /Nephrology: Negative for ESRD-on hemodialysis       Objective:   Exam:  There were no vitals filed for this visit.  General:  No acute distress, well-appearing  Neurologic: Alert and oriented x3  Psychiatric: Appropriate mood and affect, cooperative  Cardiovascular: Regular pulse  Respiratory: Breathing on room air  Skin: No rashes or ulcers  Vascular: Not palpable PT or DP pulses; good cap refill  Musculoskeletal:   LLE:  Skin intact, Mild flattening of medial arch with weight bearing  No edema/erythema/signs of infection  TTP about posterolateral border of heel  No TTP about posteromedial heel  No TTP about achilles tendon attachment  No TTP about posterior medial malleolus  Tinels at tarsal tunnel negative  Compartments soft  Maintained ROM of ankle, subtalar, midfoot and hallux MTPJ with no pain but with gastroc tightness present  SILT Sa/Mejias/DP/SP/T  Motor intact EHL/FHL/TA/Gastroc and PTT/peroneals.          Imaging:  Radiographs were ordered, obtained and personally reviewed today.  XR of L foot showing no acute fractures or osseus abnormalities. Mild flattening of medial arch.      Additional records/labs reviewed:  Prior vascular notes      Assessment:     1. Chronic heel pain, left         Discussed diagnosis in detail with the patient. The patient does not have plantar fasciitis symptoms at this time on her L foot, but alludes to prior symptoms which may have resolved. She does have decreased heel cushion and heel fat pad atrophy which correlates directly to her point of maximal ttp which may be 2/2 to her peripheral vascular disease.    Plan:       Discussed treatment options in detail with the patient. The patient would likely benefit from gel heel cushion inserts to provided added padding between her heel and bone. These were provided to the patient today in clinic. Will refer to Evelia FLORES to evaluate her knee pain and direct her to appropriate orthopaedic surgeon if indicated. RTC PRN.      No orders of the defined types were placed in this encounter.      Past Medical History:   Diagnosis Date    Abnormal Pap smear      Asthma     Depression 3/17/2016    Environmental and seasonal allergies 3/17/2016    Heartburn 3/17/2016    HLD (hyperlipidemia) 03/22/2016    ASCVD 2018 6.3%; can't tolerate statins    Menopausal syndrome (hot flashes) 3/17/2016    Mild intermittent asthma without complication 3/17/2016    Obesity (BMI 30-39.9) 3/17/2016    Sciatica 3/17/2016       Past Surgical History:   Procedure Laterality Date    ANGIOGRAPHY OF LOWER EXTREMITY Left 2/12/2020    Procedure: ANGIOGRAM, LOWER EXTREMITY;  Surgeon: RAYMUNDO Levine II, MD;  Location: [facility] OR [address] [place] [address];  Service: Vascular;  Laterality: Left;    AORTOGRAPHY N/A 2/12/2020    Procedure: AORTOGRAM;  Surgeon: RAYMUNDO Levine II, MD;  Location: [facility] OR [address] [place] [address];  Service: Vascular;  Laterality: N/A;    CERVICAL BIOPSY  W/ LOOP ELECTRODE EXCISION      ENDARTERECTOMY OF FEMORAL ARTERY Left 2/12/2020    Procedure: Left Common Femoral Approach Endarterectomy with Left SFA Angioplasty Stenting Possible Bypass;  Surgeon: RAYMUNDO Levine II, MD;  Location: [facility] OR [address] [place] [address];  Service: Vascular;  Laterality: Left;  contrast: 52ml  fluoro time: 16.3 min  mGy: 921.68    INJECTION OF JOINT Bilateral 5/31/2018    Procedure: INJECTION-JOINT;  Surgeon: Lynette Rowley MD;  Location: [facility];  Service: Pain Management;  Laterality: Bilateral;  B/L SI Joint Injs  14909, [id number]    INJECTION OF JOINT Bilateral 9/18/2018    Procedure: INJECTION, JOINT  Bilateral SI joint;  Surgeon: Lynette Rowley MD;  Location: [facility];  Service: Pain Management;  Laterality: Bilateral;    microdiscetomy      L4-L5    OVARIAN CYST REMOVAL  2001    TRANSFORAMINAL EPIDURAL INJECTION OF STEROID Left 3/21/2019    Procedure: INJECTION, STEROID, EPIDURAL, TRANSFORAMINAL APPROACH, L4 AND L5;  Surgeon: Lynette Rowley MD;  Location: [facility];  Service: Pain Management;  Laterality: Left;       Family History   Problem Relation Age of Onset    Cancer Father          lung; non smoker, worked in railroad and gas station    Alcohol abuse Father     Early death Father     Breast cancer Mother     Heart failure Mother     Diabetes Mellitus Mother         ?secondary to chronic steroids    Diabetes Mother     Heart disease Mother     Alcohol abuse Brother     Ovarian cancer Neg Hx     Hypertension Neg Hx        Social History     Socioeconomic History    Marital status:      Spouse name: Not on file    Number of children: Not on file    Years of education: Not on file    Highest education level: Not on file   Occupational History    Not on file   Social Needs    Financial resource strain: Not hard at all    Food insecurity     Worry: Never true     Inability: Never true    Transportation needs     Medical: No     Non-medical: No   Tobacco Use    Smoking status: Former Smoker     Packs/day: 0.00     Years: 40.00     Pack years: 0.00     Types: Cigarettes    Smokeless tobacco: Never Used   Substance and Sexual Activity    Alcohol use: Yes     Alcohol/week: 1.0 standard drinks     Types: 1 Glasses of wine per week     Frequency: Monthly or less     Drinks per session: 1 or 2     Binge frequency: Never     Comment: 3 x a month    Drug use: No    Sexual activity: Yes     Partners: Female     Birth control/protection: None     Comment: (partner Ivone is a trans woman)   Lifestyle    Physical activity     Days per week: 1 day     Minutes per session: 20 min    Stress: Only a little   Relationships    Social connections     Talks on phone: Once a week     Gets together: Once a week     Attends Scientologist service: Not on file     Active member of club or organization: No     Attends meetings of clubs or organizations: Never     Relationship status:    Other Topics Concern    Not on file   Social History Narrative    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    Works for  at [employer]    Walks a lot at work and for exercise               Answers for  HPI/ROS submitted by the patient on 9/30/2020   Leg pain  Pain Chronicity: chronic  History of trauma: No  Onset: more than 1 year ago  Frequency: daily  Progression since onset: unchanged  Injury mechanism: running  injury location: at home  pain- numeric: 4/10  pain location: left knee, left foot  pain quality: sharp, throbbing  Radiating Pain: No  If your pain is radiating, to what part of the body?: left knee  Aggravating factors: activity, standing, walking  fever: No  inability to bear weight: No  itching: No  joint locking: No  limited range of motion: No  stiffness: No  tingling: No  Treatments tried: NSAIDs  physical therapy: not tried  Improvement on treatment: no relief

## 2020-09-30 NOTE — LETTER
October 3, 2020      RAYMUNDO Levine II, MD  [address] [facility] [address]           [facility]  [address]  [facility] [address]  Phone: [phone]          Patient: Amita Lewis   MR Number: [id number]   YOB: 1960   Date of Visit: 9/30/2020     Dear Dr. RAYMUNDO Levine II,    Thank you for your referral of Amita Lewis for evaluation of their left heel.  Please see attached consultation note for details regarding our visit.    I appreciate the opportunity to participate in the care of our mutual patient.  Please do not hesitate to reach out with questions/concerns.    Sincerely,    Teresa Groves MD  Foot & Ankle Orthopedic Surgery  10/03/2020  [phone]

## 2020-10-01 ENCOUNTER — HOSPITAL ENCOUNTER (OUTPATIENT)
Dept: RADIOLOGY | Facility: HOSPITAL | Age: 60
Discharge: HOME | End: 2020-10-01
Attending: PHYSICIAN ASSISTANT
Payer: COMMERCIAL

## 2020-10-01 ENCOUNTER — OFFICE VISIT (OUTPATIENT)
Dept: ORTHOPEDICS | Facility: CLINIC | Age: 60
End: 2020-10-01
Payer: COMMERCIAL

## 2020-10-01 VITALS — HEIGHT: 67 IN | WEIGHT: 218.5 LBS | BODY MASS INDEX: 34.29 KG/M2

## 2020-10-01 DIAGNOSIS — M25.562 LEFT KNEE PAIN, UNSPECIFIED CHRONICITY: Primary | ICD-10-CM

## 2020-10-01 DIAGNOSIS — M25.562 LEFT KNEE PAIN, UNSPECIFIED CHRONICITY: ICD-10-CM

## 2020-10-01 PROCEDURE — 73562 X-RAY EXAM OF KNEE 3: CPT | Mod: TC,RT

## 2020-10-01 PROCEDURE — 73564 XR KNEE ORTHO LEFT WITH FLEXION: ICD-10-PCS | Mod: 26,LT,, | Performed by: RADIOLOGY

## 2020-10-01 PROCEDURE — 99213 OFFICE O/P EST LOW 20 MIN: CPT | Mod: S$GLB,,, | Performed by: PHYSICIAN ASSISTANT

## 2020-10-01 PROCEDURE — 3008F PR BODY MASS INDEX (BMI) DOCUMENTED: ICD-10-PCS | Mod: CPTII,S$GLB,, | Performed by: PHYSICIAN ASSISTANT

## 2020-10-01 PROCEDURE — 99999 PR PBB SHADOW E&M-EST. PATIENT-LVL IV: CPT | Mod: PBBFAC,,, | Performed by: PHYSICIAN ASSISTANT

## 2020-10-01 PROCEDURE — 3008F BODY MASS INDEX DOCD: CPT | Mod: CPTII,S$GLB,, | Performed by: PHYSICIAN ASSISTANT

## 2020-10-01 PROCEDURE — 73564 X-RAY EXAM KNEE 4 OR MORE: CPT | Mod: 26,LT,, | Performed by: RADIOLOGY

## 2020-10-01 PROCEDURE — 99213 PR OFFICE/OUTPT VISIT, EST, LEVL III, 20-29 MIN: ICD-10-PCS | Mod: S$GLB,,, | Performed by: PHYSICIAN ASSISTANT

## 2020-10-01 PROCEDURE — 73562 X-RAY EXAM OF KNEE 3: CPT | Mod: 26,59,RT, | Performed by: RADIOLOGY

## 2020-10-01 PROCEDURE — 73562 XR KNEE ORTHO LEFT WITH FLEXION: ICD-10-PCS | Mod: 26,59,RT, | Performed by: RADIOLOGY

## 2020-10-01 PROCEDURE — 99999 PR PBB SHADOW E&M-EST. PATIENT-LVL IV: ICD-10-PCS | Mod: PBBFAC,,, | Performed by: PHYSICIAN ASSISTANT

## 2020-10-01 NOTE — LETTER
October 1, 2020      Teresa Groves MD  [address]  [facility] [address]           [facility] 5[facility]  [address]LO[address]  [facility] [address]  Phone: [phone]          Patient: Amita Lewis   MR Number: [id number]   YOB: 1960   Date of Visit: 10/1/2020       Dear Dr. Teresa Groves:    Thank you for referring Amita Lewis to me for evaluation. Attached you will find relevant portions of my assessment and plan of care.    If you have questions, please do not hesitate to call me. I look forward to following Amita Lewis along with you.    Sincerely,    Evelia Nair PA-C    Enclosure  CC:  No Recipients    If you would like to receive this communication electronically, please contact [email or web address] or [phone] to request more information on [email or web address] Link access.    For providers and/or their staff who would like to refer a patient to [facility], please contact us through our one-stop-shop provider referral line, [facility], at [phone].    If you feel you have received this communication in error or would no longer like to receive these types of communications, please e-mail [email or web address]

## 2020-10-01 NOTE — PROGRESS NOTES
Subjective:      Patient ID: Amita Lewis is a 59 y.o. female.    Chief Complaint: Pain of the Left Knee    HPI  59 year old female presents with chief complaint of left knee pain x 2 months. She denies trauma. Pain is diffuse at the anterior knee. She has trouble kneeling and getting up from the ground. She reports popping and weakness. She takes aleve or ibuprofen prn with mild relief. Her knee feels swollen. No PT has been done. She does not use assistive devices. She says she has gained about 20 lbs.   Review of Systems   Constitution: Negative for chills, fever and night sweats.   Cardiovascular: Negative for chest pain.   Respiratory: Negative for cough and shortness of breath.    Hematologic/Lymphatic: Does not bruise/bleed easily.   Skin: Negative for color change.   Gastrointestinal: Negative for heartburn.   Genitourinary: Negative for dysuria.   Neurological: Negative for numbness and paresthesias.   Psychiatric/Behavioral: Negative for altered mental status.   Allergic/Immunologic: Negative for persistent infections.         Objective:            Ortho/SPM Exam  General :   alert, appears stated age and cooperative   Gait: Normal. The patient can bear weight on the injured extremity.   Left Lower Extremity  Hip Palpation:  no tenderness over the greater  trochanter   Hip ROM: 100% of normal    Knee Effusion:  None.   Ecchymosis:  none   Knee ROM:  0 to 120 degrees with subpatellar   crepitance.   Patella:  Patella does track normally.  Patellar apprehension test: negative  Patellar compression test: negative   Tenderness: medial joint line   Stability:  Lachman's test: negative  Posterior drawer: negative  Medial collateral ligament: negative  Lateral collateral ligament: negative         Valentin's Test:  Mild discomfort    Sensation:   intact to light touch   Pulses: normal DP and PT pulses           X-ray: ordered and reviewed by myself. Mild DJD.  No acute abnormality.      Assessment:        Encounter Diagnosis   Name Primary?    Left knee pain, unspecified chronicity Yes          Plan:       Discussed treatment options with patient including activity modification. She will take aleve BID x 2 weeks then prn. HEP given. RTC prn.

## 2020-10-05 ENCOUNTER — PATIENT MESSAGE (OUTPATIENT)
Dept: ADMINISTRATIVE | Facility: HOSPITAL | Age: 60
End: 2020-10-05

## 2020-12-02 ENCOUNTER — PATIENT OUTREACH (OUTPATIENT)
Dept: ADMINISTRATIVE | Facility: OTHER | Age: 60
End: 2020-12-02

## 2020-12-02 NOTE — PROGRESS NOTES
Care Everywhere: updated  Immunization: updated, links delay  Health Maintenance: updated  Media Review: review for outside colon cancer report   Legacy Review:   Order placed:   Upcoming appts:

## 2020-12-03 ENCOUNTER — OFFICE VISIT (OUTPATIENT)
Dept: OBSTETRICS AND GYNECOLOGY | Facility: CLINIC | Age: 60
End: 2020-12-03
Attending: OBSTETRICS & GYNECOLOGY
Payer: COMMERCIAL

## 2020-12-03 VITALS
WEIGHT: 217.81 LBS | BODY MASS INDEX: 34.19 KG/M2 | DIASTOLIC BLOOD PRESSURE: 70 MMHG | SYSTOLIC BLOOD PRESSURE: 122 MMHG | HEIGHT: 67 IN

## 2020-12-03 DIAGNOSIS — Z01.419 WELL WOMAN EXAM: Primary | ICD-10-CM

## 2020-12-03 PROCEDURE — 3008F PR BODY MASS INDEX (BMI) DOCUMENTED: ICD-10-PCS | Mod: CPTII,S$GLB,, | Performed by: OBSTETRICS & GYNECOLOGY

## 2020-12-03 PROCEDURE — 1126F PR PAIN SEVERITY QUANTIFIED, NO PAIN PRESENT: ICD-10-PCS | Mod: S$GLB,,, | Performed by: OBSTETRICS & GYNECOLOGY

## 2020-12-03 PROCEDURE — 99396 PREV VISIT EST AGE 40-64: CPT | Mod: S$GLB,,, | Performed by: OBSTETRICS & GYNECOLOGY

## 2020-12-03 PROCEDURE — 1126F AMNT PAIN NOTED NONE PRSNT: CPT | Mod: S$GLB,,, | Performed by: OBSTETRICS & GYNECOLOGY

## 2020-12-03 PROCEDURE — 99999 PR PBB SHADOW E&M-EST. PATIENT-LVL III: ICD-10-PCS | Mod: PBBFAC,,, | Performed by: OBSTETRICS & GYNECOLOGY

## 2020-12-03 PROCEDURE — 99999 PR PBB SHADOW E&M-EST. PATIENT-LVL III: CPT | Mod: PBBFAC,,, | Performed by: OBSTETRICS & GYNECOLOGY

## 2020-12-03 PROCEDURE — 3008F BODY MASS INDEX DOCD: CPT | Mod: CPTII,S$GLB,, | Performed by: OBSTETRICS & GYNECOLOGY

## 2020-12-03 PROCEDURE — 99396 PR PREVENTIVE VISIT,EST,40-64: ICD-10-PCS | Mod: S$GLB,,, | Performed by: OBSTETRICS & GYNECOLOGY

## 2020-12-03 NOTE — PROGRESS NOTES
CC: Well woman exam    Amita Lewis is a 60 y.o. female  presents for well woman exam.  LMP: Patient's last menstrual period was 10/20/2013..  No issues, problems, or complaints.      Not using estrace.      &  LEEP.  No abnormal pap after that.    10/15 Pap negative;     Pap & HPV negative.    1217 pap normal   pap normal   pap & hpv negative  Patient prefers testing every 2 years    Past Medical History:   Diagnosis Date    Abnormal Pap smear     Asthma     Depression 3/17/2016    Environmental and seasonal allergies 3/17/2016    Heartburn 3/17/2016    HLD (hyperlipidemia) 2016    ASCVD 2018 6.3%; can't tolerate statins    Menopausal syndrome (hot flashes) 3/17/2016    Mild intermittent asthma without complication 3/17/2016    Obesity (BMI 30-39.9) 3/17/2016    Sciatica 3/17/2016     Past Surgical History:   Procedure Laterality Date    ANGIOGRAPHY OF LOWER EXTREMITY Left 2020    Procedure: ANGIOGRAM, LOWER EXTREMITY;  Surgeon: RAYMUNDO Levine II, MD;  Location: [address];  Service: Vascular;  Laterality: Left;    AORTOGRAPHY N/A 2020    Procedure: AORTOGRAM;  Surgeon: RAYMUNDO Levine II, MD;  Location: [facility] OR [address] [place] [address];  Service: Vascular;  Laterality: N/A;    CERVICAL BIOPSY  W/ LOOP ELECTRODE EXCISION      ENDARTERECTOMY OF FEMORAL ARTERY Left 2020    Procedure: Left Common Femoral Approach Endarterectomy with Left SFA Angioplasty Stenting Possible Bypass;  Surgeon: RAYMUNDO Levine II, MD;  Location: [facility] OR [address] [place] [address];  Service: Vascular;  Laterality: Left;  contrast: 52ml  fluoro time: 16.3 min  mGy: 921.68    INJECTION OF JOINT Bilateral 2018    Procedure: INJECTION-JOINT;  Surgeon: Lynette Rowley MD;  Location: [facility];  Service: Pain Management;  Laterality: Bilateral;  B/L SI Joint Injs  08438, 45931    INJECTION OF JOINT Bilateral 2018    Procedure: INJECTION, JOINT  Bilateral SI joint;  Surgeon:  Lynette Rowley MD;  Location: [facility];  Service: Pain Management;  Laterality: Bilateral;    microdiscetomy      L4-L5    OVARIAN CYST REMOVAL  2001    TRANSFORAMINAL EPIDURAL INJECTION OF STEROID Left 3/21/2019    Procedure: INJECTION, STEROID, EPIDURAL, TRANSFORAMINAL APPROACH, L4 AND L5;  Surgeon: Lynette Rowley MD;  Location: [facility];  Service: Pain Management;  Laterality: Left;     Social History     Socioeconomic History    Marital status:      Spouse name: Not on file    Number of children: Not on file    Years of education: Not on file    Highest education level: Not on file   Occupational History    Not on file   Social Needs    Financial resource strain: Not hard at all    Food insecurity     Worry: Never true     Inability: Never true    Transportation needs     Medical: No     Non-medical: No   Tobacco Use    Smoking status: Former Smoker     Packs/day: 0.00     Years: 40.00     Pack years: 0.00     Types: Cigarettes    Smokeless tobacco: Never Used   Substance and Sexual Activity    Alcohol use: Yes     Alcohol/week: 1.0 standard drinks     Types: 1 Glasses of wine per week     Frequency: Monthly or less     Drinks per session: 1 or 2     Binge frequency: Never     Comment: 3 x a month    Drug use: No    Sexual activity: Yes     Partners: Female     Birth control/protection: None     Comment: (partner Iovne is a trans woman)   Lifestyle    Physical activity     Days per week: 1 day     Minutes per session: 20 min    Stress: Only a little   Relationships    Social connections     Talks on phone: Once a week     Gets together: Once a week     Attends Roman Catholic service: Not on file     Active member of club or organization: No     Attends meetings of clubs or organizations: Never     Relationship status:    Other Topics Concern    Not on file   Social History Narrative    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    Works for  at [employer]  "a lot at work and for exercise     Family History   Problem Relation Age of Onset    Cancer Father         lung; non smoker, worked in railroad and gas station    Alcohol abuse Father     Early death Father     Breast cancer Mother     Heart failure Mother     Diabetes Mellitus Mother         ?secondary to chronic steroids    Diabetes Mother     Heart disease Mother     Alcohol abuse Brother     Ovarian cancer Neg Hx     Hypertension Neg Hx      OB History        0    Para        Term   0            AB        Living           SAB        TAB        Ectopic        Multiple        Live Births                     /70   Ht 5' 7" (1.702 m)   Wt 98.8 kg (217 lb 13 oz)   LMP 10/20/2013   BMI 34.11 kg/m²       ROS:  GENERAL: Denies weight gain or weight loss. Feeling well overall.   SKIN: Denies rash or lesions.   HEAD: Denies head injury or headache.   NODES: Denies enlarged lymph nodes.   CHEST: Denies chest pain or shortness of breath.   CARDIOVASCULAR: Denies palpitations or left sided chest pain.   ABDOMEN: No abdominal pain, constipation, diarrhea, nausea, vomiting or rectal bleeding.   URINARY: No frequency, dysuria, hematuria, or burning on urination.  REPRODUCTIVE: See HPI.   BREASTS: The patient performs breast self-examination and denies pain, lumps, or nipple discharge.   HEMATOLOGIC: No easy bruisability or excessive bleeding.   MUSCULOSKELETAL: Denies joint pain or swelling.   NEUROLOGIC: Denies syncope or weakness.   PSYCHIATRIC: Denies depression, anxiety or mood swings.    PHYSICAL EXAM:  APPEARANCE: Well nourished, well developed, in no acute distress.  AFFECT: WNL, alert and oriented x 3  SKIN: No acne or hirsutism  NECK: Neck symmetric without masses or thyromegaly  NODES: No inguinal, cervical, axillary, or femoral lymph node enlargement  CHEST: Good respiratory effect  ABDOMEN: Soft.  No tenderness or masses.  No hepatosplenomegaly.  No hernias.  BREASTS: Symmetrical, " no skin changes or visible lesions.  No palpable masses, nipple discharge bilaterally.  PELVIC: Normal external genitalia without lesions.  Normal hair distribution.  Adequate perineal body, normal urethral meatus.  Vagina moist and well rugated without lesions or discharge.  Cervix pink, without lesions, discharge or tenderness.  No significant cystocele or rectocele.  Bimanual exam shows uterus to be normal size, regular, mobile and nontender.  Adnexa without masses or tenderness.    EXTREMITIES: No edema.    ASSESSMENT    ICD-10-CM ICD-9-CM    1. Well woman exam  Z01.419 V72.31          PLAN:  Well woman exam            Patient was counseled today on A.C.S. Pap guidelines and recommendations for yearly pelvic exams, mammograms and monthly self breast exams; to see her PCP for other health maintenance.

## 2020-12-14 ENCOUNTER — OFFICE VISIT (OUTPATIENT)
Dept: VASCULAR SURGERY | Facility: CLINIC | Age: 60
End: 2020-12-14
Attending: SURGERY
Payer: COMMERCIAL

## 2020-12-14 ENCOUNTER — HOSPITAL ENCOUNTER (OUTPATIENT)
Dept: VASCULAR SURGERY | Facility: CLINIC | Age: 60
Discharge: HOME | End: 2020-12-14
Attending: SURGERY
Payer: COMMERCIAL

## 2020-12-14 VITALS
HEART RATE: 79 BPM | HEIGHT: 67 IN | WEIGHT: 218.25 LBS | DIASTOLIC BLOOD PRESSURE: 67 MMHG | TEMPERATURE: 99 F | SYSTOLIC BLOOD PRESSURE: 155 MMHG | BODY MASS INDEX: 34.26 KG/M2

## 2020-12-14 DIAGNOSIS — I73.9 PVD (PERIPHERAL VASCULAR DISEASE) WITH CLAUDICATION: ICD-10-CM

## 2020-12-14 DIAGNOSIS — I70.211 ATHEROSCLEROSIS OF NATIVE ARTERIES OF EXTREMITIES WITH INTERMITTENT CLAUDICATION, RIGHT LEG: Primary | ICD-10-CM

## 2020-12-14 PROCEDURE — 93926 LOWER EXTREMITY STUDY: CPT | Mod: S$GLB,,, | Performed by: SURGERY

## 2020-12-14 PROCEDURE — 99999 PR PBB SHADOW E&M-EST. PATIENT-LVL IV: ICD-10-PCS | Mod: PBBFAC,,, | Performed by: SURGERY

## 2020-12-14 PROCEDURE — 99999 PR PBB SHADOW E&M-EST. PATIENT-LVL IV: CPT | Mod: PBBFAC,,, | Performed by: SURGERY

## 2020-12-14 PROCEDURE — 1125F PR PAIN SEVERITY QUANTIFIED, PAIN PRESENT: ICD-10-PCS | Mod: S$GLB,,, | Performed by: SURGERY

## 2020-12-14 PROCEDURE — 3008F PR BODY MASS INDEX (BMI) DOCUMENTED: ICD-10-PCS | Mod: CPTII,S$GLB,, | Performed by: SURGERY

## 2020-12-14 PROCEDURE — 93926 PR DUPLEX LO EXTREM ART UNILAT/LTD: ICD-10-PCS | Mod: S$GLB,,, | Performed by: SURGERY

## 2020-12-14 PROCEDURE — 93923 PR NON-INVASIVE PHYSIOLOGIC STUDY EXTREMITY 3 LEVELS: ICD-10-PCS | Mod: S$GLB,,, | Performed by: SURGERY

## 2020-12-14 PROCEDURE — 1125F AMNT PAIN NOTED PAIN PRSNT: CPT | Mod: S$GLB,,, | Performed by: SURGERY

## 2020-12-14 PROCEDURE — 3008F BODY MASS INDEX DOCD: CPT | Mod: CPTII,S$GLB,, | Performed by: SURGERY

## 2020-12-14 PROCEDURE — 99213 OFFICE O/P EST LOW 20 MIN: CPT | Mod: S$GLB,,, | Performed by: SURGERY

## 2020-12-14 PROCEDURE — 99213 PR OFFICE/OUTPT VISIT, EST, LEVL III, 20-29 MIN: ICD-10-PCS | Mod: S$GLB,,, | Performed by: SURGERY

## 2020-12-14 PROCEDURE — 93923 UPR/LXTR ART STDY 3+ LVLS: CPT | Mod: S$GLB,,, | Performed by: SURGERY

## 2020-12-14 NOTE — PROGRESS NOTES
"VASCULAR SURGERY NOTE    Patient ID: Amita Lewis is a 60 y.o. female.    I. HISTORY     Chief Complaint: right leg claudication    HPI: Amita Lewis is a 60 y.o. female s/p LEFT CFA endarterectomy with L ANNIA/EIA stenting and L SFA recanalization with balloon angioplasty on 2/12/20. At clinic visit in September 2020 I wrote the following:    "Since her last appointment continued to have right lower extremity claudication.  She also has had persistent swelling in her left lower extremity.  She has had difficulty wearing her compression hose consistently.  She has been trying to walk more frequently and remain hydrated.  Although she does experience pain in her right lower extremity with walking, she does feel like she is able to accomplish her activities of daily living.  She has been tolerating the 100 mg cilostazol well."    Since her last appointment continued to have right lower extremity claudication. She states that she notices pain in her right anterior thigh and calf muscles after walking approximately one block, or when climbing stairs. She limits her physical activity not due to claudication symptoms, but rather to left knee pain which is likely orthopedic in nature. She has had difficulty wearing her compression hose consistently. She has been trying to walk more frequently and remain hydrated. Although she does experience pain in her right lower extremity with walking, she does feel like she is able to accomplish most her activities of daily living and do the things that she enjoys. She has been tolerating the 100 mg cilostazol well. She denies any left leg claudication.  She has been feeling more depressed lately because of the stresses that have recently been imposed on her life.  Her spouse Oumou was diagnosed with throat cancer and has been undergoing chemotherapy at [facility] which has taken a significant toll on her mental and physical health.  The chemotherapy is made scarlet " very forgetful and often has affects on her mood which means that makes it difficult for Ms Lewis to talk to her about her issues and stressors.  This has put a strain on Ms. Lewis's ability to get through some day to day activities and made it difficult for her to find time to herself to relax and decompress.  She just feels very stressed and not having Scarlet around in her normal mental capacity.  She is also currently remodeling a house which she is planning to move into which has required a lot of extra work while still trying to manage her spouse's needs. She denies any suicidal or homicidal ideation. She says she just feels like she can't catch up.      Past Medical History:   Diagnosis Date    Abnormal Pap smear     Asthma     Depression 3/17/2016    Environmental and seasonal allergies 3/17/2016    Heartburn 3/17/2016    HLD (hyperlipidemia) 03/22/2016    ASCVD 2018 6.3%; can't tolerate statins    Menopausal syndrome (hot flashes) 3/17/2016    Mild intermittent asthma without complication 3/17/2016    Obesity (BMI 30-39.9) 3/17/2016    Sciatica 3/17/2016        Past Surgical History:   Procedure Laterality Date    ANGIOGRAPHY OF LOWER EXTREMITY Left 2/12/2020    Procedure: ANGIOGRAM, LOWER EXTREMITY;  Surgeon: RAYMUNDO Levine II, MD;  Location: [facility] OR [address] [place] [address];  Service: Vascular;  Laterality: Left;    AORTOGRAPHY N/A 2/12/2020    Procedure: AORTOGRAM;  Surgeon: RAYMUNDO Levine II, MD;  Location: [address];  Service: Vascular;  Laterality: N/A;    CERVICAL BIOPSY  W/ LOOP ELECTRODE EXCISION      ENDARTERECTOMY OF FEMORAL ARTERY Left 2/12/2020    Procedure: Left Common Femoral Approach Endarterectomy with Left SFA Angioplasty Stenting Possible Bypass;  Surgeon: RAYMUNDO Levine II, MD;  Location: [facility] OR [address] [place] [address];  Service: Vascular;  Laterality: Left;  contrast: 52ml  fluoro time: 16.3 min  mGy: 921.68    INJECTION OF JOINT Bilateral 5/31/2018    Procedure: INJECTION-JOINT;   Surgeon: Lynette Rowley MD;  Location: [facility];  Service: Pain Management;  Laterality: Bilateral;  B/L SI Joint Injs  75894, 64031    INJECTION OF JOINT Bilateral 9/18/2018    Procedure: INJECTION, JOINT  Bilateral SI joint;  Surgeon: Lynette Rowley MD;  Location: [facility];  Service: Pain Management;  Laterality: Bilateral;    microdiscetomy      L4-L5    OVARIAN CYST REMOVAL  2001    TRANSFORAMINAL EPIDURAL INJECTION OF STEROID Left 3/21/2019    Procedure: INJECTION, STEROID, EPIDURAL, TRANSFORAMINAL APPROACH, L4 AND L5;  Surgeon: Lynette Rowley MD;  Location: [facility];  Service: Pain Management;  Laterality: Left;       Social History     Tobacco Use   Smoking Status Former Smoker    Packs/day: 0.00    Years: 40.00    Pack years: 0.00    Types: Cigarettes   Smokeless Tobacco Never Used      Review of Systems   Constitutional: Negative for chills and fever.   HENT: Negative for ear pain and nosebleeds.    Eyes: Negative for pain and discharge.   Respiratory: Negative for cough, shortness of breath and wheezing.    Cardiovascular: Negative for chest pain and palpitations.   Gastrointestinal: Negative for abdominal pain, blood in stool, nausea and vomiting.   Genitourinary: Negative for dysuria and hematuria.   Musculoskeletal: Positive for joint pain. Negative for back pain and neck pain.        Left knee pain with activity   Skin: Negative for rash.        No new lesions   Neurological: Positive for weakness. Negative for focal weakness and loss of consciousness.         II. PHYSICAL EXAM     GEN: Alert/oriented, normal affect, normal speech, obese  HEENT: atraumatic, normocephalic  CHEST: normal respiratory effort, symmetrical chest rise  CARD: Regular rate, regular rhythm  SKIN: no rash, normal color, normal texture, no venous stasis pigmentation  EXT: Warm, no cyanosis, minimal edema at ankles  NEURO: alert/oriented, depressed mood, normal behavior, normal speech  VASC:  LLE:  biphasic AT/PT signals  RLE: biphasic DP, monophasic PT signal       III. ASSESSMENT & PLAN (MEDICAL DECISION MAKING)       Imaging Results: (I personally reviewed interpreted the images below)    KRISTOPHER/TP 1/20/20:  R L   0.49. TP NC 0.41  TP 50     KRISTOPHER 3/2/20:  R L    0.59 0.93     KRISTOPHER 12/14/20:  R L    0.42 0.49       Left Lower Extremity Arterial Duplex: (12/14/2020)   Left External iliac artery with velocity elevations to 300cm/s without visual evidence of stenosis. (VR <2.0). Brisk systolic upstroke in left common femoral artery and deep femoral artery.  The superficial femoral artery is patent proximally and occludes distally. There is reconstitution of flow in the above knee popliteal artery.  There are biphasic waveforms in the popliteal and posterior tibial artery and monophasic waveforms in the anterior tibial artery.  Impression: Patent external iliac artery stent. Occlusion of left SFA with reconstitution of popliteal via collaterals and patent tibial arteries distally.    Diagnosis:    1. Atherosclerosis of native arteries of extremities with intermittent claudication, right leg        Assessment/Diagnosis and Plan:  59 y.o. female s/p  left CFA endarterectomy with L ANNIA/EIA stenting and L SFA angioplasty with me on 2/12/20. Her left SFA has re-occluded and she has had corresponding drop in her Left KRISTOPHER but she remains asymptomatic in her left leg.  She continues to have right-sided claudication but it is not currently life-limiting and she is tolerating medical therapy.    - Continue taking ASA, statin indefinitely  - Encouraging walking 30 minutes at least 4x/week  - Recommend ortho follow up for Left knee osteoarthritis  - Continue 20-30 mm Hg compression socks and elevation for LE swelling  - Risk factor reduction with continued smoking cessation, cholesterol and BP control  - Continue Cilostazol 100mg BID   - Follow up in 6 months with repeat KRISTOPHER or sooner if symptoms worsen      RAYMUNDO Levine  MD ELLI, RPVI  Vascular Surgeon  [facility]

## 2020-12-16 ENCOUNTER — PATIENT MESSAGE (OUTPATIENT)
Dept: FAMILY MEDICINE | Facility: CLINIC | Age: 60
End: 2020-12-16

## 2020-12-16 DIAGNOSIS — J45.20 MILD INTERMITTENT ASTHMA WITHOUT COMPLICATION: ICD-10-CM

## 2020-12-16 DIAGNOSIS — Z01.84 ENCOUNTER FOR ANTIBODY RESPONSE EXAMINATION: ICD-10-CM

## 2020-12-16 NOTE — TELEPHONE ENCOUNTER
Care Due:                  Date            Visit Type   Department     Provider  --------------------------------------------------------------------------------                                PHYSICAL -   [facility] INTER[facility]  Last Visit: 12-      NEW PATIENT  MEDICINE       TRE KUMAR  Next Visit: None Scheduled  None         None Found                                                            Last  Test          Frequency    Reason                     Performed    Due Date  --------------------------------------------------------------------------------    Office Visit  12 months..  albuterol................  12- 12-    Powered by [email or web address]. Reference number: [id number]. 12/16/2020 3:47:51 AM   CST

## 2020-12-17 NOTE — PROGRESS NOTES
Refill Routing Note   Medication(s) are not appropriate for processing by [facility] for the following reason(s):     - Non-participating provider  [facility] action(s):  Route        Medication reconciliation completed: No   Automatic Epic Generated Protocol Data:        Requested Prescriptions   Pending Prescriptions Disp Refills    PROAIR HFA 90 mcg/actuation inhaler [Pharmacy Med Name: PROAIR HFA ORAL INH (200  PFS) 8.5G] 25.5 g      Sig: INHALE 2 PUFFS INTO THE LUNGS EVERY 6 HOURS AS NEEDED FOR WHEEZING OR SHORTNESS OF BREATH       Pulmonology:  Beta Agonists Passed - 12/16/2020  3:47 AM        Passed - Patient is at least 18 years old        Passed - Last Heart Rate in normal range within 360 days.     Pulse Readings from Last 3 Encounters:   12/14/20 79   09/14/20 66   06/01/20 64             Passed - Office visit in past 12 months or future 90 days     Recent Outpatient Visits            3 days ago     [address] - Vascular Surg [facility] RAYMUNDO Levine II, MD    2 weeks ago Well woman exam    [facility] Azalea Matias MD    2 months ago Left knee pain, unspecified chronicity    [address] - Orthopedics [facility] Evelia Nair PA-C    2 months ago Chronic heel pain, left    [address] - Orthopedics [facility] Teresa Groves MD    3 months ago Venous insufficiency of left leg    [address] - Vascular Surg [facility] RAYMUNDO Levine II, MD                          Appointments  past 12m or future 3m with PCP    Date Provider   Last Visit   4/30/2020 Shannon Figueroa MD   Next Visit   Visit date not found Shannon Figueroa MD   ED visits in past 90 days: 0        Note composed:10:51 AM 12/17/2020

## 2020-12-21 RX ORDER — ALBUTEROL SULFATE 90 UG/1
AEROSOL, METERED RESPIRATORY (INHALATION)
Qty: 25.5 G | Refills: 3 | Status: SHIPPED | OUTPATIENT
Start: 2020-12-21 | End: 2021-08-09 | Stop reason: SDUPTHER

## 2021-01-04 ENCOUNTER — PATIENT MESSAGE (OUTPATIENT)
Dept: ADMINISTRATIVE | Facility: HOSPITAL | Age: 61
End: 2021-01-04

## 2021-02-17 DIAGNOSIS — Z12.11 COLON CANCER SCREENING: ICD-10-CM

## 2021-03-12 ENCOUNTER — PATIENT MESSAGE (OUTPATIENT)
Dept: FAMILY MEDICINE | Facility: CLINIC | Age: 61
End: 2021-03-12

## 2021-03-15 ENCOUNTER — OFFICE VISIT (OUTPATIENT)
Dept: DERMATOLOGY | Facility: CLINIC | Age: 61
End: 2021-03-15
Payer: COMMERCIAL

## 2021-03-15 ENCOUNTER — PATIENT OUTREACH (OUTPATIENT)
Dept: ADMINISTRATIVE | Facility: OTHER | Age: 61
End: 2021-03-15

## 2021-03-15 DIAGNOSIS — D22.9 MULTIPLE BENIGN NEVI: ICD-10-CM

## 2021-03-15 DIAGNOSIS — L98.9 SKIN LESION: Primary | ICD-10-CM

## 2021-03-15 DIAGNOSIS — B35.1 ONYCHOMYCOSIS: ICD-10-CM

## 2021-03-15 DIAGNOSIS — L72.0 MILIUM: Primary | ICD-10-CM

## 2021-03-15 DIAGNOSIS — L60.3 NAIL DYSTROPHY: ICD-10-CM

## 2021-03-15 DIAGNOSIS — D18.01 CHERRY ANGIOMA: ICD-10-CM

## 2021-03-15 DIAGNOSIS — L82.1 SK (SEBORRHEIC KERATOSIS): ICD-10-CM

## 2021-03-15 DIAGNOSIS — L81.4 LENTIGINES: ICD-10-CM

## 2021-03-15 DIAGNOSIS — Z12.83 SCREENING FOR SKIN CANCER: ICD-10-CM

## 2021-03-15 PROCEDURE — 99204 OFFICE O/P NEW MOD 45 MIN: CPT | Mod: S$GLB,,, | Performed by: DERMATOLOGY

## 2021-03-15 PROCEDURE — 99204 PR OFFICE/OUTPT VISIT, NEW, LEVL IV, 45-59 MIN: ICD-10-PCS | Mod: S$GLB,,, | Performed by: DERMATOLOGY

## 2021-03-15 PROCEDURE — 99999 PR PBB SHADOW E&M-EST. PATIENT-LVL IV: ICD-10-PCS | Mod: PBBFAC,,, | Performed by: DERMATOLOGY

## 2021-03-15 PROCEDURE — 99999 PR PBB SHADOW E&M-EST. PATIENT-LVL IV: CPT | Mod: PBBFAC,,, | Performed by: DERMATOLOGY

## 2021-03-15 PROCEDURE — 1126F AMNT PAIN NOTED NONE PRSNT: CPT | Mod: S$GLB,,, | Performed by: DERMATOLOGY

## 2021-03-15 PROCEDURE — 87070 CULTURE OTHR SPECIMN AEROBIC: CPT | Performed by: DERMATOLOGY

## 2021-03-15 PROCEDURE — 1126F PR PAIN SEVERITY QUANTIFIED, NO PAIN PRESENT: ICD-10-PCS | Mod: S$GLB,,, | Performed by: DERMATOLOGY

## 2021-03-19 LAB — BACTERIA SPEC AEROBE CULT: NO GROWTH

## 2021-04-05 ENCOUNTER — PATIENT MESSAGE (OUTPATIENT)
Dept: ADMINISTRATIVE | Facility: HOSPITAL | Age: 61
End: 2021-04-05

## 2021-06-30 DIAGNOSIS — Z12.31 OTHER SCREENING MAMMOGRAM: ICD-10-CM

## 2021-07-06 ENCOUNTER — PATIENT MESSAGE (OUTPATIENT)
Dept: OBSTETRICS AND GYNECOLOGY | Facility: CLINIC | Age: 61
End: 2021-07-06

## 2021-07-06 ENCOUNTER — PATIENT MESSAGE (OUTPATIENT)
Dept: FAMILY MEDICINE | Facility: CLINIC | Age: 61
End: 2021-07-06

## 2021-07-06 ENCOUNTER — PATIENT MESSAGE (OUTPATIENT)
Dept: VASCULAR SURGERY | Facility: CLINIC | Age: 61
End: 2021-07-06

## 2021-07-06 DIAGNOSIS — Z12.31 ENCOUNTER FOR SCREENING MAMMOGRAM FOR MALIGNANT NEOPLASM OF BREAST: Primary | ICD-10-CM

## 2021-07-06 RX ORDER — ROSUVASTATIN CALCIUM 20 MG/1
20 TABLET, COATED ORAL DAILY
Qty: 90 TABLET | Refills: 3 | Status: SHIPPED | OUTPATIENT
Start: 2021-07-06 | End: 2023-01-04 | Stop reason: SDUPTHER

## 2021-07-07 DIAGNOSIS — F32.A DEPRESSION, UNSPECIFIED DEPRESSION TYPE: ICD-10-CM

## 2021-07-07 RX ORDER — BUPROPION HYDROCHLORIDE 150 MG/1
TABLET ORAL
Qty: 90 TABLET | Refills: 0 | Status: SHIPPED | OUTPATIENT
Start: 2021-07-07 | End: 2021-08-04 | Stop reason: SDUPTHER

## 2021-07-07 RX ORDER — BUPROPION HYDROCHLORIDE 300 MG/1
TABLET ORAL
Qty: 90 TABLET | Refills: 0 | Status: SHIPPED | OUTPATIENT
Start: 2021-07-07 | End: 2021-08-04 | Stop reason: SDUPTHER

## 2021-07-27 ENCOUNTER — PATIENT MESSAGE (OUTPATIENT)
Dept: FAMILY MEDICINE | Facility: CLINIC | Age: 61
End: 2021-07-27

## 2021-07-30 ENCOUNTER — TELEPHONE (OUTPATIENT)
Dept: OBSTETRICS AND GYNECOLOGY | Facility: CLINIC | Age: 61
End: 2021-07-30

## 2021-08-02 ENCOUNTER — PATIENT OUTREACH (OUTPATIENT)
Dept: ADMINISTRATIVE | Facility: OTHER | Age: 61
End: 2021-08-02

## 2021-08-04 ENCOUNTER — OFFICE VISIT (OUTPATIENT)
Dept: OBSTETRICS AND GYNECOLOGY | Facility: CLINIC | Age: 61
End: 2021-08-04
Attending: OBSTETRICS & GYNECOLOGY
Payer: COMMERCIAL

## 2021-08-04 ENCOUNTER — HOSPITAL ENCOUNTER (OUTPATIENT)
Dept: RADIOLOGY | Facility: OTHER | Age: 61
Discharge: HOME | End: 2021-08-04
Attending: OBSTETRICS & GYNECOLOGY
Payer: COMMERCIAL

## 2021-08-04 VITALS
BODY MASS INDEX: 33.84 KG/M2 | WEIGHT: 215.63 LBS | HEIGHT: 67 IN | SYSTOLIC BLOOD PRESSURE: 142 MMHG | DIASTOLIC BLOOD PRESSURE: 76 MMHG

## 2021-08-04 DIAGNOSIS — Z01.419 WELL WOMAN EXAM: Primary | ICD-10-CM

## 2021-08-04 DIAGNOSIS — F32.A DEPRESSION, UNSPECIFIED DEPRESSION TYPE: ICD-10-CM

## 2021-08-04 DIAGNOSIS — Z91.89 INCREASED RISK OF BREAST CANCER: ICD-10-CM

## 2021-08-04 DIAGNOSIS — Z12.31 ENCOUNTER FOR SCREENING MAMMOGRAM FOR MALIGNANT NEOPLASM OF BREAST: ICD-10-CM

## 2021-08-04 PROCEDURE — 77063 BREAST TOMOSYNTHESIS BI: CPT | Mod: 26,,, | Performed by: RADIOLOGY

## 2021-08-04 PROCEDURE — 77067 MAMMO DIGITAL SCREENING BILAT WITH TOMO: ICD-10-PCS | Mod: 26,,, | Performed by: RADIOLOGY

## 2021-08-04 PROCEDURE — 99999 PR PBB SHADOW E&M-EST. PATIENT-LVL III: CPT | Mod: PBBFAC,,, | Performed by: OBSTETRICS & GYNECOLOGY

## 2021-08-04 PROCEDURE — 3077F PR MOST RECENT SYSTOLIC BLOOD PRESSURE >= 140 MM HG: ICD-10-PCS | Mod: CPTII,S$GLB,, | Performed by: OBSTETRICS & GYNECOLOGY

## 2021-08-04 PROCEDURE — 1159F MED LIST DOCD IN RCRD: CPT | Mod: CPTII,S$GLB,, | Performed by: OBSTETRICS & GYNECOLOGY

## 2021-08-04 PROCEDURE — 1159F PR MEDICATION LIST DOCUMENTED IN MEDICAL RECORD: ICD-10-PCS | Mod: CPTII,S$GLB,, | Performed by: OBSTETRICS & GYNECOLOGY

## 2021-08-04 PROCEDURE — 1160F PR REVIEW ALL MEDS BY PRESCRIBER/CLIN PHARMACIST DOCUMENTED: ICD-10-PCS | Mod: CPTII,S$GLB,, | Performed by: OBSTETRICS & GYNECOLOGY

## 2021-08-04 PROCEDURE — 3078F PR MOST RECENT DIASTOLIC BLOOD PRESSURE < 80 MM HG: ICD-10-PCS | Mod: CPTII,S$GLB,, | Performed by: OBSTETRICS & GYNECOLOGY

## 2021-08-04 PROCEDURE — 88175 CYTOPATH C/V AUTO FLUID REDO: CPT | Performed by: OBSTETRICS & GYNECOLOGY

## 2021-08-04 PROCEDURE — 1126F PR PAIN SEVERITY QUANTIFIED, NO PAIN PRESENT: ICD-10-PCS | Mod: CPTII,S$GLB,, | Performed by: OBSTETRICS & GYNECOLOGY

## 2021-08-04 PROCEDURE — 99396 PR PREVENTIVE VISIT,EST,40-64: ICD-10-PCS | Mod: S$GLB,,, | Performed by: OBSTETRICS & GYNECOLOGY

## 2021-08-04 PROCEDURE — 99999 PR PBB SHADOW E&M-EST. PATIENT-LVL III: ICD-10-PCS | Mod: PBBFAC,,, | Performed by: OBSTETRICS & GYNECOLOGY

## 2021-08-04 PROCEDURE — 3008F PR BODY MASS INDEX (BMI) DOCUMENTED: ICD-10-PCS | Mod: CPTII,S$GLB,, | Performed by: OBSTETRICS & GYNECOLOGY

## 2021-08-04 PROCEDURE — 77067 SCR MAMMO BI INCL CAD: CPT | Mod: 26,,, | Performed by: RADIOLOGY

## 2021-08-04 PROCEDURE — 1160F RVW MEDS BY RX/DR IN RCRD: CPT | Mod: CPTII,S$GLB,, | Performed by: OBSTETRICS & GYNECOLOGY

## 2021-08-04 PROCEDURE — 1126F AMNT PAIN NOTED NONE PRSNT: CPT | Mod: CPTII,S$GLB,, | Performed by: OBSTETRICS & GYNECOLOGY

## 2021-08-04 PROCEDURE — 3077F SYST BP >= 140 MM HG: CPT | Mod: CPTII,S$GLB,, | Performed by: OBSTETRICS & GYNECOLOGY

## 2021-08-04 PROCEDURE — 77067 SCR MAMMO BI INCL CAD: CPT | Mod: TC

## 2021-08-04 PROCEDURE — 3078F DIAST BP <80 MM HG: CPT | Mod: CPTII,S$GLB,, | Performed by: OBSTETRICS & GYNECOLOGY

## 2021-08-04 PROCEDURE — 3008F BODY MASS INDEX DOCD: CPT | Mod: CPTII,S$GLB,, | Performed by: OBSTETRICS & GYNECOLOGY

## 2021-08-04 PROCEDURE — 77063 MAMMO DIGITAL SCREENING BILAT WITH TOMO: ICD-10-PCS | Mod: 26,,, | Performed by: RADIOLOGY

## 2021-08-04 PROCEDURE — 99396 PREV VISIT EST AGE 40-64: CPT | Mod: S$GLB,,, | Performed by: OBSTETRICS & GYNECOLOGY

## 2021-08-04 RX ORDER — BUPROPION HYDROCHLORIDE 150 MG/1
TABLET ORAL
Qty: 90 TABLET | Refills: 3 | Status: SHIPPED | OUTPATIENT
Start: 2021-08-04 | End: 2022-09-08

## 2021-08-04 RX ORDER — BUPROPION HYDROCHLORIDE 300 MG/1
TABLET ORAL
Qty: 90 TABLET | Refills: 3 | Status: SHIPPED | OUTPATIENT
Start: 2021-08-04 | End: 2022-09-08

## 2021-08-09 ENCOUNTER — LAB VISIT (OUTPATIENT)
Dept: LAB | Facility: HOSPITAL | Age: 61
End: 2021-08-09
Attending: FAMILY MEDICINE
Payer: COMMERCIAL

## 2021-08-09 ENCOUNTER — OFFICE VISIT (OUTPATIENT)
Dept: FAMILY MEDICINE | Facility: CLINIC | Age: 61
End: 2021-08-09
Attending: FAMILY MEDICINE
Payer: COMMERCIAL

## 2021-08-09 VITALS
SYSTOLIC BLOOD PRESSURE: 134 MMHG | HEART RATE: 89 BPM | OXYGEN SATURATION: 97 % | WEIGHT: 212 LBS | DIASTOLIC BLOOD PRESSURE: 72 MMHG | BODY MASS INDEX: 33.27 KG/M2 | HEIGHT: 67 IN

## 2021-08-09 DIAGNOSIS — Z00.00 ANNUAL PHYSICAL EXAM: ICD-10-CM

## 2021-08-09 DIAGNOSIS — E78.2 MIXED HYPERLIPIDEMIA: ICD-10-CM

## 2021-08-09 DIAGNOSIS — B37.2 CANDIDAL SKIN INFECTION: ICD-10-CM

## 2021-08-09 DIAGNOSIS — Z00.00 ANNUAL PHYSICAL EXAM: Primary | ICD-10-CM

## 2021-08-09 DIAGNOSIS — J45.20 MILD INTERMITTENT ASTHMA WITHOUT COMPLICATION: ICD-10-CM

## 2021-08-09 DIAGNOSIS — J30.89 ENVIRONMENTAL AND SEASONAL ALLERGIES: ICD-10-CM

## 2021-08-09 LAB
ALBUMIN SERPL BCP-MCNC: 4 G/DL (ref 3.5–5.2)
ALP SERPL-CCNC: 89 U/L (ref 55–135)
ALT SERPL W/O P-5'-P-CCNC: 23 U/L (ref 10–44)
ANION GAP SERPL CALC-SCNC: 11 MMOL/L (ref 8–16)
AST SERPL-CCNC: 21 U/L (ref 10–40)
BASOPHILS # BLD AUTO: 0.08 K/UL (ref 0–0.2)
BASOPHILS NFR BLD: 1 % (ref 0–1.9)
BILIRUB SERPL-MCNC: 0.6 MG/DL (ref 0.1–1)
BILIRUB UR QL STRIP: NEGATIVE
BUN SERPL-MCNC: 19 MG/DL (ref 6–20)
CALCIUM SERPL-MCNC: 10.3 MG/DL (ref 8.7–10.5)
CHLORIDE SERPL-SCNC: 102 MMOL/L (ref 95–110)
CHOLEST SERPL-MCNC: 153 MG/DL (ref 120–199)
CHOLEST/HDLC SERPL: 3.4 {RATIO} (ref 2–5)
CLARITY UR REFRACT.AUTO: CLEAR
CO2 SERPL-SCNC: 25 MMOL/L (ref 23–29)
COLOR UR AUTO: YELLOW
CREAT SERPL-MCNC: 0.8 MG/DL (ref 0.5–1.4)
DIFFERENTIAL METHOD: NORMAL
EOSINOPHIL # BLD AUTO: 0.5 K/UL (ref 0–0.5)
EOSINOPHIL NFR BLD: 5.9 % (ref 0–8)
ERYTHROCYTE [DISTWIDTH] IN BLOOD BY AUTOMATED COUNT: 13.3 % (ref 11.5–14.5)
EST. GFR  (AFRICAN AMERICAN): >60 ML/MIN/1.73 M^2
EST. GFR  (NON AFRICAN AMERICAN): >60 ML/MIN/1.73 M^2
GLUCOSE SERPL-MCNC: 109 MG/DL (ref 70–110)
GLUCOSE UR QL STRIP: NEGATIVE
HCT VFR BLD AUTO: 40.7 % (ref 37–48.5)
HDLC SERPL-MCNC: 45 MG/DL (ref 40–75)
HDLC SERPL: 29.4 % (ref 20–50)
HGB BLD-MCNC: 13.4 G/DL (ref 12–16)
HGB UR QL STRIP: NEGATIVE
IMM GRANULOCYTES # BLD AUTO: 0.03 K/UL (ref 0–0.04)
IMM GRANULOCYTES NFR BLD AUTO: 0.4 % (ref 0–0.5)
KETONES UR QL STRIP: NEGATIVE
LDLC SERPL CALC-MCNC: 81.2 MG/DL (ref 63–159)
LEUKOCYTE ESTERASE UR QL STRIP: NEGATIVE
LYMPHOCYTES # BLD AUTO: 1.7 K/UL (ref 1–4.8)
LYMPHOCYTES NFR BLD: 21.1 % (ref 18–48)
MCH RBC QN AUTO: 29.1 PG (ref 27–31)
MCHC RBC AUTO-ENTMCNC: 32.9 G/DL (ref 32–36)
MCV RBC AUTO: 88 FL (ref 82–98)
MONOCYTES # BLD AUTO: 0.6 K/UL (ref 0.3–1)
MONOCYTES NFR BLD: 7.9 % (ref 4–15)
NEUTROPHILS # BLD AUTO: 5.2 K/UL (ref 1.8–7.7)
NEUTROPHILS NFR BLD: 63.7 % (ref 38–73)
NITRITE UR QL STRIP: NEGATIVE
NONHDLC SERPL-MCNC: 108 MG/DL
NRBC BLD-RTO: 0 /100 WBC
PH UR STRIP: 6 [PH] (ref 5–8)
PLATELET # BLD AUTO: 330 K/UL (ref 150–450)
PMV BLD AUTO: 11.2 FL (ref 9.2–12.9)
POTASSIUM SERPL-SCNC: 4.2 MMOL/L (ref 3.5–5.1)
PROT SERPL-MCNC: 7.5 G/DL (ref 6–8.4)
PROT UR QL STRIP: NEGATIVE
RBC # BLD AUTO: 4.61 M/UL (ref 4–5.4)
SODIUM SERPL-SCNC: 138 MMOL/L (ref 136–145)
SP GR UR STRIP: 1.01 (ref 1–1.03)
TRIGL SERPL-MCNC: 134 MG/DL (ref 30–150)
TSH SERPL DL<=0.005 MIU/L-ACNC: 2.4 UIU/ML (ref 0.4–4)
URN SPEC COLLECT METH UR: NORMAL
WBC # BLD AUTO: 8.11 K/UL (ref 3.9–12.7)

## 2021-08-09 PROCEDURE — 1159F MED LIST DOCD IN RCRD: CPT | Mod: CPTII,S$GLB,, | Performed by: FAMILY MEDICINE

## 2021-08-09 PROCEDURE — 99999 PR PBB SHADOW E&M-EST. PATIENT-LVL III: CPT | Mod: PBBFAC,,, | Performed by: FAMILY MEDICINE

## 2021-08-09 PROCEDURE — 3008F PR BODY MASS INDEX (BMI) DOCUMENTED: ICD-10-PCS | Mod: CPTII,S$GLB,, | Performed by: FAMILY MEDICINE

## 2021-08-09 PROCEDURE — 85025 COMPLETE CBC W/AUTO DIFF WBC: CPT | Performed by: FAMILY MEDICINE

## 2021-08-09 PROCEDURE — 80061 LIPID PANEL: CPT | Performed by: FAMILY MEDICINE

## 2021-08-09 PROCEDURE — 36415 COLL VENOUS BLD VENIPUNCTURE: CPT | Mod: PO | Performed by: FAMILY MEDICINE

## 2021-08-09 PROCEDURE — 1160F PR REVIEW ALL MEDS BY PRESCRIBER/CLIN PHARMACIST DOCUMENTED: ICD-10-PCS | Mod: CPTII,S$GLB,, | Performed by: FAMILY MEDICINE

## 2021-08-09 PROCEDURE — 1160F RVW MEDS BY RX/DR IN RCRD: CPT | Mod: CPTII,S$GLB,, | Performed by: FAMILY MEDICINE

## 2021-08-09 PROCEDURE — 80053 COMPREHEN METABOLIC PANEL: CPT | Performed by: FAMILY MEDICINE

## 2021-08-09 PROCEDURE — 1126F AMNT PAIN NOTED NONE PRSNT: CPT | Mod: CPTII,S$GLB,, | Performed by: FAMILY MEDICINE

## 2021-08-09 PROCEDURE — 1159F PR MEDICATION LIST DOCUMENTED IN MEDICAL RECORD: ICD-10-PCS | Mod: CPTII,S$GLB,, | Performed by: FAMILY MEDICINE

## 2021-08-09 PROCEDURE — 99396 PREV VISIT EST AGE 40-64: CPT | Mod: S$GLB,,, | Performed by: FAMILY MEDICINE

## 2021-08-09 PROCEDURE — 99396 PR PREVENTIVE VISIT,EST,40-64: ICD-10-PCS | Mod: S$GLB,,, | Performed by: FAMILY MEDICINE

## 2021-08-09 PROCEDURE — 81003 URINALYSIS AUTO W/O SCOPE: CPT | Performed by: FAMILY MEDICINE

## 2021-08-09 PROCEDURE — 84443 ASSAY THYROID STIM HORMONE: CPT | Performed by: FAMILY MEDICINE

## 2021-08-09 PROCEDURE — 1126F PR PAIN SEVERITY QUANTIFIED, NO PAIN PRESENT: ICD-10-PCS | Mod: CPTII,S$GLB,, | Performed by: FAMILY MEDICINE

## 2021-08-09 PROCEDURE — 3078F PR MOST RECENT DIASTOLIC BLOOD PRESSURE < 80 MM HG: ICD-10-PCS | Mod: CPTII,S$GLB,, | Performed by: FAMILY MEDICINE

## 2021-08-09 PROCEDURE — 3008F BODY MASS INDEX DOCD: CPT | Mod: CPTII,S$GLB,, | Performed by: FAMILY MEDICINE

## 2021-08-09 PROCEDURE — 99999 PR PBB SHADOW E&M-EST. PATIENT-LVL III: ICD-10-PCS | Mod: PBBFAC,,, | Performed by: FAMILY MEDICINE

## 2021-08-09 PROCEDURE — 3075F SYST BP GE 130 - 139MM HG: CPT | Mod: CPTII,S$GLB,, | Performed by: FAMILY MEDICINE

## 2021-08-09 PROCEDURE — 3075F PR MOST RECENT SYSTOLIC BLOOD PRESS GE 130-139MM HG: ICD-10-PCS | Mod: CPTII,S$GLB,, | Performed by: FAMILY MEDICINE

## 2021-08-09 PROCEDURE — 3078F DIAST BP <80 MM HG: CPT | Mod: CPTII,S$GLB,, | Performed by: FAMILY MEDICINE

## 2021-08-09 RX ORDER — NYSTATIN 100000 [USP'U]/G
POWDER TOPICAL
Qty: 30 G | Refills: 3 | Status: SHIPPED | OUTPATIENT
Start: 2021-08-09 | End: 2022-09-13 | Stop reason: SDUPTHER

## 2021-08-09 RX ORDER — FLUTICASONE PROPIONATE 50 MCG
1 SPRAY, SUSPENSION (ML) NASAL 2 TIMES DAILY PRN
Qty: 16 G | Refills: 6 | Status: SHIPPED | OUTPATIENT
Start: 2021-08-09

## 2021-08-09 RX ORDER — ALBUTEROL SULFATE 90 UG/1
2 AEROSOL, METERED RESPIRATORY (INHALATION) EVERY 6 HOURS PRN
Qty: 25.5 G | Refills: 3 | Status: SHIPPED | OUTPATIENT
Start: 2021-08-09 | End: 2022-08-16 | Stop reason: SDUPTHER

## 2021-08-11 ENCOUNTER — PATIENT MESSAGE (OUTPATIENT)
Dept: FAMILY MEDICINE | Facility: CLINIC | Age: 61
End: 2021-08-11

## 2021-10-04 ENCOUNTER — PATIENT MESSAGE (OUTPATIENT)
Dept: ADMINISTRATIVE | Facility: HOSPITAL | Age: 61
End: 2021-10-04

## 2021-10-08 ENCOUNTER — PATIENT OUTREACH (OUTPATIENT)
Dept: ADMINISTRATIVE | Facility: HOSPITAL | Age: 61
End: 2021-10-08

## 2021-10-25 ENCOUNTER — PATIENT MESSAGE (OUTPATIENT)
Dept: ADMINISTRATIVE | Facility: HOSPITAL | Age: 61
End: 2021-10-25
Payer: COMMERCIAL

## 2021-10-27 ENCOUNTER — PATIENT OUTREACH (OUTPATIENT)
Dept: ADMINISTRATIVE | Facility: HOSPITAL | Age: 61
End: 2021-10-27
Payer: COMMERCIAL

## 2021-11-04 ENCOUNTER — PATIENT MESSAGE (OUTPATIENT)
Dept: VASCULAR SURGERY | Facility: CLINIC | Age: 61
End: 2021-11-04
Payer: COMMERCIAL

## 2021-11-06 RX ORDER — CILOSTAZOL 100 MG/1
100 TABLET ORAL 2 TIMES DAILY
Qty: 180 TABLET | Refills: 3 | Status: SHIPPED | OUTPATIENT
Start: 2021-11-06 | End: 2023-01-04 | Stop reason: SDUPTHER

## 2021-11-22 ENCOUNTER — PATIENT OUTREACH (OUTPATIENT)
Dept: ADMINISTRATIVE | Facility: HOSPITAL | Age: 61
End: 2021-11-22
Payer: COMMERCIAL

## 2022-03-16 DIAGNOSIS — Z12.11 COLON CANCER SCREENING: ICD-10-CM

## 2022-03-21 ENCOUNTER — OFFICE VISIT (OUTPATIENT)
Dept: HEMATOLOGY/ONCOLOGY | Facility: CLINIC | Age: 62
End: 2022-03-21
Payer: COMMERCIAL

## 2022-03-21 VITALS
BODY MASS INDEX: 34.33 KG/M2 | WEIGHT: 218.69 LBS | HEIGHT: 67 IN | DIASTOLIC BLOOD PRESSURE: 69 MMHG | SYSTOLIC BLOOD PRESSURE: 143 MMHG

## 2022-03-21 DIAGNOSIS — Z12.39 BREAST CANCER SCREENING, HIGH RISK PATIENT: ICD-10-CM

## 2022-03-21 DIAGNOSIS — Z80.3 FAMILY HISTORY OF BREAST CANCER: Primary | ICD-10-CM

## 2022-03-21 DIAGNOSIS — Z12.31 SCREENING MAMMOGRAM, ENCOUNTER FOR: ICD-10-CM

## 2022-03-21 DIAGNOSIS — Z91.89 INCREASED RISK OF BREAST CANCER: ICD-10-CM

## 2022-03-21 PROCEDURE — 3008F PR BODY MASS INDEX (BMI) DOCUMENTED: ICD-10-PCS | Mod: CPTII,S$GLB,, | Performed by: PHYSICIAN ASSISTANT

## 2022-03-21 PROCEDURE — 99999 PR PBB SHADOW E&M-EST. PATIENT-LVL IV: ICD-10-PCS | Mod: PBBFAC,,, | Performed by: PHYSICIAN ASSISTANT

## 2022-03-21 PROCEDURE — 3077F PR MOST RECENT SYSTOLIC BLOOD PRESSURE >= 140 MM HG: ICD-10-PCS | Mod: CPTII,S$GLB,, | Performed by: PHYSICIAN ASSISTANT

## 2022-03-21 PROCEDURE — 99214 PR OFFICE/OUTPT VISIT, EST, LEVL IV, 30-39 MIN: ICD-10-PCS | Mod: S$GLB,,, | Performed by: PHYSICIAN ASSISTANT

## 2022-03-21 PROCEDURE — 3078F DIAST BP <80 MM HG: CPT | Mod: CPTII,S$GLB,, | Performed by: PHYSICIAN ASSISTANT

## 2022-03-21 PROCEDURE — 3008F BODY MASS INDEX DOCD: CPT | Mod: CPTII,S$GLB,, | Performed by: PHYSICIAN ASSISTANT

## 2022-03-21 PROCEDURE — 99999 PR PBB SHADOW E&M-EST. PATIENT-LVL IV: CPT | Mod: PBBFAC,,, | Performed by: PHYSICIAN ASSISTANT

## 2022-03-21 PROCEDURE — 99214 OFFICE O/P EST MOD 30 MIN: CPT | Mod: S$GLB,,, | Performed by: PHYSICIAN ASSISTANT

## 2022-03-21 PROCEDURE — 1160F PR REVIEW ALL MEDS BY PRESCRIBER/CLIN PHARMACIST DOCUMENTED: ICD-10-PCS | Mod: CPTII,S$GLB,, | Performed by: PHYSICIAN ASSISTANT

## 2022-03-21 PROCEDURE — 3078F PR MOST RECENT DIASTOLIC BLOOD PRESSURE < 80 MM HG: ICD-10-PCS | Mod: CPTII,S$GLB,, | Performed by: PHYSICIAN ASSISTANT

## 2022-03-21 PROCEDURE — 1159F MED LIST DOCD IN RCRD: CPT | Mod: CPTII,S$GLB,, | Performed by: PHYSICIAN ASSISTANT

## 2022-03-21 PROCEDURE — 1159F PR MEDICATION LIST DOCUMENTED IN MEDICAL RECORD: ICD-10-PCS | Mod: CPTII,S$GLB,, | Performed by: PHYSICIAN ASSISTANT

## 2022-03-21 PROCEDURE — 3077F SYST BP >= 140 MM HG: CPT | Mod: CPTII,S$GLB,, | Performed by: PHYSICIAN ASSISTANT

## 2022-03-21 PROCEDURE — 1160F RVW MEDS BY RX/DR IN RCRD: CPT | Mod: CPTII,S$GLB,, | Performed by: PHYSICIAN ASSISTANT

## 2022-03-21 NOTE — PROGRESS NOTES
History:       Referring Provider:   Azalea Matias MD  [address] [place] [address]      HPI:   Amita Lewis presents for follow up of increased risk of breast cancer.  Previously seen by Sandrita Reece with Breast Surgery on 2019 who calculated her Tyrer-Cuzick score to be 25.8%. Had one breast MRI on 2019 that was benign.  Denies breast mass, changes in nipple, or drainage.      Personal history of cancer: no; Precancerous cervix s/p leep. Severely dysplastic nevus- scheduled for MOHs  Prior chest wall radiation at ages 10-30: no  Genetic testing: None  Ashkenazi inheritance: no  OB/Gyn history:    Number of pregnancies & age at first gestation:    Age of menarche: 11 y/o  Age of menopause: 51 y/o   Body mass index is 34.25 kg/m².   HRT Use: None   Breastfeeding: N/a   Number of previous biopsies:  Right breast in [place]- Benign per patient   Breast density: Scattered fibroglandular density    Family History:  Mother- Breast cancer at 65 ( at 79)  Father- Lung cancer  P Aunt- Cancer?    Tyrer-Cuzick Score:   18.7% (re-calculated in clinic today).   Ronna Model 5 Year Risk: 3.7% (average is 1.8% for 60 year old female)    Past Medical   Past Medical History:   Diagnosis Date    Abnormal Pap smear     Asthma     Depression 3/17/2016    Environmental and seasonal allergies 3/17/2016    Heartburn 3/17/2016    HLD (hyperlipidemia) 2016    ASCVD 2018 6.3%; can't tolerate statins    Menopausal syndrome (hot flashes) 3/17/2016    Mild intermittent asthma without complication 3/17/2016    Obesity (BMI 30-39.9) 3/17/2016    Sciatica 3/17/2016     Patient Active Problem List   Diagnosis    Obesity (BMI 30-39.9)    Vitamin D deficiency    Heartburn    Depression    Menopausal syndrome (hot flashes)    Mild intermittent asthma without complication    Sciatica    Environmental and seasonal allergies    HLD (hyperlipidemia)    Candidal skin infection     Claudication    Sacroiliac joint pain    Cat bite    Tonsil stone    Lumbar radiculopathy    DDD (degenerative disc disease), lumbar    Left leg pain    PVD (peripheral vascular disease) with claudication    Atherosclerosis of artery of extremity with intermittent claudication    Bilateral leg edema    Venous insufficiency    Edema    Chronic heel pain, left       Social History   Social History     Tobacco Use    Smoking status: Former Smoker     Packs/day: 0.00     Years: 40.00     Pack years: 0.00     Types: Cigarettes    Smokeless tobacco: Never Used   Substance Use Topics    Alcohol use: Yes     Alcohol/week: 1.0 standard drink     Types: 1 Glasses of wine per week     Comment: 3 x a month    Drug use: No       Family History  Family History   Problem Relation Age of Onset    Cancer Father         lung; non smoker, worked in [employer] and gas station    Alcohol abuse Father     Early death Father     Breast cancer Mother     Heart failure Mother     Diabetes Mellitus Mother         ?secondary to chronic steroids    Diabetes Mother     Heart disease Mother     Alcohol abuse Brother     Ovarian cancer Neg Hx     Hypertension Neg Hx        Medications    Current Outpatient Medications:     albuterol (PROAIR HFA) 90 mcg/actuation inhaler, Inhale 2 puffs into the lungs every 6 (six) hours as needed for Wheezing or Shortness of Breath. Rescue, Disp: 25.5 g, Rfl: 3    ASPIRIN (ASPIR-81 ORAL), Take 1 tablet by mouth every evening. , Disp: , Rfl:     buPROPion (WELLBUTRIN XL) 150 MG TB24 tablet, TAKE 1 TABLET BY MOUTH ONCE DAILY, PLUS 300 MG TABLET FOR TOTAL DOSE  MG, Disp: 90 tablet, Rfl: 3    buPROPion (WELLBUTRIN XL) 300 MG 24 hr tablet, TAKE 1 TABLET BY MOUTH ONCE DAILY PLUS 150 MG TABLET FOR A TOTAL DOSE  MG DAILY, Disp: 90 tablet, Rfl: 3    cilostazoL (PLETAL) 100 MG Tab, Take 1 tablet (100 mg total) by mouth 2 (two) times daily., Disp: 180 tablet, Rfl: 3     "ECHINACEA 1X ORAL, , Disp: , Rfl:     efinaconazole (JUBLIA) 10 % Shelley, Apply 1 application topically once daily. To affected toenail.  Do not use nail polish on the nails. (Patient taking differently: Apply 1 application topically once daily. To affected toenail.  Do not use nail polish on the nails.), Disp: 8 mL, Rfl: 10    FLAXSEED ORAL, Take by mouth., Disp: , Rfl:     fluticasone propionate (FLONASE) 50 mcg/actuation nasal spray, 1 spray (50 mcg total) by Each Nostril route 2 (two) times daily as needed for Rhinitis or Allergies., Disp: 16 g, Rfl: 6    LACTOBAC NO.41/BIFIDOBACT NO.7 (PROBIOTIC-10 ORAL), Take by mouth every evening. , Disp: , Rfl:     multivitamin (THERAGRAN) per tablet, Take 1 tablet by mouth every evening. , Disp: , Rfl:     nystatin (NYSTOP) powder, APPLY TO THE AFFECTED AREA TWICE DAILY AS NEEDED FOR FOR SKIN INFECTION. RETREAT AS NEEDED, Disp: 30 g, Rfl: 3    rosuvastatin (CRESTOR) 20 MG tablet, Take 1 tablet (20 mg total) by mouth once daily., Disp: 90 tablet, Rfl: 3    sumatriptan (IMITREX) 50 MG tablet, Take 1 tab by mouth as needed for migraine headache.  Repeat dose every 2 hours as needed.  Max 200mg in 24 hours., Disp: 30 tablet, Rfl: 1    Allergies  Review of patient's allergies indicates:  No Known Allergies    Review of Systems  General: No fever, chills, or weight loss.  Chest: No chest pain, shortness of breath, or palpitations.  Breast: No pain, masses, or nipple discharge.  Vulva: No pain, lesions, or itching.  Vagina: No relaxation, itching, discharge, or lesions.  Abdomen: No pain, nausea, vomiting, diarrhea, or constipation.  Urinary: No incontinence, nocturia, frequency, or dysuria.  Extremities:  No leg cramps, edema, or calf pain.  Neurologic: No headaches, dizziness, or visual changes.    Objective:     Vitals:    03/21/22 0836   BP: (!) 143/69   Weight: 99.2 kg (218 lb 11.1 oz)   Height: 5' 7" (1.702 m)     Body mass index is 34.25 kg/m².    GENERAL:  " Well-developed, well-nourished female in no acute distress. Vital signs reviewed.   SKIN:  Warm, dry without rashes, purpura or petechiae.  EYES:  EOMs intact.  Conjunctivae normal.  HEAD:  Normocephalic.  EARS/NOSE/MOUTH/THROAT:  Hearing intact.   NECK:  Supple with good range of motion; no masses  PSYCHIATRIC:  Normal affect and mood.  Alert and Oriented x 3.   BREASTS:Symmetrical, no skin changes or visible lesions.  No palpable masses, nipple discharge bilaterally.    BREAST IMAGING  MRI:12/18/2019  Mammogram: 8/4/2021    Assessment:     Increased risk of breast cancer: This is a 61 y.o. female who presents for follow up of increased risk of breast cancer based on an elevated Tyrer Cuzick score. Recalculated TC score places her at intermediate lifetime risk, but still at increased risk in the next 5 years based on her Ronna Model Score of 3.7%. We discussed that she would benefit from annual MRI's in addition to mammograms, alternating imaging every 6 months until age 76.  Pros and cons of MRI screening were discussed. She would like to proceed with breast MRI pending cost.     Plan:   Schedule Breast MRI.  Continue annual mammogram, due 8/2022.  I also recommended two physical exams per year, one can be with her OB/GYN.      Risk reduction options with chemoprevention such as Tamoxifen or Raloxifene were discussed.  These have been shown to lower the risk of breast cancer incidence, however there is no survival benefit in patients who don't have breast cancer.  I explained the most common side effects and risks including hot flashes, thromboembolism, stroke, endometrial cancer, weight changes, and pain. She declines Chemoprevention at this time.    Reviewed lifestyle modifications that have shown benefit of reducing breast cancer risk.   Including: Limit alcohol to less than one drink per day, Exercise at least 150 minutes per week of moderate intensity aerobic activity or at least 75 minutes of vigorous  activity, Maintaining a healthy weight, Limit red meat to no more than 2-3x per week, Reduce processed foods and processed meat. Also encouraged wide variety of fruits and vegetables, specifically cruciferous vegetables.    Follow up in 1 year for CBE.     I spent a total of 30 minutes on the day of the visit.This includes face to face time and non-face to face time preparing to see the patient (eg, review of tests), obtaining and/or reviewing separately obtained history, documenting clinical information in the electronic or other health record, independently interpreting results and communicating results to the patient/family/caregiver, or care coordinator.

## 2022-04-28 ENCOUNTER — PATIENT MESSAGE (OUTPATIENT)
Dept: ADMINISTRATIVE | Facility: HOSPITAL | Age: 62
End: 2022-04-28
Payer: COMMERCIAL

## 2022-05-30 ENCOUNTER — PATIENT MESSAGE (OUTPATIENT)
Dept: ADMINISTRATIVE | Facility: HOSPITAL | Age: 62
End: 2022-05-30
Payer: COMMERCIAL

## 2022-06-08 ENCOUNTER — HOSPITAL ENCOUNTER (OUTPATIENT)
Dept: RADIOLOGY | Facility: HOSPITAL | Age: 62
Discharge: HOME | End: 2022-06-08
Attending: PHYSICIAN ASSISTANT
Payer: COMMERCIAL

## 2022-06-08 DIAGNOSIS — Z91.89 INCREASED RISK OF BREAST CANCER: ICD-10-CM

## 2022-06-08 DIAGNOSIS — Z12.39 BREAST CANCER SCREENING, HIGH RISK PATIENT: ICD-10-CM

## 2022-06-08 DIAGNOSIS — Z80.3 FAMILY HISTORY OF BREAST CANCER: ICD-10-CM

## 2022-06-08 PROCEDURE — 25500020 PHARM REV CODE 255: Performed by: PHYSICIAN ASSISTANT

## 2022-06-08 PROCEDURE — 77049 MRI BREAST W/WO CONTRAST, W/CAD, BILATERAL: ICD-10-PCS | Mod: 26,,, | Performed by: RADIOLOGY

## 2022-06-08 PROCEDURE — 77049 MRI BREAST C-+ W/CAD BI: CPT | Mod: 26,,, | Performed by: RADIOLOGY

## 2022-06-08 PROCEDURE — A9577 INJ MULTIHANCE: HCPCS | Performed by: PHYSICIAN ASSISTANT

## 2022-06-08 PROCEDURE — 77049 MRI BREAST C-+ W/CAD BI: CPT | Mod: TC

## 2022-06-08 RX ADMIN — GADOBENATE DIMEGLUMINE 20 ML: 529 INJECTION, SOLUTION INTRAVENOUS at 10:06

## 2022-06-27 DIAGNOSIS — Z12.11 SCREENING FOR COLON CANCER: ICD-10-CM

## 2022-08-15 ENCOUNTER — PATIENT OUTREACH (OUTPATIENT)
Dept: ADMINISTRATIVE | Facility: HOSPITAL | Age: 62
End: 2022-08-15
Payer: COMMERCIAL

## 2022-08-16 ENCOUNTER — LAB VISIT (OUTPATIENT)
Dept: LAB | Facility: HOSPITAL | Age: 62
End: 2022-08-16
Attending: FAMILY MEDICINE
Payer: COMMERCIAL

## 2022-08-16 ENCOUNTER — OFFICE VISIT (OUTPATIENT)
Dept: FAMILY MEDICINE | Facility: CLINIC | Age: 62
End: 2022-08-16
Attending: FAMILY MEDICINE
Payer: COMMERCIAL

## 2022-08-16 VITALS
SYSTOLIC BLOOD PRESSURE: 122 MMHG | BODY MASS INDEX: 34.89 KG/M2 | WEIGHT: 222.31 LBS | DIASTOLIC BLOOD PRESSURE: 64 MMHG | OXYGEN SATURATION: 98 % | HEART RATE: 82 BPM | HEIGHT: 67 IN

## 2022-08-16 DIAGNOSIS — E78.2 MIXED HYPERLIPIDEMIA: ICD-10-CM

## 2022-08-16 DIAGNOSIS — Z00.00 ANNUAL PHYSICAL EXAM: ICD-10-CM

## 2022-08-16 DIAGNOSIS — Z00.00 ANNUAL PHYSICAL EXAM: Primary | ICD-10-CM

## 2022-08-16 DIAGNOSIS — J45.20 MILD INTERMITTENT ASTHMA WITHOUT COMPLICATION: ICD-10-CM

## 2022-08-16 LAB
ALBUMIN SERPL BCP-MCNC: 4.2 G/DL (ref 3.5–5.2)
ALP SERPL-CCNC: 102 U/L (ref 55–135)
ALT SERPL W/O P-5'-P-CCNC: 28 U/L (ref 10–44)
ANION GAP SERPL CALC-SCNC: 12 MMOL/L (ref 8–16)
AST SERPL-CCNC: 24 U/L (ref 10–40)
BASOPHILS # BLD AUTO: 0.08 K/UL (ref 0–0.2)
BASOPHILS NFR BLD: 0.9 % (ref 0–1.9)
BILIRUB SERPL-MCNC: 0.5 MG/DL (ref 0.1–1)
BILIRUB UR QL STRIP: NEGATIVE
BUN SERPL-MCNC: 16 MG/DL (ref 6–20)
CALCIUM SERPL-MCNC: 10.3 MG/DL (ref 8.7–10.5)
CHLORIDE SERPL-SCNC: 103 MMOL/L (ref 95–110)
CHOLEST SERPL-MCNC: 165 MG/DL (ref 120–199)
CHOLEST/HDLC SERPL: 3.4 {RATIO} (ref 2–5)
CLARITY UR REFRACT.AUTO: CLEAR
CO2 SERPL-SCNC: 25 MMOL/L (ref 23–29)
COLOR UR AUTO: COLORLESS
CREAT SERPL-MCNC: 0.7 MG/DL (ref 0.5–1.4)
DIFFERENTIAL METHOD: ABNORMAL
EOSINOPHIL # BLD AUTO: 0.3 K/UL (ref 0–0.5)
EOSINOPHIL NFR BLD: 3.4 % (ref 0–8)
ERYTHROCYTE [DISTWIDTH] IN BLOOD BY AUTOMATED COUNT: 13.3 % (ref 11.5–14.5)
EST. GFR  (NO RACE VARIABLE): >60 ML/MIN/1.73 M^2
GLUCOSE SERPL-MCNC: 98 MG/DL (ref 70–110)
GLUCOSE UR QL STRIP: NEGATIVE
HCT VFR BLD AUTO: 43.2 % (ref 37–48.5)
HDLC SERPL-MCNC: 48 MG/DL (ref 40–75)
HDLC SERPL: 29.1 % (ref 20–50)
HGB BLD-MCNC: 13.1 G/DL (ref 12–16)
HGB UR QL STRIP: NEGATIVE
IMM GRANULOCYTES # BLD AUTO: 0.03 K/UL (ref 0–0.04)
IMM GRANULOCYTES NFR BLD AUTO: 0.3 % (ref 0–0.5)
KETONES UR QL STRIP: NEGATIVE
LDLC SERPL CALC-MCNC: 86 MG/DL (ref 63–159)
LEUKOCYTE ESTERASE UR QL STRIP: NEGATIVE
LYMPHOCYTES # BLD AUTO: 1.9 K/UL (ref 1–4.8)
LYMPHOCYTES NFR BLD: 22.4 % (ref 18–48)
MCH RBC QN AUTO: 28.9 PG (ref 27–31)
MCHC RBC AUTO-ENTMCNC: 30.3 G/DL (ref 32–36)
MCV RBC AUTO: 95 FL (ref 82–98)
MONOCYTES # BLD AUTO: 0.5 K/UL (ref 0.3–1)
MONOCYTES NFR BLD: 5.8 % (ref 4–15)
NEUTROPHILS # BLD AUTO: 5.8 K/UL (ref 1.8–7.7)
NEUTROPHILS NFR BLD: 67.2 % (ref 38–73)
NITRITE UR QL STRIP: NEGATIVE
NONHDLC SERPL-MCNC: 117 MG/DL
NRBC BLD-RTO: 0 /100 WBC
PH UR STRIP: 6 [PH] (ref 5–8)
PLATELET # BLD AUTO: 343 K/UL (ref 150–450)
PMV BLD AUTO: 12.3 FL (ref 9.2–12.9)
POTASSIUM SERPL-SCNC: 4.3 MMOL/L (ref 3.5–5.1)
PROT SERPL-MCNC: 7.7 G/DL (ref 6–8.4)
PROT UR QL STRIP: NEGATIVE
RBC # BLD AUTO: 4.54 M/UL (ref 4–5.4)
SODIUM SERPL-SCNC: 140 MMOL/L (ref 136–145)
SP GR UR STRIP: 1.01 (ref 1–1.03)
TRIGL SERPL-MCNC: 155 MG/DL (ref 30–150)
TSH SERPL DL<=0.005 MIU/L-ACNC: 1.99 UIU/ML (ref 0.4–4)
URN SPEC COLLECT METH UR: ABNORMAL
WBC # BLD AUTO: 8.58 K/UL (ref 3.9–12.7)

## 2022-08-16 PROCEDURE — 99999 PR PBB SHADOW E&M-EST. PATIENT-LVL IV: CPT | Mod: PBBFAC,,, | Performed by: FAMILY MEDICINE

## 2022-08-16 PROCEDURE — 36415 COLL VENOUS BLD VENIPUNCTURE: CPT | Mod: PO | Performed by: FAMILY MEDICINE

## 2022-08-16 PROCEDURE — 1160F RVW MEDS BY RX/DR IN RCRD: CPT | Mod: CPTII,S$GLB,, | Performed by: FAMILY MEDICINE

## 2022-08-16 PROCEDURE — 3008F BODY MASS INDEX DOCD: CPT | Mod: CPTII,S$GLB,, | Performed by: FAMILY MEDICINE

## 2022-08-16 PROCEDURE — 1159F PR MEDICATION LIST DOCUMENTED IN MEDICAL RECORD: ICD-10-PCS | Mod: CPTII,S$GLB,, | Performed by: FAMILY MEDICINE

## 2022-08-16 PROCEDURE — 3008F PR BODY MASS INDEX (BMI) DOCUMENTED: ICD-10-PCS | Mod: CPTII,S$GLB,, | Performed by: FAMILY MEDICINE

## 2022-08-16 PROCEDURE — 3074F PR MOST RECENT SYSTOLIC BLOOD PRESSURE < 130 MM HG: ICD-10-PCS | Mod: CPTII,S$GLB,, | Performed by: FAMILY MEDICINE

## 2022-08-16 PROCEDURE — 99999 PR PBB SHADOW E&M-EST. PATIENT-LVL IV: ICD-10-PCS | Mod: PBBFAC,,, | Performed by: FAMILY MEDICINE

## 2022-08-16 PROCEDURE — 3078F PR MOST RECENT DIASTOLIC BLOOD PRESSURE < 80 MM HG: ICD-10-PCS | Mod: CPTII,S$GLB,, | Performed by: FAMILY MEDICINE

## 2022-08-16 PROCEDURE — 84443 ASSAY THYROID STIM HORMONE: CPT | Performed by: FAMILY MEDICINE

## 2022-08-16 PROCEDURE — 85025 COMPLETE CBC W/AUTO DIFF WBC: CPT | Performed by: FAMILY MEDICINE

## 2022-08-16 PROCEDURE — 3074F SYST BP LT 130 MM HG: CPT | Mod: CPTII,S$GLB,, | Performed by: FAMILY MEDICINE

## 2022-08-16 PROCEDURE — 1160F PR REVIEW ALL MEDS BY PRESCRIBER/CLIN PHARMACIST DOCUMENTED: ICD-10-PCS | Mod: CPTII,S$GLB,, | Performed by: FAMILY MEDICINE

## 2022-08-16 PROCEDURE — 81003 URINALYSIS AUTO W/O SCOPE: CPT | Performed by: FAMILY MEDICINE

## 2022-08-16 PROCEDURE — 1159F MED LIST DOCD IN RCRD: CPT | Mod: CPTII,S$GLB,, | Performed by: FAMILY MEDICINE

## 2022-08-16 PROCEDURE — 80061 LIPID PANEL: CPT | Performed by: FAMILY MEDICINE

## 2022-08-16 PROCEDURE — 80053 COMPREHEN METABOLIC PANEL: CPT | Performed by: FAMILY MEDICINE

## 2022-08-16 PROCEDURE — 99396 PREV VISIT EST AGE 40-64: CPT | Mod: S$GLB,,, | Performed by: FAMILY MEDICINE

## 2022-08-16 PROCEDURE — 99396 PR PREVENTIVE VISIT,EST,40-64: ICD-10-PCS | Mod: S$GLB,,, | Performed by: FAMILY MEDICINE

## 2022-08-16 PROCEDURE — 3078F DIAST BP <80 MM HG: CPT | Mod: CPTII,S$GLB,, | Performed by: FAMILY MEDICINE

## 2022-08-16 RX ORDER — ALBUTEROL SULFATE 90 UG/1
2 AEROSOL, METERED RESPIRATORY (INHALATION) EVERY 6 HOURS PRN
Qty: 25.5 G | Refills: 3 | Status: SHIPPED | OUTPATIENT
Start: 2022-08-16 | End: 2022-09-08 | Stop reason: SDUPTHER

## 2022-08-22 NOTE — PROGRESS NOTES
Subjective:       Patient ID: Amita Lewis is a 61 y.o. female.    Chief Complaint: Annual Exam    HPI   Pt is here for annual exam pt is well no sob/cp no change in bowel habits no brbpr  Pt denies n/v/f/c/d/c no cough chest congestion no sore throat  Pt denies dysuria hematuria no vaginal bleeding   Pt has asthma no sob/wheezing no acute complaints  Pt has hypercholesterolemia she is on a statin no muscle aches   Pt has depression anxiety stable on wellbutrin no si/hi no panic attacks   Review of Systems   Constitutional: Negative for activity change, chills, diaphoresis, fatigue, fever and unexpected weight change.   HENT: Negative for congestion, ear discharge, ear pain, hearing loss, postnasal drip, rhinorrhea, sinus pressure, sneezing, sore throat, trouble swallowing and voice change.    Eyes: Negative for photophobia, discharge, redness, itching and visual disturbance.   Respiratory: Negative for cough, chest tightness, shortness of breath and wheezing.    Cardiovascular: Positive for palpitations. Negative for chest pain and leg swelling.   Gastrointestinal: Negative for abdominal pain, anal bleeding, blood in stool, constipation, diarrhea, nausea, rectal pain and vomiting.   Endocrine: Negative for polydipsia and polyuria.   Genitourinary: Negative for difficulty urinating, dyspareunia, dysuria, flank pain, frequency, hematuria, menstrual problem, pelvic pain, urgency, vaginal bleeding and vaginal discharge.   Musculoskeletal: Negative for arthralgias, back pain, joint swelling and neck pain.   Skin: Negative for color change and rash.   Neurological: Negative for dizziness, speech difficulty, weakness, light-headedness, numbness and headaches.   Hematological: Does not bruise/bleed easily.   Psychiatric/Behavioral: Positive for dysphoric mood. Negative for agitation, confusion, decreased concentration, sleep disturbance and suicidal ideas. The patient is not nervous/anxious.        Objective:      "/64   Pulse 82   Ht 5' 7" (1.702 m)   Wt 100.8 kg (222 lb 4.8 oz)   LMP 10/20/2013   SpO2 98%   BMI 34.82 kg/m²     Physical Exam  Constitutional:       Appearance: She is well-developed. She is obese. She is not ill-appearing.   HENT:      Head: Normocephalic and atraumatic.      Right Ear: Tympanic membrane and external ear normal.      Left Ear: Tympanic membrane and external ear normal.      Nose: Nose normal.   Eyes:      General:         Right eye: No discharge.         Left eye: No discharge.      Conjunctiva/sclera: Conjunctivae normal.      Pupils: Pupils are equal, round, and reactive to light.   Neck:      Thyroid: No thyromegaly.   Cardiovascular:      Rate and Rhythm: Normal rate and regular rhythm.      Heart sounds: Normal heart sounds. No murmur heard.    No friction rub. No gallop.   Pulmonary:      Effort: Pulmonary effort is normal.      Breath sounds: Normal breath sounds. No wheezing or rales.   Abdominal:      General: Bowel sounds are normal. There is no distension.      Palpations: Abdomen is soft.      Tenderness: There is no abdominal tenderness. There is no guarding or rebound.   Genitourinary:     Vagina: Normal.   Musculoskeletal:         General: No tenderness. Normal range of motion.      Cervical back: Normal range of motion and neck supple.   Lymphadenopathy:      Cervical: No cervical adenopathy.   Skin:     General: Skin is warm and dry.      Findings: No erythema or rash.   Neurological:      General: No focal deficit present.      Mental Status: She is alert and oriented to person, place, and time.      Cranial Nerves: No cranial nerve deficit.      Motor: No abnormal muscle tone.      Coordination: Coordination normal.   Psychiatric:         Behavior: Behavior normal.         Thought Content: Thought content normal.         Judgment: Judgment normal.         Assessment:       1. Annual physical exam    2. Mild intermittent asthma without complication    3. Mixed " "hyperlipidemia        Plan:     orders cmp cbc lipid tsh urine  Cont meds  Relaxation techniques  Avoid caffeine and etoh  Graded exercise  rtc 6 months    Health maintenance  Lipid ordered  Flu in fall  Tetanus q 10 years  Mammogram discussed  covid discussed  Colonoscopy discussed       "This note will not be shared with the patient."     "

## 2022-09-06 ENCOUNTER — PATIENT MESSAGE (OUTPATIENT)
Dept: FAMILY MEDICINE | Facility: CLINIC | Age: 62
End: 2022-09-06
Payer: COMMERCIAL

## 2022-09-06 ENCOUNTER — PATIENT MESSAGE (OUTPATIENT)
Dept: VASCULAR SURGERY | Facility: CLINIC | Age: 62
End: 2022-09-06
Payer: COMMERCIAL

## 2022-09-08 ENCOUNTER — PATIENT MESSAGE (OUTPATIENT)
Dept: OBSTETRICS AND GYNECOLOGY | Facility: CLINIC | Age: 62
End: 2022-09-08
Payer: COMMERCIAL

## 2022-09-08 DIAGNOSIS — J45.20 MILD INTERMITTENT ASTHMA WITHOUT COMPLICATION: ICD-10-CM

## 2022-09-08 RX ORDER — ALBUTEROL SULFATE 90 UG/1
2 AEROSOL, METERED RESPIRATORY (INHALATION) EVERY 6 HOURS PRN
Qty: 25.5 G | Refills: 3 | Status: SHIPPED | OUTPATIENT
Start: 2022-09-08 | End: 2022-11-01

## 2022-09-09 ENCOUNTER — HOSPITAL ENCOUNTER (OUTPATIENT)
Dept: RADIOLOGY | Facility: OTHER | Age: 62
Discharge: HOME | End: 2022-09-09
Attending: PHYSICIAN ASSISTANT
Payer: COMMERCIAL

## 2022-09-09 DIAGNOSIS — Z12.31 SCREENING MAMMOGRAM, ENCOUNTER FOR: ICD-10-CM

## 2022-09-09 PROCEDURE — 77063 BREAST TOMOSYNTHESIS BI: CPT | Mod: TC

## 2022-09-09 PROCEDURE — 77063 MAMMO DIGITAL SCREENING BILAT WITH TOMO: ICD-10-PCS | Mod: 26,,, | Performed by: RADIOLOGY

## 2022-09-09 PROCEDURE — 77067 MAMMO DIGITAL SCREENING BILAT WITH TOMO: ICD-10-PCS | Mod: 26,,, | Performed by: RADIOLOGY

## 2022-09-09 PROCEDURE — 77063 BREAST TOMOSYNTHESIS BI: CPT | Mod: 26,,, | Performed by: RADIOLOGY

## 2022-09-09 PROCEDURE — 77067 SCR MAMMO BI INCL CAD: CPT | Mod: 26,,, | Performed by: RADIOLOGY

## 2022-09-12 ENCOUNTER — PATIENT MESSAGE (OUTPATIENT)
Dept: FAMILY MEDICINE | Facility: CLINIC | Age: 62
End: 2022-09-12
Payer: COMMERCIAL

## 2022-09-12 DIAGNOSIS — B37.2 CANDIDAL SKIN INFECTION: ICD-10-CM

## 2022-09-14 RX ORDER — NYSTATIN 100000 [USP'U]/G
POWDER TOPICAL
Qty: 30 G | Refills: 3 | Status: SHIPPED | OUTPATIENT
Start: 2022-09-14 | End: 2023-11-30 | Stop reason: SDUPTHER

## 2022-10-04 ENCOUNTER — OFFICE VISIT (OUTPATIENT)
Dept: OBSTETRICS AND GYNECOLOGY | Facility: CLINIC | Age: 62
End: 2022-10-04
Attending: OBSTETRICS & GYNECOLOGY
Payer: COMMERCIAL

## 2022-10-04 VITALS
SYSTOLIC BLOOD PRESSURE: 130 MMHG | DIASTOLIC BLOOD PRESSURE: 68 MMHG | WEIGHT: 214.94 LBS | BODY MASS INDEX: 33.74 KG/M2 | HEIGHT: 67 IN

## 2022-10-04 DIAGNOSIS — Z01.419 WELL WOMAN EXAM: Primary | ICD-10-CM

## 2022-10-04 PROCEDURE — 3008F BODY MASS INDEX DOCD: CPT | Mod: CPTII,S$GLB,, | Performed by: OBSTETRICS & GYNECOLOGY

## 2022-10-04 PROCEDURE — 88175 CYTOPATH C/V AUTO FLUID REDO: CPT | Performed by: OBSTETRICS & GYNECOLOGY

## 2022-10-04 PROCEDURE — 99396 PR PREVENTIVE VISIT,EST,40-64: ICD-10-PCS | Mod: S$GLB,,, | Performed by: OBSTETRICS & GYNECOLOGY

## 2022-10-04 PROCEDURE — 87624 HPV HI-RISK TYP POOLED RSLT: CPT | Performed by: OBSTETRICS & GYNECOLOGY

## 2022-10-04 PROCEDURE — 3075F SYST BP GE 130 - 139MM HG: CPT | Mod: CPTII,S$GLB,, | Performed by: OBSTETRICS & GYNECOLOGY

## 2022-10-04 PROCEDURE — 99999 PR PBB SHADOW E&M-EST. PATIENT-LVL III: ICD-10-PCS | Mod: PBBFAC,,, | Performed by: OBSTETRICS & GYNECOLOGY

## 2022-10-04 PROCEDURE — 1159F MED LIST DOCD IN RCRD: CPT | Mod: CPTII,S$GLB,, | Performed by: OBSTETRICS & GYNECOLOGY

## 2022-10-04 PROCEDURE — 3008F PR BODY MASS INDEX (BMI) DOCUMENTED: ICD-10-PCS | Mod: CPTII,S$GLB,, | Performed by: OBSTETRICS & GYNECOLOGY

## 2022-10-04 PROCEDURE — 3075F PR MOST RECENT SYSTOLIC BLOOD PRESS GE 130-139MM HG: ICD-10-PCS | Mod: CPTII,S$GLB,, | Performed by: OBSTETRICS & GYNECOLOGY

## 2022-10-04 PROCEDURE — 99396 PREV VISIT EST AGE 40-64: CPT | Mod: S$GLB,,, | Performed by: OBSTETRICS & GYNECOLOGY

## 2022-10-04 PROCEDURE — 1159F PR MEDICATION LIST DOCUMENTED IN MEDICAL RECORD: ICD-10-PCS | Mod: CPTII,S$GLB,, | Performed by: OBSTETRICS & GYNECOLOGY

## 2022-10-04 PROCEDURE — 3078F PR MOST RECENT DIASTOLIC BLOOD PRESSURE < 80 MM HG: ICD-10-PCS | Mod: CPTII,S$GLB,, | Performed by: OBSTETRICS & GYNECOLOGY

## 2022-10-04 PROCEDURE — 3078F DIAST BP <80 MM HG: CPT | Mod: CPTII,S$GLB,, | Performed by: OBSTETRICS & GYNECOLOGY

## 2022-10-04 PROCEDURE — 99999 PR PBB SHADOW E&M-EST. PATIENT-LVL III: CPT | Mod: PBBFAC,,, | Performed by: OBSTETRICS & GYNECOLOGY

## 2022-10-04 PROCEDURE — 1160F RVW MEDS BY RX/DR IN RCRD: CPT | Mod: CPTII,S$GLB,, | Performed by: OBSTETRICS & GYNECOLOGY

## 2022-10-04 PROCEDURE — 1160F PR REVIEW ALL MEDS BY PRESCRIBER/CLIN PHARMACIST DOCUMENTED: ICD-10-PCS | Mod: CPTII,S$GLB,, | Performed by: OBSTETRICS & GYNECOLOGY

## 2022-10-04 NOTE — PROGRESS NOTES
CC: Well woman exam    Amita Lewis is a 61 y.o. female  presents for well woman exam.  LMP: Patient's last menstrual period was 10/20/2013..  No gyn issues, problems, or complaints.          &  LEEP.  No abnormal pap after that.    10/15 Pap negative;     Pap & HPV negative.    1217 pap normal   pap normal   pap & hpv negative   pap normal    Plan yearly pap with HPV every 3 years    T/C 22%.  Has MMG alternating with MRI, followed by breast center    Past Medical History:   Diagnosis Date    Abnormal Pap smear     Asthma     Depression 2016    Environmental and seasonal allergies 2016    Heartburn 2016    HLD (hyperlipidemia) 2016    ASCVD  6.3%; can't tolerate statins    Malignant melanoma of skin, unspecified     facial, near mouth, resected with negative margins per patient    Menopausal syndrome (hot flashes) 2016    Mild intermittent asthma without complication 2016    Obesity (BMI 30-39.9) 2016    Sciatica 2016     Past Surgical History:   Procedure Laterality Date    ANGIOGRAPHY OF LOWER EXTREMITY Left 2020    Procedure: ANGIOGRAM, LOWER EXTREMITY;  Surgeon: RAYMUNDO Levine II, MD;  Location: [address];  Service: Vascular;  Laterality: Left;    AORTOGRAPHY N/A 2020    Procedure: AORTOGRAM;  Surgeon: RAYMUNDO Levine II, MD;  Location: [address];  Service: Vascular;  Laterality: N/A;    CERVICAL BIOPSY  W/ LOOP ELECTRODE EXCISION      ENDARTERECTOMY OF FEMORAL ARTERY Left 2020    Procedure: Left Common Femoral Approach Endarterectomy with Left SFA Angioplasty Stenting Possible Bypass;  Surgeon: RAYMUNDO Levine II, MD;  Location: [address];  Service: Vascular;  Laterality: Left;  contrast: 52ml  fluoro time: 16.3 min  mGy: 921.68    INJECTION OF JOINT Bilateral 2018    Procedure: INJECTION-JOINT;  Surgeon: Lynette Rowley MD;  Location: [facility];  Service: Pain  Management;  Laterality: Bilateral;  B/L SI Joint Injs  02512, 51045    INJECTION OF JOINT Bilateral 9/18/2018    Procedure: INJECTION, JOINT  Bilateral SI joint;  Surgeon: Lynette Rowley MD;  Location: [facility];  Service: Pain Management;  Laterality: Bilateral;    melona      melona remove    microdiscetomy      L4-L5    OVARIAN CYST REMOVAL  2001    TRANSFORAMINAL EPIDURAL INJECTION OF STEROID Left 3/21/2019    Procedure: INJECTION, STEROID, EPIDURAL, TRANSFORAMINAL APPROACH, L4 AND L5;  Surgeon: Lynette Rowley MD;  Location: [facility];  Service: Pain Management;  Laterality: Left;     Social History     Socioeconomic History    Marital status:    Tobacco Use    Smoking status: Former     Packs/day: 0.00     Years: 40.00     Pack years: 0.00     Types: Cigarettes    Smokeless tobacco: Never   Substance and Sexual Activity    Alcohol use: Yes     Alcohol/week: 1.0 standard drink     Types: 1 Glasses of wine per week     Comment: 3 x a month    Drug use: No    Sexual activity: Yes     Partners: Female     Birth control/protection: None     Comment: (partner Ivone is a trans woman)   Social History Narrative    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    Works for [employer]    Walks a lot at work and for exercise     Social Determinants of Health     Financial Resource Strain: Low Risk     Difficulty of Paying Living Expenses: Not hard at all   Food Insecurity: No Food Insecurity    Worried About Running Out of Food in the Last Year: Never true    Ran Out of Food in the Last Year: Never true   Transportation Needs: No Transportation Needs    Lack of Transportation (Medical): No    Lack of Transportation (Non-Medical): No   Physical Activity: Insufficiently Active    Days of Exercise per Week: 3 days    Minutes of Exercise per Session: 30 min   Stress: No Stress Concern Present    Feeling of Stress : Only a little   Social Connections: Unknown    Frequency of Communication with  "Friends and Family: Once a week    Frequency of Social Gatherings with Friends and Family: Once a week    Active Member of Clubs or Organizations: Yes    Attends Club or Organization Meetings: More than 4 times per year    Marital Status:    Housing Stability: Low Risk     Unable to Pay for Housing in the Last Year: No    Number of Places Lived in the Last Year: 1    Unstable Housing in the Last Year: No     Family History   Problem Relation Age of Onset    Cancer Father         lung; non smoker, worked in [employer] and gas station    Alcohol abuse Father     Early death Father     Breast cancer Mother     Heart failure Mother     Diabetes Mellitus Mother         ?secondary to chronic steroids    Diabetes Mother     Heart disease Mother     Alcohol abuse Brother     Ovarian cancer Neg Hx     Hypertension Neg Hx      OB History          0    Para        Term   0            AB        Living             SAB        IAB        Ectopic        Multiple        Live Births                     /68   Ht 5' 7" (1.702 m)   Wt 97.5 kg (214 lb 15.2 oz)   LMP 10/20/2013   BMI 33.67 kg/m²       ROS:  GENERAL: Denies weight gain or weight loss. Feeling well overall.   SKIN: Denies rash or lesions.   HEAD: Denies head injury or headache.   NODES: Denies enlarged lymph nodes.   CHEST: Denies chest pain or shortness of breath.   CARDIOVASCULAR: Denies palpitations or left sided chest pain.   ABDOMEN: No abdominal pain, constipation, diarrhea, nausea, vomiting or rectal bleeding.   URINARY: No frequency, dysuria, hematuria, or burning on urination.  REPRODUCTIVE: See HPI.   BREASTS: The patient performs breast self-examination and denies pain, lumps, or nipple discharge.   HEMATOLOGIC: No easy bruisability or excessive bleeding.   MUSCULOSKELETAL: Denies joint pain or swelling.   NEUROLOGIC: Denies syncope or weakness.   PSYCHIATRIC: Denies depression, anxiety or mood swings.    PHYSICAL EXAM:  APPEARANCE: " Well nourished, well developed, in no acute distress.  AFFECT: WNL, alert and oriented x 3  SKIN: No acne or hirsutism  NECK: Neck symmetric without masses or thyromegaly  NODES: No inguinal, cervical, axillary, or femoral lymph node enlargement  CHEST: Good respiratory effect  ABDOMEN: Soft.  No tenderness or masses.  No hepatosplenomegaly.  No hernias.  BREASTS: Symmetrical, no skin changes or visible lesions.  No palpable masses, nipple discharge bilaterally.  PELVIC: Normal external genitalia without lesions.  Normal hair distribution.  Adequate perineal body, normal urethral meatus.  Vagina moist and well rugated without lesions or discharge.  Cervix pink, without lesions, discharge or tenderness.  No significant cystocele or rectocele.  Bimanual exam shows uterus to be normal size, regular, mobile and nontender.  Adnexa without masses or tenderness.    EXTREMITIES: No edema.      ASSESSMENT & PLAN    ICD-10-CM ICD-9-CM    1. Well woman exam  Z01.419 V72.31 Liquid-Based Pap Smear, Screening      HPV High Risk Genotypes, PCR             Patient was counseled today on A.C.S. Pap guidelines and recommendations for yearly pelvic exams, mammograms and monthly self breast exams; to see her PCP for other health maintenance.

## 2022-11-01 DIAGNOSIS — J45.20 MILD INTERMITTENT ASTHMA WITHOUT COMPLICATION: ICD-10-CM

## 2022-11-01 RX ORDER — ALBUTEROL SULFATE 90 UG/1
AEROSOL, METERED RESPIRATORY (INHALATION)
Qty: 25.5 G | Refills: 0 | Status: SHIPPED | OUTPATIENT
Start: 2022-11-01 | End: 2023-09-15

## 2022-11-01 NOTE — TELEPHONE ENCOUNTER
Refill Decision Note   Amita Bradleyreneewa  is requesting a refill authorization.  Brief Assessment and Rationale for Refill:  Approve     Medication Therapy Plan:  Requesting while waiting for mail order    Medication Reconciliation Completed: No   Comments:     No Care Gaps recommended.     Note composed:5:19 PM 11/01/2022

## 2022-11-01 NOTE — TELEPHONE ENCOUNTER
No new care gaps identified.  [facility] Embedded Care Gaps. Reference number: [id number]. 11/01/2022   3:17:34 AM LOLAT

## 2023-01-03 ENCOUNTER — PATIENT MESSAGE (OUTPATIENT)
Dept: OBSTETRICS AND GYNECOLOGY | Facility: CLINIC | Age: 63
End: 2023-01-03
Payer: COMMERCIAL

## 2023-01-03 ENCOUNTER — PATIENT MESSAGE (OUTPATIENT)
Dept: FAMILY MEDICINE | Facility: CLINIC | Age: 63
End: 2023-01-03
Payer: COMMERCIAL

## 2023-01-03 ENCOUNTER — PATIENT MESSAGE (OUTPATIENT)
Dept: VASCULAR SURGERY | Facility: CLINIC | Age: 63
End: 2023-01-03
Payer: COMMERCIAL

## 2023-01-03 DIAGNOSIS — F32.A DEPRESSION, UNSPECIFIED DEPRESSION TYPE: ICD-10-CM

## 2023-01-03 RX ORDER — BUPROPION HYDROCHLORIDE 300 MG/1
TABLET ORAL
Qty: 90 TABLET | Refills: 0 | Status: CANCELLED | OUTPATIENT
Start: 2023-01-03

## 2023-01-03 RX ORDER — BUPROPION HYDROCHLORIDE 150 MG/1
TABLET ORAL
Qty: 90 TABLET | Refills: 0 | Status: CANCELLED | OUTPATIENT
Start: 2023-01-03

## 2023-01-04 ENCOUNTER — TELEPHONE (OUTPATIENT)
Dept: OBSTETRICS AND GYNECOLOGY | Facility: CLINIC | Age: 63
End: 2023-01-04
Payer: COMMERCIAL

## 2023-01-04 DIAGNOSIS — F32.A DEPRESSION, UNSPECIFIED DEPRESSION TYPE: ICD-10-CM

## 2023-01-04 RX ORDER — BUPROPION HYDROCHLORIDE 150 MG/1
TABLET ORAL
Qty: 90 TABLET | Refills: 3 | Status: SHIPPED | OUTPATIENT
Start: 2023-01-04 | End: 2023-11-30 | Stop reason: SDUPTHER

## 2023-01-04 RX ORDER — BUPROPION HYDROCHLORIDE 150 MG/1
TABLET ORAL
Qty: 90 TABLET | Refills: 3 | Status: SHIPPED | OUTPATIENT
Start: 2023-01-04 | End: 2023-01-04

## 2023-01-04 RX ORDER — BUPROPION HYDROCHLORIDE 300 MG/1
TABLET ORAL
Qty: 90 TABLET | Refills: 3 | Status: SHIPPED | OUTPATIENT
Start: 2023-01-04 | End: 2023-11-30 | Stop reason: SDUPTHER

## 2023-01-04 RX ORDER — ROSUVASTATIN CALCIUM 20 MG/1
20 TABLET, COATED ORAL DAILY
Qty: 90 TABLET | Refills: 3 | Status: SHIPPED | OUTPATIENT
Start: 2023-01-04 | End: 2023-11-30 | Stop reason: SDUPTHER

## 2023-01-04 RX ORDER — BUPROPION HYDROCHLORIDE 300 MG/1
TABLET ORAL
Qty: 90 TABLET | Refills: 3 | Status: SHIPPED | OUTPATIENT
Start: 2023-01-04 | End: 2023-01-04 | Stop reason: SDUPTHER

## 2023-01-04 RX ORDER — CILOSTAZOL 100 MG/1
100 TABLET ORAL 2 TIMES DAILY
Qty: 180 TABLET | Refills: 3 | Status: SHIPPED | OUTPATIENT
Start: 2023-01-04 | End: 2023-11-28

## 2023-07-28 ENCOUNTER — OFFICE VISIT (OUTPATIENT)
Dept: URGENT CARE | Facility: CLINIC | Age: 63
End: 2023-07-28
Payer: COMMERCIAL

## 2023-07-28 ENCOUNTER — PATIENT MESSAGE (OUTPATIENT)
Dept: FAMILY MEDICINE | Facility: CLINIC | Age: 63
End: 2023-07-28
Payer: COMMERCIAL

## 2023-07-28 ENCOUNTER — TELEPHONE (OUTPATIENT)
Dept: FAMILY MEDICINE | Facility: CLINIC | Age: 63
End: 2023-07-28
Payer: COMMERCIAL

## 2023-07-28 VITALS
HEART RATE: 80 BPM | WEIGHT: 214 LBS | DIASTOLIC BLOOD PRESSURE: 66 MMHG | HEIGHT: 67 IN | RESPIRATION RATE: 16 BRPM | BODY MASS INDEX: 33.59 KG/M2 | SYSTOLIC BLOOD PRESSURE: 150 MMHG | OXYGEN SATURATION: 99 % | TEMPERATURE: 98 F

## 2023-07-28 DIAGNOSIS — L03.011 PARONYCHIA OF FINGER OF RIGHT HAND: Primary | ICD-10-CM

## 2023-07-28 PROCEDURE — 26010 DRAINAGE OF FINGER ABSCESS: CPT | Mod: S$GLB,,,

## 2023-07-28 PROCEDURE — 99203 PR OFFICE/OUTPT VISIT, NEW, LEVL III, 30-44 MIN: ICD-10-PCS | Mod: 25,S$GLB,,

## 2023-07-28 PROCEDURE — 99203 OFFICE O/P NEW LOW 30 MIN: CPT | Mod: 25,S$GLB,,

## 2023-07-28 PROCEDURE — 26010 INCISION & DRAINAGE: ICD-10-PCS | Mod: S$GLB,,,

## 2023-07-28 RX ORDER — AMOXICILLIN AND CLAVULANATE POTASSIUM 875; 125 MG/1; MG/1
1 TABLET, FILM COATED ORAL EVERY 12 HOURS
Qty: 14 TABLET | Refills: 0 | Status: SHIPPED | OUTPATIENT
Start: 2023-07-28 | End: 2023-08-04

## 2023-07-28 NOTE — PATIENT INSTRUCTIONS
- Warm epsom salt soaks to the finger 2-3 times per day for 10 minutes at a time.   - You have been given an antibiotic to treat your condition today.    - Please complete the antibiotic as directed on the bottle.   - you can take over-the-counter probiotics during and after antibiotic use to help preserve gut saroj and reduce gastrointestinal symptoms    - Rest.    - Drink plenty of fluids.    - Acetaminophen (tylenol) or Ibuprofen (advil,motrin) as directed as needed for fever/pain. Avoid tylenol if you have a history of liver disease. Do not take ibuprofen if you have a history of GI bleeding, kidney disease, or if you take blood thinners.   - Ibuprofen dosing for adults: 400 mg by mouth every 4-6 hours as needed. Max: 2400 mg/day; Info: use lowest effective dose, shortest effective treatment duration; give w/ food if GI upset occurs.  - Tylenol dosing for adults: [By mouth route, immediate-release form] Dose: 325-1000 mg by mouth every 4-6h as needed; Max: 1 g/4h and 4 g/day from all sources. [By mouth route, extended-release form] Dose: 650-1300 mg Extended Release by mouth every 8h as needed; Max: 4 g/day from all sources.     - You must understand that you have received an Urgent Care treatment only and that you may be released before all of your medical problems are known or treated.   - You, the patient, will arrange for follow up care as instructed.   - If your condition worsens or fails to improve we recommend that you receive another evaluation at the ER immediately or contact your PCP to discuss your concerns or return here.   - Follow up with your PCP or specialty clinic as directed in the next 1-2 weeks if not improved or as needed.  You can call [phone] to schedule an appointment with the appropriate provider.    If your symptoms do not improve or worsen, go to the emergency room immediately.

## 2023-07-28 NOTE — PROCEDURES
Incision & Drainage    Date/Time: 7/28/2023 10:15 AM  Performed by: Bertha Solomon PA-C  Authorized by: Bertha Solomon PA-C       Type:  Abscess (paronychia)  Body area:  Upper extremity  Location details:  Right thumb  Scalpel size: 18 g.  Incision depth: dermal    Complexity:  Simple  Drainage:  Pus, serosanguinous and bloody  Drainage amount:  Moderate  Wound treatment:  Incision  Patient tolerance:  Patient tolerated the procedure well with no immediate complications    Dressed with bactroban and pressure dressing.

## 2023-07-28 NOTE — TELEPHONE ENCOUNTER
----- Message from Rylie Cobian sent at 7/28/2023  8:07 AM CDT -----  Regarding: concerns  Name of Who is Calling: Amita           What is the request in detail:Patient is requesting a call back to be seen for a infected finger for 4 days. She will attempt to upload pictures so it can be seen. She said it's throbbing and painful.           Can the clinic reply by [facility]: Yes           What Number to Call Back if not in [facility]: [phone]

## 2023-07-28 NOTE — PROGRESS NOTES
"Subjective:      Patient ID: Amita Lewis is a 62 y.o. female.    Vitals:  height is 5' 7" (1.702 m) and weight is 97.1 kg (214 lb). Her temperature is 98.2 °F (36.8 °C). Her blood pressure is 150/66 (abnormal) and her pulse is 80. Her respiration is 16 and oxygen saturation is 99%.     Chief Complaint: Laceration    Pt presents today for right thumb laceration. Pt states unknown injury mechanism, onset 5 days ago. Increased swelling and pain since then. Has been soaking the finger in iodine and peroxide everyday since incident.     Laceration   The incident occurred 3 to 5 days ago (5 days). The laceration is located on the Right hand (right thumb). The laceration mechanism is unknown.The pain has been Fluctuating since onset.     HENT:  Negative for congestion.    Skin:  Positive for wound and laceration. Negative for erythema.   Neurological:  Negative for disorientation and altered mental status.   Psychiatric/Behavioral:  Negative for altered mental status and disorientation.     Objective:     Physical Exam   Constitutional: She is oriented to person, place, and time. She appears well-developed.   HENT:   Head: Normocephalic and atraumatic. Head is without abrasion, without contusion and without laceration.   Ears:   Right Ear: External ear normal.   Left Ear: External ear normal.   Nose: Nose normal.   Mouth/Throat: Oropharynx is clear and moist and mucous membranes are normal.   Eyes: Conjunctivae, EOM and lids are normal. Pupils are equal, round, and reactive to light.   Neck: Trachea normal and phonation normal. Neck supple.   Cardiovascular: Normal rate, regular rhythm and normal heart sounds.   Pulmonary/Chest: Effort normal and breath sounds normal. No stridor. No respiratory distress.   Musculoskeletal: Normal range of motion.         General: Normal range of motion.   Neurological: She is alert and oriented to person, place, and time.   Skin: Skin is warm, dry, intact, no rash and abscessed. " Capillary refill takes less than 2 seconds. No abrasion, No burn, No bruising, No erythema and No ecchymosis        Psychiatric: Her speech is normal and behavior is normal. Judgment and thought content normal.   Nursing note and vitals reviewed.    Assessment:     1. Paronychia of finger of right hand        Plan:       Paronychia of finger of right hand  -     amoxicillin-clavulanate 875-125mg (AUGMENTIN) 875-125 mg per tablet; Take 1 tablet by mouth every 12 (twelve) hours. for 7 days  Dispense: 14 tablet; Refill: 0  -     Incision & Drainage                    Patient Instructions   - Warm epsom salt soaks to the finger 2-3 times per day for 10 minutes at a time.   - You have been given an antibiotic to treat your condition today.    - Please complete the antibiotic as directed on the bottle.   - you can take over-the-counter probiotics during and after antibiotic use to help preserve gut saroj and reduce gastrointestinal symptoms    - Rest.    - Drink plenty of fluids.    - Acetaminophen (tylenol) or Ibuprofen (advil,motrin) as directed as needed for fever/pain. Avoid tylenol if you have a history of liver disease. Do not take ibuprofen if you have a history of GI bleeding, kidney disease, or if you take blood thinners.   - Ibuprofen dosing for adults: 400 mg by mouth every 4-6 hours as needed. Max: 2400 mg/day; Info: use lowest effective dose, shortest effective treatment duration; give w/ food if GI upset occurs.  - Tylenol dosing for adults: [By mouth route, immediate-release form] Dose: 325-1000 mg by mouth every 4-6h as needed; Max: 1 g/4h and 4 g/day from all sources. [By mouth route, extended-release form] Dose: 650-1300 mg Extended Release by mouth every 8h as needed; Max: 4 g/day from all sources.     - You must understand that you have received an Urgent Care treatment only and that you may be released before all of your medical problems are known or treated.   - You, the patient, will arrange for  follow up care as instructed.   - If your condition worsens or fails to improve we recommend that you receive another evaluation at the ER immediately or contact your PCP to discuss your concerns or return here.   - Follow up with your PCP or specialty clinic as directed in the next 1-2 weeks if not improved or as needed.  You can call [phone] to schedule an appointment with the appropriate provider.    If your symptoms do not improve or worsen, go to the emergency room immediately.

## 2023-08-24 ENCOUNTER — TELEPHONE (OUTPATIENT)
Dept: OBSTETRICS AND GYNECOLOGY | Facility: CLINIC | Age: 63
End: 2023-08-24
Payer: COMMERCIAL

## 2023-08-24 DIAGNOSIS — Z12.31 VISIT FOR SCREENING MAMMOGRAM: Primary | ICD-10-CM

## 2023-08-24 NOTE — TELEPHONE ENCOUNTER
----- Message from Twyla Nathan sent at 8/24/2023 11:01 AM CDT -----  Regarding: Mammo  Contact: [phone]  Type:  Mammogram    Caller is requesting to schedule their annual mammogram appointment.  Order is not listed in EPIC.  Please enter order and contact patient to schedule.  Name of Caller:Amita  Where would they like the mammogram performed? [facility]  Would the patient rather a call back or a response via [facility]? Call  Best Call Back Number:[phone]  Additional Information:  Patient was seeing Dr Matias..

## 2023-09-15 DIAGNOSIS — J45.20 MILD INTERMITTENT ASTHMA WITHOUT COMPLICATION: ICD-10-CM

## 2023-09-15 RX ORDER — ALBUTEROL SULFATE 90 UG/1
AEROSOL, METERED RESPIRATORY (INHALATION)
Qty: 25.5 G | Refills: 0 | Status: SHIPPED | OUTPATIENT
Start: 2023-09-15 | End: 2023-11-30 | Stop reason: SDUPTHER

## 2023-09-16 NOTE — TELEPHONE ENCOUNTER
Provider Staff:  Action required for this patient     Please see care gap opportunities below in Care Due Message.    Thanks!  [facility]     Appointments      Date Provider   Last Visit   8/16/2022 Shannon Figueroa MD   Next Visit   11/16/2023 Shannon Figueroa MD     Refill Decision Note   Amita Lewis  is requesting a refill authorization.  Brief Assessment and Rationale for Refill:  Approve     Medication Therapy Plan:         Comments:     Note composed:9:57 PM 09/15/2023

## 2023-09-16 NOTE — TELEPHONE ENCOUNTER
Care Due:                  Date            Visit Type   Department     Provider  --------------------------------------------------------------------------------                                MYCHART                              ANNUAL                              CHECKUP/PHY  [facility]  Last Visit: 08-      [facility]       Shannon Figueroa                              [facility]                              ANNUAL                              CHECKUP/Y  [facility]  Next Visit: 11-      [facility]       Shannon Figueroa                                                            Last  Test          Frequency    Reason                     Performed    Due Date  --------------------------------------------------------------------------------    CMP.........  12 months..  rosuvastatin.............  08- 08-    Lipid Panel.  12 months..  rosuvastatin.............  08- 08-    [facility] Embedded Care Due Messages. Reference number: [id number].   9/15/2023 9:14:22 PM CDT

## 2023-10-12 ENCOUNTER — PATIENT MESSAGE (OUTPATIENT)
Dept: FAMILY MEDICINE | Facility: CLINIC | Age: 63
End: 2023-10-12
Payer: COMMERCIAL

## 2023-10-18 ENCOUNTER — OFFICE VISIT (OUTPATIENT)
Dept: FAMILY MEDICINE | Facility: CLINIC | Age: 63
End: 2023-10-18
Attending: FAMILY MEDICINE
Payer: COMMERCIAL

## 2023-10-18 ENCOUNTER — LAB VISIT (OUTPATIENT)
Dept: LAB | Facility: HOSPITAL | Age: 63
End: 2023-10-18
Attending: FAMILY MEDICINE
Payer: COMMERCIAL

## 2023-10-18 VITALS
SYSTOLIC BLOOD PRESSURE: 145 MMHG | HEIGHT: 67 IN | WEIGHT: 216.31 LBS | DIASTOLIC BLOOD PRESSURE: 65 MMHG | BODY MASS INDEX: 33.95 KG/M2 | HEART RATE: 80 BPM | OXYGEN SATURATION: 95 %

## 2023-10-18 DIAGNOSIS — F41.9 ANXIETY AND DEPRESSION: ICD-10-CM

## 2023-10-18 DIAGNOSIS — Z00.00 ANNUAL PHYSICAL EXAM: ICD-10-CM

## 2023-10-18 DIAGNOSIS — F32.A ANXIETY AND DEPRESSION: ICD-10-CM

## 2023-10-18 DIAGNOSIS — Z12.11 SCREENING FOR COLORECTAL CANCER: ICD-10-CM

## 2023-10-18 DIAGNOSIS — R03.0 ELEVATED BP WITHOUT DIAGNOSIS OF HYPERTENSION: ICD-10-CM

## 2023-10-18 DIAGNOSIS — R00.2 PALPITATIONS: ICD-10-CM

## 2023-10-18 DIAGNOSIS — I70.211 ATHEROSCLEROSIS OF NATIVE ARTERIES OF EXTREMITIES WITH INTERMITTENT CLAUDICATION, RIGHT LEG: ICD-10-CM

## 2023-10-18 DIAGNOSIS — Z00.00 ANNUAL PHYSICAL EXAM: Primary | ICD-10-CM

## 2023-10-18 DIAGNOSIS — Z12.12 SCREENING FOR COLORECTAL CANCER: ICD-10-CM

## 2023-10-18 LAB
ALBUMIN SERPL BCP-MCNC: 4.1 G/DL (ref 3.5–5.2)
ALP SERPL-CCNC: 102 U/L (ref 55–135)
ALT SERPL W/O P-5'-P-CCNC: 22 U/L (ref 10–44)
ANION GAP SERPL CALC-SCNC: 13 MMOL/L (ref 8–16)
AST SERPL-CCNC: 21 U/L (ref 10–40)
BASOPHILS # BLD AUTO: 0.08 K/UL (ref 0–0.2)
BASOPHILS NFR BLD: 1 % (ref 0–1.9)
BILIRUB SERPL-MCNC: 0.4 MG/DL (ref 0.1–1)
BUN SERPL-MCNC: 12 MG/DL (ref 8–23)
CALCIUM SERPL-MCNC: 9.7 MG/DL (ref 8.7–10.5)
CHLORIDE SERPL-SCNC: 105 MMOL/L (ref 95–110)
CHOLEST SERPL-MCNC: 148 MG/DL (ref 120–199)
CHOLEST/HDLC SERPL: 3.4 {RATIO} (ref 2–5)
CO2 SERPL-SCNC: 24 MMOL/L (ref 23–29)
CREAT SERPL-MCNC: 0.8 MG/DL (ref 0.5–1.4)
DIFFERENTIAL METHOD: NORMAL
EOSINOPHIL # BLD AUTO: 0.3 K/UL (ref 0–0.5)
EOSINOPHIL NFR BLD: 4.1 % (ref 0–8)
ERYTHROCYTE [DISTWIDTH] IN BLOOD BY AUTOMATED COUNT: 13.2 % (ref 11.5–14.5)
EST. GFR  (NO RACE VARIABLE): >60 ML/MIN/1.73 M^2
ESTIMATED AVG GLUCOSE: 111 MG/DL (ref 68–131)
GLUCOSE SERPL-MCNC: 102 MG/DL (ref 70–110)
HBA1C MFR BLD: 5.5 % (ref 4–5.6)
HCT VFR BLD AUTO: 40.3 % (ref 37–48.5)
HDLC SERPL-MCNC: 44 MG/DL (ref 40–75)
HDLC SERPL: 29.7 % (ref 20–50)
HGB BLD-MCNC: 13 G/DL (ref 12–16)
IMM GRANULOCYTES # BLD AUTO: 0.04 K/UL (ref 0–0.04)
IMM GRANULOCYTES NFR BLD AUTO: 0.5 % (ref 0–0.5)
LDLC SERPL CALC-MCNC: 71 MG/DL (ref 63–159)
LYMPHOCYTES # BLD AUTO: 1.7 K/UL (ref 1–4.8)
LYMPHOCYTES NFR BLD: 21.8 % (ref 18–48)
MCH RBC QN AUTO: 30 PG (ref 27–31)
MCHC RBC AUTO-ENTMCNC: 32.3 G/DL (ref 32–36)
MCV RBC AUTO: 93 FL (ref 82–98)
MONOCYTES # BLD AUTO: 0.5 K/UL (ref 0.3–1)
MONOCYTES NFR BLD: 6.2 % (ref 4–15)
NEUTROPHILS # BLD AUTO: 5.1 K/UL (ref 1.8–7.7)
NEUTROPHILS NFR BLD: 66.4 % (ref 38–73)
NONHDLC SERPL-MCNC: 104 MG/DL
NRBC BLD-RTO: 0 /100 WBC
PLATELET # BLD AUTO: 308 K/UL (ref 150–450)
PMV BLD AUTO: 12.6 FL (ref 9.2–12.9)
POTASSIUM SERPL-SCNC: 4 MMOL/L (ref 3.5–5.1)
PROT SERPL-MCNC: 7.7 G/DL (ref 6–8.4)
RBC # BLD AUTO: 4.33 M/UL (ref 4–5.4)
SODIUM SERPL-SCNC: 142 MMOL/L (ref 136–145)
TRIGL SERPL-MCNC: 165 MG/DL (ref 30–150)
TSH SERPL DL<=0.005 MIU/L-ACNC: 2.09 UIU/ML (ref 0.4–4)
WBC # BLD AUTO: 7.62 K/UL (ref 3.9–12.7)

## 2023-10-18 PROCEDURE — 80061 LIPID PANEL: CPT | Performed by: FAMILY MEDICINE

## 2023-10-18 PROCEDURE — 99999 PR PBB SHADOW E&M-EST. PATIENT-LVL V: CPT | Mod: PBBFAC,,, | Performed by: FAMILY MEDICINE

## 2023-10-18 PROCEDURE — 83036 HEMOGLOBIN GLYCOSYLATED A1C: CPT | Performed by: FAMILY MEDICINE

## 2023-10-18 PROCEDURE — 1160F RVW MEDS BY RX/DR IN RCRD: CPT | Mod: CPTII,S$GLB,, | Performed by: FAMILY MEDICINE

## 2023-10-18 PROCEDURE — 99396 PREV VISIT EST AGE 40-64: CPT | Mod: S$GLB,,, | Performed by: FAMILY MEDICINE

## 2023-10-18 PROCEDURE — 1159F PR MEDICATION LIST DOCUMENTED IN MEDICAL RECORD: ICD-10-PCS | Mod: CPTII,S$GLB,, | Performed by: FAMILY MEDICINE

## 2023-10-18 PROCEDURE — 84443 ASSAY THYROID STIM HORMONE: CPT | Performed by: FAMILY MEDICINE

## 2023-10-18 PROCEDURE — 36415 COLL VENOUS BLD VENIPUNCTURE: CPT | Mod: PO | Performed by: FAMILY MEDICINE

## 2023-10-18 PROCEDURE — 80053 COMPREHEN METABOLIC PANEL: CPT | Performed by: FAMILY MEDICINE

## 2023-10-18 PROCEDURE — 1159F MED LIST DOCD IN RCRD: CPT | Mod: CPTII,S$GLB,, | Performed by: FAMILY MEDICINE

## 2023-10-18 PROCEDURE — 3008F PR BODY MASS INDEX (BMI) DOCUMENTED: ICD-10-PCS | Mod: CPTII,S$GLB,, | Performed by: FAMILY MEDICINE

## 2023-10-18 PROCEDURE — 99396 PR PREVENTIVE VISIT,EST,40-64: ICD-10-PCS | Mod: S$GLB,,, | Performed by: FAMILY MEDICINE

## 2023-10-18 PROCEDURE — 3078F PR MOST RECENT DIASTOLIC BLOOD PRESSURE < 80 MM HG: ICD-10-PCS | Mod: CPTII,S$GLB,, | Performed by: FAMILY MEDICINE

## 2023-10-18 PROCEDURE — 3077F SYST BP >= 140 MM HG: CPT | Mod: CPTII,S$GLB,, | Performed by: FAMILY MEDICINE

## 2023-10-18 PROCEDURE — 3008F BODY MASS INDEX DOCD: CPT | Mod: CPTII,S$GLB,, | Performed by: FAMILY MEDICINE

## 2023-10-18 PROCEDURE — 99999 PR PBB SHADOW E&M-EST. PATIENT-LVL V: ICD-10-PCS | Mod: PBBFAC,,, | Performed by: FAMILY MEDICINE

## 2023-10-18 PROCEDURE — 85025 COMPLETE CBC W/AUTO DIFF WBC: CPT | Performed by: FAMILY MEDICINE

## 2023-10-18 PROCEDURE — 1160F PR REVIEW ALL MEDS BY PRESCRIBER/CLIN PHARMACIST DOCUMENTED: ICD-10-PCS | Mod: CPTII,S$GLB,, | Performed by: FAMILY MEDICINE

## 2023-10-18 PROCEDURE — 3077F PR MOST RECENT SYSTOLIC BLOOD PRESSURE >= 140 MM HG: ICD-10-PCS | Mod: CPTII,S$GLB,, | Performed by: FAMILY MEDICINE

## 2023-10-18 PROCEDURE — 3078F DIAST BP <80 MM HG: CPT | Mod: CPTII,S$GLB,, | Performed by: FAMILY MEDICINE

## 2023-10-18 NOTE — PROGRESS NOTES
Subjective:       Patient ID: Amita Lewis is a 62 y.o. female.    Chief Complaint: Annual Exam    HPI  Pt is here for annual exam pt is well no sob/cp no cough chest congestion no sore throat uri symptoms  Pt denies n/v/f/c/d/c no change in bowel habits no brbpr  Pt denies dysuria hematuria no vaginal bleeding  Pt is under stress at work she has had palpitations noted irregular heart beat on her apple watch  Pt has elevated bp at home she does not have htn however she has documented atherosclerosis   she is on a statin   Pt has anxiety depression she is on wellbutrin but has been on this med for several years she would like to start seeing psych for their input   Review of Systems   Constitutional:  Negative for activity change, chills, diaphoresis, fatigue and fever.   HENT:  Negative for congestion, ear discharge, ear pain, hearing loss, postnasal drip, rhinorrhea, sinus pressure, sneezing, sore throat, trouble swallowing and voice change.    Eyes:  Negative for photophobia, discharge, redness, itching and visual disturbance.   Respiratory:  Negative for cough, chest tightness, shortness of breath and wheezing.    Cardiovascular:  Positive for palpitations. Negative for chest pain and leg swelling.   Gastrointestinal:  Negative for abdominal pain, anal bleeding, blood in stool, constipation, diarrhea, nausea, rectal pain and vomiting.   Endocrine: Negative for polydipsia and polyuria.   Genitourinary:  Negative for dyspareunia, dysuria, flank pain, frequency, hematuria, menstrual problem, pelvic pain, urgency, vaginal bleeding and vaginal discharge.   Musculoskeletal:  Negative for arthralgias, back pain, joint swelling and neck pain.   Skin:  Negative for color change and rash.   Neurological:  Negative for dizziness, speech difficulty, weakness, light-headedness, numbness and headaches.   Hematological:  Does not bruise/bleed easily.   Psychiatric/Behavioral:  Negative for agitation, confusion,  "decreased concentration, sleep disturbance and suicidal ideas. The patient is not nervous/anxious.        Objective:    BP (!) 145/65   Pulse 80   Ht 5' 7" (1.702 m)   Wt 98.1 kg (216 lb 5.4 oz)   LMP 10/20/2013   SpO2 95%   BMI 33.88 kg/m²     Physical Exam  Constitutional:       Appearance: She is well-developed. She is obese. She is not ill-appearing.   HENT:      Head: Normocephalic and atraumatic.      Right Ear: Tympanic membrane and external ear normal.      Left Ear: Tympanic membrane and external ear normal.      Nose: Nose normal.   Eyes:      General:         Right eye: No discharge.         Left eye: No discharge.      Extraocular Movements: Extraocular movements intact.      Conjunctiva/sclera: Conjunctivae normal.      Pupils: Pupils are equal, round, and reactive to light.   Neck:      Thyroid: No thyromegaly.   Cardiovascular:      Rate and Rhythm: Normal rate and regular rhythm.      Heart sounds: Normal heart sounds. No murmur heard.     No friction rub. No gallop.   Pulmonary:      Effort: Pulmonary effort is normal.      Breath sounds: Normal breath sounds. No wheezing or rales.   Abdominal:      General: Bowel sounds are normal. There is no distension.      Palpations: Abdomen is soft.      Tenderness: There is no abdominal tenderness. There is no guarding or rebound.   Genitourinary:     Vagina: Normal.      Comments: declined  Musculoskeletal:         General: No tenderness. Normal range of motion.      Cervical back: Normal range of motion and neck supple.   Lymphadenopathy:      Cervical: No cervical adenopathy.   Skin:     General: Skin is warm and dry.      Findings: No erythema or rash.   Neurological:      General: No focal deficit present.      Mental Status: She is alert and oriented to person, place, and time.      Cranial Nerves: No cranial nerve deficit.      Motor: No abnormal muscle tone.      Coordination: Coordination normal.   Psychiatric:         Mood and Affect: Mood " "normal.         Behavior: Behavior normal.         Thought Content: Thought content normal.         Judgment: Judgment normal.         Assessment:       1. Annual physical exam    2. Palpitations    3. Anxiety and depression    4. Elevated BP without diagnosis of hypertension    5. Atherosclerosis of native arteries of extremities with intermittent claudication, right leg    6. Screening for colorectal cancer        Plan:     Orders cbc cmp lipid tsh urine hgb a1c ekg cxr echo holter  Cont meds  Low fat low salt diet  Avoid caffeine and etoh  Relaxation techniques  F/u psych  Increase water intake  Rtc 2 weeks virtual bp log     Health maintenance  Discussed with pt        "This note will not be shared with the patient."     "

## 2023-10-25 ENCOUNTER — HOSPITAL ENCOUNTER (OUTPATIENT)
Dept: CARDIOLOGY | Facility: OTHER | Age: 63
Discharge: HOME | End: 2023-10-25
Attending: FAMILY MEDICINE
Payer: COMMERCIAL

## 2023-10-25 ENCOUNTER — OFFICE VISIT (OUTPATIENT)
Dept: CARDIOLOGY | Facility: CLINIC | Age: 63
End: 2023-10-25
Attending: FAMILY MEDICINE
Payer: COMMERCIAL

## 2023-10-25 ENCOUNTER — HOSPITAL ENCOUNTER (OUTPATIENT)
Dept: RADIOLOGY | Facility: OTHER | Age: 63
Discharge: HOME | End: 2023-10-25
Attending: FAMILY MEDICINE
Payer: COMMERCIAL

## 2023-10-25 VITALS
OXYGEN SATURATION: 98 % | BODY MASS INDEX: 33.67 KG/M2 | HEART RATE: 90 BPM | DIASTOLIC BLOOD PRESSURE: 74 MMHG | SYSTOLIC BLOOD PRESSURE: 148 MMHG | WEIGHT: 215 LBS

## 2023-10-25 VITALS
DIASTOLIC BLOOD PRESSURE: 65 MMHG | SYSTOLIC BLOOD PRESSURE: 145 MMHG | HEIGHT: 67 IN | WEIGHT: 216 LBS | BODY MASS INDEX: 33.9 KG/M2

## 2023-10-25 DIAGNOSIS — R00.2 PALPITATIONS: Primary | ICD-10-CM

## 2023-10-25 DIAGNOSIS — R00.2 PALPITATIONS: ICD-10-CM

## 2023-10-25 DIAGNOSIS — E78.00 HYPERCHOLESTEROLEMIA: ICD-10-CM

## 2023-10-25 DIAGNOSIS — I70.219 ATHEROSCLEROTIC PERIPHERAL VASCULAR DISEASE WITH INTERMITTENT CLAUDICATION: ICD-10-CM

## 2023-10-25 LAB
ASCENDING AORTA: 2.82 CM
AV INDEX (PROSTH): 0.61
AV MEAN GRADIENT: 7 MMHG
AV PEAK GRADIENT: 12 MMHG
AV VALVE AREA BY VELOCITY RATIO: 2.12 CM²
AV VALVE AREA: 2.34 CM²
AV VELOCITY RATIO: 0.56
BSA FOR ECHO PROCEDURE: 2.15 M2
CV ECHO LV RWT: 0.37 CM
DOP CALC AO PEAK VEL: 1.72 M/S
DOP CALC AO VTI: 36.1 CM
DOP CALC LVOT AREA: 3.8 CM2
DOP CALC LVOT DIAMETER: 2.2 CM
DOP CALC LVOT PEAK VEL: 0.96 M/S
DOP CALC LVOT STROKE VOLUME: 84.35 CM3
DOP CALCLVOT PEAK VEL VTI: 22.2 CM
E WAVE DECELERATION TIME: 236.34 MSEC
E/A RATIO: 0.67
E/E' RATIO: 5.89 M/S
ECHO LV POSTERIOR WALL: 0.86 CM (ref 0.6–1.1)
FRACTIONAL SHORTENING: 43 % (ref 28–44)
INTERVENTRICULAR SEPTUM: 0.76 CM (ref 0.6–1.1)
IVC DIAMETER: 1.52 CM
IVRT: 114.18 MSEC
LA MAJOR: 4.13 CM
LA MINOR: 4.89 CM
LA WIDTH: 3.9 CM
LEFT ATRIUM SIZE: 3.11 CM
LEFT ATRIUM VOLUME INDEX MOD: 25.4 ML/M2
LEFT ATRIUM VOLUME INDEX: 22.1 ML/M2
LEFT ATRIUM VOLUME MOD: 53 CM3
LEFT ATRIUM VOLUME: 46.17 CM3
LEFT INTERNAL DIMENSION IN SYSTOLE: 2.63 CM (ref 2.1–4)
LEFT VENTRICLE DIASTOLIC VOLUME INDEX: 46.56 ML/M2
LEFT VENTRICLE DIASTOLIC VOLUME: 97.3 ML
LEFT VENTRICLE MASS INDEX: 57 G/M2
LEFT VENTRICLE SYSTOLIC VOLUME INDEX: 12.2 ML/M2
LEFT VENTRICLE SYSTOLIC VOLUME: 25.41 ML
LEFT VENTRICULAR INTERNAL DIMENSION IN DIASTOLE: 4.6 CM (ref 3.5–6)
LEFT VENTRICULAR MASS: 119.83 G
LV LATERAL E/E' RATIO: 5.09 M/S
LV SEPTAL E/E' RATIO: 7 M/S
LVOT MG: 1.99 MMHG
LVOT MV: 0.65 CM/S
MV PEAK A VEL: 0.83 M/S
MV PEAK E VEL: 0.56 M/S
MV STENOSIS PRESSURE HALF TIME: 68.54 MS
MV VALVE AREA P 1/2 METHOD: 3.21 CM2
PV MV: 0.75 M/S
PV PEAK GRADIENT: 5 MMHG
PV PEAK VELOCITY: 1.09 M/S
RA MAJOR: 4.34 CM
RA PRESSURE ESTIMATED: 3 MMHG
RA WIDTH: 3.5 CM
RIGHT VENTRICULAR END-DIASTOLIC DIMENSION: 3.63 CM
SINUS: 2.8 CM
STJ: 2.7 CM
TDI LATERAL: 0.11 M/S
TDI SEPTAL: 0.08 M/S
TDI: 0.1 M/S
TRICUSPID ANNULAR PLANE SYSTOLIC EXCURSION: 2.1 CM
Z-SCORE OF LEFT VENTRICULAR DIMENSION IN END DIASTOLE: -3.38
Z-SCORE OF LEFT VENTRICULAR DIMENSION IN END SYSTOLE: -3.2

## 2023-10-25 PROCEDURE — 1160F RVW MEDS BY RX/DR IN RCRD: CPT | Mod: CPTII,S$GLB,, | Performed by: INTERNAL MEDICINE

## 2023-10-25 PROCEDURE — 93306 TTE W/DOPPLER COMPLETE: CPT

## 2023-10-25 PROCEDURE — 3008F PR BODY MASS INDEX (BMI) DOCUMENTED: ICD-10-PCS | Mod: CPTII,S$GLB,, | Performed by: INTERNAL MEDICINE

## 2023-10-25 PROCEDURE — 3078F PR MOST RECENT DIASTOLIC BLOOD PRESSURE < 80 MM HG: ICD-10-PCS | Mod: CPTII,S$GLB,, | Performed by: INTERNAL MEDICINE

## 2023-10-25 PROCEDURE — 3044F HG A1C LEVEL LT 7.0%: CPT | Mod: CPTII,S$GLB,, | Performed by: INTERNAL MEDICINE

## 2023-10-25 PROCEDURE — 3008F BODY MASS INDEX DOCD: CPT | Mod: CPTII,S$GLB,, | Performed by: INTERNAL MEDICINE

## 2023-10-25 PROCEDURE — 3077F SYST BP >= 140 MM HG: CPT | Mod: CPTII,S$GLB,, | Performed by: INTERNAL MEDICINE

## 2023-10-25 PROCEDURE — 99999 PR PBB SHADOW E&M-EST. PATIENT-LVL IV: CPT | Mod: PBBFAC,,, | Performed by: INTERNAL MEDICINE

## 2023-10-25 PROCEDURE — 93306 ECHO (CUPID ONLY): ICD-10-PCS | Mod: 26,,, | Performed by: INTERNAL MEDICINE

## 2023-10-25 PROCEDURE — 99204 OFFICE O/P NEW MOD 45 MIN: CPT | Mod: 25,S$GLB,, | Performed by: INTERNAL MEDICINE

## 2023-10-25 PROCEDURE — 3044F PR MOST RECENT HEMOGLOBIN A1C LEVEL <7.0%: ICD-10-PCS | Mod: CPTII,S$GLB,, | Performed by: INTERNAL MEDICINE

## 2023-10-25 PROCEDURE — 99999 PR PBB SHADOW E&M-EST. PATIENT-LVL IV: ICD-10-PCS | Mod: PBBFAC,,, | Performed by: INTERNAL MEDICINE

## 2023-10-25 PROCEDURE — 3078F DIAST BP <80 MM HG: CPT | Mod: CPTII,S$GLB,, | Performed by: INTERNAL MEDICINE

## 2023-10-25 PROCEDURE — 93005 ELECTROCARDIOGRAM TRACING: CPT

## 2023-10-25 PROCEDURE — 1160F PR REVIEW ALL MEDS BY PRESCRIBER/CLIN PHARMACIST DOCUMENTED: ICD-10-PCS | Mod: CPTII,S$GLB,, | Performed by: INTERNAL MEDICINE

## 2023-10-25 PROCEDURE — 1159F MED LIST DOCD IN RCRD: CPT | Mod: CPTII,S$GLB,, | Performed by: INTERNAL MEDICINE

## 2023-10-25 PROCEDURE — 3077F PR MOST RECENT SYSTOLIC BLOOD PRESSURE >= 140 MM HG: ICD-10-PCS | Mod: CPTII,S$GLB,, | Performed by: INTERNAL MEDICINE

## 2023-10-25 PROCEDURE — 71046 X-RAY EXAM CHEST 2 VIEWS: CPT | Mod: 26,,, | Performed by: RADIOLOGY

## 2023-10-25 PROCEDURE — 71046 XR CHEST PA AND LATERAL: ICD-10-PCS | Mod: 26,,, | Performed by: RADIOLOGY

## 2023-10-25 PROCEDURE — 93306 TTE W/DOPPLER COMPLETE: CPT | Mod: 26,,, | Performed by: INTERNAL MEDICINE

## 2023-10-25 PROCEDURE — 1159F PR MEDICATION LIST DOCUMENTED IN MEDICAL RECORD: ICD-10-PCS | Mod: CPTII,S$GLB,, | Performed by: INTERNAL MEDICINE

## 2023-10-25 PROCEDURE — 99204 PR OFFICE/OUTPT VISIT, NEW, LEVL IV, 45-59 MIN: ICD-10-PCS | Mod: 25,S$GLB,, | Performed by: INTERNAL MEDICINE

## 2023-10-25 PROCEDURE — 71046 X-RAY EXAM CHEST 2 VIEWS: CPT | Mod: TC,FY

## 2023-10-25 PROCEDURE — 93010 EKG 12-LEAD: ICD-10-PCS | Mod: ,,, | Performed by: INTERNAL MEDICINE

## 2023-10-25 PROCEDURE — 93010 ELECTROCARDIOGRAM REPORT: CPT | Mod: ,,, | Performed by: INTERNAL MEDICINE

## 2023-10-25 NOTE — PROGRESS NOTES
[facility]    Chief Complaint  Chief Complaint   Patient presents with    Palpitations       HPI:    Presents for evaluation palpitations.  Has been occurring for about 2 months.  Infrequent and unchanged over that time.  Brief.  Feels like a fluttering.  Did a smart watch tracing which shows sinus rhythm with PVCs during 1 of these events.  No prior cardiac problems.  No exertional dyspnea or chest discomfort.  Has history of intervention on her left leg with residual disease in her right leg.  Followed by vascular surgery.    Medications  Current Outpatient Medications   Medication Sig Dispense Refill    albuterol (PROVENTIL/VENTOLIN HFA) 90 mcg/actuation inhaler USE 2 INHALATIONS EVERY 6  HOURS AS NEEDED FOR        WHEEZING OR SHORTNESS OF   BREATH (RESCUE) 25.5 g 0    ASPIRIN (ASPIR-81 ORAL) Take 1 tablet by mouth every evening.       buPROPion (WELLBUTRIN XL) 150 MG TB24 tablet Take 1 pill daily 90 tablet 3    buPROPion (WELLBUTRIN XL) 300 MG 24 hr tablet Take 1 pill daily 90 tablet 3    cilostazoL (PLETAL) 100 MG Tab Take 1 tablet (100 mg total) by mouth 2 (two) times daily. 180 tablet 3    ECHINACEA 1X ORAL       FLAXSEED ORAL Take by mouth.      fluticasone propionate (FLONASE) 50 mcg/actuation nasal spray 1 spray (50 mcg total) by Each Nostril route 2 (two) times daily as needed for Rhinitis or Allergies. 16 g 6    LACTOBAC NO.41/BIFIDOBACT NO.7 (PROBIOTIC-10 ORAL) Take by mouth every evening.       multivitamin (THERAGRAN) per tablet Take 1 tablet by mouth every evening.       nystatin (NYSTOP) powder APPLY TO THE AFFECTED AREA TWICE DAILY AS NEEDED FOR FOR SKIN INFECTION. RETREAT AS NEEDED 30 g 3    rosuvastatin (CRESTOR) 20 MG tablet Take 1 tablet (20 mg total) by mouth once daily. 90 tablet 3     No current facility-administered medications for this visit.        History  Past Medical History:   Diagnosis Date    Abnormal Pap smear     Asthma     Depression 03/17/2016    Environmental  and seasonal allergies 03/17/2016    Heartburn 03/17/2016    HLD (hyperlipidemia) 03/22/2016    ASCVD 2018 6.3%; can't tolerate statins    Malignant melanoma of skin, unspecified 2022    facial, near mouth, resected with negative margins per patient    Menopausal syndrome (hot flashes) 03/17/2016    Mild intermittent asthma without complication 03/17/2016    Obesity (BMI 30-39.9) 03/17/2016    Sciatica 03/17/2016     Past Surgical History:   Procedure Laterality Date    ANGIOGRAPHY OF LOWER EXTREMITY Left 2/12/2020    Procedure: ANGIOGRAM, LOWER EXTREMITY;  Surgeon: RAYMUNDO Levine II, MD;  Location: [facility] OR [address] [place] [address];  Service: Vascular;  Laterality: Left;    AORTOGRAPHY N/A 2/12/2020    Procedure: AORTOGRAM;  Surgeon: RAYMUNDO Levine II, MD;  Location: [facility] OR [address] [place] [address];  Service: Vascular;  Laterality: N/A;    CERVICAL BIOPSY  W/ LOOP ELECTRODE EXCISION      ENDARTERECTOMY OF FEMORAL ARTERY Left 2/12/2020    Procedure: Left Common Femoral Approach Endarterectomy with Left SFA Angioplasty Stenting Possible Bypass;  Surgeon: RAYMUNDO Levine II, MD;  Location: [facility] OR [address] [place] [address];  Service: Vascular;  Laterality: Left;  contrast: 52ml  fluoro time: 16.3 min  mGy: 921.68    INJECTION OF JOINT Bilateral 5/31/2018    Procedure: INJECTION-JOINT;  Surgeon: Lynette Rowley MD;  Location: [facility] PAIN MGT;  Service: Pain Management;  Laterality: Bilateral;  B/L SI Joint Injs  [id number], [id number]    INJECTION OF JOINT Bilateral 9/18/2018    Procedure: INJECTION, JOINT  Bilateral SI joint;  Surgeon: Lynette Rowley MD;  Location: [facility] PAIN [facility]GT;  Service: Pain Management;  Laterality: Bilateral;    melona      melona remove    microdiscetomy      L4-L5    OVARIAN CYST REMOVAL  2001    TRANSFORAMINAL EPIDURAL INJECTION OF STEROID Left 3/21/2019    Procedure: INJECTION, STEROID, EPIDURAL, TRANSFORAMINAL APPROACH, L4 AND L5;  Surgeon: Lynette Rowley MD;  Location: [facility] PAIN [facility]GT;  Service: Pain Management;  Laterality: Left;      Social History     Socioeconomic History    Marital status:    Tobacco Use    Smoking status: Former     Current packs/day: 0.00     Types: Cigarettes    Smokeless tobacco: Never   Substance and Sexual Activity    Alcohol use: Yes     Alcohol/week: 1.0 standard drink of alcohol     Types: 1 Glasses of wine per week     Comment: 3 x a month    Drug use: No    Sexual activity: Yes     Partners: Female     Birth control/protection: None     Comment: (partner Ivone is a trans woman)   Social History Narrative    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    Works for [employer] at [employer]    Walks a lot at work and for exercise     Social Determinants of Health     Financial Resource Strain: Low Risk  (10/20/2023)    Overall Financial Resource Strain (CARDIA)     Difficulty of Paying Living Expenses: Not hard at all   Food Insecurity: No Food Insecurity (10/20/2023)    Hunger Vital Sign     Worried About Running Out of Food in the Last Year: Never true     Ran Out of Food in the Last Year: Never true   Transportation Needs: No Transportation Needs (10/20/2023)    PRAPARE - Transportation     Lack of Transportation (Medical): No     Lack of Transportation (Non-Medical): No   Physical Activity: Unknown (10/20/2023)    Exercise Vital Sign     Days of Exercise per Week: 5 days     Minutes of Exercise per Session: Patient refused   Stress: No Stress Concern Present (10/20/2023)    [employer] [employer] - Occupational Stress Questionnaire     Feeling of Stress : Not at all   Social Connections: Unknown (10/20/2023)    Social Connection and Isolation Panel [NHANES]     Frequency of Communication with Friends and Family: Once a week     Frequency of Social Gatherings with Friends and Family: Once a week     Active Member of Clubs or Organizations: Yes     Attends Club or Organization Meetings: More than 4 times per year     Marital Status:    Housing Stability: Low Risk  (10/20/2023)    Housing  Stability Vital Sign     Unable to Pay for Housing in the Last Year: No     Number of Places Lived in the Last Year: 1     Unstable Housing in the Last Year: No     Family History   Problem Relation Age of Onset    Cancer Father         lung; non smoker, worked in [employer] and gas station    Alcohol abuse Father     Early death Father     Breast cancer Mother     Heart failure Mother     Diabetes Mellitus Mother         ?secondary to chronic steroids    Diabetes Mother     Heart disease Mother     Alcohol abuse Brother     Ovarian cancer Neg Hx     Hypertension Neg Hx         Allergies  Review of patient's allergies indicates:  No Known Allergies    Review of Systems   Review of Systems   Constitutional: Negative for malaise/fatigue, weight gain and weight loss.   Eyes:  Negative for visual disturbance.   Cardiovascular:  Positive for claudication and palpitations. Negative for chest pain, cyanosis, dyspnea on exertion, irregular heartbeat, leg swelling, near-syncope, orthopnea, paroxysmal nocturnal dyspnea and syncope.   Respiratory:  Negative for cough, hemoptysis, shortness of breath, sleep disturbances due to breathing and wheezing.    Hematologic/Lymphatic: Negative for bleeding problem. Does not bruise/bleed easily.   Skin:  Negative for poor wound healing.   Musculoskeletal:  Negative for muscle cramps and myalgias.   Gastrointestinal:  Negative for abdominal pain, anorexia, diarrhea, heartburn, hematemesis, hematochezia, melena, nausea and vomiting.   Genitourinary:  Negative for hematuria and nocturia.   Neurological:  Negative for excessive daytime sleepiness, dizziness, focal weakness, light-headedness and weakness.       Physical Exam  Vitals:    10/25/23 1511   BP: (!) 148/74   Pulse: 90     Wt Readings from Last 1 Encounters:   10/25/23 97.5 kg (215 lb)     Physical Exam  Vitals and nursing note reviewed.   Constitutional:       General: She is not in acute distress.     Appearance: She is not  toxic-appearing or diaphoretic.   HENT:      Head: Normocephalic and atraumatic.      Mouth/Throat:      Lips: Pink.      Mouth: Mucous membranes are moist.   Eyes:      General: No scleral icterus.     Conjunctiva/sclera: Conjunctivae normal.   Neck:      Thyroid: No thyromegaly.      Vascular: No carotid bruit, hepatojugular reflux or JVD.      Trachea: Trachea normal.   Cardiovascular:      Rate and Rhythm: Normal rate and regular rhythm. No extrasystoles are present.     Chest Wall: PMI is not displaced.      Pulses:           Carotid pulses are 2+ on the right side and 2+ on the left side.       Radial pulses are 2+ on the right side and 2+ on the left side.        Dorsalis pedis pulses are 0 on the right side and 1+ on the left side.      Heart sounds: S1 normal and S2 normal. No murmur heard.     No friction rub. No S3 or S4 sounds.   Pulmonary:      Effort: Pulmonary effort is normal. No accessory muscle usage or respiratory distress.      Breath sounds: Normal breath sounds and air entry. No decreased breath sounds, wheezing, rhonchi or rales.   Abdominal:      General: Bowel sounds are normal. There is no distension or abdominal bruit.      Palpations: Abdomen is soft. There is no hepatomegaly, splenomegaly or pulsatile mass.      Tenderness: There is no abdominal tenderness.   Musculoskeletal:         General: No tenderness or deformity.      Right lower leg: No edema.      Left lower leg: No edema.   Skin:     General: Skin is warm and dry.      Capillary Refill: Capillary refill takes less than 2 seconds.      Coloration: Skin is not cyanotic or pale.      Nails: There is no clubbing.   Neurological:      General: No focal deficit present.      Mental Status: She is alert and oriented to person, place, and time.   Psychiatric:         Attention and Perception: Attention normal.         Mood and Affect: Mood normal.         Speech: Speech normal.         Behavior: Behavior normal. Behavior is  [facility] Outpatient Visit on 10/25/2023   Component Date Value Ref Range Status    BSA 10/25/2023 2.15  m2 Final    LVOT stroke volume 10/25/2023 84.35  cm3 Final    LVIDd 10/25/2023 4.60  3.5 - 6.0 cm Final    LV Systolic Volume 10/25/2023 25.41  mL Final    LV Systolic Volume Index 10/25/2023 12.2  mL/m2 Final    LVIDs 10/25/2023 2.63  2.1 - 4.0 cm Final    LV Diastolic Volume 10/25/2023 97.30  mL Final    LV Diastolic Volume Index 10/25/2023 46.56  mL/m2 Final    IVS 10/25/2023 0.76  0.6 - 1.1 cm Final    LVOT diameter 10/25/2023 2.20  cm Final    LVOT area 10/25/2023 3.8  cm2 Final    FS 10/25/2023 43  28 - 44 % Final    Left Ventricle Relative Wall Thick* 10/25/2023 0.37  cm Final    Posterior Wall 10/25/2023 0.86  0.6 - 1.1 cm Final    LV mass 10/25/2023 119.83  g Final    LV Mass Index 10/25/2023 57  g/m2 Final    MV Peak E Kalyan 10/25/2023 0.56  m/s Final    TDI LATERAL 10/25/2023 0.11  m/s Final    TDI SEPTAL 10/25/2023 0.08  m/s Final    E/E' ratio 10/25/2023 5.89  m/s Final    MV Peak A Kalyan 10/25/2023 0.83  m/s Final    E/A ratio 10/25/2023 0.67   Final    IVRT 10/25/2023 114.18  msec Final    E wave deceleration time 10/25/2023 236.34  msec Final    LV SEPTAL E/E' RATIO 10/25/2023 7.00  m/s Final    LV LATERAL E/E' RATIO 10/25/2023 5.09  m/s Final    LVOT peak kalyan 10/25/2023 0.96  m/s Final    Left Ventricular Outflow Tract Jailene* 10/25/2023 0.65  cm/s Final    Left Ventricular Outflow Tract Jailene* 10/25/2023 1.99  mmHg Final    LA size 10/25/2023 3.11  cm Final    Left Atrium Minor Axis 10/25/2023 4.89  cm Final    Left Atrium Major Axis 10/25/2023 4.13  cm Final    RVDD 10/25/2023 3.63  cm Final    RA Major Axis 10/25/2023 4.34  cm Final    AV mean gradient 10/25/2023 7  mmHg Final    AV peak gradient 10/25/2023 12  mmHg Final    Ao peak kalyan 10/25/2023 1.72  m/s Final    Ao VTI 10/25/2023 36.10  cm Final    LVOT peak VTI 10/25/2023 22.20  cm Final    AV valve area 10/25/2023 2.34   cm² Final    AV Velocity Ratio 10/25/2023 0.56   Final    AV index (prosthetic) 10/25/2023 0.61   Final    BRENNEN by Velocity Ratio 10/25/2023 2.12  cm² Final    MV stenosis pressure 1/2 time 10/25/2023 68.54  ms Final    MV valve area p 1/2 method 10/25/2023 3.21  cm2 Final    PV PEAK VELOCITY 10/25/2023 1.09  m/s Final    PV peak gradient 10/25/2023 5  mmHg Final    Pulmonary Valve Mean Velocity 10/25/2023 0.75  m/s Final    STJ 10/25/2023 2.70  cm Final    Ascending aorta 10/25/2023 2.82  cm Final    IVC diameter 10/25/2023 1.52  cm Final    Mean e' 10/25/2023 0.10  m/s Final    ZLVIDS 10/25/2023 -3.20   Final    ZLVIDD 10/25/2023 -3.38   Final    LA Volume Index 10/25/2023 22.1  mL/m2 Final    LA volume 10/25/2023 46.17  cm3 Final    LA volume (mod) 10/25/2023 53.00  cm3 Final    LA WIDTH 10/25/2023 3.9  cm Final    LA Volume Index (Mod) 10/25/2023 25.4  mL/m2 Final    TAPSE 10/25/2023 2.10  cm Final    RA Width 10/25/2023 3.5  cm Final    Sinus 10/25/2023 2.8  cm Final    Est. RA pres 10/25/2023 3  mmHg Final   Lab Visit on 10/18/2023   Component Date Value Ref Range Status    Sodium 10/18/2023 142  136 - 145 mmol/L Final    Potassium 10/18/2023 4.0  3.5 - 5.1 mmol/L Final    Chloride 10/18/2023 105  95 - 110 mmol/L Final    CO2 10/18/2023 24  23 - 29 mmol/L Final    Glucose 10/18/2023 102  70 - 110 mg/dL Final    BUN 10/18/2023 12  8 - 23 mg/dL Final    Creatinine 10/18/2023 0.8  0.5 - 1.4 mg/dL Final    Calcium 10/18/2023 9.7  8.7 - 10.5 mg/dL Final    Total Protein 10/18/2023 7.7  6.0 - 8.4 g/dL Final    Albumin 10/18/2023 4.1  3.5 - 5.2 g/dL Final    Total Bilirubin 10/18/2023 0.4  0.1 - 1.0 mg/dL Final    Comment: For infants and newborns, interpretation of results should be based  on gestational age, weight and in agreement with clinical  observations.    Premature Infant recommended reference ranges:  Up to 24 hours.............<8.0 mg/dL  Up to 48 hours............<12.0 mg/dL  3-5  days..................<15.0 mg/dL  6-29 days.................<15.0 mg/dL      Alkaline Phosphatase 10/18/2023 102  55 - 135 U/L Final    AST 10/18/2023 21  10 - 40 U/L Final    ALT 10/18/2023 22  10 - 44 U/L Final    eGFR 10/18/2023 >60.0  >60 mL/min/1.73 m^2 Final    Anion Gap 10/18/2023 13  8 - 16 mmol/L Final    TSH 10/18/2023 2.090  0.400 - 4.000 uIU/mL Final    Cholesterol 10/18/2023 148  120 - 199 mg/dL Final    Comment: The National Cholesterol Education Program (NCEP) has set the  following guidelines (reference ranges) for Cholesterol:  Optimal.....................<200 mg/dL  Borderline High.............200-239 mg/dL  High........................> or = 240 mg/dL      Triglycerides 10/18/2023 165 (H)  30 - 150 mg/dL Final    Comment: The National Cholesterol Education Program (NCEP) has set the  following guidelines (reference values) for triglycerides:  Normal......................<150 mg/dL  Borderline High.............150-199 mg/dL  High........................200-499 mg/dL      HDL 10/18/2023 44  40 - 75 mg/dL Final    Comment: The National Cholesterol Education Program (NCEP) has set the  following guidelines (reference values) for HDL Cholesterol:  Low...............<40 mg/dL  Optimal...........>60 mg/dL      LDL Cholesterol 10/18/2023 71.0  63.0 - 159.0 mg/dL Final    Comment: The National Cholesterol Education Program (NCEP) has set the  following guidelines (reference values) for LDL Cholesterol:  Optimal.......................<130 mg/dL  Borderline High...............130-159 mg/dL  High..........................160-189 mg/dL  Very High.....................>190 mg/dL      HDL/Cholesterol Ratio 10/18/2023 29.7  20.0 - 50.0 % Final    Total Cholesterol/HDL Ratio 10/18/2023 3.4  2.0 - 5.0 Final    Non-HDL Cholesterol 10/18/2023 104  mg/dL Final    Comment: Risk category and Non-HDL cholesterol goals:  Coronary heart disease (CHD)or equivalent (10-year risk of CHD >20%):  Non-HDL cholesterol goal      <130 mg/dL  Two or more CHD risk factors and 10-year risk of CHD <= 20%:  Non-HDL cholesterol goal     <160 mg/dL  0 to 1 CHD risk factor:  Non-HDL cholesterol goal     <190 mg/dL      Hemoglobin A1C 10/18/2023 5.5  4.0 - 5.6 % Final    Comment: ADA Screening Guidelines:  5.7-6.4%  Consistent with prediabetes  >or=6.5%  Consistent with diabetes    High levels of fetal hemoglobin interfere with the HbA1C  assay. Heterozygous hemoglobin variants (HbS, HgC, etc)do  not significantly interfere with this assay.   However, presence of multiple variants may affect accuracy.      Estimated Avg Glucose 10/18/2023 111  68 - 131 mg/dL Final    WBC 10/18/2023 7.62  3.90 - 12.70 K/uL Final    RBC 10/18/2023 4.33  4.00 - 5.40 M/uL Final    Hemoglobin 10/18/2023 13.0  12.0 - 16.0 g/dL Final    Hematocrit 10/18/2023 40.3  37.0 - 48.5 % Final    MCV 10/18/2023 93  82 - 98 fL Final    MCH 10/18/2023 30.0  27.0 - 31.0 pg Final    MCHC 10/18/2023 32.3  32.0 - 36.0 g/dL Final    RDW 10/18/2023 13.2  11.5 - 14.5 % Final    Platelets 10/18/2023 308  150 - 450 K/uL Final    MPV 10/18/2023 12.6  9.2 - 12.9 fL Final    Immature Granulocytes 10/18/2023 0.5  0.0 - 0.5 % Final    Gran # (ANC) 10/18/2023 5.1  1.8 - 7.7 K/uL Final    Immature Grans (Abs) 10/18/2023 0.04  0.00 - 0.04 K/uL Final    Comment: Mild elevation in immature granulocytes is non specific and   can be seen in a variety of conditions including stress response,   acute inflammation, trauma and pregnancy. Correlation with other   laboratory and clinical findings is essential.      Lymph # 10/18/2023 1.7  1.0 - 4.8 K/uL Final    Mono # 10/18/2023 0.5  0.3 - 1.0 K/uL Final    Eos # 10/18/2023 0.3  0.0 - 0.5 K/uL Final    Baso # 10/18/2023 0.08  0.00 - 0.20 K/uL Final    nRBC 10/18/2023 0  0 /100 WBC Final    Gran % 10/18/2023 66.4  38.0 - 73.0 % Final    Lymph % 10/18/2023 21.8  18.0 - 48.0 % Final    Mono % 10/18/2023 6.2  4.0 - 15.0 % Final    Eosinophil % 10/18/2023 4.1  0.0 -  8.0 % Final    Basophil % 10/18/2023 1.0  0.0 - 1.9 % Final    Differential Method 10/18/2023 Automated   Final       Imaging  Echo    Result Date: 10/25/2023    Left Ventricle: The left ventricle is normal in size. Normal wall thickness. There is normal systolic function with a visually estimated ejection fraction of 65 - 70%. Grade I diastolic dysfunction.   Right Ventricle: Normal right ventricular cavity size. Wall thickness is normal. Systolic function is normal.     X-Ray Chest PA And Lateral    Result Date: 10/25/2023  EXAMINATION: XR CHEST PA AND LATERAL CLINICAL HISTORY: Palpitations TECHNIQUE: PA and lateral views of the chest were performed. COMPARISON: 01/09/2018 FINDINGS: The lungs are clear, with normal appearance of pulmonary vasculature and no pleural effusion or pneumothorax. The cardiac silhouette is normal in size. The hilar and mediastinal contours are unremarkable. Bones are intact.     No acute abnormality. Electronically signed by: Nicolas Espinoza MD Date:    10/25/2023 Time:    08:40      Assessment  1. Palpitations  Based on her smart watch tracing, benign ectopy  - Ambulatory referral/consult to Cardiology    2. Hypercholesterolemia  Controlled    3. Atherosclerotic peripheral vascular disease with intermittent claudication  Stable and followed by vascular surgery      Plan and Discussion    Discussed benign ectopy.  Discussed potential triggers.  Discussed management with beta-blockers if symptoms are bothersome.  She is scheduled to complete a Holter monitor.  I asked her to let me know when that is done so that I can review it.    The 10-year ASCVD risk score (Elia LYONS, et al., 2019) is: 4.5%    Values used to calculate the score:      Age: 62 years      Sex: Female      Is Non- : No      Diabetic: No      Tobacco smoker: No      Systolic Blood Pressure: 148 mmHg      Is BP treated: No      HDL Cholesterol: 44 mg/dL      Total Cholesterol: 148 mg/dL     Follow  Up  Follow up for test results.      Andrew Rojas MD

## 2023-11-14 ENCOUNTER — CLINICAL SUPPORT (OUTPATIENT)
Dept: CARDIOLOGY | Facility: HOSPITAL | Age: 63
End: 2023-11-14
Attending: FAMILY MEDICINE
Payer: COMMERCIAL

## 2023-11-14 DIAGNOSIS — R00.2 PALPITATIONS: ICD-10-CM

## 2023-11-14 PROCEDURE — 93227 XTRNL ECG REC<48 HR R&I: CPT | Mod: ,,, | Performed by: INTERNAL MEDICINE

## 2023-11-14 PROCEDURE — 93227 HOLTER MONITOR - 48 HOUR (CUPID ONLY): ICD-10-PCS | Mod: ,,, | Performed by: INTERNAL MEDICINE

## 2023-11-14 PROCEDURE — 93225 XTRNL ECG REC<48 HRS REC: CPT

## 2023-11-20 LAB
OHS CV EVENT MONITOR DAY: 0
OHS CV HOLTER LENGTH DECIMAL HOURS: 48
OHS CV HOLTER LENGTH HOURS: 48
OHS CV HOLTER LENGTH MINUTES: 0
OHS CV HOLTER SINUS AVERAGE HR: 94
OHS CV HOLTER SINUS MAX HR: 158
OHS CV HOLTER SINUS MIN HR: 52

## 2023-11-26 NOTE — TELEPHONE ENCOUNTER
No care due was identified.  [facility] Embedded Care Due Messages. Reference number: [id number].   11/26/2023 3:59:29 PM CST

## 2023-11-28 RX ORDER — CILOSTAZOL 100 MG/1
100 TABLET ORAL 2 TIMES DAILY
Qty: 180 TABLET | Refills: 3 | Status: SHIPPED | OUTPATIENT
Start: 2023-11-28 | End: 2023-11-30 | Stop reason: SDUPTHER

## 2023-11-30 DIAGNOSIS — J45.20 MILD INTERMITTENT ASTHMA WITHOUT COMPLICATION: ICD-10-CM

## 2023-11-30 DIAGNOSIS — B37.2 CANDIDAL SKIN INFECTION: ICD-10-CM

## 2023-11-30 DIAGNOSIS — F32.A DEPRESSION, UNSPECIFIED DEPRESSION TYPE: ICD-10-CM

## 2023-11-30 RX ORDER — ALBUTEROL SULFATE 90 UG/1
AEROSOL, METERED RESPIRATORY (INHALATION)
Qty: 25.5 G | Refills: 0 | Status: SHIPPED | OUTPATIENT
Start: 2023-11-30

## 2023-11-30 RX ORDER — ROSUVASTATIN CALCIUM 20 MG/1
20 TABLET, COATED ORAL DAILY
Qty: 90 TABLET | Refills: 3 | Status: SHIPPED | OUTPATIENT
Start: 2023-11-30 | End: 2024-03-31 | Stop reason: SDUPTHER

## 2023-11-30 RX ORDER — BUPROPION HYDROCHLORIDE 300 MG/1
TABLET ORAL
Qty: 90 TABLET | Refills: 3 | Status: SHIPPED | OUTPATIENT
Start: 2023-11-30

## 2023-11-30 RX ORDER — CILOSTAZOL 100 MG/1
100 TABLET ORAL 2 TIMES DAILY
Qty: 180 TABLET | Refills: 3 | Status: SHIPPED | OUTPATIENT
Start: 2023-11-30

## 2023-11-30 RX ORDER — BUPROPION HYDROCHLORIDE 150 MG/1
TABLET ORAL
Qty: 90 TABLET | Refills: 3 | Status: SHIPPED | OUTPATIENT
Start: 2023-11-30

## 2023-11-30 RX ORDER — NYSTATIN 100000 [USP'U]/G
POWDER TOPICAL
Qty: 30 G | Refills: 3 | Status: SHIPPED | OUTPATIENT
Start: 2023-11-30 | End: 2023-12-02

## 2023-11-30 NOTE — TELEPHONE ENCOUNTER
----- Message from Tania Cutler sent at 11/30/2023 12:52 PM CST -----  Regarding: self [phone]  Type: Patient Call Back    Who called:self     What is the request in detail: Came in October for annual and office did not renew medication, she asked for all medication to be renewed for the year.     [facility] - [address] - [facility]  [place] [address]  Phone: [phone] Fax: [phone]      Can the clinic reply by [facility]? no    Would the patient rather a call back or a response via [facility]? Call back     Best call back number: [phone]

## 2023-11-30 NOTE — TELEPHONE ENCOUNTER
No care due was identified.  Health [facility] Embedded Care Due Messages. Reference number: [id number].   11/30/2023 1:07:42 PM CST

## 2023-12-01 ENCOUNTER — OFFICE VISIT (OUTPATIENT)
Dept: OBSTETRICS AND GYNECOLOGY | Facility: CLINIC | Age: 63
End: 2023-12-01
Payer: COMMERCIAL

## 2023-12-01 ENCOUNTER — HOSPITAL ENCOUNTER (OUTPATIENT)
Dept: RADIOLOGY | Facility: OTHER | Age: 63
Discharge: HOME | End: 2023-12-01
Attending: OBSTETRICS & GYNECOLOGY
Payer: COMMERCIAL

## 2023-12-01 VITALS
DIASTOLIC BLOOD PRESSURE: 82 MMHG | WEIGHT: 213.88 LBS | HEIGHT: 67 IN | BODY MASS INDEX: 33.57 KG/M2 | SYSTOLIC BLOOD PRESSURE: 146 MMHG

## 2023-12-01 DIAGNOSIS — Z87.410 HISTORY OF CERVICAL DYSPLASIA: ICD-10-CM

## 2023-12-01 DIAGNOSIS — N63.0 BREAST LUMP: ICD-10-CM

## 2023-12-01 DIAGNOSIS — Z01.419 WELL WOMAN EXAM: Primary | ICD-10-CM

## 2023-12-01 DIAGNOSIS — B37.2 CANDIDAL SKIN INFECTION: ICD-10-CM

## 2023-12-01 DIAGNOSIS — Z12.31 VISIT FOR SCREENING MAMMOGRAM: ICD-10-CM

## 2023-12-01 PROCEDURE — 99396 PREV VISIT EST AGE 40-64: CPT | Mod: S$GLB,,, | Performed by: STUDENT IN AN ORGANIZED HEALTH CARE EDUCATION/TRAINING PROGRAM

## 2023-12-01 PROCEDURE — 77067 MAMMO DIGITAL SCREENING BILAT WITH TOMO: ICD-10-PCS | Mod: 26,,, | Performed by: RADIOLOGY

## 2023-12-01 PROCEDURE — 99999 PR PBB SHADOW E&M-EST. PATIENT-LVL III: ICD-10-PCS | Mod: PBBFAC,,, | Performed by: STUDENT IN AN ORGANIZED HEALTH CARE EDUCATION/TRAINING PROGRAM

## 2023-12-01 PROCEDURE — 77067 SCR MAMMO BI INCL CAD: CPT | Mod: 26,,, | Performed by: RADIOLOGY

## 2023-12-01 PROCEDURE — 88175 CYTOPATH C/V AUTO FLUID REDO: CPT | Performed by: STUDENT IN AN ORGANIZED HEALTH CARE EDUCATION/TRAINING PROGRAM

## 2023-12-01 PROCEDURE — 99396 PR PREVENTIVE VISIT,EST,40-64: ICD-10-PCS | Mod: S$GLB,,, | Performed by: STUDENT IN AN ORGANIZED HEALTH CARE EDUCATION/TRAINING PROGRAM

## 2023-12-01 PROCEDURE — 3079F PR MOST RECENT DIASTOLIC BLOOD PRESSURE 80-89 MM HG: ICD-10-PCS | Mod: CPTII,S$GLB,, | Performed by: STUDENT IN AN ORGANIZED HEALTH CARE EDUCATION/TRAINING PROGRAM

## 2023-12-01 PROCEDURE — 3008F PR BODY MASS INDEX (BMI) DOCUMENTED: ICD-10-PCS | Mod: CPTII,S$GLB,, | Performed by: STUDENT IN AN ORGANIZED HEALTH CARE EDUCATION/TRAINING PROGRAM

## 2023-12-01 PROCEDURE — 3079F DIAST BP 80-89 MM HG: CPT | Mod: CPTII,S$GLB,, | Performed by: STUDENT IN AN ORGANIZED HEALTH CARE EDUCATION/TRAINING PROGRAM

## 2023-12-01 PROCEDURE — 1159F MED LIST DOCD IN RCRD: CPT | Mod: CPTII,S$GLB,, | Performed by: STUDENT IN AN ORGANIZED HEALTH CARE EDUCATION/TRAINING PROGRAM

## 2023-12-01 PROCEDURE — 77067 SCR MAMMO BI INCL CAD: CPT | Mod: TC

## 2023-12-01 PROCEDURE — 87624 HPV HI-RISK TYP POOLED RSLT: CPT | Performed by: STUDENT IN AN ORGANIZED HEALTH CARE EDUCATION/TRAINING PROGRAM

## 2023-12-01 PROCEDURE — 3008F BODY MASS INDEX DOCD: CPT | Mod: CPTII,S$GLB,, | Performed by: STUDENT IN AN ORGANIZED HEALTH CARE EDUCATION/TRAINING PROGRAM

## 2023-12-01 PROCEDURE — 99999 PR PBB SHADOW E&M-EST. PATIENT-LVL III: CPT | Mod: PBBFAC,,, | Performed by: STUDENT IN AN ORGANIZED HEALTH CARE EDUCATION/TRAINING PROGRAM

## 2023-12-01 PROCEDURE — 77063 BREAST TOMOSYNTHESIS BI: CPT | Mod: 26,,, | Performed by: RADIOLOGY

## 2023-12-01 PROCEDURE — 3044F PR MOST RECENT HEMOGLOBIN A1C LEVEL <7.0%: ICD-10-PCS | Mod: CPTII,S$GLB,, | Performed by: STUDENT IN AN ORGANIZED HEALTH CARE EDUCATION/TRAINING PROGRAM

## 2023-12-01 PROCEDURE — 1159F PR MEDICATION LIST DOCUMENTED IN MEDICAL RECORD: ICD-10-PCS | Mod: CPTII,S$GLB,, | Performed by: STUDENT IN AN ORGANIZED HEALTH CARE EDUCATION/TRAINING PROGRAM

## 2023-12-01 PROCEDURE — 3044F HG A1C LEVEL LT 7.0%: CPT | Mod: CPTII,S$GLB,, | Performed by: STUDENT IN AN ORGANIZED HEALTH CARE EDUCATION/TRAINING PROGRAM

## 2023-12-01 PROCEDURE — 3077F SYST BP >= 140 MM HG: CPT | Mod: CPTII,S$GLB,, | Performed by: STUDENT IN AN ORGANIZED HEALTH CARE EDUCATION/TRAINING PROGRAM

## 2023-12-01 PROCEDURE — 3077F PR MOST RECENT SYSTOLIC BLOOD PRESSURE >= 140 MM HG: ICD-10-PCS | Mod: CPTII,S$GLB,, | Performed by: STUDENT IN AN ORGANIZED HEALTH CARE EDUCATION/TRAINING PROGRAM

## 2023-12-01 PROCEDURE — 77063 MAMMO DIGITAL SCREENING BILAT WITH TOMO: ICD-10-PCS | Mod: 26,,, | Performed by: RADIOLOGY

## 2023-12-01 RX ORDER — MINERAL OIL
ENEMA (ML) RECTAL
COMMUNITY

## 2023-12-01 NOTE — PROGRESS NOTES
History & Physical  Gynecology      SUBJECTIVE:     Chief Complaint: Well Woman       History of Present Illness:  Amita Lewis is a 63 y.o.  who presents for annual physical exam. Former patient of DAMARIS Matias.  She has no complaints.    No PMB. No HRT.  No discharge, itching, burning. Some vaginal dryness but no pain.  Occasional JAMES but manageable. Knows to do exercises    She has a history of abnormal pap smear, underwent LEEP in .  Last pap was  and was NILM, HPV neg. She prefers to have pap done annually.     She has a family history of breast cancer in her mom.   Did her MMG this morning, report pending. Was alternating with MRI semiannually but not established with breast center anymore.  She has no family history of colon or ovarian cancer.     Review of patient's allergies indicates:  No Known Allergies    Past Medical History:   Diagnosis Date    Abnormal Pap smear     Asthma     Depression 2016    Environmental and seasonal allergies 2016    Heartburn 2016    HLD (hyperlipidemia) 2016    ASCVD 2018 6.3%; can't tolerate statins    Malignant melanoma of skin, unspecified     facial, near mouth, resected with negative margins per patient    Menopausal syndrome (hot flashes) 2016    Mild intermittent asthma without complication 2016    Obesity (BMI 30-39.9) 2016    Sciatica 2016     Past Surgical History:   Procedure Laterality Date    ANGIOGRAPHY OF LOWER EXTREMITY Left 2020    Procedure: ANGIOGRAM, LOWER EXTREMITY;  Surgeon: RAYMUNDO Levine II, MD;  Location: [facility] OR [address] [place] [address];  Service: Vascular;  Laterality: Left;    AORTOGRAPHY N/A 2020    Procedure: AORTOGRAM;  Surgeon: RAYMUNDO Levine II, MD;  Location: [facility] OR [address] [place] [address];  Service: Vascular;  Laterality: N/A;    CERVICAL BIOPSY  W/ LOOP ELECTRODE EXCISION      ENDARTERECTOMY OF FEMORAL ARTERY Left 2020    Procedure: Left Common Femoral Approach Endarterectomy  with Left SFA Angioplasty Stenting Possible Bypass;  Surgeon: RAYMUNDO Levine II, MD;  Location: [facility] OR 2ND FLR;  Service: Vascular;  Laterality: Left;  contrast: 52ml  fluoro time: 16.3 min  mGy: 921.68    INJECTION OF JOINT Bilateral 2018    Procedure: INJECTION-JOINT;  Surgeon: Lynette Rowley MD;  Location: [facility] PAIN MGT;  Service: Pain Management;  Laterality: Bilateral;  B/L SI Joint Injs  70297, [id number]    INJECTION OF JOINT Bilateral 2018    Procedure: INJECTION, JOINT  Bilateral SI joint;  Surgeon: Lynette Rowley MD;  Location: [facility] PAIN MGT;  Service: Pain Management;  Laterality: Bilateral;    melona      melona remove    microdiscetomy      L4-L5    OVARIAN CYST REMOVAL      TRANSFORAMINAL EPIDURAL INJECTION OF STEROID Left 3/21/2019    Procedure: INJECTION, STEROID, EPIDURAL, TRANSFORAMINAL APPROACH, L4 AND L5;  Surgeon: Lynette Rowley MD;  Location: [facility] PAIN MGT;  Service: Pain Management;  Laterality: Left;     OB History          0    Para        Term   0            AB        Living             SAB        IAB        Ectopic        Multiple        Live Births                   Family History   Problem Relation Age of Onset    Cancer Father         lung; non smoker, worked in [employer] and gas station    Alcohol abuse Father     Early death Father     Breast cancer Mother     Heart failure Mother     Diabetes Mellitus Mother         ?secondary to chronic steroids    Diabetes Mother     Heart disease Mother     Alcohol abuse Brother     Ovarian cancer Neg Hx     Hypertension Neg Hx     Colon cancer Neg Hx      Social History     Tobacco Use    Smoking status: Former     Current packs/day: 0.00     Types: Cigarettes    Smokeless tobacco: Never   Substance Use Topics    Alcohol use: Yes     Alcohol/week: 1.0 standard drink of alcohol     Types: 1 Glasses of wine per week     Comment: 3 x a month    Drug use: No       Current Outpatient Medications   Medication Sig     albuterol (PROVENTIL/VENTOLIN HFA) 90 mcg/actuation inhaler USE 2 INHALATIONS EVERY 6  HOURS AS NEEDED FOR        WHEEZING OR SHORTNESS OF   BREATH (RESCUE)    ASPIRIN (ASPIR-81 ORAL) Take 1 tablet by mouth every evening.     buPROPion (WELLBUTRIN XL) 150 MG TB24 tablet Take 1 pill daily    buPROPion (WELLBUTRIN XL) 300 MG 24 hr tablet Take 1 pill daily    cilostazoL (PLETAL) 100 MG Tab Take 1 tablet (100 mg total) by mouth 2 (two) times daily.    ECHINACEA 1X ORAL     fexofenadine (ALLEGRA) 180 MG tablet TK 1 T PO ONCE D    FLAXSEED ORAL Take by mouth.    fluticasone propionate (FLONASE) 50 mcg/actuation nasal spray 1 spray (50 mcg total) by Each Nostril route 2 (two) times daily as needed for Rhinitis or Allergies.    LACTOBAC NO.41/BIFIDOBACT NO.7 (PROBIOTIC-10 ORAL) Take by mouth every evening.     multivitamin (THERAGRAN) per tablet Take 1 tablet by mouth every evening.     nystatin (NYSTOP) powder APPLY TO THE AFFECTED AREA TWICE DAILY AS NEEDED FOR FOR SKIN INFECTION. RETREAT AS NEEDED    rosuvastatin (CRESTOR) 20 MG tablet Take 1 tablet (20 mg total) by mouth once daily.     No current facility-administered medications for this visit.         Review of Systems:  Review of Systems   Constitutional:  Negative for chills and fever.   Respiratory:  Negative for shortness of breath.    Cardiovascular:  Negative for chest pain.   Gastrointestinal:  Negative for abdominal pain, constipation, diarrhea, nausea and vomiting.   Genitourinary:  Positive for bladder incontinence (JAMES) and vaginal dryness. Negative for dysuria, pelvic pain, vaginal bleeding, vaginal discharge, postmenopausal bleeding and vaginal odor.   Integumentary:  Negative for breast mass, nipple discharge and breast skin changes.   Neurological:  Negative for headaches.   Breast: Negative for mass, nipple discharge and skin changes       OBJECTIVE:     Physical Exam:  Physical Exam  Vitals reviewed. Exam conducted with a chaperone present.    Constitutional:       General: She is not in acute distress.     Appearance: Normal appearance. She is well-developed. She is not ill-appearing or toxic-appearing.   HENT:      Head: Normocephalic and atraumatic.      Nose: Nose normal.   Eyes:      Extraocular Movements: Extraocular movements intact.      Conjunctiva/sclera: Conjunctivae normal.   Cardiovascular:      Rate and Rhythm: Normal rate.   Pulmonary:      Effort: Pulmonary effort is normal. No respiratory distress.   Chest:   Breasts:     Right: Normal. No swelling, bleeding, inverted nipple, mass, nipple discharge, skin change or tenderness.      Left: Normal. No swelling, bleeding, inverted nipple, mass, nipple discharge, skin change or tenderness.   Abdominal:      Palpations: Abdomen is soft. There is no mass.      Tenderness: There is no abdominal tenderness. There is no guarding or rebound.   Genitourinary:     Vagina: Normal. No vaginal discharge or bleeding.      Cervix: No cervical motion tenderness.      Uterus: Not enlarged and not tender.       Adnexa:         Right: No mass, tenderness or fullness.          Left: No mass, tenderness or fullness.        Comments: External genitalia: Normal  Urethral: Nontender, no masses  Urethral meatus: Normal  Bladder: Non-tender  Musculoskeletal:         General: Normal range of motion.      Cervical back: Normal range of motion.   Skin:     General: Skin is warm and dry.   Neurological:      Mental Status: She is alert. Mental status is at baseline.   Psychiatric:         Mood and Affect: Mood normal.         Behavior: Behavior normal.         Thought Content: Thought content normal.           ASSESSMENT:       ICD-10-CM ICD-9-CM    1. Well woman exam  Z01.419 V72.31 Liquid-Based Pap Smear, Screening      HPV High Risk Genotypes, PCR      2. History of cervical dysplasia  Z87.410 V13.22              Plan:      -- Pap and HPV today, per patient preference.  -- MMG done today. Will follow up report.  --  Colon cancer screening per PCP.  -- DXA at 66.  -- RTC in 1 year for annual or sooner, PRN.      Susan Mendez MD

## 2023-12-02 RX ORDER — NYSTATIN 100000 [USP'U]/G
POWDER TOPICAL
Qty: 30 G | Refills: 3 | Status: SHIPPED | OUTPATIENT
Start: 2023-12-02

## 2023-12-06 ENCOUNTER — TELEPHONE (OUTPATIENT)
Dept: FAMILY MEDICINE | Facility: CLINIC | Age: 63
End: 2023-12-06
Payer: COMMERCIAL

## 2023-12-06 NOTE — TELEPHONE ENCOUNTER
----- Message from Rylie Cobian sent at 12/6/2023 10:19 AM CST -----  Regarding: directions  Type:  Pharmacy Calling to Clarify an RX    Name of Caller:Elaine  Pharmacy Name:[facility]  Prescription Name:nystatin (MYCOSTATIN) powder  What do they need to clarify?:directions on RX  Best Call Back Number:[phone] REF# [id number]  Additional Information: Clarify RETREAT AS NEEDED. RX is on hold until call is returned.

## 2023-12-11 ENCOUNTER — PATIENT MESSAGE (OUTPATIENT)
Dept: CARDIOLOGY | Facility: CLINIC | Age: 63
End: 2023-12-11
Payer: COMMERCIAL

## 2023-12-11 LAB
FINAL PATHOLOGIC DIAGNOSIS: NORMAL
Lab: NORMAL

## 2024-03-31 NOTE — TELEPHONE ENCOUNTER
No care due was identified.  [facility] Embedded Care Due Messages. Reference number: [id number].   3/31/2024 11:53:03 AM CDT

## 2024-04-02 RX ORDER — ROSUVASTATIN CALCIUM 20 MG/1
20 TABLET, COATED ORAL DAILY
Qty: 90 TABLET | Refills: 3 | Status: SHIPPED | OUTPATIENT
Start: 2024-04-02 | End: 2025-03-28

## 2024-06-20 ENCOUNTER — PATIENT MESSAGE (OUTPATIENT)
Dept: ADMINISTRATIVE | Facility: HOSPITAL | Age: 64
End: 2024-06-20
Payer: COMMERCIAL

## 2024-06-20 ENCOUNTER — PATIENT MESSAGE (OUTPATIENT)
Dept: FAMILY MEDICINE | Facility: CLINIC | Age: 64
End: 2024-06-20
Payer: COMMERCIAL

## 2024-06-20 NOTE — TELEPHONE ENCOUNTER
"Pt decline seeing NP, because pt stated " she a Np she can't do anything for me ". Pt also declined be seen at urgent care because " they are doctors and will just send her to the ER". Informed pt that  is doing virtual appointments if she would like to schedule pt decline and stated she needs to be seen in person. Informed pt that her only option would be urgent or the ER due to our Np being on vacation until Monday and  doing virtual appointments only until the month of September. Pt stated she will just find someone else before hanging up.  "

## 2024-06-21 DIAGNOSIS — Z12.11 SCREENING FOR COLON CANCER: ICD-10-CM

## 2024-07-17 ENCOUNTER — PATIENT OUTREACH (OUTPATIENT)
Dept: ADMINISTRATIVE | Facility: HOSPITAL | Age: 64
End: 2024-07-17
Payer: COMMERCIAL

## 2024-07-30 ENCOUNTER — OFFICE VISIT (OUTPATIENT)
Dept: CARDIOLOGY | Facility: CLINIC | Age: 64
End: 2024-07-30
Payer: COMMERCIAL

## 2024-07-30 DIAGNOSIS — R07.2 PRECORDIAL PAIN: Primary | ICD-10-CM

## 2024-07-30 DIAGNOSIS — I70.219 ATHEROSCLEROTIC PERIPHERAL VASCULAR DISEASE WITH INTERMITTENT CLAUDICATION: ICD-10-CM

## 2024-07-30 DIAGNOSIS — I10 PRIMARY HYPERTENSION: ICD-10-CM

## 2024-07-30 DIAGNOSIS — R00.2 PALPITATIONS: ICD-10-CM

## 2024-07-30 PROCEDURE — 99999 PR PBB SHADOW E&M-EST. PATIENT-LVL IV: CPT | Mod: PBBFAC,,, | Performed by: INTERNAL MEDICINE

## 2024-07-30 PROCEDURE — 99214 OFFICE O/P EST MOD 30 MIN: CPT | Mod: S$GLB,,, | Performed by: INTERNAL MEDICINE

## 2024-07-30 PROCEDURE — 1159F MED LIST DOCD IN RCRD: CPT | Mod: CPTII,S$GLB,, | Performed by: INTERNAL MEDICINE

## 2024-07-30 PROCEDURE — 1160F RVW MEDS BY RX/DR IN RCRD: CPT | Mod: CPTII,S$GLB,, | Performed by: INTERNAL MEDICINE

## 2024-07-30 RX ORDER — METOPROLOL SUCCINATE 50 MG/1
50 TABLET, EXTENDED RELEASE ORAL DAILY
Qty: 90 TABLET | Refills: 3 | Status: SHIPPED | OUTPATIENT
Start: 2024-07-30 | End: 2025-07-30

## 2024-07-30 NOTE — PROGRESS NOTES
[facility]    Chief Complaint  Chief Complaint   Patient presents with    Chest Pain       HPI:    Presents for evaluation of chest discomfort.  About 3-4 weeks ago developed a focal pressure or twinge on the left side of her chest.  Also her to washing in her ears when she was lying down at night.  Would last 10-15 minutes.  Not associated with activities.  No aggravating or alleviating factors noted.  No associated symptoms.  At this point, symptoms have resolved.  She is slowed down her activities because of environmental heat this summer.  With what she does, no problems with exertional dyspnea or chest discomfort.    Medications  Current Outpatient Medications   Medication Sig Dispense Refill    albuterol (PROVENTIL/VENTOLIN HFA) 90 mcg/actuation inhaler USE 2 INHALATIONS EVERY 6  HOURS AS NEEDED FOR        WHEEZING OR SHORTNESS OF   BREATH (RESCUE) 25.5 g 0    ASPIRIN (ASPIR-81 ORAL) Take 1 tablet by mouth every evening.       buPROPion (WELLBUTRIN XL) 150 MG TB24 tablet Take 1 pill daily 90 tablet 3    buPROPion (WELLBUTRIN XL) 300 MG 24 hr tablet Take 1 pill daily 90 tablet 3    cilostazoL (PLETAL) 100 MG Tab Take 1 tablet (100 mg total) by mouth 2 (two) times daily. 180 tablet 3    ECHINACEA 1X ORAL       fexofenadine (ALLEGRA) 180 MG tablet TK 1 T PO ONCE D      FLAXSEED ORAL Take by mouth.      fluticasone propionate (FLONASE) 50 mcg/actuation nasal spray 1 spray (50 mcg total) by Each Nostril route 2 (two) times daily as needed for Rhinitis or Allergies. 16 g 6    LACTOBAC NO.41/BIFIDOBACT NO.7 (PROBIOTIC-10 ORAL) Take by mouth every evening.       multivitamin (THERAGRAN) per tablet Take 1 tablet by mouth every evening.       nystatin (MYCOSTATIN) powder APPLY TO THE AFFECTED AREA TWICE DAILY AS NEEDED FOR FOR SKIN INFECTION. RETREAT AS NEEDED 30 g 3    rosuvastatin (CRESTOR) 20 MG tablet Take 1 tablet (20 mg total) by mouth once daily. 90 tablet 3    metoprolol succinate (TOPROL-XL) 50  MG 24 hr tablet Take 1 tablet (50 mg total) by mouth once daily. 90 tablet 3     No current facility-administered medications for this visit.        History  Past Medical History:   Diagnosis Date    Abnormal Pap smear     Asthma     Depression 03/17/2016    Environmental and seasonal allergies 03/17/2016    Heartburn 03/17/2016    HLD (hyperlipidemia) 03/22/2016    ASCVD 2018 6.3%; can't tolerate statins    Malignant melanoma of skin, unspecified 2022    facial, near mouth, resected with negative margins per patient    Menopausal syndrome (hot flashes) 03/17/2016    Mild intermittent asthma without complication 03/17/2016    Obesity (BMI 30-39.9) 03/17/2016    Sciatica 03/17/2016     Past Surgical History:   Procedure Laterality Date    ANGIOGRAPHY OF LOWER EXTREMITY Left 2/12/2020    Procedure: ANGIOGRAM, LOWER EXTREMITY;  Surgeon: RAYMUNDO Levine II, MD;  Location: [facility] OR [address] [place] [address];  Service: Vascular;  Laterality: Left;    AORTOGRAPHY N/A 2/12/2020    Procedure: AORTOGRAM;  Surgeon: RAYMUNDO Levine II, MD;  Location: [facility] OR [address] [place] [address];  Service: Vascular;  Laterality: N/A;    CERVICAL BIOPSY  W/ LOOP ELECTRODE EXCISION      ENDARTERECTOMY OF FEMORAL ARTERY Left 2/12/2020    Procedure: Left Common Femoral Approach Endarterectomy with Left SFA Angioplasty Stenting Possible Bypass;  Surgeon: RAYMUNDO Levine II, MD;  Location: [facility] OR [address] [place] [address];  Service: Vascular;  Laterality: Left;  contrast: 52ml  fluoro time: 16.3 min  mGy: 921.68    INJECTION OF JOINT Bilateral 5/31/2018    Procedure: INJECTION-JOINT;  Surgeon: Lynette Rowley MD;  Location: [facility] PAIN MGT;  Service: Pain Management;  Laterality: Bilateral;  B/L SI Joint Injs  69629, [id number]    INJECTION OF JOINT Bilateral 9/18/2018    Procedure: INJECTION, JOINT  Bilateral SI joint;  Surgeon: Lynette Rowley MD;  Location: [facility] PAIN MGT;  Service: Pain Management;  Laterality: Bilateral;    melona      melona remove    microdiscetomy      L4-L5    OVARIAN  CYST REMOVAL  2001    TRANSFORAMINAL EPIDURAL INJECTION OF STEROID Left 3/21/2019    Procedure: INJECTION, STEROID, EPIDURAL, TRANSFORAMINAL APPROACH, L4 AND L5;  Surgeon: Lynette Rowley MD;  Location: [facility];  Service: Pain Management;  Laterality: Left;     Social History     Socioeconomic History    Marital status:    Tobacco Use    Smoking status: Former     Current packs/day: 0.00     Types: Cigarettes    Smokeless tobacco: Never   Substance and Sexual Activity    Alcohol use: Yes     Alcohol/week: 1.0 standard drink of alcohol     Types: 1 Glasses of wine per week     Comment: 3 x a month    Drug use: No    Sexual activity: Yes     Partners: Female     Birth control/protection: None     Comment: (partner Ivone is a trans woman)   Social History Narrative    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    Works for [employer]    Walks a lot at work and for exercise     Social Determinants of Health     Financial Resource Strain: Medium Risk (7/15/2024)    Overall Financial Resource Strain (CARDIA)     Difficulty of Paying Living Expenses: Somewhat hard   Food Insecurity: No Food Insecurity (7/15/2024)    Hunger Vital Sign     Worried About Running Out of Food in the Last Year: Never true     Ran Out of Food in the Last Year: Never true   Transportation Needs: No Transportation Needs (10/20/2023)    PRAPARE - Transportation     Lack of Transportation (Medical): No     Lack of Transportation (Non-Medical): No   Physical Activity: Insufficiently Active (7/15/2024)    Exercise Vital Sign     Days of Exercise per Week: 3 days     Minutes of Exercise per Session: 20 min   Stress: Stress Concern Present (7/15/2024)    [employer] - Occupational Stress Questionnaire     Feeling of Stress : To some extent   Housing Stability: Low Risk  (10/20/2023)    Housing Stability Vital Sign     Unable to Pay for Housing in the Last Year: No     Number of Places Lived in the Last  Year: 1     Unstable Housing in the Last Year: No     Family History   Problem Relation Name Age of Onset    Breast cancer Mother Radha 65    Heart failure Mother Radha     Diabetes Mellitus Mother Radha         ?secondary to chronic steroids    Diabetes Mother Radha     Heart disease Mother Radha     Cancer Father Kahlil         lung; non smoker, worked in [employer] and gas station    Alcohol abuse Father Kahlil     Early death Father Kahlil     Alcohol abuse Brother Julian     Ovarian cancer Neg Hx      Hypertension Neg Hx      Colon cancer Neg Hx          Allergies  Review of patient's allergies indicates:  No Known Allergies    Review of Systems   Review of Systems   Constitutional: Negative for malaise/fatigue, weight gain and weight loss.   Eyes:  Negative for visual disturbance.   Cardiovascular:  Positive for chest pain and claudication. Negative for cyanosis, dyspnea on exertion, irregular heartbeat, leg swelling, near-syncope, orthopnea, palpitations, paroxysmal nocturnal dyspnea and syncope.   Respiratory:  Negative for cough, hemoptysis, shortness of breath, sleep disturbances due to breathing and wheezing.    Hematologic/Lymphatic: Negative for bleeding problem. Does not bruise/bleed easily.   Skin:  Negative for poor wound healing.   Musculoskeletal:  Negative for muscle cramps and myalgias.   Gastrointestinal:  Negative for abdominal pain, anorexia, diarrhea, heartburn, hematemesis, hematochezia, melena, nausea and vomiting.   Genitourinary:  Negative for hematuria and nocturia.   Neurological:  Negative for excessive daytime sleepiness, dizziness, focal weakness, light-headedness and weakness.       Physical Exam  Vitals:    07/30/24 1124   BP: (!) (P) 146/72   Pulse: (P) 89     Wt Readings from Last 1 Encounters:   07/30/24 (P) 95.8 kg (211 lb 1.6 oz)     Physical Exam  Vitals and nursing note reviewed.   Constitutional:       General: She is not in acute distress.     Appearance: She is not  toxic-appearing or diaphoretic.   HENT:      Head: Normocephalic and atraumatic.      Mouth/Throat:      Lips: Pink.      Mouth: Mucous membranes are moist.   Eyes:      General: No scleral icterus.     Conjunctiva/sclera: Conjunctivae normal.   Neck:      Thyroid: No thyromegaly.      Vascular: No carotid bruit, hepatojugular reflux or JVD.      Trachea: Trachea normal.   Cardiovascular:      Rate and Rhythm: Normal rate and regular rhythm. No extrasystoles are present.     Chest Wall: PMI is not displaced.      Pulses:           Carotid pulses are 2+ on the right side and 2+ on the left side.       Radial pulses are 2+ on the right side and 2+ on the left side.        Dorsalis pedis pulses are 0 on the right side and 1+ on the left side.      Heart sounds: S1 normal and S2 normal. No murmur heard.     No friction rub. No S3 or S4 sounds.   Pulmonary:      Effort: Pulmonary effort is normal. No accessory muscle usage or respiratory distress.      Breath sounds: Normal breath sounds and air entry. No decreased breath sounds, wheezing, rhonchi or rales.   Abdominal:      General: Bowel sounds are normal. There is no distension or abdominal bruit.      Palpations: Abdomen is soft. There is no hepatomegaly, splenomegaly or pulsatile mass.      Tenderness: There is no abdominal tenderness.   Musculoskeletal:         General: No tenderness or deformity.      Right lower leg: No edema.      Left lower leg: No edema.   Skin:     General: Skin is warm and dry.      Capillary Refill: Capillary refill takes less than 2 seconds.      Coloration: Skin is not cyanotic or pale.      Nails: There is no clubbing.   Neurological:      General: No focal deficit present.      Mental Status: She is alert and oriented to person, place, and time.   Psychiatric:         Attention and Perception: Attention normal.         Mood and Affect: Mood normal.         Speech: Speech normal.         Behavior: Behavior normal. Behavior is  cooperative.         Labs  No visits with results within 6 Month(s) from this visit.   Latest known visit with results is:   Office Visit on 12/01/2023   Component Date Value Ref Range Status    Final Pathologic Diagnosis 12/01/2023    Final                    Value:Specimen Adequacy  Satisfactory for interpretation. Endocervical component is present.    [place] Category  Negative for intraepithelial lesion or malignancy.  Inflammation present.      Interp By CARON Rodriguez(ASCP), Signed on 12/11/2023 at 13:30    Disclaimer 12/01/2023    Final                    Value:The Pap smear is a screening test that aids in the detection of cervical cancer and cancer precursors. Both false positive and false negative results can occur. The test should be used at regular intervals, and positive results should be confirmed before   definitive therapy.  This liquid based specimen is processed using the  or  Thin PrepPAP System. This specimen has been analyzed by the ThinPrep Imaging System ([employer]), an automated imaging and review system which assists the laboratory in evaluating   cells on ThinPrep PAP tests. Following automated imaging, selected fields from every slide are reviewed by a cytotechnologist and/or pathologist.     Screening was performed at [facility], [address] [place] [address].      HPV other High Risk types, PCR 12/01/2023 Negative  Negative Final    Comment: Other HPV genotypes include:   31,33,35,39,45,51,52,56,58,59,66 and 68.      HPV High Risk type 16, PCR 12/01/2023 Negative  Negative Final    HPV High Risk type 18, PCR 12/01/2023 Negative  Negative Final       Imaging  No results found.    Assessment  1. Precordial pain  Resolved.  Noncardiac.    2. Palpitations  Benign ectopy    3. Primary hypertension  Blood pressures are consistently running above goal.  Will institute treatment with metoprolol    4.  Atherosclerotic peripheral vascular disease with intermittent claudication  Followed by vascular surgery.  Non limiting.      Plan and Discussion    Start metoprolol.  Discussed lifestyle modification, resume exercise.    The 10-year ASCVD risk score (Elia DK, et al., 2019) is: 4.8%    Values used to calculate the score:      Age: 63 years      Sex: Female      Is Non- : No      Diabetic: No      Tobacco smoker: No      Systolic Blood Pressure: 146 mmHg      Is BP treated: No      HDL Cholesterol: 44 mg/dL      Total Cholesterol: 148 mg/dL     Follow Up  Follow up if symptoms worsen or fail to improve.      Andrew Rojas MD

## 2024-09-24 ENCOUNTER — LAB VISIT (OUTPATIENT)
Dept: LAB | Facility: HOSPITAL | Age: 64
End: 2024-09-24
Attending: FAMILY MEDICINE
Payer: COMMERCIAL

## 2024-09-24 ENCOUNTER — OFFICE VISIT (OUTPATIENT)
Dept: FAMILY MEDICINE | Facility: CLINIC | Age: 64
End: 2024-09-24
Attending: FAMILY MEDICINE
Payer: COMMERCIAL

## 2024-09-24 VITALS
HEART RATE: 91 BPM | BODY MASS INDEX: 32.89 KG/M2 | SYSTOLIC BLOOD PRESSURE: 129 MMHG | DIASTOLIC BLOOD PRESSURE: 79 MMHG | OXYGEN SATURATION: 97 % | WEIGHT: 210 LBS

## 2024-09-24 DIAGNOSIS — I10 ESSENTIAL HYPERTENSION: ICD-10-CM

## 2024-09-24 DIAGNOSIS — E78.2 MIXED HYPERLIPIDEMIA: ICD-10-CM

## 2024-09-24 DIAGNOSIS — I10 ESSENTIAL HYPERTENSION: Primary | ICD-10-CM

## 2024-09-24 DIAGNOSIS — M79.89 SOFT TISSUE MASS: ICD-10-CM

## 2024-09-24 DIAGNOSIS — Z12.31 ENCOUNTER FOR SCREENING MAMMOGRAM FOR BREAST CANCER: ICD-10-CM

## 2024-09-24 DIAGNOSIS — F32.A ANXIETY AND DEPRESSION: ICD-10-CM

## 2024-09-24 DIAGNOSIS — F41.9 ANXIETY AND DEPRESSION: ICD-10-CM

## 2024-09-24 LAB
ALBUMIN SERPL BCP-MCNC: 3.8 G/DL (ref 3.5–5.2)
ALP SERPL-CCNC: 98 U/L (ref 55–135)
ALT SERPL W/O P-5'-P-CCNC: 23 U/L (ref 10–44)
ANION GAP SERPL CALC-SCNC: 10 MMOL/L (ref 8–16)
AST SERPL-CCNC: 23 U/L (ref 10–40)
BILIRUB SERPL-MCNC: 0.4 MG/DL (ref 0.1–1)
BUN SERPL-MCNC: 17 MG/DL (ref 8–23)
CALCIUM SERPL-MCNC: 10 MG/DL (ref 8.7–10.5)
CHLORIDE SERPL-SCNC: 105 MMOL/L (ref 95–110)
CHOLEST SERPL-MCNC: 149 MG/DL (ref 120–199)
CHOLEST/HDLC SERPL: 3.2 {RATIO} (ref 2–5)
CO2 SERPL-SCNC: 24 MMOL/L (ref 23–29)
CREAT SERPL-MCNC: 0.8 MG/DL (ref 0.5–1.4)
EST. GFR  (NO RACE VARIABLE): >60 ML/MIN/1.73 M^2
GLUCOSE SERPL-MCNC: 107 MG/DL (ref 70–110)
HDLC SERPL-MCNC: 47 MG/DL (ref 40–75)
HDLC SERPL: 31.5 % (ref 20–50)
LDLC SERPL CALC-MCNC: 80.8 MG/DL (ref 63–159)
NONHDLC SERPL-MCNC: 102 MG/DL
POTASSIUM SERPL-SCNC: 3.8 MMOL/L (ref 3.5–5.1)
PROT SERPL-MCNC: 7.1 G/DL (ref 6–8.4)
SODIUM SERPL-SCNC: 139 MMOL/L (ref 136–145)
TRIGL SERPL-MCNC: 106 MG/DL (ref 30–150)

## 2024-09-24 PROCEDURE — 80053 COMPREHEN METABOLIC PANEL: CPT | Performed by: FAMILY MEDICINE

## 2024-09-24 PROCEDURE — 3074F SYST BP LT 130 MM HG: CPT | Mod: CPTII,S$GLB,, | Performed by: FAMILY MEDICINE

## 2024-09-24 PROCEDURE — 99214 OFFICE O/P EST MOD 30 MIN: CPT | Mod: S$GLB,,, | Performed by: FAMILY MEDICINE

## 2024-09-24 PROCEDURE — 99999 PR PBB SHADOW E&M-EST. PATIENT-LVL IV: CPT | Mod: PBBFAC,,, | Performed by: FAMILY MEDICINE

## 2024-09-24 PROCEDURE — 1159F MED LIST DOCD IN RCRD: CPT | Mod: CPTII,S$GLB,, | Performed by: FAMILY MEDICINE

## 2024-09-24 PROCEDURE — 80061 LIPID PANEL: CPT | Performed by: FAMILY MEDICINE

## 2024-09-24 PROCEDURE — 3078F DIAST BP <80 MM HG: CPT | Mod: CPTII,S$GLB,, | Performed by: FAMILY MEDICINE

## 2024-09-24 PROCEDURE — 36415 COLL VENOUS BLD VENIPUNCTURE: CPT | Mod: PO | Performed by: FAMILY MEDICINE

## 2024-09-24 PROCEDURE — 3008F BODY MASS INDEX DOCD: CPT | Mod: CPTII,S$GLB,, | Performed by: FAMILY MEDICINE

## 2024-09-24 NOTE — PROGRESS NOTES
Subjective:       Patient ID: Amita Lewis is a 63 y.o. female.    Chief Complaint: Hypertension    Hypertension  Associated symptoms include palpitations. Pertinent negatives include no chest pain, headaches or neck pain.     Pt is here for follow up of htn bp fine no sob/cp on pletal   Pt has hypercholesterolemia on statin no muscle aches  Pt is concerned about a soft tissue swelling above her right hip not sure how long it has been there   She thinks it is getting bigger  Review of Systems   Constitutional:  Positive for activity change. Negative for unexpected weight change.   HENT:  Negative for hearing loss, rhinorrhea and trouble swallowing.    Respiratory:  Negative for chest tightness and wheezing.    Cardiovascular:  Positive for palpitations. Negative for chest pain.   Gastrointestinal:  Negative for blood in stool, constipation, diarrhea and vomiting.   Genitourinary:  Negative for difficulty urinating, dysuria, hematuria and menstrual problem.   Musculoskeletal:  Negative for arthralgias, joint swelling and neck pain.   Neurological:  Negative for weakness and headaches.   Psychiatric/Behavioral:  Positive for dysphoric mood. Negative for confusion.        Objective:    /79   Pulse 91   Wt 95.3 kg (210 lb)   LMP 10/20/2013   SpO2 97%   BMI 32.89 kg/m²     Physical Exam  Constitutional:       Appearance: She is obese. She is not ill-appearing.   Cardiovascular:      Rate and Rhythm: Normal rate and regular rhythm.      Heart sounds:      No gallop.   Pulmonary:      Effort: Pulmonary effort is normal. No respiratory distress.   Skin:     Comments: Right hip/side approx 4 cm by 2 solid soft tissue mass tender to palpation no overlying skin changes    Neurological:      General: No focal deficit present.      Mental Status: She is alert and oriented to person, place, and time.      Cranial Nerves: No cranial nerve deficit.      Coordination: Coordination normal.   Psychiatric:          "Mood and Affect: Mood normal.         Behavior: Behavior normal.         Thought Content: Thought content normal.         Judgment: Judgment normal.         Assessment:       1. Essential hypertension    2. Soft tissue mass    3. Mixed hyperlipidemia    4. Encounter for screening mammogram for breast cancer      5. htn  Plan:     Orders soft tissue ultrasound  Cont meds  Low salt low fat diet  Rtc for annual exam        "This note will not be shared with the patient."     "

## 2024-09-26 ENCOUNTER — HOSPITAL ENCOUNTER (OUTPATIENT)
Dept: RADIOLOGY | Facility: OTHER | Age: 64
Discharge: HOME | End: 2024-09-26
Attending: FAMILY MEDICINE
Payer: COMMERCIAL

## 2024-09-26 DIAGNOSIS — M79.89 SOFT TISSUE MASS: ICD-10-CM

## 2024-09-26 PROCEDURE — 76705 ECHO EXAM OF ABDOMEN: CPT | Mod: 26,,, | Performed by: RADIOLOGY

## 2024-09-26 PROCEDURE — 76705 ECHO EXAM OF ABDOMEN: CPT | Mod: TC

## 2024-10-21 ENCOUNTER — OFFICE VISIT (OUTPATIENT)
Dept: URGENT CARE | Facility: CLINIC | Age: 64
End: 2024-10-21
Payer: COMMERCIAL

## 2024-10-21 VITALS
HEART RATE: 98 BPM | BODY MASS INDEX: 32.96 KG/M2 | RESPIRATION RATE: 19 BRPM | HEIGHT: 67 IN | DIASTOLIC BLOOD PRESSURE: 79 MMHG | SYSTOLIC BLOOD PRESSURE: 147 MMHG | OXYGEN SATURATION: 98 % | TEMPERATURE: 98 F | WEIGHT: 210 LBS

## 2024-10-21 DIAGNOSIS — S61.412A LACERATION OF LEFT HAND WITHOUT FOREIGN BODY, INITIAL ENCOUNTER: Primary | ICD-10-CM

## 2024-10-21 PROCEDURE — 90715 TDAP VACCINE 7 YRS/> IM: CPT | Mod: S$GLB,,, | Performed by: FAMILY MEDICINE

## 2024-10-21 PROCEDURE — 90471 IMMUNIZATION ADMIN: CPT | Mod: S$GLB,,, | Performed by: FAMILY MEDICINE

## 2024-10-21 PROCEDURE — 99213 OFFICE O/P EST LOW 20 MIN: CPT | Mod: 25,S$GLB,, | Performed by: FAMILY MEDICINE

## 2024-10-21 PROCEDURE — 12041 INTMD RPR N-HF/GENIT 2.5CM/<: CPT | Mod: S$GLB,,, | Performed by: FAMILY MEDICINE

## 2024-10-21 RX ORDER — MUPIROCIN 20 MG/G
OINTMENT TOPICAL
Qty: 22 G | Refills: 1 | Status: SHIPPED | OUTPATIENT
Start: 2024-10-21

## 2024-10-21 RX ORDER — NAPROXEN 500 MG/1
500 TABLET ORAL 2 TIMES DAILY
Qty: 30 TABLET | Refills: 0 | Status: SHIPPED | OUTPATIENT
Start: 2024-10-21

## 2024-10-21 RX ORDER — CEPHALEXIN 500 MG/1
500 CAPSULE ORAL EVERY 8 HOURS
Qty: 21 CAPSULE | Refills: 0 | Status: SHIPPED | OUTPATIENT
Start: 2024-10-21 | End: 2024-10-28

## 2024-10-21 NOTE — PROGRESS NOTES
"Subjective:      Patient ID: Amita Lewis is a 63 y.o. female.    Vitals:  height is 5' 7" (1.702 m) and weight is 95.3 kg (210 lb). Her oral temperature is 98.2 °F (36.8 °C). Her blood pressure is 147/79 (abnormal) and her pulse is 98. Her respiration is 19 and oxygen saturation is 98%.     Chief Complaint: Laceration    Pt cut her LT hand with an exacto knife while crafting today. Pt clean laceration with water and compressed with ice. Last Tetanus was at [facility] in 2014.     Laceration   The incident occurred 6 to 12 hours ago. The laceration is located on the Left hand. The laceration mechanism was a clean knife. The pain is at a severity of 6/10. The pain is moderate. The pain has been Constant since onset. She reports no foreign bodies present. Her tetanus status is out of date.       Skin:  Positive for laceration.      Objective:     Physical Exam   Constitutional: She does not appear ill. No distress. obesity  Cardiovascular: Normal rate, regular rhythm, normal heart sounds and normal pulses.   Pulmonary/Chest: Effort normal and breath sounds normal.   Abdominal: Normal appearance.   Neurological: She is alert.   Skin: Skin is warm. lesion (1.5 cm linear laceration hypothenar eminence, scant bleeding noted, mild swelling, tender. no obvious tendon or major vessel involvement. function fully intact)   Nursing note and vitals reviewed.    Assessment:     1. Laceration of left hand without foreign body, initial encounter        Plan:       Laceration of left hand without foreign body, initial encounter  -     Tdap (BOOSTRIX) vaccine injection 0.5 mL  -     cephALEXin (KEFLEX) 500 MG capsule; Take 1 capsule (500 mg total) by mouth every 8 (eight) hours. for 7 days  Dispense: 21 capsule; Refill: 0  -     mupirocin (BACTROBAN) 2 % ointment; Apply to affected area 3 times daily  Dispense: 22 g; Refill: 1  -     naproxen (NAPROSYN) 500 MG tablet; Take 1 tablet (500 mg total) by mouth 2 (two) times " daily.  Dispense: 30 tablet; Refill: 0    Discussed wound care and signs and symptoms. RTC 7-10 days for suture removal

## 2024-10-22 ENCOUNTER — LAB VISIT (OUTPATIENT)
Dept: LAB | Facility: HOSPITAL | Age: 64
End: 2024-10-22
Attending: FAMILY MEDICINE
Payer: COMMERCIAL

## 2024-10-22 ENCOUNTER — OFFICE VISIT (OUTPATIENT)
Dept: FAMILY MEDICINE | Facility: CLINIC | Age: 64
End: 2024-10-22
Attending: FAMILY MEDICINE
Payer: COMMERCIAL

## 2024-10-22 VITALS
OXYGEN SATURATION: 99 % | HEART RATE: 84 BPM | BODY MASS INDEX: 32.42 KG/M2 | DIASTOLIC BLOOD PRESSURE: 76 MMHG | WEIGHT: 207 LBS | SYSTOLIC BLOOD PRESSURE: 124 MMHG

## 2024-10-22 DIAGNOSIS — Z00.00 ANNUAL PHYSICAL EXAM: Primary | ICD-10-CM

## 2024-10-22 DIAGNOSIS — J45.20 MILD INTERMITTENT ASTHMA WITHOUT COMPLICATION: ICD-10-CM

## 2024-10-22 DIAGNOSIS — E78.2 MIXED HYPERLIPIDEMIA: ICD-10-CM

## 2024-10-22 DIAGNOSIS — Z00.00 ANNUAL PHYSICAL EXAM: ICD-10-CM

## 2024-10-22 DIAGNOSIS — F32.A DEPRESSION, UNSPECIFIED DEPRESSION TYPE: ICD-10-CM

## 2024-10-22 LAB
ALBUMIN SERPL BCP-MCNC: 3.9 G/DL (ref 3.5–5.2)
ALP SERPL-CCNC: 103 U/L (ref 40–150)
ALT SERPL W/O P-5'-P-CCNC: 20 U/L (ref 10–44)
ANION GAP SERPL CALC-SCNC: 12 MMOL/L (ref 8–16)
AST SERPL-CCNC: 22 U/L (ref 10–40)
BASOPHILS # BLD AUTO: 0.06 K/UL (ref 0–0.2)
BASOPHILS NFR BLD: 0.8 % (ref 0–1.9)
BILIRUB SERPL-MCNC: 0.5 MG/DL (ref 0.1–1)
BILIRUB UR QL STRIP: NEGATIVE
BUN SERPL-MCNC: 19 MG/DL (ref 8–23)
CALCIUM SERPL-MCNC: 9.8 MG/DL (ref 8.7–10.5)
CHLORIDE SERPL-SCNC: 105 MMOL/L (ref 95–110)
CHOLEST SERPL-MCNC: 145 MG/DL (ref 120–199)
CHOLEST/HDLC SERPL: 3 {RATIO} (ref 2–5)
CLARITY UR REFRACT.AUTO: CLEAR
CO2 SERPL-SCNC: 20 MMOL/L (ref 23–29)
COLOR UR AUTO: YELLOW
CREAT SERPL-MCNC: 0.8 MG/DL (ref 0.5–1.4)
DIFFERENTIAL METHOD BLD: ABNORMAL
EOSINOPHIL # BLD AUTO: 0.3 K/UL (ref 0–0.5)
EOSINOPHIL NFR BLD: 4.3 % (ref 0–8)
ERYTHROCYTE [DISTWIDTH] IN BLOOD BY AUTOMATED COUNT: 13.2 % (ref 11.5–14.5)
EST. GFR  (NO RACE VARIABLE): >60 ML/MIN/1.73 M^2
ESTIMATED AVG GLUCOSE: 105 MG/DL (ref 68–131)
GLUCOSE SERPL-MCNC: 99 MG/DL (ref 70–110)
GLUCOSE UR QL STRIP: NEGATIVE
HBA1C MFR BLD: 5.3 % (ref 4–5.6)
HCT VFR BLD AUTO: 40.7 % (ref 37–48.5)
HDLC SERPL-MCNC: 48 MG/DL (ref 40–75)
HDLC SERPL: 33.1 % (ref 20–50)
HGB BLD-MCNC: 12.9 G/DL (ref 12–16)
HGB UR QL STRIP: NEGATIVE
IMM GRANULOCYTES # BLD AUTO: 0.02 K/UL (ref 0–0.04)
IMM GRANULOCYTES NFR BLD AUTO: 0.3 % (ref 0–0.5)
KETONES UR QL STRIP: NEGATIVE
LDLC SERPL CALC-MCNC: 76.8 MG/DL (ref 63–159)
LEUKOCYTE ESTERASE UR QL STRIP: NEGATIVE
LYMPHOCYTES # BLD AUTO: 1.5 K/UL (ref 1–4.8)
LYMPHOCYTES NFR BLD: 20 % (ref 18–48)
MCH RBC QN AUTO: 29.3 PG (ref 27–31)
MCHC RBC AUTO-ENTMCNC: 31.7 G/DL (ref 32–36)
MCV RBC AUTO: 92 FL (ref 82–98)
MONOCYTES # BLD AUTO: 0.5 K/UL (ref 0.3–1)
MONOCYTES NFR BLD: 6.3 % (ref 4–15)
NEUTROPHILS # BLD AUTO: 5.2 K/UL (ref 1.8–7.7)
NEUTROPHILS NFR BLD: 68.3 % (ref 38–73)
NITRITE UR QL STRIP: NEGATIVE
NONHDLC SERPL-MCNC: 97 MG/DL
NRBC BLD-RTO: 0 /100 WBC
PH UR STRIP: 7 [PH] (ref 5–8)
PLATELET # BLD AUTO: 321 K/UL (ref 150–450)
PMV BLD AUTO: 12.1 FL (ref 9.2–12.9)
POTASSIUM SERPL-SCNC: 4.1 MMOL/L (ref 3.5–5.1)
PROT SERPL-MCNC: 7.2 G/DL (ref 6–8.4)
PROT UR QL STRIP: NEGATIVE
RBC # BLD AUTO: 4.41 M/UL (ref 4–5.4)
SODIUM SERPL-SCNC: 137 MMOL/L (ref 136–145)
SP GR UR STRIP: 1.01 (ref 1–1.03)
TRIGL SERPL-MCNC: 101 MG/DL (ref 30–150)
TSH SERPL DL<=0.005 MIU/L-ACNC: 2.61 UIU/ML (ref 0.4–4)
URN SPEC COLLECT METH UR: NORMAL
WBC # BLD AUTO: 7.64 K/UL (ref 3.9–12.7)

## 2024-10-22 PROCEDURE — 81003 URINALYSIS AUTO W/O SCOPE: CPT | Performed by: FAMILY MEDICINE

## 2024-10-22 PROCEDURE — 36415 COLL VENOUS BLD VENIPUNCTURE: CPT | Mod: PO | Performed by: FAMILY MEDICINE

## 2024-10-22 PROCEDURE — 85025 COMPLETE CBC W/AUTO DIFF WBC: CPT | Performed by: FAMILY MEDICINE

## 2024-10-22 PROCEDURE — 1159F MED LIST DOCD IN RCRD: CPT | Mod: CPTII,S$GLB,, | Performed by: FAMILY MEDICINE

## 2024-10-22 PROCEDURE — 99396 PREV VISIT EST AGE 40-64: CPT | Mod: S$GLB,,, | Performed by: FAMILY MEDICINE

## 2024-10-22 PROCEDURE — 3008F BODY MASS INDEX DOCD: CPT | Mod: CPTII,S$GLB,, | Performed by: FAMILY MEDICINE

## 2024-10-22 PROCEDURE — 83036 HEMOGLOBIN GLYCOSYLATED A1C: CPT | Performed by: FAMILY MEDICINE

## 2024-10-22 PROCEDURE — 80061 LIPID PANEL: CPT | Performed by: FAMILY MEDICINE

## 2024-10-22 PROCEDURE — 99999 PR PBB SHADOW E&M-EST. PATIENT-LVL III: CPT | Mod: PBBFAC,,, | Performed by: FAMILY MEDICINE

## 2024-10-22 PROCEDURE — 3078F DIAST BP <80 MM HG: CPT | Mod: CPTII,S$GLB,, | Performed by: FAMILY MEDICINE

## 2024-10-22 PROCEDURE — 80053 COMPREHEN METABOLIC PANEL: CPT | Performed by: FAMILY MEDICINE

## 2024-10-22 PROCEDURE — 84443 ASSAY THYROID STIM HORMONE: CPT | Performed by: FAMILY MEDICINE

## 2024-10-22 PROCEDURE — 3074F SYST BP LT 130 MM HG: CPT | Mod: CPTII,S$GLB,, | Performed by: FAMILY MEDICINE

## 2024-10-22 RX ORDER — CILOSTAZOL 100 MG/1
100 TABLET ORAL 2 TIMES DAILY
Qty: 180 TABLET | Refills: 3 | Status: SHIPPED | OUTPATIENT
Start: 2024-10-22

## 2024-10-22 RX ORDER — ROSUVASTATIN CALCIUM 20 MG/1
20 TABLET, COATED ORAL DAILY
Qty: 90 TABLET | Refills: 3 | Status: SHIPPED | OUTPATIENT
Start: 2024-10-22 | End: 2025-10-17

## 2024-10-22 RX ORDER — ALBUTEROL SULFATE 90 UG/1
INHALANT RESPIRATORY (INHALATION)
Qty: 25.5 G | Refills: 3 | Status: SHIPPED | OUTPATIENT
Start: 2024-10-22

## 2024-10-22 RX ORDER — BUPROPION HYDROCHLORIDE 300 MG/1
TABLET ORAL
Qty: 90 TABLET | Refills: 3 | Status: SHIPPED | OUTPATIENT
Start: 2024-10-22

## 2024-10-22 RX ORDER — BUPROPION HYDROCHLORIDE 150 MG/1
TABLET ORAL
Qty: 90 TABLET | Refills: 3 | Status: SHIPPED | OUTPATIENT
Start: 2024-10-22

## 2024-10-22 NOTE — PROGRESS NOTES
Subjective:       Patient ID: Amita Lewis is a 63 y.o. female.    Chief Complaint: Annual Exam    HPI  Pt is here for annual exam pt is well no sob/cp cough chest congestion sore throat uri symptoms  Pt denies dysuria hematuria no vaginal bleeding  Pt denies n/v/f/c/d/c no change in bowel habits no brbpr  Pt has hypercholesterolemia no muscle aches on statin  Pt has depression stable on wellbutrin no si/hi no panic attacks  Pt has asthma no acute complaints needs inhaler refill  Review of Systems   Constitutional:  Negative for activity change, chills, diaphoresis, fatigue and fever.   HENT:  Negative for congestion, ear discharge, ear pain, hearing loss, postnasal drip, rhinorrhea, sinus pressure, sneezing, sore throat, trouble swallowing and voice change.    Eyes:  Negative for photophobia, discharge, redness, itching and visual disturbance.   Respiratory:  Negative for cough, chest tightness, shortness of breath and wheezing.    Cardiovascular:  Negative for chest pain, palpitations and leg swelling.   Gastrointestinal:  Negative for abdominal pain, anal bleeding, blood in stool, constipation, diarrhea, nausea, rectal pain and vomiting.   Genitourinary:  Negative for dyspareunia, dysuria, flank pain, frequency, hematuria, menstrual problem, pelvic pain, urgency, vaginal bleeding and vaginal discharge.   Musculoskeletal:  Negative for arthralgias, back pain, joint swelling and neck pain.   Skin:  Negative for color change and rash.   Neurological:  Negative for dizziness, speech difficulty, weakness, light-headedness, numbness and headaches.   Hematological:  Does not bruise/bleed easily.   Psychiatric/Behavioral:  Negative for agitation, confusion, decreased concentration, sleep disturbance and suicidal ideas. The patient is not nervous/anxious.        Objective:    /76   Pulse 84   Wt 93.9 kg (207 lb)   LMP 10/20/2013   SpO2 99%   BMI 32.42 kg/m²     Physical Exam  Constitutional:        Appearance: She is well-developed. She is obese. She is not ill-appearing.   HENT:      Head: Normocephalic and atraumatic.      Right Ear: External ear normal.      Left Ear: External ear normal.      Nose: Nose normal.   Eyes:      General:         Right eye: No discharge.         Left eye: No discharge.      Conjunctiva/sclera: Conjunctivae normal.      Pupils: Pupils are equal, round, and reactive to light.   Neck:      Thyroid: No thyromegaly.   Cardiovascular:      Rate and Rhythm: Normal rate and regular rhythm.      Heart sounds: Normal heart sounds. No murmur heard.     No friction rub. No gallop.   Pulmonary:      Effort: Pulmonary effort is normal.      Breath sounds: Normal breath sounds. No wheezing or rales.   Abdominal:      General: Bowel sounds are normal. There is no distension.      Palpations: Abdomen is soft.      Tenderness: There is no abdominal tenderness. There is no guarding or rebound.   Genitourinary:     Vagina: Normal.      Comments: declined  Musculoskeletal:         General: No tenderness. Normal range of motion.      Cervical back: Normal range of motion and neck supple.   Lymphadenopathy:      Cervical: No cervical adenopathy.   Skin:     General: Skin is warm and dry.      Findings: No erythema or rash.   Neurological:      General: No focal deficit present.      Mental Status: She is alert and oriented to person, place, and time.      Cranial Nerves: No cranial nerve deficit.      Motor: No abnormal muscle tone.      Coordination: Coordination normal.   Psychiatric:         Mood and Affect: Mood normal.         Behavior: Behavior normal.         Thought Content: Thought content normal.         Judgment: Judgment normal.         Assessment:       1. Annual physical exam    2. Mixed hyperlipidemia    3. Depression, unspecified depression type    4. Mild intermittent asthma without complication        Plan:     Orders cbc cmp lipid tsh urine hgb A1c  Cont meds  Low fat diet  Graded  "exercise  Rtc 6 months    Health maintenance  Discussed with pt        "This note will not be shared with the patient."     "

## 2024-10-22 NOTE — PROCEDURES
"Laceration Repair    Date/Time: 10/21/2024 3:15 PM    Performed by: Monico Michel MD  Authorized by: Monico Michel MD  Consent Done: Yes  Consent: Verbal consent obtained.  Risks and benefits: risks, benefits and alternatives were discussed  Consent given by: patient  Patient understanding: patient states understanding of the procedure being performed  Patient consent: the patient's understanding of the procedure matches consent given  Procedure consent: procedure consent matches procedure scheduled  Patient identity confirmed: name and verbally with patient  Time out: Immediately prior to procedure a "time out" was called to verify the correct patient, procedure, equipment, support staff and site/side marked as required.  Body area: upper extremity  Location details: left hand  Laceration length: 1.5 cm  Foreign bodies: no foreign bodies  Tendon involvement: none  Nerve involvement: none  Vascular damage: no  Anesthesia: local infiltration    Anesthesia:  Local Anesthetic: lidocaine 1% without epinephrine  Anesthetic total: 3 mL    Patient sedated: no  Preparation: Patient was prepped and draped in the usual sterile fashion.  Irrigation solution: saline  Irrigation method: syringe  Amount of cleaning: extensive  Debridement: none  Degree of undermining: none  Skin closure: 4-0 Prolene  Number of sutures: 2  Technique: simple  Approximation: loose  Approximation difficulty: simple  Dressing: antibiotic ointment and dressing applied  Patient tolerance: Patient tolerated the procedure well with no immediate complications        "

## 2024-12-27 DIAGNOSIS — B37.2 CANDIDAL SKIN INFECTION: ICD-10-CM

## 2024-12-27 NOTE — TELEPHONE ENCOUNTER
Refill Encounter    PCP Visits: Recent Visits  Date Type Provider Dept   10/22/24 Office Visit Shannon Figueroa MD [facility]   09/24/24 Office Visit Shannon Figueroa MD [facility]   Showing recent visits within past 360 days and meeting all other requirements  Future Appointments  No visits were found meeting these conditions.  Showing future appointments within next 720 days and meeting all other requirements     Last 3 Blood Pressure:   BP Readings from Last 3 Encounters:   10/22/24 124/76   10/21/24 (!) 147/79   09/24/24 129/79     Preferred Pharmacy:   [facility] - [address] [facility] - [facility] AT [facility] to [facility]  [place] [address]  Phone: [phone] Fax: [phone]    [facility]/pharmacy #[id number] - [place], [place] - [address]  [address]  [place] [address]  Phone: [phone] Fax: [phone]    Requested RX:  Requested Prescriptions     Pending Prescriptions Disp Refills    nystatin (MYCOSTATIN) powder [Pharmacy Med Name: NYSTATIN POW [id number]] 30 g 3     Sig: APPLY TO THE AFFECTED AREA TWO TIMES A DAY AS NEEDED  FOR SKIN INFECTION.        RE-TREAT AS NEEDED      RX Route: Normal

## 2024-12-28 RX ORDER — NYSTATIN 100000 [USP'U]/G
POWDER TOPICAL
Qty: 30 G | Refills: 3 | Status: SHIPPED | OUTPATIENT
Start: 2024-12-28

## 2025-02-18 ENCOUNTER — PATIENT MESSAGE (OUTPATIENT)
Dept: FAMILY MEDICINE | Facility: CLINIC | Age: 65
End: 2025-02-18
Payer: COMMERCIAL

## 2025-02-25 DIAGNOSIS — Z23 NEED FOR VACCINATION: Primary | ICD-10-CM

## 2025-03-10 ENCOUNTER — LAB VISIT (OUTPATIENT)
Dept: LAB | Facility: HOSPITAL | Age: 65
End: 2025-03-10
Attending: FAMILY MEDICINE
Payer: COMMERCIAL

## 2025-03-10 DIAGNOSIS — Z23 NEED FOR VACCINATION: ICD-10-CM

## 2025-03-10 PROCEDURE — 86762 RUBELLA ANTIBODY: CPT | Performed by: FAMILY MEDICINE

## 2025-03-10 PROCEDURE — 86735 MUMPS ANTIBODY: CPT | Performed by: FAMILY MEDICINE

## 2025-03-10 PROCEDURE — 86765 RUBEOLA ANTIBODY: CPT | Performed by: FAMILY MEDICINE

## 2025-03-10 PROCEDURE — 36415 COLL VENOUS BLD VENIPUNCTURE: CPT | Mod: PO | Performed by: FAMILY MEDICINE

## 2025-03-11 LAB
RUBV IGG SER-ACNC: 146 IU/ML
RUBV IGG SER-IMP: REACTIVE

## 2025-03-12 LAB
MUMPS IGG INTERPRETATION: POSITIVE
MUMPS IGG SCREEN: >300 AU/ML
RUBEOLA IGG ANTIBODY: >300 AU/ML
RUBEOLA INTERPRETATION: POSITIVE

## 2025-04-01 ENCOUNTER — OFFICE VISIT (OUTPATIENT)
Dept: OBSTETRICS AND GYNECOLOGY | Facility: CLINIC | Age: 65
End: 2025-04-01
Payer: COMMERCIAL

## 2025-04-01 ENCOUNTER — HOSPITAL ENCOUNTER (OUTPATIENT)
Dept: RADIOLOGY | Facility: OTHER | Age: 65
Discharge: HOME | End: 2025-04-01
Attending: FAMILY MEDICINE
Payer: COMMERCIAL

## 2025-04-01 VITALS
SYSTOLIC BLOOD PRESSURE: 144 MMHG | WEIGHT: 212.06 LBS | BODY MASS INDEX: 33.28 KG/M2 | DIASTOLIC BLOOD PRESSURE: 78 MMHG | HEIGHT: 67 IN

## 2025-04-01 DIAGNOSIS — Z12.31 ENCOUNTER FOR SCREENING MAMMOGRAM FOR BREAST CANCER: ICD-10-CM

## 2025-04-01 DIAGNOSIS — Z01.419 WELL WOMAN EXAM: Primary | ICD-10-CM

## 2025-04-01 PROCEDURE — 77067 SCR MAMMO BI INCL CAD: CPT | Mod: TC

## 2025-04-01 PROCEDURE — 3077F SYST BP >= 140 MM HG: CPT | Mod: CPTII,S$GLB,, | Performed by: STUDENT IN AN ORGANIZED HEALTH CARE EDUCATION/TRAINING PROGRAM

## 2025-04-01 PROCEDURE — 77063 BREAST TOMOSYNTHESIS BI: CPT | Mod: 26,,, | Performed by: RADIOLOGY

## 2025-04-01 PROCEDURE — 3078F DIAST BP <80 MM HG: CPT | Mod: CPTII,S$GLB,, | Performed by: STUDENT IN AN ORGANIZED HEALTH CARE EDUCATION/TRAINING PROGRAM

## 2025-04-01 PROCEDURE — 99396 PREV VISIT EST AGE 40-64: CPT | Mod: S$GLB,,, | Performed by: STUDENT IN AN ORGANIZED HEALTH CARE EDUCATION/TRAINING PROGRAM

## 2025-04-01 PROCEDURE — 1159F MED LIST DOCD IN RCRD: CPT | Mod: CPTII,S$GLB,, | Performed by: STUDENT IN AN ORGANIZED HEALTH CARE EDUCATION/TRAINING PROGRAM

## 2025-04-01 PROCEDURE — 99999 PR PBB SHADOW E&M-EST. PATIENT-LVL III: CPT | Mod: PBBFAC,,, | Performed by: STUDENT IN AN ORGANIZED HEALTH CARE EDUCATION/TRAINING PROGRAM

## 2025-04-01 PROCEDURE — 3008F BODY MASS INDEX DOCD: CPT | Mod: CPTII,S$GLB,, | Performed by: STUDENT IN AN ORGANIZED HEALTH CARE EDUCATION/TRAINING PROGRAM

## 2025-04-01 PROCEDURE — 77067 SCR MAMMO BI INCL CAD: CPT | Mod: 26,,, | Performed by: RADIOLOGY

## 2025-04-01 NOTE — PROGRESS NOTES
History & Physical  Gynecology      SUBJECTIVE:     Chief Complaint: Gynecologic Exam         History of Present Illness:   Amita Lewis is a 64 y.o.  who presents for annual physical exam.   She has no complaints.    She is postmenopausal. No PMB. No HRT.  No vaginal discharge, odor, or itching. She denies urinary incontinence, dysuria, frequency.     She has a history of abnormal pap smear. LEEP 15 years ago.   Last pap was  and was NILM, HPV neg.   Last mammogram was this morning. Report pending.      Review of patient's allergies indicates:  No Known Allergies     Past Medical History:   Diagnosis Date    Abnormal Pap smear     Asthma     Depression 2016    Environmental and seasonal allergies 2016    Heartburn 2016    HLD (hyperlipidemia) 2016    ASCVD  6.3%; can't tolerate statins    Malignant melanoma of skin, unspecified     facial, near mouth, resected with negative margins per patient    Menopausal syndrome (hot flashes) 2016    Mild intermittent asthma without complication 2016    Obesity (BMI 30-39.9) 2016    Sciatica 2016      Past Surgical History:   Procedure Laterality Date    ANGIOGRAPHY OF LOWER EXTREMITY Left 2020    Procedure: ANGIOGRAM, LOWER EXTREMITY;  Surgeon: RAYMUNDO Levine II, MD;  Location: [facility] OR [address] [place] [address];  Service: Vascular;  Laterality: Left;    AORTOGRAPHY N/A 2020    Procedure: AORTOGRAM;  Surgeon: RAYMUNDO Levine II, MD;  Location: [facility] OR [address] [place] [address];  Service: Vascular;  Laterality: N/A;    CERVICAL BIOPSY  W/ LOOP ELECTRODE EXCISION      ENDARTERECTOMY OF FEMORAL ARTERY Left 2020    Procedure: Left Common Femoral Approach Endarterectomy with Left SFA Angioplasty Stenting Possible Bypass;  Surgeon: RAYMUNDO Levine II, MD;  Location: [facility] OR [address] [place] [address];  Service: Vascular;  Laterality: Left;  contrast: 52ml  fluoro time: 16.3 min  mGy: 921.68    INJECTION OF JOINT Bilateral 2018     Procedure: INJECTION-JOINT;  Surgeon: Lynette Rowley MD;  Location: [facility];  Service: Pain Management;  Laterality: Bilateral;  B/L SI Joint Injs  [id number], [id number]    INJECTION OF JOINT Bilateral 2018    Procedure: INJECTION, JOINT  Bilateral SI joint;  Surgeon: Lynette Rowley MD;  Location: [facility];  Service: Pain Management;  Laterality: Bilateral;    melona      melona remove    microdiscetomy      L4-L5    OVARIAN CYST REMOVAL      TRANSFORAMINAL EPIDURAL INJECTION OF STEROID Left 3/21/2019    Procedure: INJECTION, STEROID, EPIDURAL, TRANSFORAMINAL APPROACH, L4 AND L5;  Surgeon: Lynette Rowley MD;  Location: [facility];  Service: Pain Management;  Laterality: Left;        OB History          0    Para        Term   0            AB        Living             SAB        IAB        Ectopic        Multiple        Live Births                      Family History   Problem Relation Name Age of Onset    Breast cancer Mother Radha 65    Heart failure Mother Radha     Diabetes Mellitus Mother Radha         ?secondary to chronic steroids    Diabetes Mother Radha     Heart disease Mother Radha     Cancer Father Kahlil         lung; non smoker, worked in railroad and gas station    Alcohol abuse Father Kahlil     Early death Father Kahlil     Alcohol abuse Brother Julian     Ovarian cancer Neg Hx      Hypertension Neg Hx      Colon cancer Neg Hx          Social History[1]       Current Outpatient Medications   Medication Sig    albuterol (PROVENTIL/VENTOLIN HFA) 90 mcg/actuation inhaler USE 2 INHALATIONS EVERY 6  HOURS AS NEEDED FOR        WHEEZING OR SHORTNESS OF   BREATH (RESCUE)    ASPIRIN (ASPIR-81 ORAL) Take 1 tablet by mouth every evening.     buPROPion (WELLBUTRIN XL) 150 MG TB24 tablet Take 1 pill daily    buPROPion (WELLBUTRIN XL) 300 MG 24 hr tablet Take 1 pill daily    cilostazoL (PLETAL) 100 MG Tab Take 1 tablet (100 mg total) by mouth 2 (two) times daily.     ECHINACEA 1X ORAL     fexofenadine (ALLEGRA) 180 MG tablet TK 1 T PO ONCE D    FLAXSEED ORAL Take by mouth.    LACTOBAC NO.41/BIFIDOBACT NO.7 (PROBIOTIC-10 ORAL) Take by mouth every evening.     multivitamin (THERAGRAN) per tablet Take 1 tablet by mouth every evening.     mupirocin (BACTROBAN) 2 % ointment Apply to affected area 3 times daily    naproxen (NAPROSYN) 500 MG tablet Take 1 tablet (500 mg total) by mouth 2 (two) times daily.    nystatin (MYCOSTATIN) powder APPLY TO THE AFFECTED AREA TWO TIMES A DAY AS NEEDED  FOR SKIN INFECTION.        RE-TREAT AS NEEDED    rosuvastatin (CRESTOR) 20 MG tablet Take 1 tablet (20 mg total) by mouth once daily.     No current facility-administered medications for this visit.            Review of Systems:  Review of Systems   Constitutional:  Negative for chills and fever.   Respiratory:  Negative for shortness of breath.    Cardiovascular:  Negative for chest pain.   Gastrointestinal:  Negative for abdominal pain, constipation, diarrhea, nausea and vomiting.   Endocrine: Negative for hot flashes.   Genitourinary:  Negative for dysuria, pelvic pain, vaginal bleeding, vaginal discharge, postmenopausal bleeding and vaginal odor.   Integumentary:  Negative for breast mass, nipple discharge and breast skin changes.   Neurological:  Negative for headaches.   Breast: Negative for mass, nipple discharge and skin changes           OBJECTIVE:     Physical Exam:   Physical Exam  Vitals reviewed. Exam conducted with a chaperone present.   Constitutional:       General: She is not in acute distress.     Appearance: Normal appearance. She is well-developed. She is not ill-appearing or toxic-appearing.   HENT:      Head: Normocephalic and atraumatic.      Nose: Nose normal.   Eyes:      Extraocular Movements: Extraocular movements intact.      Conjunctiva/sclera: Conjunctivae normal.   Cardiovascular:      Rate and Rhythm: Normal rate.   Pulmonary:      Effort: Pulmonary effort is normal.  No respiratory distress.   Chest:   Breasts:     Right: Normal. No swelling, bleeding, inverted nipple, mass, nipple discharge, skin change or tenderness.      Left: Normal. No swelling, bleeding, inverted nipple, mass, nipple discharge, skin change or tenderness.   Abdominal:      Palpations: Abdomen is soft. There is no mass.      Tenderness: There is no abdominal tenderness. There is no guarding or rebound.   Genitourinary:     Vagina: Normal. No vaginal discharge or bleeding.      Cervix: No cervical motion tenderness.      Uterus: Not enlarged and not tender.       Adnexa:         Right: No mass, tenderness or fullness.          Left: No mass, tenderness or fullness.        Comments: External genitalia: Normal  Urethral: Nontender, no masses  Urethral meatus: Normal  Bladder: Non-tender  Musculoskeletal:         General: Normal range of motion.      Cervical back: Normal range of motion.   Skin:     General: Skin is warm and dry.   Neurological:      Mental Status: She is alert. Mental status is at baseline.   Psychiatric:         Mood and Affect: Mood normal.         Behavior: Behavior normal.         Thought Content: Thought content normal.              ASSESSMENT:       ICD-10-CM ICD-9-CM    1. Well woman exam  Z01.419 V72.31 Liquid-Based Pap Smear, Screening                 PLAN:     -- Pap today. Patient prefers annual paps given history.  -- MMG done today. Report pending.  -- Home stool testing with PCP.  -- RTC in 1 year for annual or sooner, PRN.      Susan Mendez MD           [1]   Social History  Tobacco Use    Smoking status: Former     Current packs/day: 0.00     Types: Cigarettes     Passive exposure: Past    Smokeless tobacco: Never   Substance Use Topics    Alcohol use: Yes     Alcohol/week: 1.0 standard drink of alcohol     Types: 1 Glasses of wine per week     Comment: 3 x a month    Drug use: No

## 2025-04-07 ENCOUNTER — OFFICE VISIT (OUTPATIENT)
Dept: FAMILY MEDICINE | Facility: CLINIC | Age: 65
End: 2025-04-07
Payer: COMMERCIAL

## 2025-04-07 VITALS
TEMPERATURE: 98 F | WEIGHT: 211 LBS | BODY MASS INDEX: 33.12 KG/M2 | HEART RATE: 83 BPM | OXYGEN SATURATION: 97 % | HEIGHT: 67 IN

## 2025-04-07 DIAGNOSIS — J45.901 MODERATE ASTHMA WITH EXACERBATION, UNSPECIFIED WHETHER PERSISTENT: Primary | ICD-10-CM

## 2025-04-07 PROCEDURE — 1159F MED LIST DOCD IN RCRD: CPT | Mod: CPTII,S$GLB,, | Performed by: NURSE PRACTITIONER

## 2025-04-07 PROCEDURE — 99214 OFFICE O/P EST MOD 30 MIN: CPT | Mod: S$GLB,,, | Performed by: NURSE PRACTITIONER

## 2025-04-07 PROCEDURE — 1160F RVW MEDS BY RX/DR IN RCRD: CPT | Mod: CPTII,S$GLB,, | Performed by: NURSE PRACTITIONER

## 2025-04-07 PROCEDURE — 3008F BODY MASS INDEX DOCD: CPT | Mod: CPTII,S$GLB,, | Performed by: NURSE PRACTITIONER

## 2025-04-07 PROCEDURE — 99999 PR PBB SHADOW E&M-EST. PATIENT-LVL IV: CPT | Mod: PBBFAC,,, | Performed by: NURSE PRACTITIONER

## 2025-04-07 RX ORDER — AZITHROMYCIN 250 MG/1
TABLET, FILM COATED ORAL
Qty: 6 TABLET | Refills: 0 | Status: SHIPPED | OUTPATIENT
Start: 2025-04-07 | End: 2025-04-12

## 2025-04-07 NOTE — PROGRESS NOTES
"Subjective:       Patient ID: Amita Lewis is a 64 y.o. female.    Chief Complaint: Cough    Cough  This is a recurrent problem. The current episode started 1 to 4 weeks ago. The problem has been gradually worsening. The cough is Productive of sputum. Associated symptoms include postnasal drip. Pertinent negatives include no chest pain, chills, ear pain, fever, headaches, heartburn, myalgias, rash, sore throat, shortness of breath or wheezing. Nothing aggravates the symptoms. She has tried OTC cough suppressant for the symptoms. The treatment provided mild relief. Her past medical history is significant for asthma.     Problem List[1]    Current Medications[2]    The following portions of the patient's history were reviewed and updated as appropriate: allergies, past family history, past medical history, past social history and past surgical history.    Review of Systems   Constitutional:  Negative for chills and fever.   HENT:  Positive for postnasal drip. Negative for ear pain and sore throat.    Respiratory:  Positive for cough. Negative for shortness of breath and wheezing.    Cardiovascular:  Negative for chest pain.   Gastrointestinal:  Negative for heartburn.   Musculoskeletal:  Negative for myalgias.   Skin:  Negative for rash.   Neurological:  Negative for headaches.       Objective:      Pulse 83   Temp 98.1 °F (36.7 °C) (Oral)   Ht 5' 7" (1.702 m)   Wt 95.7 kg (211 lb)   LMP 10/20/2013   SpO2 97%   BMI 33.05 kg/m²     Physical Exam  Constitutional:       General: She is not in acute distress.     Appearance: Normal appearance. She is ill-appearing.   HENT:      Right Ear: A middle ear effusion is present.      Left Ear: A middle ear effusion is present.   Cardiovascular:      Rate and Rhythm: Normal rate and regular rhythm.      Pulses: Normal pulses.      Heart sounds: Normal heart sounds.   Pulmonary:      Effort: Pulmonary effort is normal.      Breath sounds: Normal breath sounds. "   Musculoskeletal:         General: Normal range of motion.   Skin:     General: Skin is warm and dry.   Neurological:      Mental Status: She is alert and oriented to person, place, and time.   Psychiatric:         Mood and Affect: Mood normal.         Behavior: Behavior normal.         Assessment:       1. Moderate asthma with exacerbation, unspecified whether persistent        Plan:   Amita was seen today for cough.    Diagnoses and all orders for this visit:    Moderate asthma with exacerbation, unspecified whether persistent  -     azithromycin (Z-GILMA) 250 MG tablet; Take 2 tablets by mouth on day 1; Take 1 tablet by mouth on days 2-5      F/U 72 hours via portal.          [1]   Patient Active Problem List  Diagnosis    Obesity (BMI 30-39.9)    Vitamin D deficiency    Heartburn    Depression    Menopausal syndrome (hot flashes)    Mild intermittent asthma without complication    Sciatica    Environmental and seasonal allergies    HLD (hyperlipidemia)    Candidal skin infection    Claudication    Sacroiliac joint pain    Cat bite    Tonsil stone    Lumbar radiculopathy    DDD (degenerative disc disease), lumbar    Left leg pain    PVD (peripheral vascular disease) with claudication    Atherosclerosis of artery of extremity with intermittent claudication    Bilateral leg edema    Venous insufficiency    Edema    Chronic heel pain, left   [2]   Current Outpatient Medications:     albuterol (PROVENTIL/VENTOLIN HFA) 90 mcg/actuation inhaler, USE 2 INHALATIONS EVERY 6  HOURS AS NEEDED FOR        WHEEZING OR SHORTNESS OF   BREATH (RESCUE), Disp: 25.5 g, Rfl: 3    ASPIRIN (ASPIR-81 ORAL), Take 1 tablet by mouth every evening. , Disp: , Rfl:     buPROPion (WELLBUTRIN XL) 150 MG TB24 tablet, Take 1 pill daily, Disp: 90 tablet, Rfl: 3    buPROPion (WELLBUTRIN XL) 300 MG 24 hr tablet, Take 1 pill daily, Disp: 90 tablet, Rfl: 3    cilostazoL (PLETAL) 100 MG Tab, Take 1 tablet (100 mg total) by mouth 2 (two) times daily.,  Disp: 180 tablet, Rfl: 3    ECHINACEA 1X ORAL, , Disp: , Rfl:     fexofenadine (ALLEGRA) 180 MG tablet, TK 1 T PO ONCE D, Disp: , Rfl:     FLAXSEED ORAL, Take by mouth., Disp: , Rfl:     LACTOBAC NO.41/BIFIDOBACT NO.7 (PROBIOTIC-10 ORAL), Take by mouth every evening. , Disp: , Rfl:     multivitamin (THERAGRAN) per tablet, Take 1 tablet by mouth every evening. , Disp: , Rfl:     mupirocin (BACTROBAN) 2 % ointment, Apply to affected area 3 times daily, Disp: 22 g, Rfl: 1    naproxen (NAPROSYN) 500 MG tablet, Take 1 tablet (500 mg total) by mouth 2 (two) times daily., Disp: 30 tablet, Rfl: 0    nystatin (MYCOSTATIN) powder, APPLY TO THE AFFECTED AREA TWO TIMES A DAY AS NEEDED  FOR SKIN INFECTION.        RE-TREAT AS NEEDED, Disp: 30 g, Rfl: 3    rosuvastatin (CRESTOR) 20 MG tablet, Take 1 tablet (20 mg total) by mouth once daily., Disp: 90 tablet, Rfl: 3    azithromycin (Z-GILMA) 250 MG tablet, Take 2 tablets by mouth on day 1; Take 1 tablet by mouth on days 2-5, Disp: 6 tablet, Rfl: 0

## 2025-04-09 ENCOUNTER — OFFICE VISIT (OUTPATIENT)
Dept: NEUROSURGERY | Facility: CLINIC | Age: 65
End: 2025-04-09
Attending: NEUROLOGICAL SURGERY
Payer: COMMERCIAL

## 2025-04-09 ENCOUNTER — PATIENT MESSAGE (OUTPATIENT)
Dept: NEUROSURGERY | Facility: CLINIC | Age: 65
End: 2025-04-09

## 2025-04-09 VITALS
DIASTOLIC BLOOD PRESSURE: 74 MMHG | HEART RATE: 98 BPM | SYSTOLIC BLOOD PRESSURE: 115 MMHG | BODY MASS INDEX: 32.91 KG/M2 | WEIGHT: 210.13 LBS

## 2025-04-09 DIAGNOSIS — M54.16 LUMBAR RADICULOPATHY: Primary | ICD-10-CM

## 2025-04-09 DIAGNOSIS — M51.369 DEGENERATION OF INTERVERTEBRAL DISC OF LUMBAR REGION, UNSPECIFIED WHETHER PAIN PRESENT: ICD-10-CM

## 2025-04-09 DIAGNOSIS — M48.07 SPINAL STENOSIS, LUMBOSACRAL REGION: ICD-10-CM

## 2025-04-09 DIAGNOSIS — M54.9 DORSALGIA, UNSPECIFIED: ICD-10-CM

## 2025-04-09 DIAGNOSIS — Z98.890 HX OF MICRODISCECTOMY: ICD-10-CM

## 2025-04-09 PROCEDURE — 1159F MED LIST DOCD IN RCRD: CPT | Mod: CPTII,S$GLB,, | Performed by: NURSE PRACTITIONER

## 2025-04-09 PROCEDURE — 99204 OFFICE O/P NEW MOD 45 MIN: CPT | Mod: S$GLB,,, | Performed by: NURSE PRACTITIONER

## 2025-04-09 PROCEDURE — 3078F DIAST BP <80 MM HG: CPT | Mod: CPTII,S$GLB,, | Performed by: NURSE PRACTITIONER

## 2025-04-09 PROCEDURE — 3008F BODY MASS INDEX DOCD: CPT | Mod: CPTII,S$GLB,, | Performed by: NURSE PRACTITIONER

## 2025-04-09 PROCEDURE — 1160F RVW MEDS BY RX/DR IN RCRD: CPT | Mod: CPTII,S$GLB,, | Performed by: NURSE PRACTITIONER

## 2025-04-09 PROCEDURE — 3074F SYST BP LT 130 MM HG: CPT | Mod: CPTII,S$GLB,, | Performed by: NURSE PRACTITIONER

## 2025-04-09 PROCEDURE — 99999 PR PBB SHADOW E&M-EST. PATIENT-LVL IV: CPT | Mod: PBBFAC,,, | Performed by: NURSE PRACTITIONER

## 2025-04-09 NOTE — PROGRESS NOTES
Neurosurgery  History & Physical    SUBJECTIVE:     History of Present Illness: Amita Lewis is a 64 y.o. female being seen in clinic today to discuss concerns with persistent right leg pain for the past 4 weeks. Denies trauma or inciting event. The patient has a hx of microdiscectomy L4-5 in 2007. She has struggled with intermittent back since surgery and was followed by pain management obtaining injections with some relief. She remains active with her HEP. Today, she is concerned that the pain in now persistent inn the right leg. Describes the pain as aching in the right buttock radiating posteriorly into the calf. Aggravating factors include standing up straight. Alleviating factors include Aleve and rest. Denies weakness, numbness or tingling, b/b dysfunction, saddle anesthesia, or gait instability.      Review of patient's allergies indicates:  No Known Allergies    Current Medications[1]    Past Medical History:   Diagnosis Date    Abnormal Pap smear     Asthma     Depression 03/17/2016    Environmental and seasonal allergies 03/17/2016    Heartburn 03/17/2016    HLD (hyperlipidemia) 03/22/2016    ASCVD 2018 6.3%; can't tolerate statins    Malignant melanoma of skin, unspecified 2022    facial, near mouth, resected with negative margins per patient    Menopausal syndrome (hot flashes) 03/17/2016    Mild intermittent asthma without complication 03/17/2016    Obesity (BMI 30-39.9) 03/17/2016    Sciatica 03/17/2016     Past Surgical History:   Procedure Laterality Date    ANGIOGRAPHY OF LOWER EXTREMITY Left 2/12/2020    Procedure: ANGIOGRAM, LOWER EXTREMITY;  Surgeon: RAYMUNDO Levine II, MD;  Location: [facility] OR [address] [place] [address];  Service: Vascular;  Laterality: Left;    AORTOGRAPHY N/A 2/12/2020    Procedure: AORTOGRAM;  Surgeon: RAYMUNDO Levine II, MD;  Location: [facility] OR [address] [place] [address];  Service: Vascular;  Laterality: N/A;    CERVICAL BIOPSY  W/ LOOP ELECTRODE EXCISION      ENDARTERECTOMY OF FEMORAL ARTERY Left  2/12/2020    Procedure: Left Common Femoral Approach Endarterectomy with Left SFA Angioplasty Stenting Possible Bypass;  Surgeon: RAYMUNDO Levine II, MD;  Location: [facility] OR [address] [place] [address];  Service: Vascular;  Laterality: Left;  contrast: 52ml  fluoro time: 16.3 min  mGy: 921.68    INJECTION OF JOINT Bilateral 5/31/2018    Procedure: INJECTION-JOINT;  Surgeon: Lynette Rowley MD;  Location: [facility]IN [facility]GT;  Service: Pain Management;  Laterality: Bilateral;  B/L SI Joint Injs  47134, [id number]    INJECTION OF JOINT Bilateral 9/18/2018    Procedure: INJECTION, JOINT  Bilateral SI joint;  Surgeon: Lynette Rowley MD;  Location: [facility]IN [facility]GT;  Service: Pain Management;  Laterality: Bilateral;    melona      melona remove    microdiscetomy      L4-L5    OVARIAN CYST REMOVAL  2001    TRANSFORAMINAL EPIDURAL INJECTION OF STEROID Left 3/21/2019    Procedure: INJECTION, STEROID, EPIDURAL, TRANSFORAMINAL APPROACH, L4 AND L5;  Surgeon: Lynette Rowley MD;  Location: [facility]IN [facility];  Service: Pain Management;  Laterality: Left;     Family History       Problem Relation (Age of Onset)    Alcohol abuse Father, Brother    Breast cancer Mother (65)    Cancer Father    Diabetes Mother    Diabetes Mellitus Mother    Early death Father    Heart disease Mother    Heart failure Mother          Social History     Socioeconomic History    Marital status:    Tobacco Use    Smoking status: Former     Current packs/day: 0.00     Types: Cigarettes     Passive exposure: Past    Smokeless tobacco: Never   Substance and Sexual Activity    Alcohol use: Yes     Alcohol/week: 1.0 standard drink of alcohol     Types: 1 Glasses of wine per week     Comment: 3 x a month    Drug use: No    Sexual activity: Yes     Partners: Female     Birth control/protection: None     Comment: (partner Ivone is a trans woman)   Social History Narrative    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    Works for  at [employer]    Walks a lot at work and for  exercise     Social Drivers of Health     Financial Resource Strain: Medium Risk (7/15/2024)    Overall Financial Resource Strain (CARDIA)     Difficulty of Paying Living Expenses: Somewhat hard   Food Insecurity: Unknown (7/15/2024)    Hunger Vital Sign     Ran Out of Food in the Last Year: Never true   Transportation Needs: No Transportation Needs (10/20/2023)    PRAPARE - Transportation     Lack of Transportation (Medical): No     Lack of Transportation (Non-Medical): No   Physical Activity: Insufficiently Active (7/15/2024)    Exercise Vital Sign     Days of Exercise per Week: 3 days     Minutes of Exercise per Session: 20 min   Stress: Stress Concern Present (7/15/2024)    [employer] - Occupational Stress Questionnaire     Feeling of Stress : To some extent   Housing Stability: Low Risk  (10/20/2023)    Housing Stability Vital Sign     Unable to Pay for Housing in the Last Year: No     Number of Places Lived in the Last Year: 1     Unstable Housing in the Last Year: No       Review of Systems    OBJECTIVE:     Vital Signs     There is no height or weight on file to calculate BMI.      Neurosurgery Physical Exam  General: well developed, well nourished, no distress.   Head: normocephalic, atraumatic  Neurologic: Alert and oriented. Thought content appropriate.  GCS: Motor: 6/Verbal: 5/Eyes: 4 GCS Total: 15  Mental Status: Awake, Alert, Oriented x 4  Language: No aphasia  Speech: No dysarthria  Cranial nerves: face symmetric, tongue midline, CN II-XII grossly intact.   Eyes: pupils equal, round, reactive to light with accomodation, EOMI.   Pulmonary: normal respirations, no signs of respiratory distress  Abdomen: soft, non-distended  Skin: Skin is warm, dry and intact.  Sensory: intact to light touch throughout  Motor Strength:Moves all extremities spontaneously with good tone.  Full strength upper and lower extremities. No abnormal movements seen.     Cerebellar:   Gait stable,  fluid.   Tandem Gait: No difficulty  Able to walk on heels & toes    Lumbar:  Midline TTP: Negative.  Straight Leg Test: Negative.    Other:  SI joint TTP: Negative.  Greater trochanter TTP: Negative.    Diagnostic Results:  There is no new imaging to review for this encounter.      ASSESSMENT/PLAN:     Amita Lewis is a 64 y.o. female seen in clinic today to discuss concerns with persistent right leg pain despite conservative treatment. The patient has a hx of microdiscectomy L5-S1 in 2007. I have ordered an updated MRI lumbar spine and dynamic x-ray. PT has also been ordered. I would like the patient to follow-up in clinic in 2-4 weeks to discuss the findings. I have encouraged her to contact the clinic with any questions, concerns, or adverse clinical changes. She verbalized understanding.      DWAIN Mercado  Neurosurgery  [facility].      Note dictated with voice recognition software, please excuse any grammatical errors.           [1]   Current Outpatient Medications   Medication Sig Dispense Refill    albuterol (PROVENTIL/VENTOLIN HFA) 90 mcg/actuation inhaler USE 2 INHALATIONS EVERY 6  HOURS AS NEEDED FOR        WHEEZING OR SHORTNESS OF   BREATH (RESCUE) 25.5 g 3    ASPIRIN (ASPIR-81 ORAL) Take 1 tablet by mouth every evening.       azithromycin (Z-GILMA) 250 MG tablet Take 2 tablets by mouth on day 1; Take 1 tablet by mouth on days 2-5 6 tablet 0    buPROPion (WELLBUTRIN XL) 150 MG TB24 tablet Take 1 pill daily 90 tablet 3    buPROPion (WELLBUTRIN XL) 300 MG 24 hr tablet Take 1 pill daily 90 tablet 3    cilostazoL (PLETAL) 100 MG Tab Take 1 tablet (100 mg total) by mouth 2 (two) times daily. 180 tablet 3    ECHINACEA 1X ORAL       fexofenadine (ALLEGRA) 180 MG tablet TK 1 T PO ONCE D      FLAXSEED ORAL Take by mouth.      LACTOBAC NO.41/BIFIDOBACT NO.7 (PROBIOTIC-10 ORAL) Take by mouth every evening.       multivitamin (THERAGRAN) per tablet Take 1 tablet by mouth every  evening.       mupirocin (BACTROBAN) 2 % ointment Apply to affected area 3 times daily 22 g 1    naproxen (NAPROSYN) 500 MG tablet Take 1 tablet (500 mg total) by mouth 2 (two) times daily. 30 tablet 0    nystatin (MYCOSTATIN) powder APPLY TO THE AFFECTED AREA TWO TIMES A DAY AS NEEDED  FOR SKIN INFECTION.        RE-TREAT AS NEEDED 30 g 3    rosuvastatin (CRESTOR) 20 MG tablet Take 1 tablet (20 mg total) by mouth once daily. 90 tablet 3     No current facility-administered medications for this visit.

## 2025-04-15 ENCOUNTER — RESULTS FOLLOW-UP (OUTPATIENT)
Dept: OBSTETRICS AND GYNECOLOGY | Facility: CLINIC | Age: 65
End: 2025-04-15

## 2025-04-23 ENCOUNTER — HOSPITAL ENCOUNTER (OUTPATIENT)
Dept: RADIOLOGY | Facility: HOSPITAL | Age: 65
Discharge: HOME | End: 2025-04-23
Attending: NURSE PRACTITIONER
Payer: COMMERCIAL

## 2025-04-23 DIAGNOSIS — M51.369 DEGENERATION OF INTERVERTEBRAL DISC OF LUMBAR REGION, UNSPECIFIED WHETHER PAIN PRESENT: ICD-10-CM

## 2025-04-23 DIAGNOSIS — M54.16 LUMBAR RADICULOPATHY: ICD-10-CM

## 2025-04-23 DIAGNOSIS — Z98.890 HX OF MICRODISCECTOMY: ICD-10-CM

## 2025-04-23 DIAGNOSIS — M48.07 SPINAL STENOSIS, LUMBOSACRAL REGION: ICD-10-CM

## 2025-04-23 DIAGNOSIS — M54.9 DORSALGIA, UNSPECIFIED: ICD-10-CM

## 2025-04-23 PROCEDURE — 72148 MRI LUMBAR SPINE W/O DYE: CPT | Mod: TC

## 2025-04-23 PROCEDURE — 72114 X-RAY EXAM L-S SPINE BENDING: CPT | Mod: TC

## 2025-04-23 PROCEDURE — 72114 X-RAY EXAM L-S SPINE BENDING: CPT | Mod: 26,,, | Performed by: RADIOLOGY

## 2025-04-23 PROCEDURE — 72148 MRI LUMBAR SPINE W/O DYE: CPT | Mod: 26,,, | Performed by: RADIOLOGY

## 2025-05-07 ENCOUNTER — TELEPHONE (OUTPATIENT)
Dept: BARIATRICS | Facility: CLINIC | Age: 65
End: 2025-05-07
Payer: COMMERCIAL

## 2025-05-07 ENCOUNTER — OFFICE VISIT (OUTPATIENT)
Dept: NEUROSURGERY | Facility: CLINIC | Age: 65
End: 2025-05-07
Payer: COMMERCIAL

## 2025-05-07 VITALS
BODY MASS INDEX: 33.74 KG/M2 | WEIGHT: 214.94 LBS | HEART RATE: 82 BPM | HEIGHT: 67 IN | DIASTOLIC BLOOD PRESSURE: 81 MMHG | SYSTOLIC BLOOD PRESSURE: 145 MMHG

## 2025-05-07 DIAGNOSIS — M48.07 SPINAL STENOSIS, LUMBOSACRAL REGION: ICD-10-CM

## 2025-05-07 DIAGNOSIS — Z98.890 HX OF MICRODISCECTOMY: ICD-10-CM

## 2025-05-07 DIAGNOSIS — M54.16 LUMBAR RADICULOPATHY: Primary | ICD-10-CM

## 2025-05-07 DIAGNOSIS — S61.412A LACERATION OF LEFT HAND WITHOUT FOREIGN BODY, INITIAL ENCOUNTER: ICD-10-CM

## 2025-05-07 DIAGNOSIS — E66.9 OBESITY (BMI 30-39.9): ICD-10-CM

## 2025-05-07 PROCEDURE — 99213 OFFICE O/P EST LOW 20 MIN: CPT | Mod: S$GLB,,, | Performed by: NURSE PRACTITIONER

## 2025-05-07 PROCEDURE — 3008F BODY MASS INDEX DOCD: CPT | Mod: CPTII,S$GLB,, | Performed by: NURSE PRACTITIONER

## 2025-05-07 PROCEDURE — 99999 PR PBB SHADOW E&M-EST. PATIENT-LVL V: CPT | Mod: PBBFAC,,, | Performed by: NURSE PRACTITIONER

## 2025-05-07 PROCEDURE — 1160F RVW MEDS BY RX/DR IN RCRD: CPT | Mod: CPTII,S$GLB,, | Performed by: NURSE PRACTITIONER

## 2025-05-07 PROCEDURE — 3079F DIAST BP 80-89 MM HG: CPT | Mod: CPTII,S$GLB,, | Performed by: NURSE PRACTITIONER

## 2025-05-07 PROCEDURE — 3077F SYST BP >= 140 MM HG: CPT | Mod: CPTII,S$GLB,, | Performed by: NURSE PRACTITIONER

## 2025-05-07 PROCEDURE — 1159F MED LIST DOCD IN RCRD: CPT | Mod: CPTII,S$GLB,, | Performed by: NURSE PRACTITIONER

## 2025-05-07 RX ORDER — NAPROXEN 500 MG/1
500 TABLET ORAL 2 TIMES DAILY
Qty: 30 TABLET | Refills: 2 | Status: SHIPPED | OUTPATIENT
Start: 2025-05-07

## 2025-05-07 NOTE — PROGRESS NOTES
Neurosurgery  Established Patient    SUBJECTIVE:     History of Present Illness 4/9/25: Amita Lewis is a 64 y.o. female being seen in clinic today to discuss concerns with persistent right leg pain for the past 4 weeks. Denies trauma or inciting event. The patient has a hx of microdiscectomy L4-5 in 2007. She has struggled with intermittent back since surgery and was followed by pain management obtaining injections with some relief. She remains active with her HEP. Today, she is concerned that the pain in now persistent inn the right leg. Describes the pain as aching in the right buttock radiating posteriorly into the calf. Aggravating factors include standing up straight. Alleviating factors include Aleve and rest. Denies weakness, numbness or tingling, b/b dysfunction, saddle anesthesia, or gait instability.     Interval Hx 5/7/25: After the last appointment, it was recommended that the patient obtain an updated MRI to further evaluate her concerns. She is being seen in clinic today to discuss the findings and progress with PT. States that she has noticed some improvement in her pain with the dry needling, massage, and exercises. She noticed worsening pain yesterday but today the pain is slightly better. She takes Naproxen as needed with some relief.     Review of patient's allergies indicates:  No Known Allergies    Current Medications[1]    Past Medical History:   Diagnosis Date    Abnormal Pap smear     Asthma     Depression 03/17/2016    Environmental and seasonal allergies 03/17/2016    Heartburn 03/17/2016    HLD (hyperlipidemia) 03/22/2016    ASCVD 2018 6.3%; can't tolerate statins    Malignant melanoma of skin, unspecified 2022    facial, near mouth, resected with negative margins per patient    Menopausal syndrome (hot flashes) 03/17/2016    Mild intermittent asthma without complication 03/17/2016    Obesity (BMI 30-39.9) 03/17/2016    Sciatica 03/17/2016     Past Surgical History:   Procedure  Laterality Date    ANGIOGRAPHY OF LOWER EXTREMITY Left 2/12/2020    Procedure: ANGIOGRAM, LOWER EXTREMITY;  Surgeon: RAYMUNDO Levine II, MD;  Location: [facility] OR 2ND FLR;  Service: Vascular;  Laterality: Left;    AORTOGRAPHY N/A 2/12/2020    Procedure: AORTOGRAM;  Surgeon: RAYMUNDO Levine II, MD;  Location: [facility] OR 2ND FLR;  Service: Vascular;  Laterality: N/A;    CERVICAL BIOPSY  W/ LOOP ELECTRODE EXCISION      ENDARTERECTOMY OF FEMORAL ARTERY Left 2/12/2020    Procedure: Left Common Femoral Approach Endarterectomy with Left SFA Angioplasty Stenting Possible Bypass;  Surgeon: RAYMUNDO Levine II, MD;  Location: [facility] OR 2ND FLR;  Service: Vascular;  Laterality: Left;  contrast: 52ml  fluoro time: 16.3 min  mGy: 921.68    INJECTION OF JOINT Bilateral 5/31/2018    Procedure: INJECTION-JOINT;  Surgeon: Lynette Rowley MD;  Location: [facility] PAIN MGT;  Service: Pain Management;  Laterality: Bilateral;  B/L SI Joint Injs  [id number], [id number]    INJECTION OF JOINT Bilateral 9/18/2018    Procedure: INJECTION, JOINT  Bilateral SI joint;  Surgeon: Lynette Rowley MD;  Location: [facility] PAIN MGT;  Service: Pain Management;  Laterality: Bilateral;    melona      melona remove    microdiscetomy      L4-L5    OVARIAN CYST REMOVAL  2001    TRANSFORAMINAL EPIDURAL INJECTION OF STEROID Left 3/21/2019    Procedure: INJECTION, STEROID, EPIDURAL, TRANSFORAMINAL APPROACH, L4 AND L5;  Surgeon: Lynette Rowley MD;  Location: [facility] PAIN MGT;  Service: Pain Management;  Laterality: Left;     Family History       Problem Relation (Age of Onset)    Alcohol abuse Father, Brother    Breast cancer Mother (65)    Cancer Father    Diabetes Mother    Diabetes Mellitus Mother    Early death Father    Heart disease Mother    Heart failure Mother          Social History     Socioeconomic History    Marital status:    Tobacco Use    Smoking status: Former     Current packs/day: 0.00     Types: Cigarettes     Passive exposure: Past    Smokeless  "tobacco: Never   Substance and Sexual Activity    Alcohol use: Yes     Alcohol/week: 1.0 standard drink of alcohol     Types: 1 Glasses of wine per week     Comment: 3 x a month    Drug use: No    Sexual activity: Yes     Partners: Female     Birth control/protection: None     Comment: (partner Ivone is a trans woman)   Social History Narrative    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    Works for [employer] at [employer]    Walks a lot at work and for exercise     Social Drivers of Health     Financial Resource Strain: Medium Risk (7/15/2024)    Overall Financial Resource Strain (CARDIA)     Difficulty of Paying Living Expenses: Somewhat hard   Food Insecurity: Unknown (7/15/2024)    Hunger Vital Sign     Ran Out of Food in the Last Year: Never true   Transportation Needs: No Transportation Needs (10/20/2023)    PRAPARE - Transportation     Lack of Transportation (Medical): No     Lack of Transportation (Non-Medical): No   Physical Activity: Insufficiently Active (7/15/2024)    Exercise Vital Sign     Days of Exercise per Week: 3 days     Minutes of Exercise per Session: 20 min   Stress: Stress Concern Present (7/15/2024)    [employer] - Occupational Stress Questionnaire     Feeling of Stress : To some extent   Housing Stability: Low Risk  (10/20/2023)    Housing Stability Vital Sign     Unable to Pay for Housing in the Last Year: No     Number of Places Lived in the Last Year: 1     Unstable Housing in the Last Year: No       Review of Systems    OBJECTIVE:     Vital Signs  Pulse: 82  BP: (!) 145/81  Pain Score: 0-No pain  Height: 5' 7" (170.2 cm)  Weight: 97.5 kg (214 lb 15.2 oz)  Body mass index is 33.67 kg/m².    Neurosurgery Physical Exam  General: well developed, well nourished, no distress.   Head: normocephalic, atraumatic  Neurologic: Alert and oriented. Thought content appropriate.  GCS: Motor: 6/Verbal: 5/Eyes: 4 GCS Total: 15  Mental Status: Awake, Alert, Oriented x " 4  Language: No aphasia  Speech: No dysarthria  Cranial nerves: face symmetric, tongue midline, CN II-XII grossly intact.   Eyes: pupils equal, round, reactive to light with accomodation, EOMI.   Pulmonary: normal respirations, no signs of respiratory distress  Abdomen: soft, non-distended  Skin: Skin is warm, dry and intact.  Sensory: intact to light touch throughout  Motor Strength:Moves all extremities spontaneously with good tone.       Diagnostic Results:  I have personally reviewed the imaging dated 4/23/25:    X-ray lumbar flex/ex shows no instability    2.   MRI lumbar shows degenerative changes of the spine notable for disc extrusion at L2-L3 contributing to severe spinal canal stenosis and 0.6 cm synovial cyst along the posterior epidural space, probably arising from the right facet joint. L4-L5: Operative level, decompressed.     ASSESSMENT/PLAN:     Amita Lewis is a 64 y.o. female seen in clinic today to discuss the MRI findings and progress with PT. She has noticed some improvement with conservative treatment. We discussed her MRI findings and recommended to continue with PT and consider injections with pain management. She was agreeable. We reviewed red flag symptoms that would prompt a sooner return. She verbalized understanding. I have encouraged her to contact the clinic with any questions, concerns, or adverse clinical changes.     SHY Mercado-SUNDAY  Neurosurgery  [facility].      Note dictated with voice recognition software, please excuse any grammatical errors.          [1]   Current Outpatient Medications   Medication Sig Dispense Refill    albuterol (PROVENTIL/VENTOLIN HFA) 90 mcg/actuation inhaler USE 2 INHALATIONS EVERY 6  HOURS AS NEEDED FOR        WHEEZING OR SHORTNESS OF   BREATH (RESCUE) 25.5 g 3    ASPIRIN (ASPIR-81 ORAL) Take 1 tablet by mouth every evening.       buPROPion (WELLBUTRIN XL) 150 MG TB24 tablet Take 1 pill daily 90 tablet 3    buPROPion  (WELLBUTRIN XL) 300 MG 24 hr tablet Take 1 pill daily 90 tablet 3    cilostazoL (PLETAL) 100 MG Tab Take 1 tablet (100 mg total) by mouth 2 (two) times daily. 180 tablet 3    ECHINACEA 1X ORAL       fexofenadine (ALLEGRA) 180 MG tablet TK 1 T PO ONCE D      FLAXSEED ORAL Take by mouth.      LACTOBAC NO.41/BIFIDOBACT NO.7 (PROBIOTIC-10 ORAL) Take by mouth every evening.       multivitamin (THERAGRAN) per tablet Take 1 tablet by mouth every evening.       mupirocin (BACTROBAN) 2 % ointment Apply to affected area 3 times daily 22 g 1    nystatin (MYCOSTATIN) powder APPLY TO THE AFFECTED AREA TWO TIMES A DAY AS NEEDED  FOR SKIN INFECTION.        RE-TREAT AS NEEDED 30 g 3    rosuvastatin (CRESTOR) 20 MG tablet Take 1 tablet (20 mg total) by mouth once daily. 90 tablet 3    naproxen (NAPROSYN) 500 MG tablet Take 1 tablet (500 mg total) by mouth 2 (two) times daily. 30 tablet 2     No current facility-administered medications for this visit.

## 2025-05-08 ENCOUNTER — OFFICE VISIT (OUTPATIENT)
Dept: PAIN MEDICINE | Facility: CLINIC | Age: 65
End: 2025-05-08
Payer: COMMERCIAL

## 2025-05-08 VITALS
HEART RATE: 83 BPM | WEIGHT: 212.5 LBS | HEIGHT: 67 IN | SYSTOLIC BLOOD PRESSURE: 145 MMHG | BODY MASS INDEX: 33.35 KG/M2 | DIASTOLIC BLOOD PRESSURE: 80 MMHG

## 2025-05-08 DIAGNOSIS — M54.16 LUMBAR RADICULOPATHY: Primary | ICD-10-CM

## 2025-05-08 DIAGNOSIS — M48.07 SPINAL STENOSIS, LUMBOSACRAL REGION: ICD-10-CM

## 2025-05-08 DIAGNOSIS — Z98.890 HX OF MICRODISCECTOMY: ICD-10-CM

## 2025-05-08 PROCEDURE — 99999 PR PBB SHADOW E&M-EST. PATIENT-LVL IV: CPT | Mod: PBBFAC,,, | Performed by: STUDENT IN AN ORGANIZED HEALTH CARE EDUCATION/TRAINING PROGRAM

## 2025-05-08 RX ORDER — PREGABALIN 50 MG/1
50 CAPSULE ORAL 3 TIMES DAILY
Qty: 90 CAPSULE | Refills: 1 | Status: SHIPPED | OUTPATIENT
Start: 2025-05-08 | End: 2025-05-08

## 2025-05-08 RX ORDER — PREGABALIN 50 MG/1
50 CAPSULE ORAL 3 TIMES DAILY
Qty: 90 CAPSULE | Refills: 1 | Status: SHIPPED | OUTPATIENT
Start: 2025-05-08 | End: 2025-07-07

## 2025-05-08 NOTE — PROGRESS NOTES
[facility] - New Patient Evaluation    Referred by: Georgie Hand   Reason for referral: Lumbar radiculopathy  Spinal stenosis, lumbosacral region  Hx of microdiscectomy     CC: No chief complaint on file.        1/22/2020     7:58 AM 12/18/2019     1:27 PM 4/18/2019     1:35 PM   Last 3 PDI Scores   Pain Disability Index (PDI) 31 21 19       Subjective 05/08/2025:   Amita Lewis is a 64 y.o. female who presents complaining of approximately 2 months of lower back pain that radiates down the posterior right leg to the calf.  No inciting incident or trauma.  She does have a history of microdiskectomy at L4/L5 in 2007.  No other lumbar surgeries.  She recently had a lumbar MRI that was significant for disc extrusion at L2/L3 contributing to severe spinal canal stenosis as well as a synovial cyst along the posterior epidural space.  She was evaluated by Neurosurgery who recommended physical therapy and possible injections.  Like taking Alleve for her pain.    Initial Pain Assessment:  Location: lower back, right leg   Onset (Approx Date Pain started): 2 months  Current Pain Score: 2/10  Daily Pain of Range: 1-8/10  Quality: Shooting  Radiation:   Right leg  Worsened by: sitting and bending backwards  Improved by: physical therapy     Denies new weakness, saddle anesthesia, and bowel or bladder incontinence.      Previous Interventions:  - injections many years ago prior to surgery in 2007    Previous Therapies:  PT/OT: yes - has completed 2 weeks  Chiropractor:   HEP:   Relevant Surgery: yes   hx of microdiscectomy L4-5 in 2007     Previous Medications:   - Tylenol or NSAIDS: Aleve, Aspirin 81 mg  - Muscle Relaxants: N/A   - TCAs: N/A  - SNRIs: Wellbutrin  - Topicals: N/A  - Anticonvulsants: N/A   - Opioids: N/A  - Adjuvants: N/A    Current Pain Medications:  Alleve PRN    Anticoagulation:     Review of Systems:  ROS    GENERAL:  No weight loss, malaise or fevers.  HEENT:   No  recent changes in vision or hearing  NECK:  No difficulty with swallowing. No stridor.   RESPIRATORY:  Negative for cough, wheezing or shortness of breath, patient denies any recent URI.  CARDIOVASCULAR:  Negative for chest pain, leg swelling or palpitations.  GI:  Negative for abdominal discomfort, blood in stools or black stools or change in bowel habits.  MUSCULOSKELETAL:  See HPI.  SKIN:  Negative for lesions, rash, and itching.  PSYCH:  No mood disorder or recent psychosocial stressors.    HEMATOLOGY/LYMPHOLOGY:  Negative for prolonged bleeding, bruising easily or swollen nodes.  Patient is not currently taking any anti-coagulants  NEURO:   No history of headaches, syncope, paralysis, seizures or tremors.  All other reviewed and negative other than HPI.    History:  Current medications, allergies, medical history, surgical history,   family history, and social history were reviewed in the chart as marked.    Full Medication List:  Current Medications[1]     Allergies:  Patient has no known allergies.     Medical History:   has a past medical history of Abnormal Pap smear, Asthma, Depression (03/17/2016), Environmental and seasonal allergies (03/17/2016), Heartburn (03/17/2016), HLD (hyperlipidemia) (03/22/2016), Malignant melanoma of skin, unspecified (2022), Menopausal syndrome (hot flashes) (03/17/2016), Mild intermittent asthma without complication (03/17/2016), Obesity (BMI 30-39.9) (03/17/2016), and Sciatica (03/17/2016).    Surgical History:   has a past surgical history that includes Cervical biopsy w/ loop electrode excision; microdiscetomy; Ovarian cyst removal (2001); Injection of joint (Bilateral, 5/31/2018); Injection of joint (Bilateral, 9/18/2018); Transforaminal epidural injection of steroid (Left, 3/21/2019); Endarterectomy of femoral artery (Left, 2/12/2020); Aortography (N/A, 2/12/2020); Angiography of lower extremity (Left, 2/12/2020); and melona.    Family History:  family history includes  Alcohol abuse in her brother and father; Breast cancer (age of onset: 65) in her mother; Cancer in her father; Diabetes in her mother; Diabetes Mellitus in her mother; Early death in her father; Heart disease in her mother; Heart failure in her mother.    Social History:   reports that she has quit smoking. Her smoking use included cigarettes. She has been exposed to tobacco smoke. She has never used smokeless tobacco. She reports current alcohol use of about 1.0 standard drink of alcohol per week. She reports that she does not use drugs.    Physical Exam:  There were no vitals filed for this visit.    GENERAL: Well appearing, in no acute distress, alert and oriented x3.  PSYCH:  Mood and affect appropriate.  SKIN: Skin color, texture, turgor normal, no rashes or lesions.  HEAD/FACE:  Normocephalic, atraumatic. Cranial nerves grossly intact.  NECK: Normal ROM. Supple.   CV: RRR with palpation of the radial artery.  PULM: No evidence of respiratory difficulty, symmetric chest rise.  GI:  Soft and non-distended.  MSK:  Lumbar surgical scar from previous diskectomy is well healed.  Straight leg raising is positive on the right to radicular pain. + pain to palpation over the facet joints of the lumbar spine. No pain with lumbar facet loading. No pain over the SI joints. MANN test is negative. Peripheral joint ROM is full and pain free without obvious instability or laxity in all four extremities. No obvious deformities, edema, or skin discoloration.  No atrophy or tone abnormalities are noted.   NEURO: Bilateral upper and lower extremity coordination and strength is symmetric.  No loss of sensation is noted. No clonus.   MENTAL STATUS: A x O x 3, good concentration, speech is fluent and goal directed  MOTOR: 5/5 in all  muscle groups  GAIT: Normal.  Ambulates unassisted.    Imaging:  LUMBAR MRI  Results for orders placed during the hospital encounter of 04/23/25    MRI Lumbar Spine Without  Contrast    Narrative  EXAMINATION:  MRI LUMBAR SPINE WITHOUT CONTRAST    CLINICAL HISTORY:  Spinal stenosis, lumbar;Low back pain, symptoms persist with > 6wks conservative treatment; Radiculopathy, lumbar region    TECHNIQUE:  Multiplanar, multisequence MR images were acquired from the thoracolumbar junction to the sacrum without contrast.    COMPARISON:  X-ray lumbar spine 04/23/2025, MRI lumbar spine 04/15/2019    FINDINGS:  Alignment: Normal.    Vertebrae: Postoperative changes of remote right L4 hemilaminectomy.  No fracture or marrow infiltrative process.    Discs: Multilevel disc height loss and disc degeneration moderate at L3-L5.  Annular fissures at L2-L3, L3-L4. No evidence for discitis.    Cord: Conus terminates at L1 and appears unremarkable.  Cauda equina appears unremarkable.    Degenerative findings:    T12-L1: No spinal canal stenosis or neuroforaminal narrowing.    L1-L2: No spinal canal stenosis or neural foraminal narrowing.    L2-L3: Central disc extrusion with superior migration of the disc contents (overall measuring 1.1 x 0.7 x 2.2 cm, TV by AP by CC), mild facet arthropathy, and ligamentum flavum hypertrophy contribute to severe spinal canal stenosis.  0.6 cm synovial cyst along the posterior epidural space, probably arising from the right facet joint.    L3-L4: Circumferential disc bulge, moderate bilateral facet arthropathy and ligamentum flavum hypertrophy contribute to moderate spinal canal stenosis, mild bilateral neural foraminal narrowing.    L4-L5: Operative level, decompressed.  Posterior disc bulge, severe left facet arthropathy and ligamentum flavum hypertrophy efface the left lateral recess and compress the descending left L5 nerve root.  These findings contribute to moderate left and mild right neural foraminal narrowing.    L5-S1: Mild bilateral facet arthropathy.  No spinal canal stenosis or neuroforaminal narrowing.    Paraspinal muscles & soft tissues: Small right renal  cyst noted.    Impression  Multilevel degenerative changes of the spine notable for disc extrusion at L2-L3 contributing to severe spinal canal stenosis.    Electronically signed by resident: Roberto Rea  Date:    04/23/2025  Time:    13:53    Electronically signed by: Norris Borden MD  Date:    04/23/2025  Time:    16:22      Labs:  BMP  Lab Results   Component Value Date     10/22/2024    K 4.1 10/22/2024     10/22/2024    CO2 20 (L) 10/22/2024    BUN 19 10/22/2024    CREATININE 0.8 10/22/2024    CALCIUM 9.8 10/22/2024    ANIONGAP 12 10/22/2024    EGFRNORACEVR >60.0 10/22/2024     Lab Results   Component Value Date    ALT 20 10/22/2024    AST 22 10/22/2024    ALKPHOS 103 10/22/2024    BILITOT 0.5 10/22/2024     Lab Results   Component Value Date    WBC 7.64 10/22/2024    HGB 12.9 10/22/2024    HCT 40.7 10/22/2024    MCV 92 10/22/2024     10/22/2024         Assessment:  Problem List Items Addressed This Visit          Neuro    Lumbar radiculopathy     Other Visit Diagnoses         Spinal stenosis, lumbosacral region          Hx of microdiscectomy                05/08/2025 - Amita Lewis is a 64 y.o. female who  has a past medical history of Abnormal Pap smear, Asthma, Depression (03/17/2016), Environmental and seasonal allergies (03/17/2016), Heartburn (03/17/2016), HLD (hyperlipidemia) (03/22/2016), Malignant melanoma of skin, unspecified (2022), Menopausal syndrome (hot flashes) (03/17/2016), Mild intermittent asthma without complication (03/17/2016), Obesity (BMI 30-39.9) (03/17/2016), and Sciatica (03/17/2016).  By history and examination this patient has chronic low back pain with radiculopathy on the right.  The underlying cause is facet arthritis, degenerative disc disease, foraminal stenosis, and central canal stenosis.  Pathology is confirmed by imaging.  She has a history of L4/L5 microdiskectomy.  We discussed the underlying diagnoses and multiple treatment options  including non-opioid medications, interventional procedures, and physical therapy.  Recommend 6 weeks of PT, trial of Lyrica, and epidural steroid injection if no improvement.  The risks and benefits of each treatment option were discussed and all questions were answered.      Treatment Plan:   Procedures:   If no improvement with 6 weeks of conservative therapy, consider transforaminal epidural steroid injection bilaterally at L2/L3.  PT/OT/HEP: I have stressed the importance of physical activity and a home exercise plan to help with pain and improve health.  She is currently enrolled in PT in his completed 2 weeks.  Continue with physical therapy as tolerated.  Medications:    - start Lyrica 50 mg t.i.d..  Start with q.h.s. and increase to t.i.d. if tolerated.  - may continue with the leave PRN   -  Reviewed and consistent with medication use as prescribed.  Imaging:  Lumbar MRI independently reviewed and discussed with the patient.  Follow Up:  Virtual visit in 4 weeks to assess response to physical therapy and Lyrica.    Yuni Rollins,    Interventional Pain Medicine / Anesthesiology    Disclaimer: This note was partly generated using dictation software which may occasionally result in transcription errors.         [1]   Current Outpatient Medications:     albuterol (PROVENTIL/VENTOLIN HFA) 90 mcg/actuation inhaler, USE 2 INHALATIONS EVERY 6  HOURS AS NEEDED FOR        WHEEZING OR SHORTNESS OF   BREATH (RESCUE), Disp: 25.5 g, Rfl: 3    ASPIRIN (ASPIR-81 ORAL), Take 1 tablet by mouth every evening. , Disp: , Rfl:     buPROPion (WELLBUTRIN XL) 150 MG TB24 tablet, Take 1 pill daily, Disp: 90 tablet, Rfl: 3    buPROPion (WELLBUTRIN XL) 300 MG 24 hr tablet, Take 1 pill daily, Disp: 90 tablet, Rfl: 3    cilostazoL (PLETAL) 100 MG Tab, Take 1 tablet (100 mg total) by mouth 2 (two) times daily., Disp: 180 tablet, Rfl: 3    ECHINACEA 1X ORAL, , Disp: , Rfl:     fexofenadine (ALLEGRA) 180 MG tablet, TK 1 T PO ONCE  D, Disp: , Rfl:     FLAXSEED ORAL, Take by mouth., Disp: , Rfl:     LACTOBAC NO.41/BIFIDOBACT NO.7 (PROBIOTIC-10 ORAL), Take by mouth every evening. , Disp: , Rfl:     multivitamin (THERAGRAN) per tablet, Take 1 tablet by mouth every evening. , Disp: , Rfl:     mupirocin (BACTROBAN) 2 % ointment, Apply to affected area 3 times daily, Disp: 22 g, Rfl: 1    naproxen (NAPROSYN) 500 MG tablet, Take 1 tablet (500 mg total) by mouth 2 (two) times daily., Disp: 30 tablet, Rfl: 2    nystatin (MYCOSTATIN) powder, APPLY TO THE AFFECTED AREA TWO TIMES A DAY AS NEEDED  FOR SKIN INFECTION.        RE-TREAT AS NEEDED, Disp: 30 g, Rfl: 3    rosuvastatin (CRESTOR) 20 MG tablet, Take 1 tablet (20 mg total) by mouth once daily., Disp: 90 tablet, Rfl: 3

## 2025-06-01 ENCOUNTER — PATIENT MESSAGE (OUTPATIENT)
Dept: NEUROSURGERY | Facility: CLINIC | Age: 65
End: 2025-06-01
Payer: COMMERCIAL

## 2025-06-05 ENCOUNTER — OFFICE VISIT (OUTPATIENT)
Dept: PAIN MEDICINE | Facility: CLINIC | Age: 65
End: 2025-06-05
Payer: COMMERCIAL

## 2025-06-05 DIAGNOSIS — M48.07 SPINAL STENOSIS, LUMBOSACRAL REGION: ICD-10-CM

## 2025-06-05 DIAGNOSIS — Z98.890 HX OF MICRODISCECTOMY: ICD-10-CM

## 2025-06-05 DIAGNOSIS — M54.16 LUMBAR RADICULOPATHY: Primary | ICD-10-CM

## 2025-06-05 NOTE — H&P (VIEW-ONLY)
The patient location is: [place]  The chief complaint leading to consultation is:  Lumbar radiculopathy    Visit type: audiovisual     [facility] - Established Patient Evaluation    Telemedicine Encounter    Telemedicine Bundle:  The patient location is: patient's home  The chief complaint leading to consultation is: No chief complaint on file.  Visit type: Virtual visit with synchronous audio and video  Each patient to whom he or she provides medical services by telemedicine is:    (1) informed of the relationship between the physician and patient and the respective role of any other health care provider with respect to management of the patient  (2) notified that he or she may decline to receive medical services by telemedicine and may withdraw from such care at any time.      Referred by: No ref. provider found   Reason for referral: Lumbar radiculopathy     CC: No chief complaint on file.        5/8/2025    11:36 AM 1/22/2020     7:58 AM 12/18/2019     1:27 PM   Last 3 PDI Scores   Pain Disability Index (PDI) 14 31 21     Interval History 6/5/2025:   Amita Lewis is a 64 y.o. female presents virtually for follow up of her low back pain with radiculopathy. Since last visit the pain has has slightly improved with physical therapy, but she continues to have sharp pain that radiates to the right lower extremity, affecting her day-to-day activities. The pain is located in the low back area and radiates to the right leg.  The pain is described as sharp.   She started the Lyrica about 4 nights ago, taking 50 mg nightly.  She would like to move forward with an epidural steroid injection.    Today the pain is rated as 8/10    Subjective 05/08/2025:   Amita Lewis is a 64 y.o. female who presents complaining of approximately 2 months of lower back pain that radiates down the posterior right leg to the calf.  No inciting incident or trauma.  She does have a history of  microdiskectomy at L4/L5 in 2007.  No other lumbar surgeries.  She recently had a lumbar MRI that was significant for disc extrusion at L2/L3 contributing to severe spinal canal stenosis as well as a synovial cyst along the posterior epidural space.  She was evaluated by Neurosurgery who recommended physical therapy and possible injections.  She is  taking Aleve for her pain.    Initial Pain Assessment:  Location: lower back, right leg   Onset (Approx Date Pain started): 2 months  Current Pain Score: 2/10  Daily Pain of Range: 1-8/10  Quality: Shooting  Radiation:   Right leg  Worsened by: sitting and bending backwards  Improved by: physical therapy     Denies new weakness, saddle anesthesia, and bowel or bladder incontinence.      Previous Interventions:  - injections many years ago prior to surgery in 2007    Previous Therapies:  PT/OT: yes -completed 6 weeks of PT  Chiropractor:   HEP:  Yes  Relevant Surgery: yes   hx of microdiscectomy L4-5 in 2007     Previous Medications:   - Tylenol or NSAIDS: Aleve, Aspirin 81 mg  - Muscle Relaxants: N/A   - TCAs: N/A  - SNRIs: Wellbutrin  - Topicals: N/A  - Anticonvulsants: N/A   - Opioids: N/A  - Adjuvants: N/A    Current Pain Medications:  Aleve PRN    Anticoagulation:     Review of Systems:  ROS    GENERAL:  No weight loss, malaise or fevers.  HEENT:   No recent changes in vision or hearing  NECK:  No difficulty with swallowing. No stridor.   RESPIRATORY:  Negative for cough, wheezing or shortness of breath, patient denies any recent URI.  CARDIOVASCULAR:  Negative for chest pain, leg swelling or palpitations.  GI:  Negative for abdominal discomfort, blood in stools or black stools or change in bowel habits.  MUSCULOSKELETAL:  See HPI.  SKIN:  Negative for lesions, rash, and itching.  PSYCH:  No mood disorder or recent psychosocial stressors.    HEMATOLOGY/LYMPHOLOGY:  Negative for prolonged bleeding, bruising easily or swollen nodes.  Patient is not currently taking any  anti-coagulants  NEURO:   No history of headaches, syncope, paralysis, seizures or tremors.  All other reviewed and negative other than HPI.    History:  Current medications, allergies, medical history, surgical history,   family history, and social history were reviewed in the chart as marked.    Full Medication List:  Current Medications[1]     Allergies:  Patient has no known allergies.     Medical History:   has a past medical history of Abnormal Pap smear, Asthma, Depression (03/17/2016), Environmental and seasonal allergies (03/17/2016), Heartburn (03/17/2016), HLD (hyperlipidemia) (03/22/2016), Malignant melanoma of skin, unspecified (2022), Menopausal syndrome (hot flashes) (03/17/2016), Mild intermittent asthma without complication (03/17/2016), Obesity (BMI 30-39.9) (03/17/2016), and Sciatica (03/17/2016).    Surgical History:   has a past surgical history that includes Cervical biopsy w/ loop electrode excision; microdiscetomy; Ovarian cyst removal (2001); Injection of joint (Bilateral, 5/31/2018); Injection of joint (Bilateral, 9/18/2018); Transforaminal epidural injection of steroid (Left, 3/21/2019); Endarterectomy of femoral artery (Left, 2/12/2020); Aortography (N/A, 2/12/2020); Angiography of lower extremity (Left, 2/12/2020); and melona.    Family History:  family history includes Alcohol abuse in her brother and father; Breast cancer (age of onset: 65) in her mother; Cancer in her father; Diabetes in her mother; Diabetes Mellitus in her mother; Early death in her father; Heart disease in her mother; Heart failure in her mother.    Social History:   reports that she has quit smoking. Her smoking use included cigarettes. She has been exposed to tobacco smoke. She has never used smokeless tobacco. She reports current alcohol use of about 1.0 standard drink of alcohol per week. She reports that she does not use drugs.    Physical Exam:  There were no vitals filed for this visit.    GENERAL: Well  appearing, in no acute distress, alert and oriented x3.  PSYCH:  Mood and affect appropriate.  SKIN: Skin color, texture, turgor normal, no rashes or lesions.  HEAD/FACE:  Normocephalic, atraumatic. Cranial nerves grossly intact.  NECK: Normal ROM. Supple.   CV: RRR with palpation of the radial artery.  PULM: No evidence of respiratory difficulty, symmetric chest rise.  GI:  Soft and non-distended.  MSK:  Lumbar surgical scar from previous diskectomy is well healed.  Straight leg raising is positive on the right to radicular pain. + pain to palpation over the facet joints of the lumbar spine. No pain with lumbar facet loading. No pain over the SI joints. MANN test is negative. Peripheral joint ROM is full and pain free without obvious instability or laxity in all four extremities. No obvious deformities, edema, or skin discoloration.  No atrophy or tone abnormalities are noted.   NEURO: Bilateral upper and lower extremity coordination and strength is symmetric.  No loss of sensation is noted. No clonus.   MENTAL STATUS: A x O x 3, good concentration, speech is fluent and goal directed  MOTOR: 5/5 in all  muscle groups  GAIT: Normal.  Ambulates unassisted.    Imaging:  LUMBAR MRI  Results for orders placed during the hospital encounter of 04/23/25    MRI Lumbar Spine Without Contrast    Narrative  EXAMINATION:  MRI LUMBAR SPINE WITHOUT CONTRAST    CLINICAL HISTORY:  Spinal stenosis, lumbar;Low back pain, symptoms persist with > 6wks conservative treatment; Radiculopathy, lumbar region    TECHNIQUE:  Multiplanar, multisequence MR images were acquired from the thoracolumbar junction to the sacrum without contrast.    COMPARISON:  X-ray lumbar spine 04/23/2025, MRI lumbar spine 04/15/2019    FINDINGS:  Alignment: Normal.    Vertebrae: Postoperative changes of remote right L4 hemilaminectomy.  No fracture or marrow infiltrative process.    Discs: Multilevel disc height loss and disc degeneration moderate at L3-L5.   Annular fissures at L2-L3, L3-L4. No evidence for discitis.    Cord: Conus terminates at L1 and appears unremarkable.  Cauda equina appears unremarkable.    Degenerative findings:    T12-L1: No spinal canal stenosis or neuroforaminal narrowing.    L1-L2: No spinal canal stenosis or neural foraminal narrowing.    L2-L3: Central disc extrusion with superior migration of the disc contents (overall measuring 1.1 x 0.7 x 2.2 cm, TV by AP by CC), mild facet arthropathy, and ligamentum flavum hypertrophy contribute to severe spinal canal stenosis.  0.6 cm synovial cyst along the posterior epidural space, probably arising from the right facet joint.    L3-L4: Circumferential disc bulge, moderate bilateral facet arthropathy and ligamentum flavum hypertrophy contribute to moderate spinal canal stenosis, mild bilateral neural foraminal narrowing.    L4-L5: Operative level, decompressed.  Posterior disc bulge, severe left facet arthropathy and ligamentum flavum hypertrophy efface the left lateral recess and compress the descending left L5 nerve root.  These findings contribute to moderate left and mild right neural foraminal narrowing.    L5-S1: Mild bilateral facet arthropathy.  No spinal canal stenosis or neuroforaminal narrowing.    Paraspinal muscles & soft tissues: Small right renal cyst noted.    Impression  Multilevel degenerative changes of the spine notable for disc extrusion at L2-L3 contributing to severe spinal canal stenosis.    Electronically signed by resident: Roberto Rea  Date:    04/23/2025  Time:    13:53    Electronically signed by: Norris Borden MD  Date:    04/23/2025  Time:    16:22      Labs:  BMP  Lab Results   Component Value Date     10/22/2024    K 4.1 10/22/2024     10/22/2024    CO2 20 (L) 10/22/2024    BUN 19 10/22/2024    CREATININE 0.8 10/22/2024    CALCIUM 9.8 10/22/2024    ANIONGAP 12 10/22/2024    EGFRNORACEVR >60.0 10/22/2024     Lab Results   Component Value Date    ALT  20 10/22/2024    AST 22 10/22/2024    ALKPHOS 103 10/22/2024    BILITOT 0.5 10/22/2024     Lab Results   Component Value Date    WBC 7.64 10/22/2024    HGB 12.9 10/22/2024    HCT 40.7 10/22/2024    MCV 92 10/22/2024     10/22/2024         Assessment:  Problem List Items Addressed This Visit          Neuro    Lumbar radiculopathy - Primary    Relevant Orders    Procedure Request Order for Pain Management     Other Visit Diagnoses         Spinal stenosis, lumbosacral region        Relevant Orders    Procedure Request Order for Pain Management      Hx of microdiscectomy                  05/08/2025 - Amita Lewis is a 64 y.o. female who  has a past medical history of Abnormal Pap smear, Asthma, Depression (03/17/2016), Environmental and seasonal allergies (03/17/2016), Heartburn (03/17/2016), HLD (hyperlipidemia) (03/22/2016), Malignant melanoma of skin, unspecified (2022), Menopausal syndrome (hot flashes) (03/17/2016), Mild intermittent asthma without complication (03/17/2016), Obesity (BMI 30-39.9) (03/17/2016), and Sciatica (03/17/2016).  By history and examination this patient has chronic low back pain with radiculopathy on the right.  The underlying cause is facet arthritis, degenerative disc disease, foraminal stenosis, and central canal stenosis.  Pathology is confirmed by imaging.  She has a history of L4/L5 microdiskectomy.  We discussed the underlying diagnoses and multiple treatment options including non-opioid medications, interventional procedures, and physical therapy.  Recommend 6 weeks of PT, trial of Lyrica, and epidural steroid injection if no improvement.  The risks and benefits of each treatment option were discussed and all questions were answered.      06/05/2025 - patient presents with continued low back pain with right-sided radicular symptoms.  Schedule patient for right L2/L3 and L3/L4 transforaminal epidural steroid injection.  Recommend continue with Lyrica and increase to  t.i.d. if tolerated.       Treatment Plan:   Procedures:   Schedule patient for right L2/L3 and L3/L4 transforaminal epidural steroid injection  PT/OT/HEP: I have stressed the importance of physical activity and a home exercise plan to help with pain and improve health.  Patient has completed 6 weeks of PT in his compliant with a home exercise regimen.   Medications:    - continue with Lyrica 50 mg t.i.d..  Start with q.h.s. and increase to t.i.d. if tolerated.  - may continue with naproxen PRN   -  Reviewed and consistent with medication use as prescribed.  Imaging:  Lumbar MRI independently reviewed and discussed with the patient.  Follow Up:  2-3 weeks after procedure to assess response to injection and ongoing management of her pain    Yuni Rollins DO   Interventional Pain Medicine / Anesthesiology    Disclaimer: This note was partly generated using dictation software which may occasionally result in transcription errors.                   [1]   Current Outpatient Medications:     albuterol (PROVENTIL/VENTOLIN HFA) 90 mcg/actuation inhaler, USE 2 INHALATIONS EVERY 6  HOURS AS NEEDED FOR        WHEEZING OR SHORTNESS OF   BREATH (RESCUE), Disp: 25.5 g, Rfl: 3    ASPIRIN (ASPIR-81 ORAL), Take 1 tablet by mouth every evening. , Disp: , Rfl:     buPROPion (WELLBUTRIN XL) 150 MG TB24 tablet, Take 1 pill daily, Disp: 90 tablet, Rfl: 3    buPROPion (WELLBUTRIN XL) 300 MG 24 hr tablet, Take 1 pill daily, Disp: 90 tablet, Rfl: 3    cilostazoL (PLETAL) 100 MG Tab, Take 1 tablet (100 mg total) by mouth 2 (two) times daily., Disp: 180 tablet, Rfl: 3    ECHINACEA 1X ORAL, , Disp: , Rfl:     fexofenadine (ALLEGRA) 180 MG tablet, TK 1 T PO ONCE D, Disp: , Rfl:     FLAXSEED ORAL, Take by mouth., Disp: , Rfl:     LACTOBAC NO.41/BIFIDOBACT NO.7 (PROBIOTIC-10 ORAL), Take by mouth every evening. , Disp: , Rfl:     multivitamin (THERAGRAN) per tablet, Take 1 tablet by mouth every evening. , Disp: , Rfl:     mupirocin  (BACTROBAN) 2 % ointment, Apply to affected area 3 times daily, Disp: 22 g, Rfl: 1    naproxen (NAPROSYN) 500 MG tablet, Take 1 tablet (500 mg total) by mouth 2 (two) times daily., Disp: 30 tablet, Rfl: 2    nystatin (MYCOSTATIN) powder, APPLY TO THE AFFECTED AREA TWO TIMES A DAY AS NEEDED  FOR SKIN INFECTION.        RE-TREAT AS NEEDED, Disp: 30 g, Rfl: 3    pregabalin (LYRICA) 50 MG capsule, Take 1 capsule (50 mg total) by mouth 3 (three) times daily., Disp: 90 capsule, Rfl: 1    rosuvastatin (CRESTOR) 20 MG tablet, Take 1 tablet (20 mg total) by mouth once daily., Disp: 90 tablet, Rfl: 3

## 2025-06-06 ENCOUNTER — TELEPHONE (OUTPATIENT)
Dept: PAIN MEDICINE | Facility: CLINIC | Age: 65
End: 2025-06-06
Payer: COMMERCIAL

## 2025-06-06 DIAGNOSIS — M48.07 SPINAL STENOSIS, LUMBOSACRAL REGION: ICD-10-CM

## 2025-06-06 DIAGNOSIS — M54.16 LUMBAR RADICULOPATHY: Primary | ICD-10-CM

## 2025-06-17 ENCOUNTER — TELEPHONE (OUTPATIENT)
Dept: FAMILY MEDICINE | Facility: CLINIC | Age: 65
End: 2025-06-17
Payer: COMMERCIAL

## 2025-06-17 NOTE — TELEPHONE ENCOUNTER
----- Message from Sudha sent at 6/17/2025 11:49 AM CDT -----  Regarding: FW: Clearance  Good morning,    I am just checking the status of the clearance for patient to hold Pletal for 2 days.    Thanks,  Sudha  ----- Message -----  From: Sudha Montanez  Sent: 6/6/2025   1:38 PM CDT  To: Shannon Figueroa MD  Subject: Clearance                                        Good afternoon,    Patient is scheduled with Dr Rollins on June 25 to have a Transforaminal JILLIAN. Requesting clearance for patient to hold Pletal 2 days prior to injection.    Thanks,  Sudha

## 2025-06-18 ENCOUNTER — TELEPHONE (OUTPATIENT)
Dept: PAIN MEDICINE | Facility: CLINIC | Age: 65
End: 2025-06-18
Payer: COMMERCIAL

## 2025-06-20 ENCOUNTER — TELEPHONE (OUTPATIENT)
Dept: PAIN MEDICINE | Facility: HOSPITAL | Age: 65
End: 2025-06-20
Payer: COMMERCIAL

## 2025-06-25 ENCOUNTER — HOSPITAL ENCOUNTER (OUTPATIENT)
Facility: HOSPITAL | Age: 65
Discharge: HOME | End: 2025-06-25
Attending: STUDENT IN AN ORGANIZED HEALTH CARE EDUCATION/TRAINING PROGRAM | Admitting: STUDENT IN AN ORGANIZED HEALTH CARE EDUCATION/TRAINING PROGRAM
Payer: COMMERCIAL

## 2025-06-25 ENCOUNTER — PATIENT MESSAGE (OUTPATIENT)
Dept: ADMINISTRATIVE | Facility: HOSPITAL | Age: 65
End: 2025-06-25
Payer: COMMERCIAL

## 2025-06-25 VITALS
TEMPERATURE: 97 F | DIASTOLIC BLOOD PRESSURE: 82 MMHG | BODY MASS INDEX: 34.53 KG/M2 | WEIGHT: 220 LBS | SYSTOLIC BLOOD PRESSURE: 146 MMHG | HEIGHT: 67 IN | OXYGEN SATURATION: 98 % | RESPIRATION RATE: 18 BRPM | HEART RATE: 72 BPM

## 2025-06-25 DIAGNOSIS — M54.16 LUMBAR RADICULOPATHY: Primary | ICD-10-CM

## 2025-06-25 DIAGNOSIS — M51.362 DEGENERATION OF INTERVERTEBRAL DISC OF LUMBAR REGION WITH DISCOGENIC BACK PAIN AND LOWER EXTREMITY PAIN: ICD-10-CM

## 2025-06-25 DIAGNOSIS — G89.29 CHRONIC PAIN: ICD-10-CM

## 2025-06-25 PROCEDURE — 25500020 PHARM REV CODE 255: Performed by: STUDENT IN AN ORGANIZED HEALTH CARE EDUCATION/TRAINING PROGRAM

## 2025-06-25 PROCEDURE — 64483 NJX AA&/STRD TFRM EPI L/S 1: CPT | Mod: RT,,, | Performed by: STUDENT IN AN ORGANIZED HEALTH CARE EDUCATION/TRAINING PROGRAM

## 2025-06-25 PROCEDURE — 64483 NJX AA&/STRD TFRM EPI L/S 1: CPT | Mod: RT | Performed by: STUDENT IN AN ORGANIZED HEALTH CARE EDUCATION/TRAINING PROGRAM

## 2025-06-25 PROCEDURE — 64484 NJX AA&/STRD TFRM EPI L/S EA: CPT | Mod: RT | Performed by: STUDENT IN AN ORGANIZED HEALTH CARE EDUCATION/TRAINING PROGRAM

## 2025-06-25 PROCEDURE — 64484 NJX AA&/STRD TFRM EPI L/S EA: CPT | Mod: RT,,, | Performed by: STUDENT IN AN ORGANIZED HEALTH CARE EDUCATION/TRAINING PROGRAM

## 2025-06-25 PROCEDURE — 63600175 PHARM REV CODE 636 W HCPCS: Performed by: STUDENT IN AN ORGANIZED HEALTH CARE EDUCATION/TRAINING PROGRAM

## 2025-06-25 RX ORDER — DEXAMETHASONE SODIUM PHOSPHATE 10 MG/ML
INJECTION, SOLUTION INTRA-ARTICULAR; INTRALESIONAL; INTRAMUSCULAR; INTRAVENOUS; SOFT TISSUE
Status: DISCONTINUED | OUTPATIENT
Start: 2025-06-25 | End: 2025-06-25 | Stop reason: HOSPADM

## 2025-06-25 RX ORDER — LIDOCAINE HYDROCHLORIDE 10 MG/ML
INJECTION, SOLUTION EPIDURAL; INFILTRATION; INTRACAUDAL; PERINEURAL
Status: DISCONTINUED | OUTPATIENT
Start: 2025-06-25 | End: 2025-06-25 | Stop reason: HOSPADM

## 2025-06-25 RX ORDER — ALPRAZOLAM 0.5 MG/1
0.5 TABLET, ORALLY DISINTEGRATING ORAL ONCE AS NEEDED
Status: DISCONTINUED | OUTPATIENT
Start: 2025-06-25 | End: 2025-06-25 | Stop reason: HOSPADM

## 2025-06-25 RX ORDER — LIDOCAINE HYDROCHLORIDE 20 MG/ML
INJECTION, SOLUTION EPIDURAL; INFILTRATION; INTRACAUDAL; PERINEURAL
Status: DISCONTINUED | OUTPATIENT
Start: 2025-06-25 | End: 2025-06-25 | Stop reason: HOSPADM

## 2025-06-25 NOTE — DISCHARGE SUMMARY
Discharge Note  Short Stay      SUMMARY     Admit Date: 6/25/2025    Attending Physician: Yuni Rollins      Discharge Physician: Yuni Rollins      Discharge Date: 6/25/2025 1:42 PM    Procedure(s) (LRB):  TFESI RT L2/3, L3/4 (Right)    Final Diagnosis: Lumbar radiculopathy [M54.16]  Spinal stenosis, lumbosacral region [M48.07]    Disposition: Home or self care    Patient Instructions:   Current Discharge Medication List        CONTINUE these medications which have NOT CHANGED    Details   !! buPROPion (WELLBUTRIN XL) 150 MG TB24 tablet Take 1 pill daily  Qty: 90 tablet, Refills: 3    Associated Diagnoses: Depression, unspecified depression type      !! buPROPion (WELLBUTRIN XL) 300 MG 24 hr tablet Take 1 pill daily  Qty: 90 tablet, Refills: 3      rosuvastatin (CRESTOR) 20 MG tablet Take 1 tablet (20 mg total) by mouth once daily.  Qty: 90 tablet, Refills: 3      albuterol (PROVENTIL/VENTOLIN HFA) 90 mcg/actuation inhaler USE 2 INHALATIONS EVERY 6  HOURS AS NEEDED FOR        WHEEZING OR SHORTNESS OF   BREATH (RESCUE)  Qty: 25.5 g, Refills: 3    Associated Diagnoses: Mild intermittent asthma without complication      ASPIRIN (ASPIR-81 ORAL) Take 1 tablet by mouth every evening.       cilostazoL (PLETAL) 100 MG Tab Take 1 tablet (100 mg total) by mouth 2 (two) times daily.  Qty: 180 tablet, Refills: 3      ECHINACEA 1X ORAL       fexofenadine (ALLEGRA) 180 MG tablet TK 1 T PO ONCE D      FLAXSEED ORAL Take by mouth.      LACTOBAC NO.41/BIFIDOBACT NO.7 (PROBIOTIC-10 ORAL) Take by mouth every evening.       multivitamin (THERAGRAN) per tablet Take 1 tablet by mouth every evening.       mupirocin (BACTROBAN) 2 % ointment Apply to affected area 3 times daily  Qty: 22 g, Refills: 1    Associated Diagnoses: Laceration of left hand without foreign body, initial encounter      naproxen (NAPROSYN) 500 MG tablet Take 1 tablet (500 mg total) by mouth 2 (two) times daily.  Qty: 30 tablet, Refills: 2    Associated  Diagnoses: Laceration of left hand without foreign body, initial encounter      nystatin (MYCOSTATIN) powder APPLY TO THE AFFECTED AREA TWO TIMES A DAY AS NEEDED  FOR SKIN INFECTION.        RE-TREAT AS NEEDED  Qty: 30 g, Refills: 3    Associated Diagnoses: Candidal skin infection      pregabalin (LYRICA) 50 MG capsule Take 1 capsule (50 mg total) by mouth 3 (three) times daily.  Qty: 90 capsule, Refills: 1    Associated Diagnoses: Lumbar radiculopathy; Spinal stenosis, lumbosacral region       !! - Potential duplicate medications found. Please discuss with provider.              Discharge Diagnosis: Lumbar radiculopathy [M54.16]  Spinal stenosis, lumbosacral region [M48.07]  Condition on Discharge: Stable with no complications to procedure   Diet on Discharge: Same as before.  Activity: as per instruction sheet.  Discharge to: Home with a responsible adult.  Follow up: 2-4 weeks       Please call my office or pager at [phone] if experienced any weakness or loss of sensation, fever > 101.5, pain uncontrolled with oral medications, persistent nausea/vomiting/or diarrhea, redness or drainage from the incisions, or any other worrisome concerns. If physician on call was not reached or could not communicate with our office for any reason please go to the nearest emergency department

## 2025-06-25 NOTE — OP NOTE
Lumbar Transforaminal Epidural Steroid Injection under Fluoroscopic Guidance    The procedure, risks, benefits, and options were discussed with the patient. There are no contraindications to the procedure. The patent expressed understanding and agreed to the procedure. Informed written consent was obtained prior to the start of the procedure and can be found in the patient's chart.    PATIENT NAME: Amita Lewis   MRN: [id number]     DATE OF PROCEDURE: 06/25/2025    PROCEDURE:  Right  L2/3 and L3/4 Lumbar Transforaminal Epidural Steroid Injection under Fluoroscopic Guidance    PRE-OP DIAGNOSIS: Lumbar radiculopathy [M54.16]  Spinal stenosis, lumbosacral region [M48.07] Lumbar radiculopathy [M54.16]    POST-OP DIAGNOSIS: Same    PHYSICIAN: Yuni Rollins DO    ASSISTANTS: None     MEDICATIONS INJECTED: Preservative-free Decadron 10mg with 5cc of Lidocaine 1% MPF     LOCAL ANESTHETIC INJECTED: Xylocaine 2%     SEDATION: None    ESTIMATED BLOOD LOSS: None    COMPLICATIONS: None    TECHNIQUE: Time-out was performed to identify the patient and procedure to be performed. With the patient laying in a prone position, the surgical area was prepped and draped in the usual sterile fashion using ChloraPrep and a fenestrated drape.The levels were determined under fluoroscopy guidance. Skin anesthesia was achieved by injecting Lidocaine 2% over the injection sites. The transforaminal spaces were then approached with a 22 gauge, 5 inch spinal quinke needle that was introduced under fluoroscopic guidance in the AP and Lateral views. Once the needle tip was in the area of the transforaminal space, and there was no blood, CSF or paraesthesias, contrast dye Omnipaque (300mg/mL) was injected to confirm placement and there was no vascular runoff. Fluoroscopic imaging in the AP and lateral views revealed a clear outline of the spinal nerve with proximal spread of agent through the neural foramen into the epidural space. 3 mL of the  medication mixture listed above was injected slowly at each site. Displacement of the radio opaque contrast after injection of the medication confirmed that the medication went into the area of the transforaminal spaces. The needles were removed and bleeding was nil. A sterile dressing was applied. No specimens collected. The patient tolerated the procedure well.       The patient was monitored after the procedure in the recovery area. They were given post-procedure and discharge instructions to follow at home. The patient was discharged in a stable condition.    Yuni Rollins DO

## 2025-06-25 NOTE — DISCHARGE INSTRUCTIONS
[facility] [place]  Dr. Yuni Rollins  Messaging service # [phone]    POST-PROCEDURE INSTRUCTIONS:    Today you had an injection that included a steroid medications.  The steroid may or may not have been mixed with a local anesthetic when it was injected.   If the injection was in the neck, you may feel some pressure, numbness, or slight weakness in the arm after the procedure for a short period of time (this is a normal response), if this persists for longer than 1 day please contact our office or go to the emergency room.  If the injection was in the low back, you may feel some pressure, numbness, or slight weakness in the leg after the procedure for a short period of time (this is a normal response), if this persists for longer than 1 day please contact our office or go to the emergency room.  You may get side effects from the steroid.  This is not uncommon.  Symptoms include: elevated blood sugar, elevated blood pressure, headache, flushing, nausea, insomnia.  These symptoms are transient and will resolve within 1-3 days.  If symptoms last longer than this please contact our office or head to the emergency room.  Steroid medications can take anywhere from 3-14 days to take effect (rarely longer).  You may notice that your pain worsens for a short period of time after the injection, this would not be unusual due to the pressure and trauma from the needle.    If you do not have a follow up appointment scheduled, please contact my office (or the office of the physician who referred you for the procedure) to get a post-procedure follow up scheduled 2-4 weeks after the procedure.  This can be done as a virtual visit if that is more convenient for you.      What you need to do:    Keep a record of your response to the injection you had today.    How much relief did you get?   When did the relief start and how long did it last?  Were you able to decrease the use of any of your pain  medications?  Were you able to increase your level of activity?  How long did the relief last?    What to watch out for:    If you experience any of the following symptoms after your procedure, please notify the messaging service immediately (see above for contact information):   fever (increased oral temperature)   bleeding or swelling at the injection site,    drainage, rash or redness at the injection site    possible signs of infection    increased pain at the injection site   worsening of your usual pain   severe headache   new or worsening numbness    new arm and/or leg weakness, or    changes in bowel and/or bladder function: urinating or defecating on yourself and not knowing that you did it.    PLEASE FOLLOW ALL INSTRUCTIONS CAREFULLY     Do not engage in strenuous activity (e.g., lifting or pushing heavy objects or repeated bending) for 24 hours.     Do not take a bath, swim or use Jacuzzi for 24 hours after procedure. (A shower is fine).   Remove any Band-Aids when you get home.    Use cold/ice, as needed for comfort.  We recommend the use of cold therapy alternating on for 20 minutes, off for 20 minutes.    Do not apply direct heat (heating pad or heat packs) to the injection site for 24 hours.     Resume your usual medications, unless instructed otherwise by your Pain Physician.     If you are on warfarin (Coumadin) or other blood thinner, resume this medication as instructed by your prescribing Physician.    IF AT ANY POINT YOU ARE VERY CONCERNED ABOUT YOUR SYMPTOMS, PLEASE GO TO THE EMERGENCY ROOM.    If you develop worsening pain, weakness, numbness, lose bowel or bladder control (i.e., having an accident where you did not even know you had to go to the bathroom and suddenly noticed you soiled yourself), saddle anesthesia (a loss of sensation restricted to the area of the buttocks, anus and between the legs -- i.e., those parts of your body that would touch a saddle if you were sitting on one) you  need to go immediately to the emergency department for evaluation and treatment.    ----------------------------------------------------------------------------------------------------------------------------------------------------------------  If you received Sedation please read the following instructions:  POST SEDATION INSTRUCTIONS    Today you received intravenous medication (also known as sedation) that was used to help you relax and/or decrease discomfort during your procedure. This medication will be acting in your body for the next 24 hours, so you might feel a little tired or sleepy. This feeling will slowly wear off.   Common side effects associated with these medications include: drowsiness, dizziness, sleepiness, confusion, feeling excited, difficulty remembering things, lack of steadiness with walking or balance, loss of fine muscle control, slowed reflexes, difficulty focusing, and blurred vision.  Some over-the-counter and prescription medications (e.g., muscle relaxants, opioids, mood-altering medications, sedatives/hypnotics, antihistamines) can interact with the intravenous medication you received and cause an increased risk of the side effects listed above in addition to other potentially life threatening side effects. Use extreme caution if you are taking such medications, and consult with your Pain Physician or prescribing physician if you have any questions.  For the next 12-24 hours:    DO NOT--Drive a car, operate machinery or power tools   DO NOT--Drink any alcoholic beverages (not even beer), they may dangerously increase the risk of side effects.    DO NOT--Make any important legal or business decisions or sign important documents.  We advise you to have someone to assist you at home. Move slowly and carefully. Do not make sudden changes in position. Be aware of dizziness or light-headedness and move accordingly.   If you seek medical treatment within 24 hours, let the nurse or doctor  caring for you know that you have received the above medications. If you have any questions or concerns related to your sedation or treatment today please contact us.

## 2025-06-25 NOTE — PLAN OF CARE
Amita Joshua has met all discharge criteria from Phase II. Vital Signs are stable, ambulating  without difficulty. Discharge instructions given, patient verbalized understanding. Discharged from facility via ambulation in stable condition.

## 2025-06-26 DIAGNOSIS — Z12.11 SCREENING FOR COLON CANCER: ICD-10-CM

## 2025-08-04 ENCOUNTER — PATIENT MESSAGE (OUTPATIENT)
Dept: ADMINISTRATIVE | Facility: HOSPITAL | Age: 65
End: 2025-08-04
Payer: COMMERCIAL

## 2025-08-09 ENCOUNTER — HOSPITAL ENCOUNTER (EMERGENCY)
Facility: OTHER | Age: 65
Discharge: HOME | End: 2025-08-09
Attending: STUDENT IN AN ORGANIZED HEALTH CARE EDUCATION/TRAINING PROGRAM
Payer: COMMERCIAL

## 2025-08-09 ENCOUNTER — NURSE TRIAGE (OUTPATIENT)
Dept: ADMINISTRATIVE | Facility: CLINIC | Age: 65
End: 2025-08-09
Payer: COMMERCIAL

## 2025-08-09 VITALS
OXYGEN SATURATION: 99 % | RESPIRATION RATE: 17 BRPM | HEIGHT: 67 IN | HEART RATE: 78 BPM | DIASTOLIC BLOOD PRESSURE: 72 MMHG | TEMPERATURE: 98 F | BODY MASS INDEX: 34.53 KG/M2 | SYSTOLIC BLOOD PRESSURE: 177 MMHG | WEIGHT: 220 LBS

## 2025-08-09 DIAGNOSIS — R42 DIZZINESS: ICD-10-CM

## 2025-08-09 DIAGNOSIS — R51.9 ACUTE INTRACTABLE HEADACHE, UNSPECIFIED HEADACHE TYPE: ICD-10-CM

## 2025-08-09 DIAGNOSIS — R10.11 RUQ PAIN: ICD-10-CM

## 2025-08-09 DIAGNOSIS — R11.2 NAUSEA AND VOMITING, UNSPECIFIED VOMITING TYPE: Primary | ICD-10-CM

## 2025-08-09 LAB
ABSOLUTE EOSINOPHIL (OHS): 0.15 K/UL
ABSOLUTE MONOCYTE (OHS): 0.43 K/UL (ref 0.3–1)
ABSOLUTE NEUTROPHIL COUNT (OHS): 5.82 K/UL (ref 1.8–7.7)
ALBUMIN SERPL BCP-MCNC: 4.3 G/DL (ref 3.5–5.2)
ALP SERPL-CCNC: 101 UNIT/L (ref 40–150)
ALT SERPL W/O P-5'-P-CCNC: 25 UNIT/L (ref 10–44)
ANION GAP (OHS): 15 MMOL/L (ref 8–16)
AST SERPL-CCNC: 31 UNIT/L (ref 11–45)
BASOPHILS # BLD AUTO: 0.06 K/UL
BASOPHILS NFR BLD AUTO: 0.8 %
BILIRUB SERPL-MCNC: 0.5 MG/DL (ref 0.1–1)
BILIRUB UR QL STRIP.AUTO: NEGATIVE
BUN SERPL-MCNC: 18 MG/DL (ref 8–23)
CALCIUM SERPL-MCNC: 9.3 MG/DL (ref 8.7–10.5)
CHLORIDE SERPL-SCNC: 105 MMOL/L (ref 95–110)
CLARITY UR: CLEAR
CO2 SERPL-SCNC: 19 MMOL/L (ref 23–29)
COLOR UR AUTO: YELLOW
CREAT SERPL-MCNC: 0.7 MG/DL (ref 0.5–1.4)
ERYTHROCYTE [DISTWIDTH] IN BLOOD BY AUTOMATED COUNT: 12.6 % (ref 11.5–14.5)
GFR SERPLBLD CREATININE-BSD FMLA CKD-EPI: >60 ML/MIN/1.73/M2
GLUCOSE SERPL-MCNC: 106 MG/DL (ref 70–110)
GLUCOSE UR QL STRIP: NEGATIVE
HCT VFR BLD AUTO: 39.2 % (ref 37–48.5)
HGB BLD-MCNC: 13.3 GM/DL (ref 12–16)
HGB UR QL STRIP: NEGATIVE
IMM GRANULOCYTES # BLD AUTO: 0.03 K/UL (ref 0–0.04)
IMM GRANULOCYTES NFR BLD AUTO: 0.4 % (ref 0–0.5)
KETONES UR QL STRIP: NEGATIVE
LEUKOCYTE ESTERASE UR QL STRIP: NEGATIVE
LIPASE SERPL-CCNC: 18 U/L (ref 4–60)
LYMPHOCYTES # BLD AUTO: 1.4 K/UL (ref 1–4.8)
MAGNESIUM SERPL-MCNC: 2.1 MG/DL (ref 1.6–2.6)
MCH RBC QN AUTO: 29.9 PG (ref 27–31)
MCHC RBC AUTO-ENTMCNC: 33.9 G/DL (ref 32–36)
MCV RBC AUTO: 88 FL (ref 82–98)
NITRITE UR QL STRIP: NEGATIVE
NUCLEATED RBC (/100WBC) (OHS): 0 /100 WBC
PH UR STRIP: 7 [PH]
PLATELET # BLD AUTO: 312 K/UL (ref 150–450)
PMV BLD AUTO: 11.1 FL (ref 9.2–12.9)
POCT GLUCOSE: 111 MG/DL (ref 70–110)
POTASSIUM SERPL-SCNC: 4.1 MMOL/L (ref 3.5–5.1)
PROT SERPL-MCNC: 7.5 GM/DL (ref 6–8.4)
PROT UR QL STRIP: NEGATIVE
RBC # BLD AUTO: 4.45 M/UL (ref 4–5.4)
RELATIVE EOSINOPHIL (OHS): 1.9 %
RELATIVE LYMPHOCYTE (OHS): 17.7 % (ref 18–48)
RELATIVE MONOCYTE (OHS): 5.4 % (ref 4–15)
RELATIVE NEUTROPHIL (OHS): 73.8 % (ref 38–73)
SODIUM SERPL-SCNC: 139 MMOL/L (ref 136–145)
SP GR UR STRIP: 1.01
TROPONIN I SERPL HS-MCNC: <3 NG/L
UROBILINOGEN UR STRIP-ACNC: NEGATIVE EU/DL
WBC # BLD AUTO: 7.89 K/UL (ref 3.9–12.7)

## 2025-08-09 PROCEDURE — 93010 ELECTROCARDIOGRAM REPORT: CPT | Mod: ,,, | Performed by: INTERNAL MEDICINE

## 2025-08-09 PROCEDURE — 63600175 PHARM REV CODE 636 W HCPCS: Mod: JZ,TB

## 2025-08-09 PROCEDURE — 83690 ASSAY OF LIPASE: CPT

## 2025-08-09 PROCEDURE — 84484 ASSAY OF TROPONIN QUANT: CPT

## 2025-08-09 PROCEDURE — 82962 GLUCOSE BLOOD TEST: CPT

## 2025-08-09 PROCEDURE — 83735 ASSAY OF MAGNESIUM: CPT

## 2025-08-09 PROCEDURE — 99285 EMERGENCY DEPT VISIT HI MDM: CPT | Mod: 25

## 2025-08-09 PROCEDURE — 25000003 PHARM REV CODE 250

## 2025-08-09 PROCEDURE — 85025 COMPLETE CBC W/AUTO DIFF WBC: CPT

## 2025-08-09 PROCEDURE — 96374 THER/PROPH/DIAG INJ IV PUSH: CPT

## 2025-08-09 PROCEDURE — 93005 ELECTROCARDIOGRAM TRACING: CPT

## 2025-08-09 PROCEDURE — 80053 COMPREHEN METABOLIC PANEL: CPT

## 2025-08-09 PROCEDURE — 81003 URINALYSIS AUTO W/O SCOPE: CPT

## 2025-08-09 PROCEDURE — 96361 HYDRATE IV INFUSION ADD-ON: CPT

## 2025-08-09 PROCEDURE — 96375 TX/PRO/DX INJ NEW DRUG ADDON: CPT

## 2025-08-09 RX ORDER — ONDANSETRON HYDROCHLORIDE 2 MG/ML
4 INJECTION, SOLUTION INTRAVENOUS
Status: COMPLETED | OUTPATIENT
Start: 2025-08-09 | End: 2025-08-09

## 2025-08-09 RX ORDER — ACETAMINOPHEN 500 MG
1000 TABLET ORAL
Status: COMPLETED | OUTPATIENT
Start: 2025-08-09 | End: 2025-08-09

## 2025-08-09 RX ORDER — MECLIZINE HYDROCHLORIDE 25 MG/1
25 TABLET ORAL 3 TIMES DAILY PRN
Qty: 20 TABLET | Refills: 0 | Status: SHIPPED | OUTPATIENT
Start: 2025-08-09 | End: 2025-08-09

## 2025-08-09 RX ORDER — MECLIZINE HYDROCHLORIDE 25 MG/1
25 TABLET ORAL 3 TIMES DAILY PRN
Qty: 20 TABLET | Refills: 0 | Status: SHIPPED | OUTPATIENT
Start: 2025-08-09

## 2025-08-09 RX ORDER — SUMATRIPTAN SUCCINATE 25 MG/1
100 TABLET ORAL
Status: COMPLETED | OUTPATIENT
Start: 2025-08-09 | End: 2025-08-09

## 2025-08-09 RX ORDER — KETOROLAC TROMETHAMINE 30 MG/ML
15 INJECTION, SOLUTION INTRAMUSCULAR; INTRAVENOUS
Status: COMPLETED | OUTPATIENT
Start: 2025-08-09 | End: 2025-08-09

## 2025-08-09 RX ORDER — MECLIZINE HYDROCHLORIDE 25 MG/1
25 TABLET ORAL
Status: COMPLETED | OUTPATIENT
Start: 2025-08-09 | End: 2025-08-09

## 2025-08-09 RX ADMIN — KETOROLAC TROMETHAMINE 15 MG: 30 INJECTION, SOLUTION INTRAMUSCULAR at 01:08

## 2025-08-09 RX ADMIN — ONDANSETRON 4 MG: 2 INJECTION INTRAMUSCULAR; INTRAVENOUS at 01:08

## 2025-08-09 RX ADMIN — ACETAMINOPHEN 1000 MG: 500 TABLET, FILM COATED ORAL at 02:08

## 2025-08-09 RX ADMIN — SODIUM CHLORIDE 1000 ML: 9 INJECTION, SOLUTION INTRAVENOUS at 01:08

## 2025-08-09 RX ADMIN — SUMATRIPTAN SUCCINATE 100 MG: 25 TABLET ORAL at 04:08

## 2025-08-09 RX ADMIN — MECLIZINE HYDROCHLORIDE 25 MG: 25 TABLET ORAL at 02:08

## 2025-08-09 NOTE — TELEPHONE ENCOUNTER
"Speaking with patient. States she is not feeling well. Started last night. She is dizzy, has dry heaves, upper abdominal pain, chills. States does not feel well at all. Triage done-dispo ED now. Verb understanding.   Reason for Disposition   [1] Pain lasts > 10 minutes AND [2] age > 50    Additional Information   Pain is mainly in upper abdomen  (Note: If needed, ask: "Is it mainly above the belly button?")   Negative: SEVERE difficulty breathing (e.g., struggling for each breath, speaks in single words)   Negative: Shock suspected (e.g., cold/pale/clammy skin, too weak to stand, low BP, rapid pulse)   Negative: Difficult to awaken or acting confused (e.g., disoriented, slurred speech)   Negative: Passed out (e.g., fainted, lost consciousness, blacked out and was not responding)   Negative: Visible sweat on face or sweat dripping down face   Negative: Sounds like a life-threatening emergency to the triager   Negative: Followed an abdomen (stomach) injury   Negative: Chest pain   Negative: [1] SEVERE pain (e.g., excruciating) AND [2] present > 1 hour    Protocols used: Abdominal Pain - Female-A-AH, Abdominal Pain - Upper-A-AH    "

## 2025-08-10 LAB
HOLD SPECIMEN: NORMAL
OHS QRS DURATION: 74 MS
OHS QTC CALCULATION: 421 MS

## 2025-08-12 ENCOUNTER — OFFICE VISIT (OUTPATIENT)
Dept: FAMILY MEDICINE | Facility: CLINIC | Age: 65
End: 2025-08-12
Payer: COMMERCIAL

## 2025-08-12 VITALS
DIASTOLIC BLOOD PRESSURE: 74 MMHG | BODY MASS INDEX: 33.84 KG/M2 | HEART RATE: 95 BPM | SYSTOLIC BLOOD PRESSURE: 127 MMHG | HEIGHT: 67 IN | WEIGHT: 215.63 LBS | OXYGEN SATURATION: 98 %

## 2025-08-12 DIAGNOSIS — R42 DIZZINESS: ICD-10-CM

## 2025-08-12 DIAGNOSIS — G43.809 OTHER MIGRAINE WITHOUT STATUS MIGRAINOSUS, NOT INTRACTABLE: Primary | ICD-10-CM

## 2025-08-12 PROCEDURE — 99214 OFFICE O/P EST MOD 30 MIN: CPT | Mod: S$GLB,,, | Performed by: NURSE PRACTITIONER

## 2025-08-12 PROCEDURE — 99999 PR PBB SHADOW E&M-EST. PATIENT-LVL IV: CPT | Mod: PBBFAC,,, | Performed by: NURSE PRACTITIONER

## 2025-08-12 PROCEDURE — 3008F BODY MASS INDEX DOCD: CPT | Mod: CPTII,S$GLB,, | Performed by: NURSE PRACTITIONER

## 2025-08-12 PROCEDURE — 3074F SYST BP LT 130 MM HG: CPT | Mod: CPTII,S$GLB,, | Performed by: NURSE PRACTITIONER

## 2025-08-12 PROCEDURE — 1159F MED LIST DOCD IN RCRD: CPT | Mod: CPTII,S$GLB,, | Performed by: NURSE PRACTITIONER

## 2025-08-12 PROCEDURE — 1160F RVW MEDS BY RX/DR IN RCRD: CPT | Mod: CPTII,S$GLB,, | Performed by: NURSE PRACTITIONER

## 2025-08-12 PROCEDURE — 3078F DIAST BP <80 MM HG: CPT | Mod: CPTII,S$GLB,, | Performed by: NURSE PRACTITIONER

## 2025-08-12 RX ORDER — SUMATRIPTAN SUCCINATE 50 MG/1
TABLET ORAL
Qty: 30 TABLET | Refills: 1 | Status: SHIPPED | OUTPATIENT
Start: 2025-08-12

## 2025-08-13 ENCOUNTER — PATIENT MESSAGE (OUTPATIENT)
Dept: FAMILY MEDICINE | Facility: CLINIC | Age: 65
End: 2025-08-13
Payer: COMMERCIAL

## (undated) DEVICE — SUT 3-0 12-18IN SILK

## (undated) DEVICE — ADAPTER CHECK FLO ADAPTER

## (undated) DEVICE — Device

## (undated) DEVICE — DRESSING TRANS 4X4 TEGADERM

## (undated) DEVICE — GUIDEWIRE ADVAN 25CM.014 180CM

## (undated) DEVICE — GUIDEWIRE WINN 300CM X .014

## (undated) DEVICE — DRESSING LEUKOPLAST FLEX 1X3IN

## (undated) DEVICE — BANDAGE ADHESIVE

## (undated) DEVICE — GUIDEWIRE STF .035X180CM ANG

## (undated) DEVICE — CATH ANGIO SOFT VU 5FR 65CM

## (undated) DEVICE — INFLATOR ENCORE

## (undated) DEVICE — SPONGE DERMACEA 4X4IN 12PLY

## (undated) DEVICE — SUT SILK 2-0 SH 18IN BLACK

## (undated) DEVICE — SEE MEDLINE ITEM 156911

## (undated) DEVICE — PACK UNIVERSAL SPLIT II

## (undated) DEVICE — GUIDEWIRE STD .035X180CM ANG

## (undated) DEVICE — CATH ANGIO ACCU VU 5FRX70CM

## (undated) DEVICE — BLADE SCALP OPHTL BEVEL STR

## (undated) DEVICE — LOOP VESSEL BLUE MAXI

## (undated) DEVICE — APPLICATOR CHLORAPREP ORN 26ML

## (undated) DEVICE — SUT 7/0 18IN PROLENE BL MO

## (undated) DEVICE — INTRODUCER VASC RADPQ 5FRX10CM

## (undated) DEVICE — KIT INTRODUCER W/GUIDEWIRE

## (undated) DEVICE — ELECTRODE REM PLYHSV RETURN 9

## (undated) DEVICE — SUT PROLENE 5-0 24 C-1 BL

## (undated) DEVICE — CATH SEEKER .018IN 90CM

## (undated) DEVICE — TUBING CNTRST INJ ADPT 72IN

## (undated) DEVICE — SUT 2/0 36IN COATED VICRYL

## (undated) DEVICE — SET DECANTER MEDICHOICE

## (undated) DEVICE — CLIP MED TICALL

## (undated) DEVICE — SUT 2-0 12-18IN SILK

## (undated) DEVICE — SUT MCRYL PLUS 4-0 PS2 27IN

## (undated) DEVICE — KIT INTRO MICRO NIT VSI 4FR

## (undated) DEVICE — WIRE GUIDE J .035 X 180CM

## (undated) DEVICE — SUT VICRYL 3-0 27 SH

## (undated) DEVICE — SEE MEDLINE ITEM 152622

## (undated) DEVICE — ADHESIVE DERMABOND ADVANCED

## (undated) DEVICE — VISE RADIFOCUS MULTI TORQUE

## (undated) DEVICE — CATH STERLING MR 4X30X135

## (undated) DEVICE — GUIDEWIRE V14 CONTROLWIRE 300

## (undated) DEVICE — CATH ALL PUR URTHL RR 20FR

## (undated) DEVICE — COVER LIGHT HANDLE 80/CA

## (undated) DEVICE — CATH STERLING OTW 4X220X150

## (undated) DEVICE — BLLN MUSTANG 7X80X75

## (undated) DEVICE — SUT PROLENE 6-0 C-1 30IN BL

## (undated) DEVICE — NDL HYPO REG 25G X 1 1/2